# Patient Record
Sex: FEMALE | Race: WHITE | Employment: OTHER | URBAN - NONMETROPOLITAN AREA
[De-identification: names, ages, dates, MRNs, and addresses within clinical notes are randomized per-mention and may not be internally consistent; named-entity substitution may affect disease eponyms.]

---

## 2017-04-13 ENCOUNTER — TELEPHONE (OUTPATIENT)
Dept: NEUROSURGERY | Age: 57
End: 2017-04-13

## 2017-04-20 ENCOUNTER — OFFICE VISIT (OUTPATIENT)
Dept: URGENT CARE | Age: 57
End: 2017-04-20
Payer: COMMERCIAL

## 2017-04-20 VITALS
TEMPERATURE: 98.7 F | HEIGHT: 68 IN | RESPIRATION RATE: 18 BRPM | BODY MASS INDEX: 34.56 KG/M2 | SYSTOLIC BLOOD PRESSURE: 128 MMHG | OXYGEN SATURATION: 94 % | DIASTOLIC BLOOD PRESSURE: 72 MMHG | WEIGHT: 228 LBS | HEART RATE: 87 BPM

## 2017-04-20 DIAGNOSIS — J40 BRONCHITIS: Primary | ICD-10-CM

## 2017-04-20 PROCEDURE — 99213 OFFICE O/P EST LOW 20 MIN: CPT | Performed by: NURSE PRACTITIONER

## 2017-04-20 RX ORDER — METHYLPREDNISOLONE 4 MG/1
TABLET ORAL
Qty: 1 KIT | Refills: 0 | Status: SHIPPED | OUTPATIENT
Start: 2017-04-20 | End: 2017-04-26

## 2017-04-20 RX ORDER — AZITHROMYCIN 250 MG/1
TABLET, FILM COATED ORAL
Qty: 1 PACKET | Refills: 0 | Status: SHIPPED | OUTPATIENT
Start: 2017-04-20 | End: 2017-04-30

## 2017-04-20 RX ORDER — BENZONATATE 100 MG/1
100 CAPSULE ORAL 3 TIMES DAILY PRN
Qty: 21 CAPSULE | Refills: 0 | Status: SHIPPED | OUTPATIENT
Start: 2017-04-20 | End: 2017-04-27

## 2017-04-20 ASSESSMENT — ENCOUNTER SYMPTOMS
GASTROINTESTINAL NEGATIVE: 1
COUGH: 1
SINUS PRESSURE: 1
SORE THROAT: 0

## 2017-06-13 ENCOUNTER — OFFICE VISIT (OUTPATIENT)
Dept: NEUROSURGERY | Age: 57
End: 2017-06-13
Payer: COMMERCIAL

## 2017-06-13 VITALS
BODY MASS INDEX: 36.8 KG/M2 | SYSTOLIC BLOOD PRESSURE: 110 MMHG | DIASTOLIC BLOOD PRESSURE: 70 MMHG | WEIGHT: 229 LBS | HEIGHT: 66 IN | HEART RATE: 64 BPM | OXYGEN SATURATION: 95 %

## 2017-06-13 DIAGNOSIS — R41.3 MEMORY LOSS: Primary | ICD-10-CM

## 2017-06-13 DIAGNOSIS — R41.3 MEMORY LOSS: ICD-10-CM

## 2017-06-13 LAB
ALBUMIN SERPL-MCNC: 4.3 G/DL (ref 3.5–5.2)
ALP BLD-CCNC: 88 U/L (ref 35–104)
ALT SERPL-CCNC: 51 U/L (ref 5–33)
ANION GAP SERPL CALCULATED.3IONS-SCNC: 19 MMOL/L (ref 7–19)
AST SERPL-CCNC: 53 U/L (ref 5–32)
BASOPHILS ABSOLUTE: 0 K/UL (ref 0–0.2)
BASOPHILS RELATIVE PERCENT: 0.4 % (ref 0–1)
BILIRUB SERPL-MCNC: 0.5 MG/DL (ref 0.2–1.2)
BUN BLDV-MCNC: 15 MG/DL (ref 6–20)
CALCIUM SERPL-MCNC: 9.3 MG/DL (ref 8.6–10)
CHLORIDE BLD-SCNC: 101 MMOL/L (ref 98–111)
CO2: 23 MMOL/L (ref 22–29)
CREAT SERPL-MCNC: 0.7 MG/DL (ref 0.5–0.9)
EOSINOPHILS ABSOLUTE: 0.2 K/UL (ref 0–0.6)
EOSINOPHILS RELATIVE PERCENT: 3.6 % (ref 0–5)
GFR NON-AFRICAN AMERICAN: >60
GLUCOSE BLD-MCNC: 82 MG/DL (ref 74–109)
HCT VFR BLD CALC: 43.4 % (ref 37–47)
HEMOGLOBIN: 13.9 G/DL (ref 12–16)
LYMPHOCYTES ABSOLUTE: 2.3 K/UL (ref 1.1–4.5)
LYMPHOCYTES RELATIVE PERCENT: 34.5 % (ref 20–40)
MCH RBC QN AUTO: 29 PG (ref 27–31)
MCHC RBC AUTO-ENTMCNC: 32 G/DL (ref 33–37)
MCV RBC AUTO: 90.4 FL (ref 81–99)
MONOCYTES ABSOLUTE: 0.4 K/UL (ref 0–0.9)
MONOCYTES RELATIVE PERCENT: 6.1 % (ref 0–10)
NEUTROPHILS ABSOLUTE: 3.7 K/UL (ref 1.5–7.5)
NEUTROPHILS RELATIVE PERCENT: 55.1 % (ref 50–65)
PDW BLD-RTO: 12.9 % (ref 11.5–14.5)
PLATELET # BLD: 184 K/UL (ref 130–400)
PMV BLD AUTO: 10.2 FL (ref 9.4–12.3)
POTASSIUM SERPL-SCNC: 3.2 MMOL/L (ref 3.5–5)
RBC # BLD: 4.8 M/UL (ref 4.2–5.4)
RHEUMATOID FACTOR: <10 IU/ML
SODIUM BLD-SCNC: 143 MMOL/L (ref 136–145)
T4 FREE: 1.1 NG/ML (ref 0.9–1.7)
TOTAL PROTEIN: 7.6 G/DL (ref 6.6–8.7)
TSH SERPL DL<=0.05 MIU/L-ACNC: 1.72 UIU/ML (ref 0.27–4.2)
VITAMIN B-12: 610 PG/ML (ref 211–946)
WBC # BLD: 6.8 K/UL (ref 4.8–10.8)

## 2017-06-13 PROCEDURE — 99204 OFFICE O/P NEW MOD 45 MIN: CPT | Performed by: PSYCHIATRY & NEUROLOGY

## 2017-06-13 RX ORDER — DILTIAZEM HYDROCHLORIDE 360 MG/1
360 CAPSULE, EXTENDED RELEASE ORAL DAILY
COMMUNITY
End: 2018-06-11 | Stop reason: ALTCHOICE

## 2017-06-13 RX ORDER — ESCITALOPRAM OXALATE 10 MG/1
10 TABLET ORAL NIGHTLY
COMMUNITY

## 2017-06-13 RX ORDER — DONEPEZIL HYDROCHLORIDE 10 MG/1
10 TABLET, FILM COATED ORAL NIGHTLY
COMMUNITY
End: 2018-12-13 | Stop reason: SDUPTHER

## 2017-06-13 RX ORDER — MEMANTINE HYDROCHLORIDE 28 MG/1
28 CAPSULE, EXTENDED RELEASE ORAL DAILY
Qty: 30 CAPSULE | Refills: 5 | Status: SHIPPED | OUTPATIENT
Start: 2017-06-13 | End: 2017-06-16

## 2017-06-14 ENCOUNTER — TELEPHONE (OUTPATIENT)
Dept: NEUROLOGY | Age: 57
End: 2017-06-14

## 2017-06-15 LAB
HIV-1 AND HIV-2 ANTIBODIES: NEGATIVE
RPR: NORMAL
THYROID PEROXIDASE (TPO) ABS: 1.3 IU/ML (ref 0–9)

## 2017-06-16 ENCOUNTER — TELEPHONE (OUTPATIENT)
Dept: NEUROSURGERY | Age: 57
End: 2017-06-16

## 2017-06-16 LAB
ARSENIC BLOOD: <10 UG/L (ref 0–13)
LEAD LEVEL BLOOD: <2 UG/DL (ref 0–4.9)
MERCURY BLOOD: <3 UG/L (ref 0–10)
VITAMIN B1 WHOLE BLOOD: 139 NMOL/L (ref 70–180)

## 2017-06-16 RX ORDER — MEMANTINE HYDROCHLORIDE 10 MG/1
10 TABLET ORAL 2 TIMES DAILY
Qty: 60 TABLET | Refills: 5 | Status: SHIPPED | OUTPATIENT
Start: 2017-06-16 | End: 2017-09-19 | Stop reason: SINTOL

## 2017-06-16 RX ORDER — MEMANTINE HYDROCHLORIDE 5 MG-10 MG
KIT ORAL
Qty: 1 PACKAGE | Refills: 0 | Status: SHIPPED | OUTPATIENT
Start: 2017-06-16 | End: 2017-09-19 | Stop reason: SINTOL

## 2017-06-17 LAB — THYROID STIMULATING IMMUNOGLOBULIN: 92 %

## 2017-06-26 ENCOUNTER — TELEPHONE (OUTPATIENT)
Dept: NEUROLOGY | Age: 57
End: 2017-06-26

## 2017-07-10 ENCOUNTER — HOSPITAL ENCOUNTER (OUTPATIENT)
Dept: MRI IMAGING | Age: 57
Discharge: HOME | End: 2017-07-10
Payer: COMMERCIAL

## 2017-07-10 DIAGNOSIS — R41.3 MEMORY LOSS: ICD-10-CM

## 2017-07-10 PROCEDURE — 70553 MRI BRAIN STEM W/O & W/DYE: CPT

## 2017-07-10 PROCEDURE — 6360000004 HC RX CONTRAST MEDICATION: Performed by: PSYCHIATRY & NEUROLOGY

## 2017-07-10 PROCEDURE — A9579 GAD-BASE MR CONTRAST NOS,1ML: HCPCS | Performed by: PSYCHIATRY & NEUROLOGY

## 2017-07-10 RX ADMIN — GADOPENTETATE DIMEGLUMINE 20 ML: 469.01 INJECTION INTRAVENOUS at 13:38

## 2017-07-13 ENCOUNTER — TELEPHONE (OUTPATIENT)
Dept: NEUROLOGY | Age: 57
End: 2017-07-13

## 2017-07-27 ENCOUNTER — OFFICE VISIT (OUTPATIENT)
Dept: URGENT CARE | Age: 57
End: 2017-07-27
Payer: COMMERCIAL

## 2017-07-27 VITALS
HEIGHT: 66 IN | DIASTOLIC BLOOD PRESSURE: 65 MMHG | BODY MASS INDEX: 36.32 KG/M2 | RESPIRATION RATE: 20 BRPM | WEIGHT: 226 LBS | HEART RATE: 66 BPM | SYSTOLIC BLOOD PRESSURE: 122 MMHG | TEMPERATURE: 98.9 F | OXYGEN SATURATION: 96 %

## 2017-07-27 DIAGNOSIS — J01.00 ACUTE NON-RECURRENT MAXILLARY SINUSITIS: Primary | ICD-10-CM

## 2017-07-27 PROCEDURE — 99213 OFFICE O/P EST LOW 20 MIN: CPT | Performed by: NURSE PRACTITIONER

## 2017-07-27 RX ORDER — FLUTICASONE PROPIONATE 50 MCG
1 SPRAY, SUSPENSION (ML) NASAL DAILY
Qty: 1 BOTTLE | Refills: 0 | Status: SHIPPED | OUTPATIENT
Start: 2017-07-27 | End: 2018-08-14

## 2017-07-27 RX ORDER — AMOXICILLIN AND CLAVULANATE POTASSIUM 875; 125 MG/1; MG/1
1 TABLET, FILM COATED ORAL 2 TIMES DAILY
Qty: 20 TABLET | Refills: 0 | Status: SHIPPED | OUTPATIENT
Start: 2017-07-27 | End: 2017-08-06

## 2017-07-27 ASSESSMENT — ENCOUNTER SYMPTOMS
COUGH: 1
SINUS COMPLAINT: 1
EYE REDNESS: 0
SORE THROAT: 0
VOMITING: 0
SHORTNESS OF BREATH: 0
DIARRHEA: 0
SINUS PRESSURE: 1
EYE DISCHARGE: 0

## 2017-09-13 ENCOUNTER — HOSPITAL ENCOUNTER (OUTPATIENT)
Dept: NEUROLOGY | Age: 57
Discharge: HOME | End: 2017-09-13
Payer: COMMERCIAL

## 2017-09-13 PROCEDURE — 95816 EEG AWAKE AND DROWSY: CPT

## 2017-09-13 PROCEDURE — 95819 EEG AWAKE AND ASLEEP: CPT | Performed by: PSYCHIATRY & NEUROLOGY

## 2017-09-19 ENCOUNTER — OFFICE VISIT (OUTPATIENT)
Dept: NEUROSURGERY | Age: 57
End: 2017-09-19
Payer: COMMERCIAL

## 2017-09-19 VITALS
SYSTOLIC BLOOD PRESSURE: 123 MMHG | WEIGHT: 231 LBS | BODY MASS INDEX: 37.12 KG/M2 | HEIGHT: 66 IN | DIASTOLIC BLOOD PRESSURE: 75 MMHG | OXYGEN SATURATION: 96 % | HEART RATE: 76 BPM

## 2017-09-19 DIAGNOSIS — R41.3 MEMORY LOSS: Primary | ICD-10-CM

## 2017-09-19 PROCEDURE — 99214 OFFICE O/P EST MOD 30 MIN: CPT | Performed by: PSYCHIATRY & NEUROLOGY

## 2017-09-20 ENCOUNTER — TRANSCRIBE ORDERS (OUTPATIENT)
Dept: ADMINISTRATIVE | Facility: HOSPITAL | Age: 57
End: 2017-09-20

## 2017-09-20 DIAGNOSIS — Z12.31 ENCOUNTER FOR SCREENING MAMMOGRAM FOR MALIGNANT NEOPLASM OF BREAST: Primary | ICD-10-CM

## 2017-11-20 DIAGNOSIS — Z12.11 COLON CANCER SCREENING: Primary | ICD-10-CM

## 2017-11-20 LAB
CONTROL: NORMAL
HEMOCCULT STL QL: NEGATIVE

## 2017-11-21 ENCOUNTER — HOSPITAL ENCOUNTER (OUTPATIENT)
Dept: CT IMAGING | Age: 57
Discharge: HOME | End: 2017-11-21
Payer: COMMERCIAL

## 2017-11-21 DIAGNOSIS — R10.9 ABDOMINAL PAIN, UNSPECIFIED ABDOMINAL LOCATION: ICD-10-CM

## 2017-11-21 PROCEDURE — 74177 CT ABD & PELVIS W/CONTRAST: CPT

## 2017-11-21 PROCEDURE — 6360000004 HC RX CONTRAST MEDICATION: Performed by: NURSE PRACTITIONER

## 2017-11-21 RX ADMIN — IOVERSOL 90 ML: 636 INJECTION INTRA-ARTERIAL; INTRAVENOUS at 08:15

## 2017-11-22 ENCOUNTER — TRANSCRIBE ORDERS (OUTPATIENT)
Dept: ADMINISTRATIVE | Facility: HOSPITAL | Age: 57
End: 2017-11-22

## 2017-11-22 ENCOUNTER — APPOINTMENT (OUTPATIENT)
Dept: LAB | Facility: HOSPITAL | Age: 57
End: 2017-11-22

## 2017-11-22 DIAGNOSIS — R19.7 DIARRHEA, UNSPECIFIED TYPE: Primary | ICD-10-CM

## 2017-11-24 ENCOUNTER — TRANSCRIBE ORDERS (OUTPATIENT)
Dept: LAB | Facility: HOSPITAL | Age: 57
End: 2017-11-24

## 2017-11-24 ENCOUNTER — APPOINTMENT (OUTPATIENT)
Dept: LAB | Facility: HOSPITAL | Age: 57
End: 2017-11-24

## 2017-11-24 DIAGNOSIS — R19.7 DIARRHEA, UNSPECIFIED TYPE: Primary | ICD-10-CM

## 2017-11-24 LAB
ADV 40+41 DNA STL QL NAA+NON-PROBE: NOT DETECTED
ASTRO TYP 1-8 RNA STL QL NAA+NON-PROBE: NOT DETECTED
C CAYETANENSIS DNA STL QL NAA+NON-PROBE: NOT DETECTED
C DIFF TOX GENS STL QL NAA+PROBE: NOT DETECTED
CAMPY SP DNA.DIARRHEA STL QL NAA+PROBE: NOT DETECTED
CRYPTOSP STL CULT: NOT DETECTED
E COLI DNA SPEC QL NAA+PROBE: NOT DETECTED
E HISTOLYT AG STL-ACNC: NOT DETECTED
EAEC PAA PLAS AGGR+AATA ST NAA+NON-PRB: NOT DETECTED
EC STX1+STX2 GENES STL QL NAA+NON-PROBE: NOT DETECTED
EPEC EAE GENE STL QL NAA+NON-PROBE: NOT DETECTED
ETEC LTA+ST1A+ST1B TOX ST NAA+NON-PROBE: NOT DETECTED
G LAMBLIA DNA SPEC QL NAA+PROBE: NOT DETECTED
NOROVIRUS GI+II RNA STL QL NAA+NON-PROBE: NOT DETECTED
P SHIGELLOIDES DNA STL QL NAA+NON-PROBE: NOT DETECTED
RV RNA STL NAA+PROBE: NOT DETECTED
SALMONELLA DNA SPEC QL NAA+PROBE: NOT DETECTED
SAPO I+II+IV+V RNA STL QL NAA+NON-PROBE: NOT DETECTED
SHIGELLA SP+EIEC IPAH ST NAA+NON-PROBE: NOT DETECTED
V CHOLERAE DNA SPEC QL NAA+PROBE: NOT DETECTED
VIBRIO DNA SPEC NAA+PROBE: NOT DETECTED
YERSINIA STL CULT: NOT DETECTED

## 2017-11-24 PROCEDURE — 87507 IADNA-DNA/RNA PROBE TQ 12-25: CPT | Performed by: NURSE PRACTITIONER

## 2017-11-28 ENCOUNTER — OFFICE VISIT (OUTPATIENT)
Dept: GASTROENTEROLOGY | Facility: CLINIC | Age: 57
End: 2017-11-28

## 2017-11-28 VITALS
WEIGHT: 230 LBS | HEART RATE: 80 BPM | DIASTOLIC BLOOD PRESSURE: 64 MMHG | BODY MASS INDEX: 36.96 KG/M2 | SYSTOLIC BLOOD PRESSURE: 118 MMHG | HEIGHT: 66 IN | TEMPERATURE: 99 F | OXYGEN SATURATION: 97 %

## 2017-11-28 DIAGNOSIS — R19.7 DIARRHEA, UNSPECIFIED TYPE: Primary | ICD-10-CM

## 2017-11-28 DIAGNOSIS — R11.0 NAUSEA: ICD-10-CM

## 2017-11-28 DIAGNOSIS — R41.3 MEMORY DEFICIT: ICD-10-CM

## 2017-11-28 DIAGNOSIS — K52.9 COLITIS: ICD-10-CM

## 2017-11-28 PROCEDURE — 99204 OFFICE O/P NEW MOD 45 MIN: CPT | Performed by: NURSE PRACTITIONER

## 2017-11-28 RX ORDER — LATANOPROST 50 UG/ML
SOLUTION/ DROPS OPHTHALMIC
COMMUNITY
Start: 2017-10-13 | End: 2021-08-26

## 2017-11-28 RX ORDER — DILTIAZEM HYDROCHLORIDE 360 MG/1
360 CAPSULE, EXTENDED RELEASE ORAL DAILY
COMMUNITY
Start: 2017-09-20 | End: 2023-03-13

## 2017-11-28 RX ORDER — ESCITALOPRAM OXALATE 10 MG/1
10 TABLET ORAL DAILY
COMMUNITY
Start: 2017-11-15

## 2017-11-28 RX ORDER — DONEPEZIL HYDROCHLORIDE 10 MG/1
10 TABLET, FILM COATED ORAL NIGHTLY
COMMUNITY
Start: 2017-11-17

## 2017-11-28 RX ORDER — TRIAMTERENE AND HYDROCHLOROTHIAZIDE 37.5; 25 MG/1; MG/1
CAPSULE ORAL
COMMUNITY
Start: 2017-09-20 | End: 2021-08-26

## 2017-11-28 NOTE — PROGRESS NOTES
Chief Complaint:   Chief Complaint   Patient presents with   • Diarrhea     Patient is here today with complaints of diarrhea.         Patient ID: Geneva Knight is a 56 y.o. female     History of Present Illness:  This is a very pleasant 56-year-old female who was referred to our office by JR Hoyt for diarrhea.  The patient tells me that this started occurring gradually around the beginning to the middle of October.  She states at that time she was having 5-7 watery stools a day.  She was seen by her PCP who ordered a CT of the abdomen and pelvis that revealed distal colon changes that could represent acute colitis there was no obstruction.  Stool cultures were taken on 11/24/17 and were found to be negative for pathogens.  The patient states that she is still having the diarrhea but it has decreased to only occur every few days now.  The patient states that she has had some nausea with this.  She states she cannot find any relieving or exacerbating factors.    The patient denies any vomiting, epigastric pain, dysphagia, pyrosis or hematemesis.  She denies any fever or chills.  She denies any melena or hematochezia.  She denies any constipation.  She denies any abdominal pain.  She denies any unintentional weight loss or loss of appetite.     Last colonoscopy was 7/25/13 normal.         Past Medical History:   Diagnosis Date   • GERD (gastroesophageal reflux disease)    • HTN (hypertension)    • Hyperlipemia    • VIOLETA (obstructive sleep apnea)        Past Surgical History:   Procedure Laterality Date   • CHOLECYSTECTOMY     • COLONOSCOPY  07/25/2013         Current Outpatient Prescriptions:   •  diltiaZEM CD (CARDIZEM CD) 360 MG 24 hr capsule, , Disp: , Rfl:   •  donepezil (ARICEPT) 10 MG tablet, , Disp: , Rfl:   •  escitalopram (LEXAPRO) 10 MG tablet, , Disp: , Rfl:   •  latanoprost (XALATAN) 0.005 % ophthalmic solution, , Disp: , Rfl:   •  triamterene-hydrochlorothiazide (DYAZIDE) 37.5-25 MG per  "capsule, , Disp: , Rfl:   •  polyethylene glycol (GOLYTELY) 236 g solution, Take 4,000 mL by mouth 1 (One) Time for 1 dose. As directed by instructions provided at office, Disp: 4000 mL, Rfl: 0    No Known Allergies    Social History     Social History   • Marital status:      Spouse name: N/A   • Number of children: N/A   • Years of education: N/A     Occupational History   • Not on file.     Social History Main Topics   • Smoking status: Never Smoker   • Smokeless tobacco: Never Used   • Alcohol use No   • Drug use: Not on file   • Sexual activity: Not on file     Other Topics Concern   • Not on file     Social History Narrative       Family History   Problem Relation Age of Onset   • Breast cancer Mother    • Breast cancer Maternal Grandmother    • Colon cancer Neg Hx    • Colon polyps Neg Hx        Vitals:    11/28/17 0821   BP: 118/64   Pulse: 80   Temp: 99 °F (37.2 °C)   SpO2: 97%   Weight: 230 lb (104 kg)   Height: 66\" (167.6 cm)       Review of Systems:    General:    Present -feeling well   Skin:    Not Present-Rash   HEENT:     Not Present-Acute visual changes or Acute hearing changes   Neck :    Not Present- swollen glands   Genitourinary:      Not Present- burning, frequency, urgency hematuria, dysuria,   Cardiovascular:   Not Present-chest pain, palpitations, or pressure   Respiratory:   Not Present- shortness of breath or cough   Gastrointestinal:  Musculoskeletal:  Neurological:  Psychiatric:   Present as mentioned in the HP    Not Present. Recent gait disturbances.    Not Present-Seizures and weakness in extremities.    Not Present- Anxiety or Depression.       Physical Exam:    General Appearance:    Alert, cooperative, in no acute distress   Psych:    Mood appropriate    Eyes:          conjunctivae and sclerae normal, no   icterus, no pallor   ENMT:    Ears appear intact with no abnormalities noted oral mucosa moist   Neck:   No adenopathy, supple, trachea midline, no thyromegaly, no   " carotid bruit, no JVD    Cardiovascular:    Regular rhythm and normal rate, normal S1 and S2, no            murmur, no gallop, no rub, no click   Gastrointestinal:     Inspection normal.  Normal bowel sounds, no masses, no organomegaly, soft round non-tender, non-distended, no guarding, no rebound or tenderness. No hepatosplenomegaly.   Skin:   No bleeding, bruising or rash   Neurologic:   nonfocal       No results found for: WBC, HGB, HCT, PLT     No results found for: NA, K, CL, CO2, BUN, CREATININE, BILITOT, ALKPHOS, ALT, AST, GLUCOSE    No results found for: INR    Assessment and Plan:    Geneva was seen today for diarrhea.    Diagnoses and all orders for this visit:    Diarrhea, unspecified type  -     Case Request; Standing  -     Case Request Colonoscopy    Colitis  -     Case Request; Standing  -     Case Request    Nausea  -     Case Request; Standing  -     Case Request    Memory deficit    Other orders  -     Implement Anesthesia Orders Day of Procedure; Standing  -     Obtain Informed Consent; Standing  -     Verify bowel prep was successful; Standing  -     polyethylene glycol (GOLYTELY) 236 g solution; Take 4,000 mL by mouth 1 (One) Time for 1 dose. As directed by instructions provided at office      The risks, benefits, and alternatives of colonoscopy were reviewed with the patient today.  Risks including perforation of the colon possibly requiring surgery or colostomy.  Additional risks include risk of bleeding from biopsies or removal of colon tissue.  There is also the risk of a drug reaction or problems with anesthesia.  This will be discussed with the further by the anesthesia team on the day of the procedure.  Lastly there is a possibility of missing a colon polyp or cancer.  The benefits include the diagnosis and management of disease of the colon and rectum.  Alternatives to colonoscopy include barium enema, laboratory testing, radiographic evaluation, or no intervention.  The patient  verbalizes understanding and agrees.      Next follow-up appointment      EMR Dragon/Transcription disclaimer:  Much of this encounter note is an electronic transcription/translation of spoken language to printed text. The electronic translation of spoken language may permit erroneous, or at times, nonsensical words or phrases to be inadvertently transcribed; although I have reviewed the note for such errors, some may still exist.

## 2017-12-18 ENCOUNTER — HOSPITAL ENCOUNTER (OUTPATIENT)
Facility: HOSPITAL | Age: 57
Setting detail: HOSPITAL OUTPATIENT SURGERY
Discharge: HOME | End: 2017-12-18
Attending: INTERNAL MEDICINE | Admitting: INTERNAL MEDICINE

## 2017-12-18 ENCOUNTER — ANESTHESIA (OUTPATIENT)
Dept: GASTROENTEROLOGY | Facility: HOSPITAL | Age: 57
End: 2017-12-18

## 2017-12-18 ENCOUNTER — ANESTHESIA EVENT (OUTPATIENT)
Dept: GASTROENTEROLOGY | Facility: HOSPITAL | Age: 57
End: 2017-12-18

## 2017-12-18 VITALS
RESPIRATION RATE: 17 BRPM | SYSTOLIC BLOOD PRESSURE: 121 MMHG | HEIGHT: 66 IN | WEIGHT: 229 LBS | HEART RATE: 67 BPM | TEMPERATURE: 97.6 F | DIASTOLIC BLOOD PRESSURE: 70 MMHG | OXYGEN SATURATION: 94 % | BODY MASS INDEX: 36.8 KG/M2

## 2017-12-18 DIAGNOSIS — R19.7 DIARRHEA, UNSPECIFIED TYPE: ICD-10-CM

## 2017-12-18 DIAGNOSIS — K52.9 COLITIS: ICD-10-CM

## 2017-12-18 DIAGNOSIS — R11.0 NAUSEA: ICD-10-CM

## 2017-12-18 PROBLEM — K63.5 COLON POLYPS: Status: ACTIVE | Noted: 2017-12-18

## 2017-12-18 PROCEDURE — 88305 TISSUE EXAM BY PATHOLOGIST: CPT | Performed by: INTERNAL MEDICINE

## 2017-12-18 PROCEDURE — 45380 COLONOSCOPY AND BIOPSY: CPT | Performed by: INTERNAL MEDICINE

## 2017-12-18 PROCEDURE — 45385 COLONOSCOPY W/LESION REMOVAL: CPT | Performed by: INTERNAL MEDICINE

## 2017-12-18 PROCEDURE — 25010000002 PROPOFOL 10 MG/ML EMULSION: Performed by: NURSE ANESTHETIST, CERTIFIED REGISTERED

## 2017-12-18 RX ORDER — SODIUM CHLORIDE 9 MG/ML
500 INJECTION, SOLUTION INTRAVENOUS CONTINUOUS PRN
Status: DISCONTINUED | OUTPATIENT
Start: 2017-12-18 | End: 2017-12-18 | Stop reason: HOSPADM

## 2017-12-18 RX ORDER — SODIUM CHLORIDE 9 MG/ML
100 INJECTION, SOLUTION INTRAVENOUS CONTINUOUS
Status: CANCELLED | OUTPATIENT
Start: 2017-12-18

## 2017-12-18 RX ORDER — SODIUM CHLORIDE 0.9 % (FLUSH) 0.9 %
3 SYRINGE (ML) INJECTION AS NEEDED
Status: DISCONTINUED | OUTPATIENT
Start: 2017-12-18 | End: 2017-12-18 | Stop reason: HOSPADM

## 2017-12-18 RX ORDER — PROPOFOL 10 MG/ML
VIAL (ML) INTRAVENOUS AS NEEDED
Status: DISCONTINUED | OUTPATIENT
Start: 2017-12-18 | End: 2017-12-18 | Stop reason: SURG

## 2017-12-18 RX ORDER — SODIUM CHLORIDE 0.9 % (FLUSH) 0.9 %
1-10 SYRINGE (ML) INJECTION AS NEEDED
Status: CANCELLED | OUTPATIENT
Start: 2017-12-18

## 2017-12-18 RX ORDER — LIDOCAINE HYDROCHLORIDE 20 MG/ML
INJECTION, SOLUTION INFILTRATION; PERINEURAL AS NEEDED
Status: DISCONTINUED | OUTPATIENT
Start: 2017-12-18 | End: 2017-12-18 | Stop reason: SURG

## 2017-12-18 RX ADMIN — SODIUM CHLORIDE 500 ML: 9 INJECTION, SOLUTION INTRAVENOUS at 07:35

## 2017-12-18 RX ADMIN — PROPOFOL 100 MG: 10 INJECTION, EMULSION INTRAVENOUS at 09:17

## 2017-12-18 RX ADMIN — PROPOFOL 100 MG: 10 INJECTION, EMULSION INTRAVENOUS at 09:03

## 2017-12-18 RX ADMIN — LIDOCAINE HYDROCHLORIDE 0.5 ML: 10 INJECTION, SOLUTION EPIDURAL; INFILTRATION; INTRACAUDAL; PERINEURAL at 07:35

## 2017-12-18 RX ADMIN — PROPOFOL 50 MG: 10 INJECTION, EMULSION INTRAVENOUS at 09:10

## 2017-12-18 RX ADMIN — PROPOFOL 50 MG: 10 INJECTION, EMULSION INTRAVENOUS at 09:06

## 2017-12-18 RX ADMIN — LIDOCAINE HYDROCHLORIDE 50 MG: 20 INJECTION, SOLUTION INFILTRATION; PERINEURAL at 09:03

## 2017-12-18 RX ADMIN — PROPOFOL 50 MG: 10 INJECTION, EMULSION INTRAVENOUS at 09:23

## 2017-12-18 RX ADMIN — PROPOFOL 50 MG: 10 INJECTION, EMULSION INTRAVENOUS at 09:14

## 2017-12-18 RX ADMIN — PROPOFOL 50 MG: 10 INJECTION, EMULSION INTRAVENOUS at 09:29

## 2017-12-18 RX ADMIN — PROPOFOL 50 MG: 10 INJECTION, EMULSION INTRAVENOUS at 09:26

## 2017-12-18 RX ADMIN — PROPOFOL 100 MG: 10 INJECTION, EMULSION INTRAVENOUS at 09:20

## 2017-12-18 NOTE — PLAN OF CARE
Problem: GI Endoscopy (Adult)  Goal: Signs and Symptoms of Listed Potential Problems Will be Absent or Manageable (GI Endoscopy)  Outcome: Ongoing (interventions implemented as appropriate)    12/18/17 8835   GI Endoscopy   Problems Assessed (GI Endoscopy) all   Problems Present (GI Endoscopy) none

## 2017-12-18 NOTE — ANESTHESIA PREPROCEDURE EVALUATION
Anesthesia Evaluation     no history of anesthetic complications:  NPO Solid Status: > 8 hours  NPO Liquid Status: > 2 hours     Airway   Mallampati: II  TM distance: >3 FB  Neck ROM: full  no difficulty expected  Dental - normal exam     Pulmonary - normal exam    breath sounds clear to auscultation  (+) sleep apnea on CPAP,   (-) asthma, recent URI, not a smoker  Cardiovascular - normal exam  Exercise tolerance: good (4-7 METS)    Rhythm: regular  Rate: normal    (+) hypertension well controlled,   (-) pacemaker, past MI, angina, cardiac stents, CABG      Neuro/Psych  (-) seizures, TIA, CVA  GI/Hepatic/Renal/Endo    (+) obesity,    (-) GERD, liver disease, no renal disease, diabetes, hypothyroidism, hyperthyroidism    Musculoskeletal     Abdominal    Substance History      OB/GYN          Other                                        Anesthesia Plan    ASA 2     general   total IV anesthesia  intravenous induction   Anesthetic plan and risks discussed with patient.

## 2017-12-18 NOTE — ANESTHESIA POSTPROCEDURE EVALUATION
Patient: Geneva Knight    Procedure Summary     Date Anesthesia Start Anesthesia Stop Room / Location    12/18/17 0902 0933 [facility] ENDOSCOPY 5 / [facility]       Procedure Diagnosis Surgeon Provider    COLONOSCOPY WITH ANESTHESIA (N/A ) Colitis; Nausea; Diarrhea, unspecified type  (Colitis [K52.9]; Nausea [R11.0]; Diarrhea, unspecified type [R19.7]) MD JOEL Arrington CRNA          Anesthesia Type: general  Last vitals  BP   131/63 (12/18/17 0935)   Temp   97.6 °F (36.4 °C) (12/18/17 0725)   Pulse   77 (12/18/17 0935)   Resp   18 (12/18/17 0935)     SpO2   93 % (12/18/17 0935)     Post Anesthesia Care and Evaluation    Patient location during evaluation: PACU  Patient participation: complete - patient participated  Level of consciousness: awake and alert  Pain score: 0  Pain management: adequate  Airway patency: patent  Anesthetic complications: No anesthetic complications    Cardiovascular status: acceptable and stable  Respiratory status: acceptable and unassisted  Hydration status: acceptable

## 2017-12-18 NOTE — PLAN OF CARE
Problem: Patient Care Overview (Adult)  Goal: Plan of Care Review  Outcome: Outcome(s) achieved Date Met:  12/18/17 12/18/17 0937   Coping/Psychosocial Response Interventions   Plan Of Care Reviewed With patient;spouse   Patient Care Overview   Progress improving   Outcome Evaluation   Outcome Summary/Follow up Plan discharge criteria met

## 2017-12-18 NOTE — PLAN OF CARE
Problem: Patient Care Overview (Adult)  Goal: Adult Individualization and Mutuality  Outcome: Ongoing (interventions implemented as appropriate)    12/18/17 0723   Individualization   Patient Specific Preferences none   Patient Specific Goals none   Patient Specific Interventions none   Mutuality/Individual Preferences   What Anxieties, Fears or Concerns Do You Have About Your Health or Care? none   What Questions Do You Have About Your Health or Care? none   What Information Would Help Us Give You More Personalized Care? none

## 2017-12-18 NOTE — PLAN OF CARE
Problem: GI Endoscopy (Adult)  Goal: Signs and Symptoms of Listed Potential Problems Will be Absent or Manageable (GI Endoscopy)  Outcome: Outcome(s) achieved Date Met:  12/18/17 12/18/17 0913   GI Endoscopy   Problems Assessed (GI Endoscopy) all   Problems Present (GI Endoscopy) none

## 2017-12-19 LAB
CYTO UR: NORMAL
LAB AP CASE REPORT: NORMAL
LAB AP CLINICAL INFORMATION: NORMAL
Lab: NORMAL
PATH REPORT.FINAL DX SPEC: NORMAL
PATH REPORT.GROSS SPEC: NORMAL

## 2017-12-27 ENCOUNTER — HOSPITAL ENCOUNTER (OUTPATIENT)
Dept: MAMMOGRAPHY | Facility: HOSPITAL | Age: 57
Discharge: HOME | End: 2017-12-27
Admitting: NURSE PRACTITIONER

## 2017-12-27 DIAGNOSIS — Z12.31 ENCOUNTER FOR SCREENING MAMMOGRAM FOR MALIGNANT NEOPLASM OF BREAST: ICD-10-CM

## 2017-12-27 PROCEDURE — G0202 SCR MAMMO BI INCL CAD: HCPCS

## 2017-12-27 PROCEDURE — 77063 BREAST TOMOSYNTHESIS BI: CPT

## 2017-12-30 ENCOUNTER — OFFICE VISIT (OUTPATIENT)
Dept: URGENT CARE | Age: 57
End: 2017-12-30
Payer: COMMERCIAL

## 2017-12-30 VITALS
DIASTOLIC BLOOD PRESSURE: 70 MMHG | OXYGEN SATURATION: 98 % | HEART RATE: 78 BPM | SYSTOLIC BLOOD PRESSURE: 120 MMHG | WEIGHT: 233 LBS | RESPIRATION RATE: 18 BRPM | BODY MASS INDEX: 37.61 KG/M2 | TEMPERATURE: 98.2 F

## 2017-12-30 DIAGNOSIS — J01.90 ACUTE SINUSITIS, RECURRENCE NOT SPECIFIED, UNSPECIFIED LOCATION: Primary | ICD-10-CM

## 2017-12-30 PROCEDURE — G8417 CALC BMI ABV UP PARAM F/U: HCPCS | Performed by: SPECIALIST

## 2017-12-30 PROCEDURE — 3017F COLORECTAL CA SCREEN DOC REV: CPT | Performed by: SPECIALIST

## 2017-12-30 PROCEDURE — G8427 DOCREV CUR MEDS BY ELIG CLIN: HCPCS | Performed by: SPECIALIST

## 2017-12-30 PROCEDURE — 99213 OFFICE O/P EST LOW 20 MIN: CPT | Performed by: SPECIALIST

## 2017-12-30 PROCEDURE — G8484 FLU IMMUNIZE NO ADMIN: HCPCS | Performed by: SPECIALIST

## 2017-12-30 PROCEDURE — 3014F SCREEN MAMMO DOC REV: CPT | Performed by: SPECIALIST

## 2017-12-30 PROCEDURE — 1036F TOBACCO NON-USER: CPT | Performed by: SPECIALIST

## 2017-12-30 RX ORDER — AMOXICILLIN 875 MG/1
875 TABLET, COATED ORAL 2 TIMES DAILY
Qty: 20 TABLET | Refills: 0 | Status: SHIPPED | OUTPATIENT
Start: 2017-12-30 | End: 2018-01-09

## 2017-12-30 RX ORDER — PREDNISONE 10 MG/1
10 TABLET ORAL DAILY
Qty: 5 TABLET | Refills: 0 | Status: SHIPPED | OUTPATIENT
Start: 2017-12-30 | End: 2018-01-04

## 2017-12-30 ASSESSMENT — ENCOUNTER SYMPTOMS
SINUS PAIN: 1
SINUS PRESSURE: 1

## 2017-12-30 NOTE — PATIENT INSTRUCTIONS
Patient Education        Sinusitis: Care Instructions  Your Care Instructions    Sinusitis is an infection of the lining of the sinus cavities in your head. Sinusitis often follows a cold. It causes pain and pressure in your head and face. In most cases, sinusitis gets better on its own in 1 to 2 weeks. But some mild symptoms may last for several weeks. Sometimes antibiotics are needed. Follow-up care is a key part of your treatment and safety. Be sure to make and go to all appointments, and call your doctor if you are having problems. It's also a good idea to know your test results and keep a list of the medicines you take. How can you care for yourself at home? · Take an over-the-counter pain medicine, such as acetaminophen (Tylenol), ibuprofen (Advil, Motrin), or naproxen (Aleve). Read and follow all instructions on the label. · If the doctor prescribed antibiotics, take them as directed. Do not stop taking them just because you feel better. You need to take the full course of antibiotics. · Be careful when taking over-the-counter cold or flu medicines and Tylenol at the same time. Many of these medicines have acetaminophen, which is Tylenol. Read the labels to make sure that you are not taking more than the recommended dose. Too much acetaminophen (Tylenol) can be harmful. · Breathe warm, moist air from a steamy shower, a hot bath, or a sink filled with hot water. Avoid cold, dry air. Using a humidifier in your home may help. Follow the directions for cleaning the machine. · Use saline (saltwater) nasal washes to help keep your nasal passages open and wash out mucus and bacteria. You can buy saline nose drops at a grocery store or drugstore. Or you can make your own at home by adding 1 teaspoon of salt and 1 teaspoon of baking soda to 2 cups of distilled water. If you make your own, fill a bulb syringe with the solution, insert the tip into your nostril, and squeeze gently. [place] your nose.   · Put a hot, wet towel or a warm gel pack on your face 3 or 4 times a day for 5 to 10 minutes each time. · Try a decongestant nasal spray like oxymetazoline (Afrin). Do not use it for more than 3 days in a row. Using it for more than 3 days can make your congestion worse. When should you call for help? Call your doctor now or seek immediate medical care if:  ? · You have new or worse swelling or redness in your face or around your eyes. ? · You have a new or higher fever. ? Watch closely for changes in your health, and be sure to contact your doctor if:  ? · You have new or worse facial pain. ? · The mucus from your nose becomes thicker (like pus) or has new blood in it. ? · You are not getting better as expected. Where can you learn more? Go to https://Call[employer] TechnologiespepicRaftereb.Fubles. org and sign in to your [employer] account. Enter B555 in the [employer] box to learn more about \"Sinusitis: Care Instructions. \"     If you do not have an account, please click on the \"Sign Up Now\" link. Current as of: May 12, 2017  Content Version: 11.4  © 7377-4342 Healthwise, Incorporated. Care instructions adapted under license by [facility] ([facility]). If you have questions about a medical condition or this instruction, always ask your healthcare professional. [address]  any warranty or liability for your use of this information.

## 2017-12-30 NOTE — PROGRESS NOTES
[address] [facility] CARE  [address] [facility] [address]  Dept: [phone]  Loc: [phone]    Nils Barbour is a 65 y.o. female who presents today for her medical conditions/complaints as noted below. Nils Barbour is c/o of Head Congestion        HPI:     Sinusitis   This is a new problem. The current episode started in the past 7 days. The problem has been gradually worsening since onset. Associated symptoms include congestion and sinus pressure. Past treatments include nothing. Past Medical History:   Diagnosis Date    Anxiety     Depression     Diabetes ([address]     Hypertension     Memory difficulty     Sleep apnea     c-pap      Past Surgical History:   Procedure Laterality Date    GALLBLADDER SURGERY         Family History   Problem Relation Age of Onset    Cancer Mother     Heart Attack Father        Social History   Substance Use Topics    Smoking status: Never Smoker    Smokeless tobacco: Never Used    Alcohol use No      Current Outpatient Prescriptions   Medication Sig Dispense Refill    amoxicillin (AMOXIL) 875 MG tablet Take 1 tablet by mouth 2 times daily for 10 days 20 tablet 0    predniSONE (DELTASONE) 10 MG tablet Take 1 tablet by mouth daily for 5 days 5 tablet 0    fluticasone (FLONASE) 50 MCG/ACT nasal spray 1 spray by Nasal route daily 1 Bottle 0    diltiazem (TIAZAC) 360 MG extended release capsule Take 360 mg by mouth daily      escitalopram (LEXAPRO) 10 MG tablet Take 10 mg by mouth daily      donepezil (ARICEPT) 10 MG tablet Take 10 mg by mouth nightly      triamterene-hydrochlorothiazide (MAXZIDE-25) 37.5-25 MG per tablet Take 1 tablet by mouth daily       No current facility-administered medications for this visit.       No Known Allergies    Health Maintenance   Topic Date Due    Hepatitis C screen  1960    Diabetic foot exam  12/27/1971    Diabetic hemoglobin A1C test  12/27/1971    Diabetic retinal exam  12/27/1971    Lipid screen  12/27/1971    Diabetic microalbuminuria test  12/27/1979    DTaP/Tdap/Td vaccine (1 - Tdap) 12/27/1980    Pneumococcal med risk (1 of 1 - PPSV23) 12/27/1980    Cervical cancer screen  12/27/1982    Breast cancer screen  11/08/2015    Flu vaccine (1) 09/01/2017    Colon Cancer Screen FIT/FOBT  10/25/2018    HIV screen  Completed       Subjective:      Review of Systems   HENT: Positive for congestion, sinus pain and sinus pressure. Objective:     Physical Exam   Constitutional: She is oriented to person, place, and time. Vital signs are normal. She appears well-developed and well-nourished. HENT:   Head: Normocephalic and atraumatic. Right Ear: Tympanic membrane and external ear normal.   Left Ear: Tympanic membrane and external ear normal.   Nose: Nose normal.   Mouth/Throat: Oropharynx is clear and moist.   Neck: Normal range of motion. Cardiovascular: Normal rate, regular rhythm, normal heart sounds and intact distal pulses. Pulmonary/Chest: Effort normal and breath sounds normal.   Musculoskeletal: Normal range of motion. Lymphadenopathy:     She has cervical adenopathy. Neurological: She is alert and oriented to person, place, and time. Skin: Skin is warm and dry. Psychiatric: She has a normal mood and affect. Her behavior is normal. Judgment and thought content normal.   Nursing note and vitals reviewed. /70   Pulse 78   Temp 98.2 °F (36.8 °C) (Temporal)   Resp 18   Wt 233 lb (105.7 kg)   SpO2 98%   BMI 37.61 kg/m²     Assessment:      1. Acute sinusitis, recurrence not specified, unspecified location         Plan:    No orders of the defined types were placed in this encounter. No Follow-up on file. No orders of the defined types were placed in this encounter.     Orders Placed This Encounter   Medications    amoxicillin (AMOXIL) 875 MG tablet     Sig: Take 1 tablet by mouth 2 times daily for 10 days     Dispense: air. Using a humidifier in your home may help. Follow the directions for cleaning the machine. · Use saline (saltwater) nasal washes to help keep your nasal passages open and wash out mucus and bacteria. You can buy saline nose drops at a grocery store or drugstore. Or you can make your own at home by adding 1 teaspoon of salt and 1 teaspoon of baking soda to 2 cups of distilled water. If you make your own, fill a bulb syringe with the solution, insert the tip into your nostril, and squeeze gently. [employer] your nose. · Put a hot, wet towel or a warm gel pack on your face 3 or 4 times a day for 5 to 10 minutes each time. · Try a decongestant nasal spray like oxymetazoline (Afrin). Do not use it for more than 3 days in a row. Using it for more than 3 days can make your congestion worse. When should you call for help? Call your doctor now or seek immediate medical care if:  ? · You have new or worse swelling or redness in your face or around your eyes. ? · You have a new or higher fever. ? Watch closely for changes in your health, and be sure to contact your doctor if:  ? · You have new or worse facial pain. ? · The mucus from your nose becomes thicker (like pus) or has new blood in it. ? · You are not getting better as expected. Where can you learn more? Go to https://Cloudfinder.[employer]IME Analytics. org and sign in to your [employer] account. Enter Y896 in the [facility] box to learn more about \"Sinusitis: Care Instructions. \"     If you do not have an account, please click on the \"Sign Up Now\" link. Current as of: May 12, 2017  Content Version: 11.4  © 9317-5118 Healthwise, [facility]. Care instructions adapted under license by [facility] ([facility]). If you have questions about a medical condition or this instruction, always ask your healthcare professional. [address] any warranty or liability for your use of this information.              Electronically signed by Xander Chaney NP

## 2018-06-11 ENCOUNTER — OFFICE VISIT (OUTPATIENT)
Dept: NEUROSURGERY | Age: 58
End: 2018-06-11
Payer: COMMERCIAL

## 2018-06-11 VITALS
HEIGHT: 66 IN | BODY MASS INDEX: 37.64 KG/M2 | WEIGHT: 234.2 LBS | SYSTOLIC BLOOD PRESSURE: 113 MMHG | DIASTOLIC BLOOD PRESSURE: 68 MMHG | HEART RATE: 69 BPM

## 2018-06-11 DIAGNOSIS — R41.3 MEMORY DEFICIT: Primary | ICD-10-CM

## 2018-06-11 PROBLEM — K52.9 COLITIS: Status: ACTIVE | Noted: 2017-11-28

## 2018-06-11 PROBLEM — R19.7 DIARRHEA: Status: ACTIVE | Noted: 2017-11-28

## 2018-06-11 PROBLEM — R11.0 NAUSEA: Status: ACTIVE | Noted: 2017-11-28

## 2018-06-11 PROBLEM — K63.5 COLON POLYPS: Status: ACTIVE | Noted: 2017-12-18

## 2018-06-11 PROCEDURE — 99213 OFFICE O/P EST LOW 20 MIN: CPT | Performed by: NURSE PRACTITIONER

## 2018-06-11 RX ORDER — METOPROLOL SUCCINATE 100 MG/1
100 TABLET, EXTENDED RELEASE ORAL NIGHTLY
Refills: 1 | COMMUNITY
Start: 2018-06-04

## 2018-06-26 ENCOUNTER — TELEPHONE (OUTPATIENT)
Dept: NEUROSURGERY | Age: 58
End: 2018-06-26

## 2018-08-14 ENCOUNTER — HOSPITAL ENCOUNTER (EMERGENCY)
Age: 58
Discharge: HOME | End: 2018-08-14
Attending: EMERGENCY MEDICINE
Payer: COMMERCIAL

## 2018-08-14 ENCOUNTER — APPOINTMENT (OUTPATIENT)
Dept: CT IMAGING | Age: 58
End: 2018-08-14
Payer: COMMERCIAL

## 2018-08-14 VITALS
BODY MASS INDEX: 33.34 KG/M2 | HEIGHT: 68 IN | SYSTOLIC BLOOD PRESSURE: 129 MMHG | RESPIRATION RATE: 17 BRPM | TEMPERATURE: 99 F | WEIGHT: 220 LBS | HEART RATE: 82 BPM | OXYGEN SATURATION: 94 % | DIASTOLIC BLOOD PRESSURE: 77 MMHG

## 2018-08-14 DIAGNOSIS — R19.7 NAUSEA VOMITING AND DIARRHEA: ICD-10-CM

## 2018-08-14 DIAGNOSIS — R11.2 NAUSEA VOMITING AND DIARRHEA: ICD-10-CM

## 2018-08-14 DIAGNOSIS — R10.12 LEFT UPPER QUADRANT PAIN: Primary | ICD-10-CM

## 2018-08-14 LAB
ALBUMIN SERPL-MCNC: 4.9 G/DL (ref 3.5–5.2)
ALP BLD-CCNC: 89 U/L (ref 35–104)
ALT SERPL-CCNC: 66 U/L (ref 5–33)
ANION GAP SERPL CALCULATED.3IONS-SCNC: 20 MMOL/L (ref 7–19)
AST SERPL-CCNC: 99 U/L (ref 5–32)
BASOPHILS ABSOLUTE: 0 K/UL (ref 0–0.2)
BASOPHILS RELATIVE PERCENT: 0.2 % (ref 0–1)
BILIRUB SERPL-MCNC: 1.3 MG/DL (ref 0.2–1.2)
BUN BLDV-MCNC: 15 MG/DL (ref 6–20)
CALCIUM SERPL-MCNC: 10.5 MG/DL (ref 8.6–10)
CHLORIDE BLD-SCNC: 103 MMOL/L (ref 98–111)
CO2: 21 MMOL/L (ref 22–29)
CREAT SERPL-MCNC: 0.9 MG/DL (ref 0.5–0.9)
EOSINOPHILS ABSOLUTE: 0 K/UL (ref 0–0.6)
EOSINOPHILS RELATIVE PERCENT: 0.1 % (ref 0–5)
GFR NON-AFRICAN AMERICAN: >60
GLUCOSE BLD-MCNC: 201 MG/DL (ref 74–109)
HCT VFR BLD CALC: 48.7 % (ref 37–47)
HEMOGLOBIN: 16.3 G/DL (ref 12–16)
LIPASE: 62 U/L (ref 13–60)
LYMPHOCYTES ABSOLUTE: 0.6 K/UL (ref 1.1–4.5)
LYMPHOCYTES RELATIVE PERCENT: 3.7 % (ref 20–40)
MCH RBC QN AUTO: 29 PG (ref 27–31)
MCHC RBC AUTO-ENTMCNC: 33.5 G/DL (ref 33–37)
MCV RBC AUTO: 86.7 FL (ref 81–99)
MONOCYTES ABSOLUTE: 0.7 K/UL (ref 0–0.9)
MONOCYTES RELATIVE PERCENT: 4.5 % (ref 0–10)
NEUTROPHILS ABSOLUTE: 14.8 K/UL (ref 1.5–7.5)
NEUTROPHILS RELATIVE PERCENT: 91.1 % (ref 50–65)
PDW BLD-RTO: 13.1 % (ref 11.5–14.5)
PLATELET # BLD: 189 K/UL (ref 130–400)
PMV BLD AUTO: 10.4 FL (ref 9.4–12.3)
POTASSIUM SERPL-SCNC: 3.1 MMOL/L (ref 3.5–5)
RBC # BLD: 5.62 M/UL (ref 4.2–5.4)
SODIUM BLD-SCNC: 144 MMOL/L (ref 136–145)
TOTAL PROTEIN: 8.9 G/DL (ref 6.6–8.7)
WBC # BLD: 16.3 K/UL (ref 4.8–10.8)

## 2018-08-14 PROCEDURE — 99284 EMERGENCY DEPT VISIT MOD MDM: CPT

## 2018-08-14 PROCEDURE — 96374 THER/PROPH/DIAG INJ IV PUSH: CPT

## 2018-08-14 PROCEDURE — S0028 INJECTION, FAMOTIDINE, 20 MG: HCPCS | Performed by: EMERGENCY MEDICINE

## 2018-08-14 PROCEDURE — 6370000000 HC RX 637 (ALT 250 FOR IP): Performed by: EMERGENCY MEDICINE

## 2018-08-14 PROCEDURE — 74177 CT ABD & PELVIS W/CONTRAST: CPT

## 2018-08-14 PROCEDURE — 2580000003 HC RX 258: Performed by: EMERGENCY MEDICINE

## 2018-08-14 PROCEDURE — 6360000004 HC RX CONTRAST MEDICATION: Performed by: EMERGENCY MEDICINE

## 2018-08-14 PROCEDURE — 2500000003 HC RX 250 WO HCPCS: Performed by: EMERGENCY MEDICINE

## 2018-08-14 PROCEDURE — 36415 COLL VENOUS BLD VENIPUNCTURE: CPT

## 2018-08-14 PROCEDURE — 6360000002 HC RX W HCPCS: Performed by: EMERGENCY MEDICINE

## 2018-08-14 PROCEDURE — 96375 TX/PRO/DX INJ NEW DRUG ADDON: CPT

## 2018-08-14 PROCEDURE — 80053 COMPREHEN METABOLIC PANEL: CPT

## 2018-08-14 PROCEDURE — 85025 COMPLETE CBC W/AUTO DIFF WBC: CPT

## 2018-08-14 PROCEDURE — 99284 EMERGENCY DEPT VISIT MOD MDM: CPT | Performed by: EMERGENCY MEDICINE

## 2018-08-14 PROCEDURE — 83690 ASSAY OF LIPASE: CPT

## 2018-08-14 RX ORDER — MORPHINE SULFATE 15 MG/1
15 TABLET ORAL EVERY 6 HOURS PRN
Qty: 12 TABLET | Refills: 0 | Status: SHIPPED | OUTPATIENT
Start: 2018-08-14 | End: 2018-08-17

## 2018-08-14 RX ORDER — ONDANSETRON 4 MG/1
4 TABLET, ORALLY DISINTEGRATING ORAL EVERY 8 HOURS PRN
Qty: 15 TABLET | Refills: 0 | Status: SHIPPED | OUTPATIENT
Start: 2018-08-14 | End: 2019-02-26 | Stop reason: ALTCHOICE

## 2018-08-14 RX ORDER — ONDANSETRON 2 MG/ML
4 INJECTION INTRAMUSCULAR; INTRAVENOUS ONCE
Status: COMPLETED | OUTPATIENT
Start: 2018-08-14 | End: 2018-08-14

## 2018-08-14 RX ORDER — POTASSIUM CHLORIDE 750 MG/1
40 TABLET, EXTENDED RELEASE ORAL ONCE
Status: COMPLETED | OUTPATIENT
Start: 2018-08-14 | End: 2018-08-14

## 2018-08-14 RX ORDER — MORPHINE SULFATE 1 MG/ML
6 INJECTION, SOLUTION EPIDURAL; INTRATHECAL; INTRAVENOUS ONCE
Status: COMPLETED | OUTPATIENT
Start: 2018-08-14 | End: 2018-08-14

## 2018-08-14 RX ADMIN — Medication 6 MG: at 14:25

## 2018-08-14 RX ADMIN — IOPAMIDOL 90 ML: 755 INJECTION, SOLUTION INTRAVENOUS at 14:09

## 2018-08-14 RX ADMIN — POTASSIUM CHLORIDE 40 MEQ: 10 TABLET, EXTENDED RELEASE ORAL at 15:16

## 2018-08-14 RX ADMIN — ONDANSETRON HYDROCHLORIDE 4 MG: 2 INJECTION, SOLUTION INTRAMUSCULAR; INTRAVENOUS at 14:25

## 2018-08-14 RX ADMIN — DEXTROSE AND SODIUM CHLORIDE 1000 ML: 5; 900 INJECTION, SOLUTION INTRAVENOUS at 15:16

## 2018-08-14 RX ADMIN — FAMOTIDINE 20 MG: 10 INJECTION, SOLUTION INTRAVENOUS at 14:25

## 2018-08-14 ASSESSMENT — ENCOUNTER SYMPTOMS
SHORTNESS OF BREATH: 0
CHEST TIGHTNESS: 0
BACK PAIN: 0
COUGH: 0
ABDOMINAL PAIN: 1
VOMITING: 1
EYE PAIN: 0
SORE THROAT: 0
RHINORRHEA: 0
PHOTOPHOBIA: 0
NAUSEA: 1
DIARRHEA: 1

## 2018-08-14 ASSESSMENT — PAIN DESCRIPTION - LOCATION: LOCATION: ABDOMEN;BACK

## 2018-08-14 ASSESSMENT — PAIN SCALES - GENERAL
PAINLEVEL_OUTOF10: 9
PAINLEVEL_OUTOF10: 10

## 2018-08-14 ASSESSMENT — PAIN DESCRIPTION - PAIN TYPE: TYPE: ACUTE PAIN

## 2018-08-14 NOTE — Clinical Note
Patient is well-appearing, stable for discharge and follow-up without fail with his/her medical doctor. I had a detailed discussion with the patient and/or guardian regarding the historical points, exam findings, and any diagnostic results supporting  the discharge diagnosis. The patient was educated on care and need for follow-up. Strict return instructions including red flag signs and symptoms were discussed with the patient. Medications for discharge discussed, and adverse effects reviewed. Danica Hardy invited and answered. Patient shows understanding of the discharge information and agrees to follow-up.

## 2018-08-14 NOTE — ED PROVIDER NOTES
Mouth/Throat: Oropharynx is clear and moist.   Eyes: Pupils are equal, round, and reactive to light. EOM are normal. No scleral icterus. Neck: Normal range of motion. Neck supple. No tracheal deviation present. Cardiovascular: Normal rate, regular rhythm, normal heart sounds and intact distal pulses. Exam reveals no gallop and no friction rub. No murmur heard. Pulmonary/Chest: Effort normal and breath sounds normal. No respiratory distress. She has no wheezes. She has no rales. She exhibits no tenderness. Abdominal: Soft. She exhibits no distension and no mass. There is tenderness (Epigastric and left upper quadrant). There is no rebound and no guarding. Musculoskeletal: Normal range of motion. She exhibits no edema, tenderness or deformity. Neurological: She is alert and oriented to person, place, and time. No cranial nerve deficit. She exhibits normal muscle tone. Coordination normal.   Skin: Skin is warm and dry. No rash noted. Psychiatric: She has a normal mood and affect. Her behavior is normal. Judgment and thought content normal.   Nursing note and vitals reviewed. DIAGNOSTIC RESULTS     EKG: All EKG's are interpreted by the Emergency Department Physician who either signs or Co-signs this chart in the absence of a cardiologist.    RADIOLOGY:   Non-plain film images such as CT, Ultrasound and MRI are read by the radiologist. Plain radiographic images are visualized and preliminarily interpreted by the emergency physician with the below findings:    CT ABDOMEN PELVIS W IV CONTRAST Additional Contrast? None   Final Result   1. Fluid-filled appearance of multiple loops of small bowel,   nonspecific, may be seen in enteritis. 2.  Hepatosteatosis.    Signed by Dr Desiree Garcia on 8/14/2018 2:27 PM          LABS:  Labs Reviewed   CBC WITH AUTO DIFFERENTIAL - Abnormal; Notable for the following:        Result Value    WBC 16.3 (*)     RBC 5.62 (*)     Hemoglobin 16.3 (*)     Hematocrit 48.7 mouth every 6 hours as needed for Pain (For breakthrough pain) for up to 3 days. ., Disp-12 tablet, R-0Print                (Please note that portions of this note were completed with a voice recognition program.  Efforts were made to edit the dictations but occasionally words are mis-transcribed.)    Geneva Geiger MD (electronically signed)  Attending Emergency Physician          Geneva Geiger MD  08/14/18 5257

## 2018-11-13 ENCOUNTER — OFFICE VISIT (OUTPATIENT)
Dept: URGENT CARE | Age: 58
End: 2018-11-13
Payer: COMMERCIAL

## 2018-11-13 VITALS
HEIGHT: 66 IN | OXYGEN SATURATION: 95 % | TEMPERATURE: 98.7 F | WEIGHT: 237 LBS | BODY MASS INDEX: 38.09 KG/M2 | HEART RATE: 80 BPM | RESPIRATION RATE: 18 BRPM

## 2018-11-13 DIAGNOSIS — J01.10 ACUTE NON-RECURRENT FRONTAL SINUSITIS: Primary | ICD-10-CM

## 2018-11-13 PROCEDURE — 99213 OFFICE O/P EST LOW 20 MIN: CPT | Performed by: NURSE PRACTITIONER

## 2018-11-13 PROCEDURE — 96372 THER/PROPH/DIAG INJ SC/IM: CPT | Performed by: NURSE PRACTITIONER

## 2018-11-13 RX ORDER — AMOXICILLIN AND CLAVULANATE POTASSIUM 875; 125 MG/1; MG/1
1 TABLET, FILM COATED ORAL 2 TIMES DAILY
Qty: 20 TABLET | Refills: 0 | Status: SHIPPED | OUTPATIENT
Start: 2018-11-13 | End: 2018-11-23

## 2018-11-13 RX ORDER — BENZONATATE 100 MG/1
100 CAPSULE ORAL 3 TIMES DAILY PRN
Qty: 21 CAPSULE | Refills: 0 | Status: SHIPPED | OUTPATIENT
Start: 2018-11-13 | End: 2018-11-20

## 2018-11-13 RX ORDER — DEXAMETHASONE SODIUM PHOSPHATE 10 MG/ML
10 INJECTION INTRAMUSCULAR; INTRAVENOUS ONCE
Status: COMPLETED | OUTPATIENT
Start: 2018-11-13 | End: 2018-11-13

## 2018-11-13 RX ORDER — DEXTROMETHORPHAN HYDROBROMIDE AND PROMETHAZINE HYDROCHLORIDE 15; 6.25 MG/5ML; MG/5ML
5 SYRUP ORAL NIGHTLY PRN
Qty: 120 ML | Refills: 0 | Status: SHIPPED | OUTPATIENT
Start: 2018-11-13 | End: 2018-12-13 | Stop reason: ALTCHOICE

## 2018-11-13 RX ORDER — METHYLPREDNISOLONE 4 MG/1
TABLET ORAL
Qty: 1 KIT | Refills: 0 | Status: SHIPPED | OUTPATIENT
Start: 2018-11-13 | End: 2018-12-13 | Stop reason: ALTCHOICE

## 2018-11-13 RX ADMIN — DEXAMETHASONE SODIUM PHOSPHATE 10 MG: 10 INJECTION INTRAMUSCULAR; INTRAVENOUS at 12:01

## 2018-11-13 ASSESSMENT — ENCOUNTER SYMPTOMS
NAUSEA: 0
VOMITING: 0
DIARRHEA: 0
ABDOMINAL PAIN: 0
COUGH: 1
RHINORRHEA: 0
SORE THROAT: 0
SHORTNESS OF BREATH: 0

## 2018-11-21 ENCOUNTER — TRANSCRIBE ORDERS (OUTPATIENT)
Dept: ADMINISTRATIVE | Facility: HOSPITAL | Age: 58
End: 2018-11-21

## 2018-11-21 DIAGNOSIS — Z12.31 ENCOUNTER FOR SCREENING MAMMOGRAM FOR MALIGNANT NEOPLASM OF BREAST: Primary | ICD-10-CM

## 2018-12-13 ENCOUNTER — OFFICE VISIT (OUTPATIENT)
Dept: NEUROSURGERY | Age: 58
End: 2018-12-13
Payer: COMMERCIAL

## 2018-12-13 VITALS
HEIGHT: 66 IN | DIASTOLIC BLOOD PRESSURE: 69 MMHG | BODY MASS INDEX: 37.61 KG/M2 | SYSTOLIC BLOOD PRESSURE: 108 MMHG | WEIGHT: 234 LBS | HEART RATE: 64 BPM

## 2018-12-13 DIAGNOSIS — R41.3 MEMORY DEFICIT: Primary | ICD-10-CM

## 2018-12-13 PROCEDURE — 99213 OFFICE O/P EST LOW 20 MIN: CPT | Performed by: NURSE PRACTITIONER

## 2018-12-13 RX ORDER — DONEPEZIL HYDROCHLORIDE 10 MG/1
10 TABLET, FILM COATED ORAL NIGHTLY
Qty: 30 TABLET | Refills: 5 | Status: SHIPPED | OUTPATIENT
Start: 2018-12-13 | End: 2019-06-21 | Stop reason: SDUPTHER

## 2018-12-13 NOTE — PROGRESS NOTES
[id number] [facility] Neurology Office Note      Patient:   Francia Harper  MR#:    [id number]  Account Number:                         YOB: 1960  Date of Evaluation:  12/13/2018  Time of Note:                          9:48 AM  Primary/Referring Physician:  SWAPNA Brennan - CNP   Consulting Physician:  SWAPNA Carmichael     FOLLOW UP VISIT    Chief Complaint   Patient presents with    Follow-up     6 month    Memory Loss     Pt reports worsening memory, family states she has been worsening, also notes speech declines when tired.  Fall     Pt reports recent falls. HISTORY OF PRESENT ILLNESS    Francia Harper is a 65y.o. year old female here for follow up of memory loss. Some slight worsening since last being seen. Family has noticed more difficulty with speech- problems with word finding/has difficulty forming thoughts and sentences- tends to worsen when she is tired. Mostly short term memory loss, no problems remembering events from years ago. Some falls noted as well. No clear bladder incontinence, hallucinations, tremor. Denies depression or anxiety. No overt issues with ADLs- states she can get them done but just slower. Lives with . On Aricept, has previously been unable to tolerate Namenda and Namzeric. Has seen Dr. Garrison Coello, psychiatry, no records today but reportedly did some type of neuropsych testing. She has been seen by Deborah, possible MCI vs mild early onset dementia. No overt evidence of FTD dementia. Family history with aunt having Pick's disease, onset was in her 46s. No other complaints today.      Past Medical History:   Diagnosis Date    Anxiety     Depression     Diabetes ([facility] [address]     Hypertension     Memory difficulty     Sleep apnea     c-pap       Past Surgical History:   Procedure Laterality Date    GALLBLADDER SURGERY         Family History   Problem Relation Age of Onset    Cancer Mother     Heart Attack Father        Social History     Social

## 2018-12-28 ENCOUNTER — HOSPITAL ENCOUNTER (OUTPATIENT)
Dept: MAMMOGRAPHY | Facility: HOSPITAL | Age: 58
Discharge: HOME | End: 2018-12-28
Admitting: FAMILY MEDICINE

## 2018-12-28 DIAGNOSIS — Z12.31 ENCOUNTER FOR SCREENING MAMMOGRAM FOR MALIGNANT NEOPLASM OF BREAST: ICD-10-CM

## 2018-12-28 PROCEDURE — 77063 BREAST TOMOSYNTHESIS BI: CPT

## 2018-12-28 PROCEDURE — 77067 SCR MAMMO BI INCL CAD: CPT

## 2019-02-26 ENCOUNTER — HOSPITAL ENCOUNTER (OUTPATIENT)
Dept: GENERAL RADIOLOGY | Age: 59
Discharge: HOME | End: 2019-02-26
Payer: COMMERCIAL

## 2019-02-26 ENCOUNTER — OFFICE VISIT (OUTPATIENT)
Dept: URGENT CARE | Age: 59
End: 2019-02-26
Payer: COMMERCIAL

## 2019-02-26 VITALS
OXYGEN SATURATION: 95 % | BODY MASS INDEX: 35.61 KG/M2 | SYSTOLIC BLOOD PRESSURE: 143 MMHG | WEIGHT: 235 LBS | DIASTOLIC BLOOD PRESSURE: 78 MMHG | TEMPERATURE: 99.8 F | HEART RATE: 76 BPM | HEIGHT: 68 IN | RESPIRATION RATE: 18 BRPM

## 2019-02-26 DIAGNOSIS — R05.9 COUGH: ICD-10-CM

## 2019-02-26 DIAGNOSIS — R06.2 WHEEZING: ICD-10-CM

## 2019-02-26 DIAGNOSIS — J18.9 PNEUMONIA DUE TO ORGANISM: Primary | ICD-10-CM

## 2019-02-26 DIAGNOSIS — R09.81 HEAD CONGESTION: ICD-10-CM

## 2019-02-26 LAB
INFLUENZA A ANTIBODY: NORMAL
INFLUENZA B ANTIBODY: NORMAL
S PYO AG THROAT QL: NORMAL

## 2019-02-26 PROCEDURE — 96372 THER/PROPH/DIAG INJ SC/IM: CPT | Performed by: NURSE PRACTITIONER

## 2019-02-26 PROCEDURE — 71046 X-RAY EXAM CHEST 2 VIEWS: CPT

## 2019-02-26 PROCEDURE — 87880 STREP A ASSAY W/OPTIC: CPT | Performed by: NURSE PRACTITIONER

## 2019-02-26 PROCEDURE — 99213 OFFICE O/P EST LOW 20 MIN: CPT | Performed by: NURSE PRACTITIONER

## 2019-02-26 PROCEDURE — 87804 INFLUENZA ASSAY W/OPTIC: CPT | Performed by: NURSE PRACTITIONER

## 2019-02-26 RX ORDER — DEXAMETHASONE SODIUM PHOSPHATE 100 MG/10ML
10 INJECTION INTRAMUSCULAR; INTRAVENOUS ONCE
Status: COMPLETED | OUTPATIENT
Start: 2019-02-26 | End: 2019-02-26

## 2019-02-26 RX ORDER — AZITHROMYCIN 250 MG/1
250 TABLET, FILM COATED ORAL SEE ADMIN INSTRUCTIONS
Qty: 6 TABLET | Refills: 0 | Status: SHIPPED | OUTPATIENT
Start: 2019-02-26 | End: 2019-03-03

## 2019-02-26 RX ADMIN — DEXAMETHASONE SODIUM PHOSPHATE 10 MG: 100 INJECTION INTRAMUSCULAR; INTRAVENOUS at 12:14

## 2019-02-26 ASSESSMENT — ENCOUNTER SYMPTOMS
SORE THROAT: 1
SINUS PRESSURE: 1
EYES NEGATIVE: 1
CHEST TIGHTNESS: 0
TROUBLE SWALLOWING: 0
CHOKING: 0
WHEEZING: 0
COUGH: 1
GASTROINTESTINAL NEGATIVE: 1
RHINORRHEA: 0
VOICE CHANGE: 0
STRIDOR: 0
COLOR CHANGE: 0
SHORTNESS OF BREATH: 0

## 2019-03-13 ENCOUNTER — OFFICE VISIT (OUTPATIENT)
Dept: URGENT CARE | Age: 59
End: 2019-03-13
Payer: COMMERCIAL

## 2019-03-13 VITALS
SYSTOLIC BLOOD PRESSURE: 100 MMHG | HEART RATE: 78 BPM | HEIGHT: 68 IN | OXYGEN SATURATION: 96 % | RESPIRATION RATE: 18 BRPM | DIASTOLIC BLOOD PRESSURE: 66 MMHG | WEIGHT: 235 LBS | TEMPERATURE: 98.5 F | BODY MASS INDEX: 35.61 KG/M2

## 2019-03-13 DIAGNOSIS — B86 SCABIES: Primary | ICD-10-CM

## 2019-03-13 PROCEDURE — 99213 OFFICE O/P EST LOW 20 MIN: CPT | Performed by: NURSE PRACTITIONER

## 2019-03-13 RX ORDER — PERMETHRIN 50 MG/G
CREAM TOPICAL
Qty: 1 TUBE | Refills: 0 | Status: SHIPPED | OUTPATIENT
Start: 2019-03-13 | End: 2019-12-02 | Stop reason: ALTCHOICE

## 2019-03-13 RX ORDER — HYDROXYZINE HYDROCHLORIDE 25 MG/1
25 TABLET, FILM COATED ORAL EVERY 6 HOURS PRN
Qty: 30 TABLET | Refills: 0 | Status: SHIPPED | OUTPATIENT
Start: 2019-03-13 | End: 2019-03-23

## 2019-03-13 ASSESSMENT — ENCOUNTER SYMPTOMS: COUGH: 0

## 2019-06-21 ENCOUNTER — OFFICE VISIT (OUTPATIENT)
Dept: NEUROSURGERY | Age: 59
End: 2019-06-21
Payer: COMMERCIAL

## 2019-06-21 VITALS
DIASTOLIC BLOOD PRESSURE: 70 MMHG | OXYGEN SATURATION: 94 % | WEIGHT: 243 LBS | SYSTOLIC BLOOD PRESSURE: 114 MMHG | HEART RATE: 63 BPM | BODY MASS INDEX: 38.14 KG/M2 | HEIGHT: 67 IN

## 2019-06-21 DIAGNOSIS — R41.3 MEMORY DEFICIT: Primary | ICD-10-CM

## 2019-06-21 LAB
ALBUMIN SERPL-MCNC: 4.4 G/DL (ref 3.5–5.2)
ALP BLD-CCNC: 91 U/L (ref 35–104)
ALT SERPL-CCNC: 41 U/L (ref 5–33)
ANION GAP SERPL CALCULATED.3IONS-SCNC: 13 MMOL/L (ref 7–19)
AST SERPL-CCNC: 55 U/L (ref 5–32)
BASOPHILS ABSOLUTE: 0 K/UL (ref 0–0.2)
BASOPHILS RELATIVE PERCENT: 0.6 % (ref 0–1)
BILIRUB SERPL-MCNC: 1 MG/DL (ref 0.2–1.2)
BUN BLDV-MCNC: 15 MG/DL (ref 6–20)
CALCIUM SERPL-MCNC: 9.6 MG/DL (ref 8.6–10)
CHLORIDE BLD-SCNC: 102 MMOL/L (ref 98–111)
CHOLESTEROL, TOTAL: 171 MG/DL (ref 160–199)
CO2: 26 MMOL/L (ref 22–29)
CREAT SERPL-MCNC: 0.7 MG/DL (ref 0.5–0.9)
EOSINOPHILS ABSOLUTE: 0.2 K/UL (ref 0–0.6)
EOSINOPHILS RELATIVE PERCENT: 3.5 % (ref 0–5)
GFR NON-AFRICAN AMERICAN: >60
GLUCOSE BLD-MCNC: 144 MG/DL (ref 74–109)
HBA1C MFR BLD: 6.9 % (ref 4–6)
HCT VFR BLD CALC: 43.1 % (ref 37–47)
HDLC SERPL-MCNC: 60 MG/DL (ref 65–121)
HEMOGLOBIN: 14.2 G/DL (ref 12–16)
LDL CHOLESTEROL CALCULATED: 94 MG/DL
LYMPHOCYTES ABSOLUTE: 1.9 K/UL (ref 1.1–4.5)
LYMPHOCYTES RELATIVE PERCENT: 28.2 % (ref 20–40)
MCH RBC QN AUTO: 30.6 PG (ref 27–31)
MCHC RBC AUTO-ENTMCNC: 32.9 G/DL (ref 33–37)
MCV RBC AUTO: 92.9 FL (ref 81–99)
MONOCYTES ABSOLUTE: 0.5 K/UL (ref 0–0.9)
MONOCYTES RELATIVE PERCENT: 7.4 % (ref 0–10)
NEUTROPHILS ABSOLUTE: 4 K/UL (ref 1.5–7.5)
NEUTROPHILS RELATIVE PERCENT: 60.1 % (ref 50–65)
PDW BLD-RTO: 13.2 % (ref 11.5–14.5)
PLATELET # BLD: 136 K/UL (ref 130–400)
PMV BLD AUTO: 10.7 FL (ref 9.4–12.3)
POTASSIUM SERPL-SCNC: 4 MMOL/L (ref 3.5–5)
RBC # BLD: 4.64 M/UL (ref 4.2–5.4)
SODIUM BLD-SCNC: 141 MMOL/L (ref 136–145)
TOTAL PROTEIN: 7.4 G/DL (ref 6.6–8.7)
TRIGL SERPL-MCNC: 83 MG/DL (ref 0–149)
TSH SERPL DL<=0.05 MIU/L-ACNC: 1.75 UIU/ML (ref 0.27–4.2)
VITAMIN D 25-HYDROXY: 25.1 NG/ML
WBC # BLD: 6.6 K/UL (ref 4.8–10.8)

## 2019-06-21 PROCEDURE — 99213 OFFICE O/P EST LOW 20 MIN: CPT | Performed by: NURSE PRACTITIONER

## 2019-06-21 RX ORDER — DONEPEZIL HYDROCHLORIDE 10 MG/1
10 TABLET, FILM COATED ORAL NIGHTLY
Qty: 30 TABLET | Refills: 5 | Status: SHIPPED | OUTPATIENT
Start: 2019-06-21 | End: 2019-12-02 | Stop reason: SDUPTHER

## 2019-06-21 NOTE — PROGRESS NOTES
Marital status:      Spouse name: Not on file    Number of children: Not on file    Years of education: Not on file    Highest education level: Not on file   Occupational History    Not on file   Social Needs    Financial resource strain: Not on file    Food insecurity:     Worry: Not on file     Inability: Not on file    Transportation needs:     Medical: Not on file     Non-medical: Not on file   Tobacco Use    Smoking status: Never Smoker    Smokeless tobacco: Never Used   Substance and Sexual Activity    Alcohol use: No    Drug use: No    Sexual activity: Yes   Lifestyle    Physical activity:     Days per week: Not on file     Minutes per session: Not on file    Stress: Not on file   Relationships    Social connections:     Talks on phone: Not on file     Gets together: Not on file     Attends Taoist service: Not on file     Active member of club or organization: Not on file     Attends meetings of clubs or organizations: Not on file     Relationship status: Not on file    Intimate partner violence:     Fear of current or ex partner: Not on file     Emotionally abused: Not on file     Physically abused: Not on file     Forced sexual activity: Not on file   Other Topics Concern    Not on file   Social History Narrative    Not on file       Current Outpatient Medications   Medication Sig Dispense Refill    donepezil (ARICEPT) 10 MG tablet Take 1 tablet by mouth nightly 30 tablet 5    metoprolol succinate (TOPROL XL) 100 MG extended release tablet Take 1 tablet by mouth daily  1    escitalopram (LEXAPRO) 10 MG tablet Take 10 mg by mouth daily      triamterene-hydrochlorothiazide (MAXZIDE-25) 37.5-25 MG per tablet Take 1 tablet by mouth daily      permethrin (ELIMITE) 5 % cream Apply topically as directed 1 Tube 0     No current facility-administered medications for this visit.       ALLERGIES  No Known Allergies      REVIEW OF SYSTEMS    Constitutional: []Fever []Sweat []Chills [] Recent Injury [x] Denies all unless marked  HEENT:[]Headache  [] Head Injury/Hearing Loss  [] Sore Throat  [] Ear Ache/Dizziness  [x] Denies all unless marked  Spine:  [] Neck pain  [] Back pain  [] Sciaticia  [x] Denies all unless marked  Cardiovascular:[]Heart Disease []Chest Pain [] Palpitations  [x] Denies all unless marked  Pulmonary: []Shortness of Breath []Cough   [x] Denies all unless marke  Gastrointestinal: []Nausea  []Vomiting  []Abdominal Pain  []Constipation  []Diarrhea  []Dark Bloody Stools  [x] Denies all unless marked  Psychiatric/Behavioral:[] Depression [] Anxiety [x] Denies all unless marked  Genitourinary:   [] Frequency  [] Urgency  [] Incontinence [] Pain with Urination  [x] Denies all unless marked  Extremities: []Pain  []Swelling  [x] Denies all unless marked  Musculoskeletal: [] Muscle Pain  [] Joint Pain  [] Arthritis [] Muscle Cramps [] Muscle Twitches  [x] Denies all unless marked  Sleep: [] Insomnia [x] Snoring [] Restless Legs [x] Sleep Apnea  [] Daytime Sleepiness  [x] Denies all unless marked  Skin:[] Rash [] Skin Discoloration [x] Denies all unless marked   Neurological: [x]Visual Disturbance/Memory Loss [] Loss of Balance [x] Slurred Speech/Weakness [] Seizures  [] Vertigo/Dizziness [x] Denies all unless marked     The MA has completed the ROS with the patient. I have reviewed it in its' entirety with the patient and agree with the documentation. PHYSICAL EXAM  /70   Pulse 63   Ht 5' 7\" (1.702 m)   Wt 243 lb (110.2 kg)   SpO2 94%   BMI 38.06 kg/m²     Constitutional - No acute distress    HEENT- Conjunctiva normal.  No scars, masses, or lesions over external nose or ears  Cardiac- regular rate and rhythm   Respiratory- normal effort, no use of accessory muscles  Musculoskeletal - No significant wasting of muscles noted, no bony deformities  Extremities - No clubbing, cyanosis or edema  Skin - Warm, dry, and intact.   No rash, erythema, or pallor    NEUROLOGICAL EXAM    Mental status   [x]Awake, alert, oriented   [x]Affect attention and concentration appear appropriate  []Recent and remote memory appears unremarkable  []Speech normal without dysarthria or aphasia, comprehension and repetition intact. COMMENTS:14/30 SLUMS, unchanged, speech abn intermittently    Cranial Nerves [x]No VF deficit to confrontation,  no papilledema on fundoscopic exam.  [x]PERRLA, EOMI, no nystagmus, conjugate eye movements, no ptosis  [x]Face symmetric  [x]Facial sensation intact  [x]Tongue midline no atrophy or fasciculations present  [x]Palate midline, hearing to finger rub normal bilaterally  [x]Shoulder shrug and SCM testing normal bilaterally  COMMENTS:   Motor   [x]5/5 strength x 4 extremities  [x]Normal bulk and tone  [x]No tremor present  [x]No rigidity or bradykinesia noted  COMMENTS:   Sensory  [x]Sensation intact to light touch, pin prick, vibration, and proprioception BLE  [x]Sensation intact to light touch, pin prick, vibration, and proprioception BUE  COMMENTS:   Coordination [x]FTN normal bilaterally   [x]HTS normal bilaterally  [x]OSCAR normal bilaterally.    COMMENTS:   Reflexes  [x]Symmetric and non-pathological  [x]Toes down going bilaterally  [x]No clonus present  COMMENTS:   Gait                  []Normal steady gait    []Ataxic    []Spastic     []Magnetic     []Shuffling  COMMENTS:  Not overtly shuffling, decreased arm swing over the right       LABS RECORD AND IMAGING REVIEW (As below and per HPI)      Lab Results   Component Value Date    WBC 16.3 (H) 08/14/2018    HGB 16.3 (H) 08/14/2018    HCT 48.7 (H) 08/14/2018    MCV 86.7 08/14/2018     08/14/2018     Lab Results   Component Value Date     08/14/2018    K 3.1 (L) 08/14/2018     08/14/2018    CO2 21 (L) 08/14/2018    BUN 15 08/14/2018    CREATININE 0.9 08/14/2018    GLUCOSE 201 (H) 08/14/2018    CALCIUM 10.5 (H) 08/14/2018    PROT 8.9 (H) 08/14/2018    LABALBU 4.9 08/14/2018    BILITOT 1.3 (H) 08/14/2018    ALKPHOS 89 08/14/2018    AST 99 (H) 08/14/2018    ALT 66 (H) 08/14/2018    LABGLOM >60 08/14/2018       Labs, MRI, records from Dr. Wade Noland and [place] reviewed    MRI, EEG, and labs reviewed and negative. ASSESSMENT:    Tosha Murdock is a 63y.o. year old female here for follow up of memory loss. Some progression noted since last visit, now requiring more cues to complete ADLs. No overt personality or mood changes noted. Suspect underlying dementia, possible early onset Alzheimer's. FTD felt less likely. Family history with Pick's disease noted. On Aricept and tolerating. Discussed further referral to [facility] in [place], patient will discuss with . Diagnosis Orders   1. Memory deficit          PLAN:  1. Continue Aricept, previously unable to tolerate Namenda (GI side effects)   2. Recommend 30 minutes daily exercise, daily mental stimulation, and healthy diet with fish, fruits, vegetables, fiber and water   3. Safety concerns discussed- increase supervision, monitor medications, no driving   4. Re-try for records from Dr. Ras Beebe (neuropsych testing)   5. Patient will discuss referral to [facility] with family (note placed in AVS paperwork for family), will place referral if they wish to follow through on this   6. Follow up in 6 months, sooner with any worsening     SWAPNA Monae    Note:  A total of >50% (>8 minutes) of 15 minutes was spent discussing the pathophysiology and treatment and/or coordination of care of the above diagnoses.

## 2019-06-21 NOTE — PROGRESS NOTES
Review of Systems:  Denies all others  Constitution- Fatigue  Skin- None  Hearing/ Nose/ Throat- None  Eyes- None  Cardiovascular- None  Respiratory- None  Gastrointestinal- None  Genitourinary- None  Musculoskeletal- Falls  Endocrine/ Hematology/ Allergy- None  Neurological- Speech changes  Psychiatric- Memory loss

## 2019-09-25 ENCOUNTER — TELEPHONE (OUTPATIENT)
Dept: NEUROSURGERY | Age: 59
End: 2019-09-25

## 2019-09-25 NOTE — TELEPHONE ENCOUNTER
Called patient about changing appointment with Johnnie Rodriguez, left message on patient voice mail about the appointment.

## 2019-12-02 ENCOUNTER — OFFICE VISIT (OUTPATIENT)
Dept: NEUROSURGERY | Age: 59
End: 2019-12-02
Payer: COMMERCIAL

## 2019-12-02 VITALS
HEIGHT: 68 IN | DIASTOLIC BLOOD PRESSURE: 63 MMHG | HEART RATE: 74 BPM | BODY MASS INDEX: 37.56 KG/M2 | OXYGEN SATURATION: 91 % | WEIGHT: 247.8 LBS | SYSTOLIC BLOOD PRESSURE: 110 MMHG

## 2019-12-02 DIAGNOSIS — R41.3 MEMORY DEFICIT: Primary | ICD-10-CM

## 2019-12-02 PROCEDURE — 99213 OFFICE O/P EST LOW 20 MIN: CPT | Performed by: NURSE PRACTITIONER

## 2019-12-02 RX ORDER — DONEPEZIL HYDROCHLORIDE 10 MG/1
10 TABLET, FILM COATED ORAL NIGHTLY
Qty: 30 TABLET | Refills: 11 | Status: SHIPPED | OUTPATIENT
Start: 2019-12-02 | End: 2020-08-07 | Stop reason: SDUPTHER

## 2020-06-03 ENCOUNTER — OFFICE VISIT (OUTPATIENT)
Dept: NEUROSURGERY | Age: 60
End: 2020-06-03
Payer: COMMERCIAL

## 2020-06-03 VITALS
HEIGHT: 66 IN | OXYGEN SATURATION: 93 % | BODY MASS INDEX: 40.18 KG/M2 | SYSTOLIC BLOOD PRESSURE: 119 MMHG | DIASTOLIC BLOOD PRESSURE: 71 MMHG | HEART RATE: 82 BPM | WEIGHT: 250 LBS

## 2020-06-03 DIAGNOSIS — R41.3 MEMORY DEFICIT: ICD-10-CM

## 2020-06-03 LAB
BASOPHILS ABSOLUTE: 0 K/UL (ref 0–0.2)
BASOPHILS RELATIVE PERCENT: 0.6 % (ref 0–1)
EOSINOPHILS ABSOLUTE: 0.2 K/UL (ref 0–0.6)
EOSINOPHILS RELATIVE PERCENT: 4.7 % (ref 0–5)
HCT VFR BLD CALC: 38.6 % (ref 37–47)
HEMOGLOBIN: 13.2 G/DL (ref 12–16)
IMMATURE GRANULOCYTES #: 0 K/UL
LYMPHOCYTES ABSOLUTE: 1.7 K/UL (ref 1.1–4.5)
LYMPHOCYTES RELATIVE PERCENT: 34.2 % (ref 20–40)
MCH RBC QN AUTO: 30.8 PG (ref 27–31)
MCHC RBC AUTO-ENTMCNC: 34.2 G/DL (ref 33–37)
MCV RBC AUTO: 90.2 FL (ref 81–99)
MONOCYTES ABSOLUTE: 0.4 K/UL (ref 0–0.9)
MONOCYTES RELATIVE PERCENT: 7.9 % (ref 0–10)
NEUTROPHILS ABSOLUTE: 2.6 K/UL (ref 1.5–7.5)
NEUTROPHILS RELATIVE PERCENT: 52.4 % (ref 50–65)
PDW BLD-RTO: 13.4 % (ref 11.5–14.5)
PLATELET # BLD: 101 K/UL (ref 130–400)
PMV BLD AUTO: 11.1 FL (ref 9.4–12.3)
RBC # BLD: 4.28 M/UL (ref 4.2–5.4)
TSH REFLEX FT4: 1.59 UIU/ML (ref 0.35–5.5)
VITAMIN B-12: 852 PG/ML (ref 211–946)
WBC # BLD: 4.9 K/UL (ref 4.8–10.8)

## 2020-06-03 PROCEDURE — 99213 OFFICE O/P EST LOW 20 MIN: CPT | Performed by: NURSE PRACTITIONER

## 2020-06-03 NOTE — PROGRESS NOTES
[facility] Neurology Office Note      Patient:   Brynn Aguero  MR#:    [id number]  Account Number:                         YOB: 1960  Date of Evaluation:  6/3/2020  Time of Note:                          10:37 AM  Primary/Referring Physician:  SWAPNA Handy - CNP   Consulting Physician:  SWAPNA Licea     FOLLOW UP VISIT    Chief Complaint   Patient presents with    6 Month Follow-Up    Memory Loss       HISTORY OF PRESENT ILLNESS    Brynn Aguero is a 63y.o. year old female here for follow up of memory loss. She is alone at today's appointment given COVID 19 restrictions, her  is waiting in the car. She lives at home with her . States that she is doing about the same. Feels like she is having a little more confusion possibly, states \"I feel like I'm turning in circles sometimes\". Primarily short term memory affected. Notes more difficulty with speech and word finding difficulty. Needing reminders to complete ADLs, having difficulty with cooking/following recipes. Denies gait changes or bladder incontinence. No hallucinations. Denies tremor. Needing some reminders to take medications at time. No longer driving. Denies depression/anxiety. She has noted more fatigue recently. Getting a full nights sleep but will find herself napping throughout the day. On Aricept, no side effects. Unable to tolerate Namenda and Namzeric in the past. She has had neuropsych testing completed, no records today. No overt evidence of FTD. Family history with aunt having Pick's disease, onset was in her 46s. No other complaints today.      Past Medical History:   Diagnosis Date    Anxiety     Depression     Diabetes ([address]     Hypertension     Memory difficulty     Sleep apnea     c-pap       Past Surgical History:   Procedure Laterality Date    GALLBLADDER SURGERY         Family History   Problem Relation Age of Onset    Cancer Mother     Heart Attack Father        Social History Socioeconomic History    Marital status:      Spouse name: Not on file    Number of children: Not on file    Years of education: Not on file    Highest education level: Not on file   Occupational History    Not on file   Social Needs    Financial resource strain: Not on file    Food insecurity     Worry: Not on file     Inability: Not on file    Transportation needs     Medical: Not on file     Non-medical: Not on file   Tobacco Use    Smoking status: Never Smoker    Smokeless tobacco: Never Used   Substance and Sexual Activity    Alcohol use: No    Drug use: No    Sexual activity: Yes   Lifestyle    Physical activity     Days per week: Not on file     Minutes per session: Not on file    Stress: Not on file   Relationships    Social connections     Talks on phone: Not on file     Gets together: Not on file     Attends Anabaptism service: Not on file     Active member of club or organization: Not on file     Attends meetings of clubs or organizations: Not on file     Relationship status: Not on file    Intimate partner violence     Fear of current or ex partner: Not on file     Emotionally abused: Not on file     Physically abused: Not on file     Forced sexual activity: Not on file   Other Topics Concern    Not on file   Social History Narrative    Not on file       Current Outpatient Medications   Medication Sig Dispense Refill    donepezil (ARICEPT) 10 MG tablet Take 1 tablet by mouth nightly 30 tablet 11    metoprolol succinate (TOPROL XL) 100 MG extended release tablet Take 1 tablet by mouth daily  1    escitalopram (LEXAPRO) 10 MG tablet Take 10 mg by mouth daily      triamterene-hydrochlorothiazide (MAXZIDE-25) 37.5-25 MG per tablet Take 1 tablet by mouth daily       No current facility-administered medications for this visit.       ALLERGIES  Allergies   Allergen Reactions    Azithromycin Rash    Namenda  [Memantine Hcl] Nausea And Vomiting and Nausea Only       REVIEW OF

## 2020-06-08 ENCOUNTER — TELEPHONE (OUTPATIENT)
Dept: NEUROSURGERY | Age: 60
End: 2020-06-08

## 2020-06-08 LAB — VITAMIN B1 WHOLE BLOOD: 175 NMOL/L (ref 70–180)

## 2020-06-08 NOTE — TELEPHONE ENCOUNTER
----- Message from SWAPNA Resendiz sent at 6/3/2020  4:29 PM CDT -----  Platelets are mildly low- have her PCP follow this.  Otherwise labs are normal.

## 2020-06-09 ENCOUNTER — TELEPHONE (OUTPATIENT)
Dept: NEUROSURGERY | Age: 60
End: 2020-06-09

## 2020-06-10 ENCOUNTER — TELEPHONE (OUTPATIENT)
Dept: NEUROSURGERY | Age: 60
End: 2020-06-10

## 2020-07-01 LAB
ALBUMIN SERPL-MCNC: 4.1 G/DL (ref 3.5–5.2)
ALP BLD-CCNC: 135 U/L (ref 35–104)
ALT SERPL-CCNC: 38 U/L (ref 5–33)
ANION GAP SERPL CALCULATED.3IONS-SCNC: 13 MMOL/L (ref 7–19)
AST SERPL-CCNC: 52 U/L (ref 5–32)
BASOPHILS ABSOLUTE: 0 K/UL (ref 0–0.2)
BASOPHILS RELATIVE PERCENT: 0.6 % (ref 0–1)
BILIRUB SERPL-MCNC: 0.8 MG/DL (ref 0.2–1.2)
BUN BLDV-MCNC: 14 MG/DL (ref 6–20)
CALCIUM SERPL-MCNC: 9 MG/DL (ref 8.6–10)
CHLORIDE BLD-SCNC: 99 MMOL/L (ref 98–111)
CHOLESTEROL, TOTAL: 151 MG/DL (ref 160–199)
CO2: 27 MMOL/L (ref 22–29)
CREAT SERPL-MCNC: 0.5 MG/DL (ref 0.5–0.9)
EOSINOPHILS ABSOLUTE: 0.2 K/UL (ref 0–0.6)
EOSINOPHILS RELATIVE PERCENT: 3.3 % (ref 0–5)
GFR NON-AFRICAN AMERICAN: >60
GLUCOSE BLD-MCNC: 206 MG/DL (ref 74–109)
HBA1C MFR BLD: 9.4 % (ref 4–6)
HCT VFR BLD CALC: 40.5 % (ref 37–47)
HDLC SERPL-MCNC: 55 MG/DL (ref 65–121)
HEMOGLOBIN: 13.5 G/DL (ref 12–16)
IMMATURE GRANULOCYTES #: 0 K/UL
LDL CHOLESTEROL CALCULATED: 77 MG/DL
LYMPHOCYTES ABSOLUTE: 1.9 K/UL (ref 1.1–4.5)
LYMPHOCYTES RELATIVE PERCENT: 36.9 % (ref 20–40)
MCH RBC QN AUTO: 30.5 PG (ref 27–31)
MCHC RBC AUTO-ENTMCNC: 33.3 G/DL (ref 33–37)
MCV RBC AUTO: 91.4 FL (ref 81–99)
MONOCYTES ABSOLUTE: 0.4 K/UL (ref 0–0.9)
MONOCYTES RELATIVE PERCENT: 8.1 % (ref 0–10)
NEUTROPHILS ABSOLUTE: 2.6 K/UL (ref 1.5–7.5)
NEUTROPHILS RELATIVE PERCENT: 50.7 % (ref 50–65)
PDW BLD-RTO: 13.2 % (ref 11.5–14.5)
PLATELET # BLD: 107 K/UL (ref 130–400)
PMV BLD AUTO: 11.3 FL (ref 9.4–12.3)
POTASSIUM SERPL-SCNC: 4 MMOL/L (ref 3.5–5)
RBC # BLD: 4.43 M/UL (ref 4.2–5.4)
SODIUM BLD-SCNC: 139 MMOL/L (ref 136–145)
T4 FREE: 0.86 NG/DL (ref 0.93–1.7)
TOTAL PROTEIN: 6.8 G/DL (ref 6.6–8.7)
TRIGL SERPL-MCNC: 97 MG/DL (ref 0–149)
TSH SERPL DL<=0.05 MIU/L-ACNC: 1.98 UIU/ML (ref 0.27–4.2)
VITAMIN D 25-HYDROXY: 33.5 NG/ML
WBC # BLD: 5.1 K/UL (ref 4.8–10.8)

## 2020-07-24 ENCOUNTER — HOSPITAL ENCOUNTER (OUTPATIENT)
Dept: ULTRASOUND IMAGING | Age: 60
Discharge: HOME | End: 2020-07-24
Payer: MEDICARE

## 2020-07-24 PROCEDURE — 76705 ECHO EXAM OF ABDOMEN: CPT

## 2020-07-27 NOTE — PROGRESS NOTES
OP HEMATOLOGY/ONCOLOGY CONSULTATION      Pt Name: Vandaan Clement  YOB: 1960  MRN: [id number]  Referring provider: Julien Brower PA-C   PCP: SWAPNA Inman - CNP      Date of evaluation: 7/28/2020   History Obtained From:  patient, electronic medical record    CHIEF COMPLAINT:    Chief Complaint   Patient presents with    New Patient     Thrombocytopenia     HISTORY OF PRESENT ILLNESS:    Vandana Clement is a 63 y.o.  female referred by SWAPNA Mayer for evaluation of thrombocytopenia and splenomegaly. Serology 6/30/2020  B12 - 852  CMP -alk phos 135, AST 52, ALT 38    · Review prior imaging studies  CT abdomen pelvis with contrast at [facility] [place] [place] [facility] 8/14/2018 compared to 11/21/2017 revealed hepato-steatosis but spleen read as-no splenomegaly    · Current imaging study  Ultrasound liver 7/24/2020 at [facility] was compared to CT of the abdomen 8/14/2018 which revealed moderate diffuse fatty infiltration of the liver without focal lesion. Doppler sonography suggested a normal hepato-pedal portal venous circulation. Splenomegaly measuring 14.4 x 9.5 x 14.6 cm    She is newly diagnosed diabetic and currently on metformin. She does have essential hypertension that is controlled with Toprol- mg daily, Maxide 25 daily. She does have issues with memory- recent memory, remote memory is okay. She is on Aricept. She has anxiety that is controlled with Lexapro.     Past Medical History:   Diagnosis Date    Anxiety     Depression     Diabetes ([address]     Hypertension     Memory difficulty     Sleep apnea     c-pap       Past Surgical History:   Procedure Laterality Date    GALLBLADDER SURGERY           Current Outpatient Medications:     metFORMIN (GLUCOPHAGE) 500 MG tablet, Take 500 mg by mouth daily (with breakfast), Disp: , Rfl:     donepezil (ARICEPT) 10 MG tablet, Take 1 tablet by mouth nightly, Disp: 30 tablet, Rfl: 11    metoprolol succinate (TOPROL XL) 100 MG extended release 238 lb 6.4 oz (108.1 kg)   SpO2 94%   BMI 38.48 kg/m²     PHYSICAL EXAM:  Physical Exam  Constitutional:       Appearance: She is well-developed. HENT:      Head: Normocephalic and atraumatic. Eyes:      General: No scleral icterus. Conjunctiva/sclera: Conjunctivae normal.   Neck:      Musculoskeletal: Normal range of motion and neck supple. Trachea: No tracheal deviation. Cardiovascular:      Rate and Rhythm: Normal rate and regular rhythm. Heart sounds: Normal heart sounds. No murmur. Pulmonary:      Effort: Pulmonary effort is normal. No respiratory distress. Breath sounds: Normal breath sounds. Abdominal:      General: Bowel sounds are normal. There is no distension. Palpations: Abdomen is soft. There is hepatomegaly (Minimal tenderness). There is no fluid wave or splenomegaly (Not obviously palpable). Tenderness: There is no abdominal tenderness. Musculoskeletal:         General: No tenderness. Comments: Full ROM in all 4 extremities   Skin:     General: Skin is warm and dry. Findings: No rash. Neurological:      Mental Status: She is alert and oriented to person, place, and time. Coordination: Coordination normal.   Psychiatric:         Behavior: Behavior normal.         Thought Content: Thought content normal.         Cognition and Memory: She exhibits impaired recent memory. Narrative    Examination. US LIVER 7/24/2020 8:19 AM    History: Abdominal liver enzymes. The ultrasound examination of the liver is performed. There is no    previous study for comparison. The correlation is made with the    previous CT scan of the abdomen dated 8/14/2018. Moderate diffuse increased density of the liver parenchyma seen. No    focal abnormality or a lesion. The Doppler sonography suggested a    normal hepatopedal portal venous circulation. The gallbladder is surgically absent. The common bile duct measures 8    mm in diameter.     The pancreas is normal.    The right kidney measures 12.6 x 5.1 x 4.9 cm. No evidence of mass or    hydronephrosis. There is normal corticomedullary differentiation. Renal cortex measures 1.3 cm in thickness. The spleen is moderately enlarged and measures 14.4 x 9.5 x 14.6 cm.         Impression    The fatty infiltration of the liver. A moderate splenomegaly. A prior cholecystectomy. Signed by Dr Fiorella Knight on 7/24/2020 10:16 AM      Lab Results   Component Value Date    WBC 5.38 07/28/2020    HGB 14.6 07/28/2020    HCT 42.8 07/28/2020    MCV 94.1 07/28/2020     (L) 07/28/2020         VISIT DIAGNOSES  1. Thrombocytopenia (HCC)    2. Splenomegaly    3. Hepatic steatosis              Serology 6/30/2020  B12 - 852  CMP -alk phos 135, AST 52, ALT 38    · Review prior imaging studies  CT abdomen pelvis with contrast at [facility] 8/14/2018 compared to 11/21/2017 revealed hepato-steatosis but spleen read as-no splenomegaly    · Current imaging study  Ultrasound liver 7/24/2020 at [facility] was compared to CT of the abdomen 8/14/2018 which revealed moderate diffuse fatty infiltration of the liver without focal lesion. Doppler sonography suggested a normal hepato-pedal portal venous circulation. Splenomegaly measuring 14.4 x 9.5 x 14.6 cm      CBC today in the office reveals a normal WBC of 5.38 with normal percent differential.  Hgb is 14.6 with MCV 94.1. PLT is 116,000. 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 I discussed the fact that her platelet count is adequate at 116,000. She denies any significant bruising or bleeding. She denies any night sweats, weight loss, extreme fatigue, pruritus. Baseline serology will be checked however she will most likely be monitored conservatively. 4.  Colon cancer screening. Most recent colonoscopy was 12/18/2017 by Dr. Boris Willson.   3 polyps removed from the rectum, (1 tubular adenoma and 2 hyperplastic polyps), congested rectal mucosa with biopsy benign. 5.  Breast cancer screening. Bilateral screening mammogram 12/28/2018 BI-RADS 1    6. Cervical cancer screening. She reports that she has Pap smears every other year and they have been negative. Thank you Shey Lambert for referring Mrs. Dave Rollins for evaluation of her thrombocytopenia and splenomegaly. Orders Placed This Encounter   Procedures    Comprehensive Metabolic Panel    Lactate Dehydrogenase    Folate    Beta 2 Microglobulin, Serum    Electrophoresis Protein, Serum with Reflex to Immunofixation    Immunoglobulins, Quantitative    [place]/Lambda Free Lt Chains, Serum Quant    Platelet antibodies, direct    Protime/INR & PTT    Fibrinogen    Copper, Serum    Zinc    Camilla Barr Virus (EBV) Antibody Panel I        Return in about 3 months (around 10/28/2020) for With Jordon Avilez.       Hoang Krause PA-C  10:45 AM  7/28/2020

## 2020-07-28 ENCOUNTER — HOSPITAL ENCOUNTER (OUTPATIENT)
Dept: INFUSION THERAPY | Age: 60
Discharge: HOME | End: 2020-07-28
Payer: MEDICARE

## 2020-07-28 ENCOUNTER — OFFICE VISIT (OUTPATIENT)
Dept: HEMATOLOGY | Age: 60
End: 2020-07-28
Payer: MEDICARE

## 2020-07-28 VITALS
WEIGHT: 238.4 LBS | HEIGHT: 66 IN | HEART RATE: 65 BPM | BODY MASS INDEX: 38.31 KG/M2 | SYSTOLIC BLOOD PRESSURE: 130 MMHG | DIASTOLIC BLOOD PRESSURE: 78 MMHG | OXYGEN SATURATION: 94 % | TEMPERATURE: 97.7 F

## 2020-07-28 DIAGNOSIS — K76.0 HEPATIC STEATOSIS: ICD-10-CM

## 2020-07-28 DIAGNOSIS — R16.1 SPLENOMEGALY: ICD-10-CM

## 2020-07-28 DIAGNOSIS — D69.6 THROMBOCYTOPENIA (HCC): ICD-10-CM

## 2020-07-28 LAB
ALBUMIN SERPL-MCNC: 4.2 G/DL (ref 3.5–5.2)
ALP BLD-CCNC: 130 U/L (ref 35–104)
ALT SERPL-CCNC: 54 U/L (ref 9–52)
ANION GAP SERPL CALCULATED.3IONS-SCNC: 7 MMOL/L (ref 7–19)
AST SERPL-CCNC: 79 U/L (ref 14–36)
BASOPHILS ABSOLUTE: 0.03 K/UL (ref 0.01–0.08)
BASOPHILS RELATIVE PERCENT: 0.6 % (ref 0.1–1.2)
BILIRUB SERPL-MCNC: 1.7 MG/DL (ref 0.2–1.3)
BUN BLDV-MCNC: 18 MG/DL (ref 7–17)
CALCIUM SERPL-MCNC: 9.7 MG/DL (ref 8.4–10.2)
CHLORIDE BLD-SCNC: 105 MMOL/L (ref 98–111)
CO2: 28 MMOL/L (ref 22–29)
CREAT SERPL-MCNC: 0.6 MG/DL (ref 0.5–1)
EOSINOPHILS ABSOLUTE: 0.21 K/UL (ref 0.04–0.54)
EOSINOPHILS RELATIVE PERCENT: 3.9 % (ref 0.7–7)
FOLATE: 18.2 NG/ML (ref 2.7–20)
GFR NON-AFRICAN AMERICAN: >60
GLOBULIN: 3.4 G/DL
GLUCOSE BLD-MCNC: 155 MG/DL (ref 74–106)
HCT VFR BLD CALC: 42.8 % (ref 34.1–44.9)
HEMOGLOBIN: 14.6 G/DL (ref 11.2–15.7)
LACTATE DEHYDROGENASE: 510 U/L (ref 313–618)
LYMPHOCYTES ABSOLUTE: 2.04 K/UL (ref 1.18–3.74)
LYMPHOCYTES RELATIVE PERCENT: 37.9 % (ref 19.3–53.1)
MCH RBC QN AUTO: 32.1 PG (ref 25.6–32.2)
MCHC RBC AUTO-ENTMCNC: 34.1 G/DL (ref 32.3–35.5)
MCV RBC AUTO: 94.1 FL (ref 79.4–94.8)
MONOCYTES ABSOLUTE: 0.43 K/UL (ref 0.24–0.82)
MONOCYTES RELATIVE PERCENT: 8 % (ref 4.7–12.5)
NEUTROPHILS ABSOLUTE: 2.67 K/UL (ref 1.56–6.13)
NEUTROPHILS RELATIVE PERCENT: 49.6 % (ref 34–71.1)
PDW BLD-RTO: 12.9 % (ref 11.7–14.4)
PLATELET # BLD: 116 K/UL (ref 182–369)
PMV BLD AUTO: 10.9 FL (ref 7.4–10.4)
POTASSIUM SERPL-SCNC: 4.2 MMOL/L (ref 3.5–5.1)
RBC # BLD: 4.55 M/UL (ref 3.93–5.22)
SODIUM BLD-SCNC: 140 MMOL/L (ref 137–145)
TOTAL PROTEIN: 7.7 G/DL (ref 6.3–8.2)
WBC # BLD: 5.38 K/UL (ref 3.98–10.04)

## 2020-07-28 PROCEDURE — 83615 LACTATE (LD) (LDH) ENZYME: CPT

## 2020-07-28 PROCEDURE — 99211 OFF/OP EST MAY X REQ PHY/QHP: CPT

## 2020-07-28 PROCEDURE — 80053 COMPREHEN METABOLIC PANEL: CPT

## 2020-07-28 PROCEDURE — 99203 OFFICE O/P NEW LOW 30 MIN: CPT | Performed by: PHYSICIAN ASSISTANT

## 2020-07-28 PROCEDURE — 82746 ASSAY OF FOLIC ACID SERUM: CPT

## 2020-07-28 PROCEDURE — 85025 COMPLETE CBC W/AUTO DIFF WBC: CPT

## 2020-07-28 ASSESSMENT — ENCOUNTER SYMPTOMS
ABDOMINAL PAIN: 0
CONSTIPATION: 0
COUGH: 0
NAUSEA: 0
EYE DISCHARGE: 0
EYE ITCHING: 0
BACK PAIN: 0
SHORTNESS OF BREATH: 0
SORE THROAT: 0
TROUBLE SWALLOWING: 0
ABDOMINAL DISTENTION: 1
VOMITING: 0
DIARRHEA: 0
BLOOD IN STOOL: 0
VOICE CHANGE: 0
COLOR CHANGE: 0
WHEEZING: 0
PHOTOPHOBIA: 0

## 2020-08-10 RX ORDER — DONEPEZIL HYDROCHLORIDE 10 MG/1
10 TABLET, FILM COATED ORAL NIGHTLY
Qty: 30 TABLET | Refills: 11 | Status: SHIPPED | OUTPATIENT
Start: 2020-08-10 | End: 2021-06-09 | Stop reason: SDUPTHER

## 2020-08-10 NOTE — TELEPHONE ENCOUNTER
Killian Olga has requested a refill on her medication.       Last office visit : 6/3/2020   Next office visit : 12/3/2020         Requested Prescriptions     Pending Prescriptions Disp Refills    donepezil (ARICEPT) 10 MG tablet 30 tablet 11     Sig: Take 1 tablet by mouth nightly

## 2020-10-26 NOTE — PROGRESS NOTES
differential.  Hgb is 14.6 with MCV 94.1. PLT is 116,000.     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 I discussed the fact that her platelet count is adequate at 116,000. She denies any significant bruising or bleeding. She denies any night sweats, weight loss, extreme fatigue, pruritus.     Baseline serology will be checked however she will most likely be monitored conservatively.     Serology 7/28/2020  Fibrinogen - 261  PT/INR 12.2/1.2  PTT - 31  Folate - 18.2  Copper - 91  Zinc - 75  Antiplatelet ab - Negative  SPEP - No M spike -with immunofixation negative  QI - IgG 1232, IgA 422 (), IgM 78  Free serum light chains -   B2 M - 2.1    EBV titers  EBV Ab VCA, IgM  <36  EBV Ab VCA, IgG 136  EBV nuclear antigen Ab, IgG < 18    Consistent with prior infection      Past Medical History:   Diagnosis Date    Anxiety     Depression     Diabetes ([facility] [address]     Hypertension     Memory difficulty     Sleep apnea     c-pap        Past Surgical History:   Procedure Laterality Date    GALLBLADDER SURGERY             Current Outpatient Medications:     donepezil (ARICEPT) 10 MG tablet, Take 1 tablet by mouth nightly, Disp: 30 tablet, Rfl: 11    metFORMIN (GLUCOPHAGE) 500 MG tablet, Take 1,000 mg by mouth daily (with breakfast) , Disp: , Rfl:     metoprolol succinate (TOPROL XL) 100 MG extended release tablet, Take 1 tablet by mouth daily, Disp: , Rfl: 1    escitalopram (LEXAPRO) 10 MG tablet, Take 10 mg by mouth daily, Disp: , Rfl:     triamterene-hydrochlorothiazide (MAXZIDE-25) 37.5-25 MG per tablet, Take 1 tablet by mouth daily, Disp: , Rfl:      Allergies   Allergen Reactions    Azithromycin Rash    Namenda  [Memantine Hcl] Nausea And Vomiting and Nausea Only       Social History     Tobacco Use    Smoking status: Never Smoker    Smokeless tobacco: Never Used   Substance Use Topics    Alcohol use: No    Drug use: No       Family History   Problem Relation Age of Onset    Cancer Mother     Heart Attack Father        Subjective   REVIEW OF SYSTEMS:   Review of Systems   Constitutional: Negative for chills, diaphoresis, fatigue, fever and unexpected weight change. HENT: Negative for mouth sores, nosebleeds, sore throat, trouble swallowing and voice change. Eyes: Negative for photophobia, discharge and itching. Respiratory: Negative for cough, shortness of breath and wheezing. Cardiovascular: Negative for chest pain, palpitations and leg swelling. Gastrointestinal: Positive for abdominal distention. Negative for abdominal pain, blood in stool, constipation, diarrhea, nausea and vomiting. Endocrine: Negative for cold intolerance, heat intolerance, polydipsia and polyuria. Genitourinary: Negative for difficulty urinating, dysuria, hematuria and urgency. Musculoskeletal: Negative for arthralgias, back pain, joint swelling and myalgias. Skin: Negative for color change and rash. Neurological: Negative for dizziness, tremors, seizures, syncope and light-headedness. Hematological: Negative for adenopathy. Does not bruise/bleed easily. Psychiatric/Behavioral: Negative for behavioral problems and suicidal ideas. The patient is not nervous/anxious. All other systems reviewed and are negative. Objective   /78   Pulse 71   Temp 97.1 °F (36.2 °C)   Ht 5' 6\" (1.676 m)   Wt 232 lb (105.2 kg)   SpO2 97%   BMI 37.45 kg/m²     PHYSICAL EXAM:  Physical Exam  Constitutional:       Appearance: She is well-developed. HENT:      Head: Normocephalic and atraumatic. Eyes:      General: No scleral icterus. Conjunctiva/sclera: Conjunctivae normal.   Neck:      Musculoskeletal: Normal range of motion and neck supple. Trachea: No tracheal deviation. Cardiovascular:      Rate and Rhythm: Normal rate and regular rhythm. Heart sounds: Normal heart sounds. No murmur.    Pulmonary:      Effort: Pulmonary effort is normal. No respiratory distress. Breath sounds: Normal breath sounds. Abdominal:      General: Bowel sounds are normal. There is no distension. Palpations: Abdomen is soft. There is hepatomegaly (Minimal tenderness). There is no fluid wave or splenomegaly (Not obviously palpable). Tenderness: There is no abdominal tenderness. Musculoskeletal:         General: No tenderness. Comments: Full ROM in all 4 extremities   Skin:     General: Skin is warm and dry. Findings: No rash. Neurological:      Mental Status: She is alert and oriented to person, place, and time. Coordination: Coordination normal.   Psychiatric:         Behavior: Behavior normal.         Thought Content: Thought content normal.         Cognition and Memory: She exhibits impaired recent memory. Lab Results   Component Value Date    WBC 5.43 10/27/2020    HGB 13.9 10/27/2020    HCT 41.8 10/27/2020    MCV 96.3 (H) 10/27/2020     (L) 10/27/2020       VISIT DIAGNOSES  1. Thrombocytopenia (HCC)    2. Splenomegaly    3. Hepatic steatosis    4. Abnormal LFTs    5. Other specified anemias         ASSESSMENT/PLAN:      Serology 7/28/2020  Fibrinogen - 261  PT/INR 12.2/1.2  PTT - 31  Folate - 18.2  Copper - 91  Zinc - 75  Antiplatelet ab - Negative  SPEP - No M spike -with immunofixation negative  QI - IgG 1232, IgA 422 (), IgM 78  Free serum light chains -   B2 M - 2.1    LDH - 510 (313-618) - WNL  CMP - total bilirubin is 1.7, alk phos 130, AST 79, ALT 54    EBV titers  EBV Ab VCA, IgM  <36  EBV Ab VCA, IgG 136  EBV nuclear antigen Ab, IgG < 18    Consistent with prior infection    CBC today reveals that her PLT is 125,000 which is actually improved slightly. She continues to have mild thrombocytopenia. She does not have any obvious splenomegaly on examination today. She has chronic mild midepigastric discomfort. PLAN  HIV  Hepatitis ABC  Repeat protein serology above     5. Colon cancer screening.   Most recent colonoscopy was 12/18/2017 by Dr. Drew Epley. 3 polyps removed from the rectum, (1 tubular adenoma and 2 hyperplastic polyps), congested rectal mucosa with biopsy benign.     6.  Breast cancer screening. Bilateral screening mammogram 5/28/2020 BI-RADS 2 - Donaldo Stoner.     7.  Cervical cancer screening. She reports that she has Pap smears every other year and they have been negative. Orders Placed This Encounter   Procedures    Hepatitis Panel, Acute    HIV Rapid 1&2    Comprehensive Metabolic Panel    Beta 2 Microglobulin, Serum    Electrophoresis Protein, Serum with Reflex to Immunofixation    [place]/Lambda Free Lt Chains, Serum Quant        Return in about 4 months (around 2/27/2021) for With Krystyna Choi.      Sergio Rose PA-C  9:21 AM  10/27/2020

## 2020-10-27 ENCOUNTER — OFFICE VISIT (OUTPATIENT)
Dept: HEMATOLOGY | Age: 60
End: 2020-10-27
Payer: MEDICARE

## 2020-10-27 ENCOUNTER — HOSPITAL ENCOUNTER (OUTPATIENT)
Dept: INFUSION THERAPY | Age: 60
Discharge: HOME | End: 2020-10-27
Payer: MEDICARE

## 2020-10-27 VITALS
BODY MASS INDEX: 37.28 KG/M2 | WEIGHT: 232 LBS | HEART RATE: 71 BPM | OXYGEN SATURATION: 97 % | SYSTOLIC BLOOD PRESSURE: 122 MMHG | TEMPERATURE: 97.1 F | DIASTOLIC BLOOD PRESSURE: 78 MMHG | HEIGHT: 66 IN

## 2020-10-27 DIAGNOSIS — D69.6 THROMBOCYTOPENIA (HCC): ICD-10-CM

## 2020-10-27 LAB
ALBUMIN SERPL-MCNC: 4.5 G/DL (ref 3.5–5.2)
ALP BLD-CCNC: 102 U/L (ref 35–104)
ALT SERPL-CCNC: 39 U/L (ref 9–52)
ANION GAP SERPL CALCULATED.3IONS-SCNC: 8 MMOL/L (ref 7–19)
AST SERPL-CCNC: 56 U/L (ref 14–36)
BASOPHILS ABSOLUTE: 0.03 K/UL (ref 0.01–0.08)
BASOPHILS RELATIVE PERCENT: 0.6 % (ref 0.1–1.2)
BILIRUB SERPL-MCNC: 1.5 MG/DL (ref 0.2–1.3)
BUN BLDV-MCNC: 16 MG/DL (ref 7–17)
CALCIUM SERPL-MCNC: 9.5 MG/DL (ref 8.4–10.2)
CHLORIDE BLD-SCNC: 106 MMOL/L (ref 98–111)
CO2: 30 MMOL/L (ref 22–29)
CREAT SERPL-MCNC: 0.7 MG/DL (ref 0.5–1)
EOSINOPHILS ABSOLUTE: 0.19 K/UL (ref 0.04–0.54)
EOSINOPHILS RELATIVE PERCENT: 3.5 % (ref 0.7–7)
GFR NON-AFRICAN AMERICAN: >60
GLUCOSE BLD-MCNC: 140 MG/DL (ref 74–106)
HCT VFR BLD CALC: 41.8 % (ref 34.1–44.9)
HEMOGLOBIN: 13.9 G/DL (ref 11.2–15.7)
LYMPHOCYTES ABSOLUTE: 2.03 K/UL (ref 1.18–3.74)
LYMPHOCYTES RELATIVE PERCENT: 37.4 % (ref 19.3–53.1)
MCH RBC QN AUTO: 32 PG (ref 25.6–32.2)
MCHC RBC AUTO-ENTMCNC: 33.3 G/DL (ref 32.3–35.5)
MCV RBC AUTO: 96.3 FL (ref 79.4–94.8)
MONOCYTES ABSOLUTE: 0.38 K/UL (ref 0.24–0.82)
MONOCYTES RELATIVE PERCENT: 7 % (ref 4.7–12.5)
NEUTROPHILS ABSOLUTE: 2.8 K/UL (ref 1.56–6.13)
NEUTROPHILS RELATIVE PERCENT: 51.5 % (ref 34–71.1)
PDW BLD-RTO: 12.8 % (ref 11.7–14.4)
PLATELET # BLD: 125 K/UL (ref 182–369)
PMV BLD AUTO: 11.1 FL (ref 7.4–10.4)
POTASSIUM SERPL-SCNC: 3.9 MMOL/L (ref 3.5–5.1)
RBC # BLD: 4.34 M/UL (ref 3.93–5.22)
SODIUM BLD-SCNC: 144 MMOL/L (ref 137–145)
TOTAL PROTEIN: 7.6 G/DL (ref 6.3–8.2)
WBC # BLD: 5.43 K/UL (ref 3.98–10.04)

## 2020-10-27 PROCEDURE — 99211 OFF/OP EST MAY X REQ PHY/QHP: CPT

## 2020-10-27 PROCEDURE — 80053 COMPREHEN METABOLIC PANEL: CPT

## 2020-10-27 PROCEDURE — 85025 COMPLETE CBC W/AUTO DIFF WBC: CPT

## 2020-10-27 PROCEDURE — 99213 OFFICE O/P EST LOW 20 MIN: CPT | Performed by: PHYSICIAN ASSISTANT

## 2020-10-27 ASSESSMENT — ENCOUNTER SYMPTOMS
WHEEZING: 0
PHOTOPHOBIA: 0
VOICE CHANGE: 0
ABDOMINAL PAIN: 0
VOMITING: 0
TROUBLE SWALLOWING: 0
DIARRHEA: 0
SORE THROAT: 0
EYE ITCHING: 0
COUGH: 0
ABDOMINAL DISTENTION: 1
NAUSEA: 0
BACK PAIN: 0
COLOR CHANGE: 0
CONSTIPATION: 0
BLOOD IN STOOL: 0
EYE DISCHARGE: 0
SHORTNESS OF BREATH: 0

## 2020-12-03 ENCOUNTER — OFFICE VISIT (OUTPATIENT)
Dept: NEUROSURGERY | Age: 60
End: 2020-12-03
Payer: MEDICARE

## 2020-12-03 VITALS
TEMPERATURE: 97 F | HEIGHT: 66 IN | DIASTOLIC BLOOD PRESSURE: 73 MMHG | BODY MASS INDEX: 36.96 KG/M2 | WEIGHT: 230 LBS | HEART RATE: 69 BPM | SYSTOLIC BLOOD PRESSURE: 113 MMHG | OXYGEN SATURATION: 95 %

## 2020-12-03 PROCEDURE — 99213 OFFICE O/P EST LOW 20 MIN: CPT | Performed by: NURSE PRACTITIONER

## 2020-12-03 RX ORDER — SITAGLIPTIN AND METFORMIN HYDROCHLORIDE 100; 1000 MG/1; MG/1
100-1000 TABLET, FILM COATED, EXTENDED RELEASE ORAL NIGHTLY
COMMUNITY
End: 2022-07-12

## 2020-12-03 NOTE — PROGRESS NOTES
[facility] Neurology Office Note      Patient:   Toribio Jenkins  MR#:    [id number]  Account Number:                         YOB: 1960  Date of Evaluation:  12/3/2020  Time of Note:                          9:35 AM  Primary/Referring Physician:  SWAPNA De Leon - CNP   Consulting Physician:  SWAPNA Barker     FOLLOW UP VISIT    Chief Complaint   Patient presents with    6 Month Follow-Up    Memory Loss     pt states things are about the same       HISTORY OF PRESENT ILLNESS    Toribio Jenkins is a 63y.o. year old female here for follow up of memory loss. She is alone at today's appointment. Doing about the same from last visit. Primarily short term memory loss noted. She has quite a bit of word finding difficulty, speech difficulty. She states that she feels like her thoughts turn in circles at times. Needing reminders to complete ADLs, having difficulty with cooking/following recipes. Denies gait changes or bladder incontinence. No hallucinations. Denies tremor. Needing some reminders to take medications at time,  is helping her with this. No longer driving. Denies depression/anxiety. On Aricept 10mg nightly. Tried and failed Namenda and Namzeric in the past. She has had neuropsych testing completed, no records. No overt evidence of FTD. Family history with aunt having Pick's disease, onset was in her 46s. Following with hematology now for thrombocytopenia, felt to be related to splenomegaly. No other complaints.      Past Medical History:   Diagnosis Date    Anxiety     Depression     Diabetes ([address]     Hypertension     Memory difficulty     Sleep apnea     c-pap       Past Surgical History:   Procedure Laterality Date    GALLBLADDER SURGERY         Family History   Problem Relation Age of Onset    Cancer Mother     Heart Attack Father        Social History     Socioeconomic History    Marital status:      Spouse name: Not on file    Number of children: Not on file    Years of education: Not on file    Highest education level: Not on file   Occupational History    Not on file   Social Needs    Financial resource strain: Not on file    Food insecurity     Worry: Not on file     Inability: Not on file    Transportation needs     Medical: Not on file     Non-medical: Not on file   Tobacco Use    Smoking status: Never Smoker    Smokeless tobacco: Never Used   Substance and Sexual Activity    Alcohol use: No    Drug use: No    Sexual activity: Yes   Lifestyle    Physical activity     Days per week: Not on file     Minutes per session: Not on file    Stress: Not on file   Relationships    Social connections     Talks on phone: Not on file     Gets together: Not on file     Attends Zoroastrian service: Not on file     Active member of club or organization: Not on file     Attends meetings of clubs or organizations: Not on file     Relationship status: Not on file    Intimate partner violence     Fear of current or ex partner: Not on file     Emotionally abused: Not on file     Physically abused: Not on file     Forced sexual activity: Not on file   Other Topics Concern    Not on file   Social History Narrative    Not on file       Current Outpatient Medications   Medication Sig Dispense Refill    SITagliptin-metFORMIN HCl ER (JANUMET XR) 100-1000 MG TB24 Take 100-1,000 mg by mouth daily      donepezil (ARICEPT) 10 MG tablet Take 1 tablet by mouth nightly 30 tablet 11    metoprolol succinate (TOPROL XL) 100 MG extended release tablet Take 1 tablet by mouth daily  1    escitalopram (LEXAPRO) 10 MG tablet Take 10 mg by mouth daily      triamterene-hydrochlorothiazide (MAXZIDE-25) 37.5-25 MG per tablet Take 1 tablet by mouth daily       No current facility-administered medications for this visit.       ALLERGIES  Allergies   Allergen Reactions    Azithromycin Rash    Namenda  [Memantine Hcl] Nausea And Vomiting and Nausea Only     REVIEW OF SYSTEMS  Constitutional: []? Fever []? Sweats []? Chills []? Recent Injury [x]? Denies all unless marked  HEENT:[]? Headache  []? Head Injury []? Hearing Loss  []? Sore Throat  []? Ear Ache [x]? Denies all unless marked  Spine:  []? Neck pain  []? Back pain  []? Sciaticia  [x]? Denies all unless marked  Cardiovascular:[]? Heart Disease []? Palpitations []? Chest Pain   [x]? Denies all unless marked  Pulmonary: []? Shortness of Breath []? Cough   [x]? Denies all unless marked  Psychiatric/Behavioral:[]? Depression []? Anxiety [x]? Denies all unless marked  Gastrointestinal: []? Nausea  []? Vomiting  []? Abdominal Pain  []? Constipation  []? Diarrhea  [x]? Denies all unless marked  Genitourinary:   []? Frequency  []? Urgency  []? Dysuria []? Incontinence  [x]? Denies all unless marked  Extremities: []? Pain  []? Swelling  [x]? Denies all unless marked  Musculoskeletal: []? Myalgias  []? Joint Pain  []? Arthritis []? Muscle Cramps []? Muscle Twitches  [x]? Denies all unless marked  Sleep: []? Insomnia[]? Snoring []? Restless Legs  []? Sleep Apnea  []? Daytime Sleepiness  [x]? Denies all unless marked  Skin:[]? Rash []? Color Change [x]? Denies all unless marked   Neurological:[]? Visual Disturbance []? Memory Loss []? Loss of Balance []? Slurred Speech []? Weakness []? Seizures  []? Dizziness [x]? Denies all unless marked    The MA has completed the ROS with the patient. I have reviewed it in its' entirety with the patient and agree with the documentation.      PHYSICAL EXAM  /73   Pulse 69   Temp 97 °F (36.1 °C)   Ht 5' 6\" (1.676 m)   Wt 230 lb (104.3 kg)   SpO2 95%   BMI 37.12 kg/m²     Constitutional - No acute distress    HEENT- Conjunctiva normal.  No scars, masses, or lesions over external nose or ears  Cardiac- regular rate and rhythm   Respiratory- normal effort, no use of accessory muscles  Musculoskeletal - No significant wasting of muscles noted, no bony deformities  Extremities - No clubbing, cyanosis or edema  Skin - LABALBU 4.5 10/27/2020    BILITOT 1.5 (H) 10/27/2020    ALKPHOS 102 10/27/2020    AST 56 (H) 10/27/2020    ALT 39 10/27/2020    LABGLOM >60 10/27/2020    AGRATIO 1.3 10/27/2020    GLOB 3.2 10/27/2020     Labs, MRI, records from Dr. Harvinder Herrera and [place] reviewed    MRI, EEG, and labs reviewed and negative. ASSESSMENT:    Salina Wade is a 63y.o. year old female here for follow up of memory loss. No clear progression since last visit. No overt mood or personality changes. Suspect underlying dementia- possible early onset Alzheimer's, FTD felt less likely. Family history of Pick's disease. On Aricept, unable to tolerate Namenda or Namzeric in the past. Discussed referral to [facility] again today but patient defers. Will discuss further with family as well. Diagnosis Orders   1. Memory deficit          PLAN:  1. Continue Aricept 10mg nightly. 2. Supplement thiamine   3. Safety concerns discussed, increase supervision, monitor medications, no driving   4. Discussed referral to [facility]   5. Follow up in 6 months, sooner with any worsening     Vicenta Bernstein DNP, APRN     Note:  A total of >50% (>8 minutes) of 15 minutes was spent discussing the pathophysiology and treatment and/or coordination of care of the above diagnoses.

## 2020-12-03 NOTE — PATIENT INSTRUCTIONS
Would like a family member to accompany her to next appointment    Please let me know if you would like a referral to [facility]

## 2020-12-31 LAB
ALBUMIN SERPL-MCNC: 4.1 G/DL (ref 3.5–5.2)
ALP BLD-CCNC: 127 U/L (ref 35–104)
ALT SERPL-CCNC: 29 U/L (ref 5–33)
ANION GAP SERPL CALCULATED.3IONS-SCNC: 9 MMOL/L (ref 7–19)
AST SERPL-CCNC: 38 U/L (ref 5–32)
BILIRUB SERPL-MCNC: 0.9 MG/DL (ref 0.2–1.2)
BUN BLDV-MCNC: 17 MG/DL (ref 6–20)
CALCIUM SERPL-MCNC: 9.4 MG/DL (ref 8.6–10)
CHLORIDE BLD-SCNC: 105 MMOL/L (ref 98–111)
CHOLESTEROL, TOTAL: 169 MG/DL (ref 160–199)
CO2: 28 MMOL/L (ref 22–29)
CREAT SERPL-MCNC: 0.6 MG/DL (ref 0.5–0.9)
GFR AFRICAN AMERICAN: >59
GFR NON-AFRICAN AMERICAN: >60
GLUCOSE BLD-MCNC: 160 MG/DL (ref 74–109)
HBA1C MFR BLD: 6.6 % (ref 4–6)
HDLC SERPL-MCNC: 68 MG/DL (ref 65–121)
LDL CHOLESTEROL CALCULATED: 82 MG/DL
POTASSIUM SERPL-SCNC: 3.9 MMOL/L (ref 3.5–5)
SODIUM BLD-SCNC: 142 MMOL/L (ref 136–145)
TOTAL PROTEIN: 6.9 G/DL (ref 6.6–8.7)
TRIGL SERPL-MCNC: 96 MG/DL (ref 0–149)

## 2021-02-22 NOTE — PROGRESS NOTES
Progress Note      Pt Name: Frida Murguia  YOB: 1960  MRN: [id number]    Date of evaluation: 2/23/2021  History Obtained From:  patient, electronic medical record    CHIEF COMPLAINT:    Chief Complaint   Patient presents with    Follow-up     Thrombocytopenia ([address]     Current active problems  Thrombocytopenia  Splenomegaly  Hepatic steatosis  Prior EBV      HISTORY OF PRESENT ILLNESS:    Frida Murguia is a 62 y.o.  female seen initially on 7/28/2020 referred for evaluation of mild thrombocytopenia and splenomegaly. 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 She does have splenomegaly and hepatic steatosis. She denies any new left upper quadrant pain. She was also noted to have a mild elevated IgA as well as elevated K/L ratio. She denies any renal issues. She denies any hospitalizations her last visit. She has not had any Covid 19 issues. She is diabetic and currently on Januvia med with blood sugars running around 160. She does not know her A1c. Blood pressure has been stable on her Toprol-XL and Maxide. She does have memory issuescognitive problems and is followed by Dr. Yina Li. She is on Aricept she has been on this medication for about 5 years. She has not really had any significant worsening of her recent memory. She is on Lexapro with a stable mood. She reports she does take multivitamins at times.     HEMATOLOGY HISTORY: Thrombocytopenia, splenomegaly, hepatic steatosis, gammopathy  Jasmin Hussein was seen in initial hematology consultation on 7/28/2020 referred by SWAPNA Pretty for evaluation of thrombocytopenia and splenomegaly.             Serology 6/30/2020  B12 - 852  CMP -alk phos 135, AST 52, ALT 38     · Review prior imaging studies  CT abdomen pelvis with contrast at [facility] 8/14/2018 compared to 11/21/2017 revealed hepato-steatosis but spleen read asno splenomegaly     · Current imaging study  Ultrasound liver 7/24/2020 at [facility] was compared to CT of the abdomen 8/14/2018 which revealed moderate diffuse fatty infiltration of the liver without focal lesion. Doppler sonography suggested a normal hepato-pedal portal venous circulation. Splenomegaly measuring 14.4 x 9.5 x 14.6 cm     Her initial CBC in the office on 7/28/2020 revealed a normal WBC of 5.38 with normal percent differential.  Hgb is 14.6 with MCV 94.1. PLT is 116,000.     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 I discussed the fact that her platelet count is adequate at 116,000. She denies any significant bruising or bleeding. She denies any night sweats, weight loss, extreme fatigue, pruritus.     Baseline serology will be checked however she will most likely be monitored conservatively.     Serology 7/28/2020  Fibrinogen - 261  PT/INR 12.2/1.2  PTT - 31  Folate - 18.2  Copper - 91  Zinc - 75  Antiplatelet ab - Negative  SPEP - No M spike -with immunofixation negative  QI - IgG 1232, IgA 422 (), IgM 78  Free serum light chains -   B2 M - 2.1    EBV titers  EBV Ab VCA, IgM  <36  EBV Ab VCA, IgG 136  EBV nuclear antigen Ab, IgG < 18    Consistent with prior infection    Serology 10/27/2020  SPEP - no M spike with immunofixation negative  QI - IgG 1227, IgA 390, IgM 69  Free serum light chains -kappa 31.3, K/L ratio 2.27  B2M - 2.2 - WNL  CMP = crt 0.6 with GFR > 60, AST 56, total bili 1.5    HIV 1/2 - nonreactive  Hepatitis A, B, C - negative        Past Medical History:   Diagnosis Date    Anxiety     Depression     [facility] [address] [address]     Hypertension     Memory difficulty     Sleep apnea     c-pap        Past Surgical History:   Procedure Laterality Date    GALLBLADDER SURGERY             Current Outpatient Medications:     SITagliptin-metFORMIN HCl ER (JANUMET XR) 100-1000 MG TB24, Take 100-1,000 mg by mouth daily, Disp: , Rfl:     donepezil (ARICEPT) 10 MG tablet, Take 1 tablet by mouth nightly, Disp: 30 tablet, Rfl: 11    metoprolol succinate (TOPROL XL) 100 MG extended release tablet, Take 1 tablet by mouth daily, Disp: , Rfl: 1    escitalopram (LEXAPRO) 10 MG tablet, Take 10 mg by mouth daily, Disp: , Rfl:     triamterene-hydrochlorothiazide (MAXZIDE-25) 37.5-25 MG per tablet, Take 1 tablet by mouth daily, Disp: , Rfl:      Allergies   Allergen Reactions    Azithromycin Rash    Namenda  [Memantine Hcl] Nausea And Vomiting and Nausea Only       Social History     Tobacco Use    Smoking status: Never Smoker    Smokeless tobacco: Never Used   Substance Use Topics    Alcohol use: No    Drug use: No       Family History   Problem Relation Age of Onset    Cancer Mother     Heart Attack Father        Subjective   REVIEW OF SYSTEMS:   Review of Systems   Constitutional: Negative for chills, diaphoresis, fatigue, fever and unexpected weight change. HENT: Negative for mouth sores, nosebleeds, sore throat, trouble swallowing and voice change. Eyes: Negative for photophobia, discharge and itching. Respiratory: Negative for cough, shortness of breath and wheezing. Cardiovascular: Negative for chest pain, palpitations and leg swelling. Gastrointestinal: Positive for abdominal distention. Negative for abdominal pain, blood in stool, constipation, diarrhea, nausea and vomiting. Endocrine: Negative for cold intolerance, heat intolerance, polydipsia and polyuria. Genitourinary: Negative for difficulty urinating, dysuria, hematuria and urgency. Musculoskeletal: Negative for arthralgias, back pain, joint swelling and myalgias. Skin: Negative for color change and rash. Neurological: Negative for dizziness, tremors, seizures, syncope and light-headedness. Hematological: Negative for adenopathy. Does not bruise/bleed easily. Psychiatric/Behavioral: Negative for behavioral problems and suicidal ideas. The patient is not nervous/anxious. All other systems reviewed and are negative.       Objective   /68   Pulse 72   Temp 96.9 °F (36.1 °C)   Ht 5' 6\" (1.676 m)   Wt 240 lb (108.9 kg)   SpO2 93%   BMI 38.74 kg/m²     PHYSICAL EXAM:  Physical Exam  Constitutional:       Appearance: She is well-developed. HENT:      Head: Normocephalic and atraumatic. Eyes:      General: No scleral icterus. Conjunctiva/sclera: Conjunctivae normal.   Neck:      Musculoskeletal: Normal range of motion and neck supple. Trachea: No tracheal deviation. Cardiovascular:      Rate and Rhythm: Normal rate and regular rhythm. Heart sounds: Normal heart sounds. No murmur. Pulmonary:      Effort: Pulmonary effort is normal. No respiratory distress. Breath sounds: Normal breath sounds. Abdominal:      General: Bowel sounds are normal. There is no distension. Palpations: Abdomen is soft. There is hepatomegaly (Minimal tenderness). There is no fluid wave or splenomegaly (Not obviously palpable). Tenderness: There is no abdominal tenderness. Musculoskeletal:         General: No tenderness. Comments: Full ROM in all 4 extremities   Lymphadenopathy:      Cervical: No cervical adenopathy. Upper Body:      Right upper body: No supraclavicular or axillary adenopathy. Left upper body: No supraclavicular or axillary adenopathy. Lower Body: No right inguinal adenopathy. No left inguinal adenopathy. Skin:     General: Skin is warm and dry. Findings: No rash. Neurological:      Mental Status: She is alert and oriented to person, place, and time. Coordination: Coordination normal.   Psychiatric:         Behavior: Behavior normal.         Thought Content: Thought content normal.         Cognition and Memory: She exhibits impaired recent memory.      CBC reviewed by me  Lab Results   Component Value Date    WBC 5.74 02/23/2021    HGB 13.7 02/23/2021    HCT 39.5 02/23/2021    MCV 90.8 02/23/2021     (L) 02/23/2021     Lab Results   Component Value Date    NEUTROABS 2.92 02/23/2021       VISIT procedure performed in the office versus in [address] [place] [address] [place] [facility] She would prefer to have the procedure performed here. We will also need to get a 24 urine for immunoelectrophoresis and immunofixation. Imaging studies will also need to be considered. All questions were answered to her satisfaction.         · Colon cancer screening. Most recent colonoscopy was 12/18/2017 by Dr. Noman Quiles. 3 polyps removed from the rectum, (1 tubular adenoma and 2 hyperplastic polyps), congested rectal mucosa with biopsy benign.     · Breast cancer screening. Bilateral screening mammogram 5/28/2020 BI-RADS 2 - Yeison Davidson.     · Cervical cancer screening. She reports that she has Pap smears every other year and they have been negative. Orders Placed This Encounter   Procedures    Electrophoresis Protein, Serum with Reflex to Immunofixation    [place]/Lambda Free Lt Chains, Serum Quant    Beta 2 Microglobulin, Serum    Lactate Dehydrogenase    Comprehensive Metabolic Panel    Vitamin B12    Folate        Return in about 4 months (around 6/23/2021) for With [facility].      Tran Whitman PA-C  9:32 AM  2/23/2021

## 2021-02-23 ENCOUNTER — HOSPITAL ENCOUNTER (OUTPATIENT)
Dept: INFUSION THERAPY | Age: 61
Discharge: HOME | End: 2021-02-23
Payer: MEDICARE

## 2021-02-23 ENCOUNTER — OFFICE VISIT (OUTPATIENT)
Dept: HEMATOLOGY | Age: 61
End: 2021-02-23
Payer: MEDICARE

## 2021-02-23 VITALS
HEART RATE: 72 BPM | OXYGEN SATURATION: 93 % | SYSTOLIC BLOOD PRESSURE: 130 MMHG | DIASTOLIC BLOOD PRESSURE: 68 MMHG | HEIGHT: 66 IN | WEIGHT: 240 LBS | BODY MASS INDEX: 38.57 KG/M2 | TEMPERATURE: 96.9 F

## 2021-02-23 DIAGNOSIS — D69.6 THROMBOCYTOPENIA (HCC): Primary | ICD-10-CM

## 2021-02-23 DIAGNOSIS — D47.2 GAMMOPATHY: ICD-10-CM

## 2021-02-23 DIAGNOSIS — R79.89 ABNORMAL LFTS: ICD-10-CM

## 2021-02-23 DIAGNOSIS — R16.1 SPLENOMEGALY: ICD-10-CM

## 2021-02-23 DIAGNOSIS — D69.6 THROMBOCYTOPENIA (HCC): ICD-10-CM

## 2021-02-23 DIAGNOSIS — K76.0 HEPATIC STEATOSIS: ICD-10-CM

## 2021-02-23 LAB
ALBUMIN SERPL-MCNC: 4.3 G/DL (ref 3.5–5.2)
ALP BLD-CCNC: 155 U/L (ref 35–104)
ALT SERPL-CCNC: 34 U/L (ref 9–52)
ANION GAP SERPL CALCULATED.3IONS-SCNC: 10 MMOL/L (ref 7–19)
AST SERPL-CCNC: 52 U/L (ref 14–36)
BASOPHILS ABSOLUTE: 0.03 K/UL (ref 0.01–0.08)
BASOPHILS RELATIVE PERCENT: 0.5 % (ref 0.1–1.2)
BILIRUB SERPL-MCNC: 0.8 MG/DL (ref 0.2–1.3)
BUN BLDV-MCNC: 19 MG/DL (ref 7–17)
CALCIUM SERPL-MCNC: 9.7 MG/DL (ref 8.4–10.2)
CHLORIDE BLD-SCNC: 106 MMOL/L (ref 98–111)
CO2: 27 MMOL/L (ref 22–29)
CREAT SERPL-MCNC: 0.7 MG/DL (ref 0.5–1)
EOSINOPHILS ABSOLUTE: 0.23 K/UL (ref 0.04–0.54)
EOSINOPHILS RELATIVE PERCENT: 4 % (ref 0.7–7)
FOLATE: 11.9 NG/ML (ref 2.7–20)
GFR NON-AFRICAN AMERICAN: >60
GLOBULIN: 2.9 G/DL
GLUCOSE BLD-MCNC: 138 MG/DL (ref 74–106)
HCT VFR BLD CALC: 39.5 % (ref 34.1–44.9)
HEMOGLOBIN: 13.7 G/DL (ref 11.2–15.7)
LACTATE DEHYDROGENASE: 492 U/L (ref 313–618)
LYMPHOCYTES ABSOLUTE: 2.15 K/UL (ref 1.18–3.74)
LYMPHOCYTES RELATIVE PERCENT: 37.5 % (ref 19.3–53.1)
MCH RBC QN AUTO: 31.5 PG (ref 25.6–32.2)
MCHC RBC AUTO-ENTMCNC: 34.7 G/DL (ref 32.3–35.5)
MCV RBC AUTO: 90.8 FL (ref 79.4–94.8)
MONOCYTES ABSOLUTE: 0.41 K/UL (ref 0.24–0.82)
MONOCYTES RELATIVE PERCENT: 7.1 % (ref 4.7–12.5)
NEUTROPHILS ABSOLUTE: 2.92 K/UL (ref 1.56–6.13)
NEUTROPHILS RELATIVE PERCENT: 50.9 % (ref 34–71.1)
PDW BLD-RTO: 12.4 % (ref 11.7–14.4)
PLATELET # BLD: 103 K/UL (ref 182–369)
PMV BLD AUTO: 10 FL (ref 7.4–10.4)
POTASSIUM SERPL-SCNC: 4.2 MMOL/L (ref 3.5–5.1)
RBC # BLD: 4.35 M/UL (ref 3.93–5.22)
SODIUM BLD-SCNC: 143 MMOL/L (ref 137–145)
TOTAL PROTEIN: 7.1 G/DL (ref 6.3–8.2)
VITAMIN B-12: 766 PG/ML (ref 239–931)
WBC # BLD: 5.74 K/UL (ref 3.98–10.04)

## 2021-02-23 PROCEDURE — 82746 ASSAY OF FOLIC ACID SERUM: CPT

## 2021-02-23 PROCEDURE — 99211 OFF/OP EST MAY X REQ PHY/QHP: CPT

## 2021-02-23 PROCEDURE — 83615 LACTATE (LD) (LDH) ENZYME: CPT

## 2021-02-23 PROCEDURE — 85025 COMPLETE CBC W/AUTO DIFF WBC: CPT

## 2021-02-23 PROCEDURE — 99214 OFFICE O/P EST MOD 30 MIN: CPT | Performed by: PHYSICIAN ASSISTANT

## 2021-02-23 PROCEDURE — 82607 VITAMIN B-12: CPT

## 2021-02-23 PROCEDURE — 80053 COMPREHEN METABOLIC PANEL: CPT

## 2021-02-23 ASSESSMENT — ENCOUNTER SYMPTOMS
EYE ITCHING: 0
COUGH: 0
SORE THROAT: 0
COLOR CHANGE: 0
VOMITING: 0
SHORTNESS OF BREATH: 0
PHOTOPHOBIA: 0
ABDOMINAL PAIN: 0
TROUBLE SWALLOWING: 0
WHEEZING: 0
EYE DISCHARGE: 0
BLOOD IN STOOL: 0
BACK PAIN: 0
VOICE CHANGE: 0
CONSTIPATION: 0
DIARRHEA: 0
ABDOMINAL DISTENTION: 1
NAUSEA: 0

## 2021-04-20 ENCOUNTER — HOSPITAL ENCOUNTER (INPATIENT)
Age: 61
LOS: 3 days | Discharge: HOME | DRG: 493 | End: 2021-04-23
Attending: INTERNAL MEDICINE | Admitting: INTERNAL MEDICINE
Payer: MEDICARE

## 2021-04-20 ENCOUNTER — APPOINTMENT (OUTPATIENT)
Dept: GENERAL RADIOLOGY | Age: 61
DRG: 493 | End: 2021-04-20
Payer: MEDICARE

## 2021-04-20 DIAGNOSIS — S92.311A CLOSED DISPLACED FRACTURE OF FIRST METATARSAL BONE OF RIGHT FOOT, INITIAL ENCOUNTER: ICD-10-CM
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
## 2021-04-20 DIAGNOSIS — S82.51XA CLOSED DISPLACED FRACTURE OF MEDIAL MALLEOLUS OF RIGHT TIBIA, INITIAL ENCOUNTER: Primary | ICD-10-CM

## 2021-04-20 DIAGNOSIS — S82.891A CLOSED RIGHT ANKLE FRACTURE, INITIAL ENCOUNTER: ICD-10-CM

## 2021-04-20 DIAGNOSIS — S82.442A CLOSED DISPLACED SPIRAL FRACTURE OF SHAFT OF LEFT FIBULA, INITIAL ENCOUNTER: ICD-10-CM

## 2021-04-20 DIAGNOSIS — S92.321A CLOSED DISPLACED FRACTURE OF SECOND METATARSAL BONE OF RIGHT FOOT, INITIAL ENCOUNTER: ICD-10-CM

## 2021-04-20 PROBLEM — S82.842A FRACTURE, ANKLE CLOSED, BIMALLEOLAR, LEFT, INITIAL ENCOUNTER: Status: ACTIVE | Noted: 2021-04-20

## 2021-04-20 LAB
ALBUMIN SERPL-MCNC: 3.9 G/DL (ref 3.5–5.2)
ALP BLD-CCNC: 129 U/L (ref 35–104)
ALT SERPL-CCNC: 29 U/L (ref 5–33)
ANION GAP SERPL CALCULATED.3IONS-SCNC: 10 MMOL/L (ref 7–19)
AST SERPL-CCNC: 52 U/L (ref 5–32)
BASOPHILS ABSOLUTE: 0 K/UL (ref 0–0.2)
BASOPHILS RELATIVE PERCENT: 0.3 % (ref 0–1)
BILIRUB SERPL-MCNC: 0.7 MG/DL (ref 0.2–1.2)
BUN BLDV-MCNC: 14 MG/DL (ref 6–20)
CALCIUM SERPL-MCNC: 9.2 MG/DL (ref 8.6–10)
CHLORIDE BLD-SCNC: 103 MMOL/L (ref 98–111)
CO2: 26 MMOL/L (ref 22–29)
CREAT SERPL-MCNC: 0.6 MG/DL (ref 0.5–0.9)
EOSINOPHILS ABSOLUTE: 0.2 K/UL (ref 0–0.6)
EOSINOPHILS RELATIVE PERCENT: 2.3 % (ref 0–5)
GFR AFRICAN AMERICAN: >59
GFR NON-AFRICAN AMERICAN: >60
GLUCOSE BLD-MCNC: 154 MG/DL (ref 74–109)
HCT VFR BLD CALC: 40 % (ref 37–47)
HEMOGLOBIN: 13.4 G/DL (ref 12–16)
IMMATURE GRANULOCYTES #: 0 K/UL
LYMPHOCYTES ABSOLUTE: 1.6 K/UL (ref 1.1–4.5)
LYMPHOCYTES RELATIVE PERCENT: 17.2 % (ref 20–40)
MCH RBC QN AUTO: 31.2 PG (ref 27–31)
MCHC RBC AUTO-ENTMCNC: 33.5 G/DL (ref 33–37)
MCV RBC AUTO: 93.2 FL (ref 81–99)
MONOCYTES ABSOLUTE: 0.7 K/UL (ref 0–0.9)
MONOCYTES RELATIVE PERCENT: 6.9 % (ref 0–10)
NEUTROPHILS ABSOLUTE: 6.9 K/UL (ref 1.5–7.5)
NEUTROPHILS RELATIVE PERCENT: 73.1 % (ref 50–65)
PDW BLD-RTO: 13.2 % (ref 11.5–14.5)
PLATELET # BLD: 127 K/UL (ref 130–400)
PMV BLD AUTO: 11.2 FL (ref 9.4–12.3)
POTASSIUM REFLEX MAGNESIUM: 3.6 MMOL/L (ref 3.5–5)
RBC # BLD: 4.29 M/UL (ref 4.2–5.4)
SARS-COV-2, NAAT: NOT DETECTED
SODIUM BLD-SCNC: 139 MMOL/L (ref 136–145)
TOTAL PROTEIN: 6.9 G/DL (ref 6.6–8.7)
WBC # BLD: 9.4 K/UL (ref 4.8–10.8)

## 2021-04-20 PROCEDURE — 85025 COMPLETE CBC W/AUTO DIFF WBC: CPT

## 2021-04-20 PROCEDURE — 29515 APPLICATION SHORT LEG SPLINT: CPT

## 2021-04-20 PROCEDURE — 99284 EMERGENCY DEPT VISIT MOD MDM: CPT

## 2021-04-20 PROCEDURE — 80053 COMPREHEN METABOLIC PANEL: CPT

## 2021-04-20 PROCEDURE — 1210000000 HC MED SURG R&B

## 2021-04-20 PROCEDURE — 73610 X-RAY EXAM OF ANKLE: CPT

## 2021-04-20 PROCEDURE — 87635 SARS-COV-2 COVID-19 AMP PRB: CPT

## 2021-04-20 PROCEDURE — 71045 X-RAY EXAM CHEST 1 VIEW: CPT

## 2021-04-20 PROCEDURE — 96372 THER/PROPH/DIAG INJ SC/IM: CPT

## 2021-04-20 PROCEDURE — 96376 TX/PRO/DX INJ SAME DRUG ADON: CPT

## 2021-04-20 PROCEDURE — 73630 X-RAY EXAM OF FOOT: CPT

## 2021-04-20 PROCEDURE — 36415 COLL VENOUS BLD VENIPUNCTURE: CPT

## 2021-04-20 PROCEDURE — 96374 THER/PROPH/DIAG INJ IV PUSH: CPT

## 2021-04-20 PROCEDURE — 6360000002 HC RX W HCPCS: Performed by: NURSE PRACTITIONER

## 2021-04-20 PROCEDURE — 93005 ELECTROCARDIOGRAM TRACING: CPT | Performed by: NURSE PRACTITIONER

## 2021-04-20 RX ORDER — METOPROLOL SUCCINATE 50 MG/1
100 TABLET, EXTENDED RELEASE ORAL NIGHTLY
Status: DISCONTINUED | OUTPATIENT
Start: 2021-04-21 | End: 2021-04-23 | Stop reason: HOSPADM

## 2021-04-20 RX ORDER — ESCITALOPRAM OXALATE 10 MG/1
10 TABLET ORAL NIGHTLY
Status: DISCONTINUED | OUTPATIENT
Start: 2021-04-21 | End: 2021-04-23 | Stop reason: HOSPADM

## 2021-04-20 RX ORDER — ONDANSETRON 2 MG/ML
4 INJECTION INTRAMUSCULAR; INTRAVENOUS ONCE
Status: COMPLETED | OUTPATIENT
Start: 2021-04-20 | End: 2021-04-20

## 2021-04-20 RX ORDER — HYDROMORPHONE HYDROCHLORIDE 1 MG/ML
1 INJECTION, SOLUTION INTRAMUSCULAR; INTRAVENOUS; SUBCUTANEOUS ONCE
Status: COMPLETED | OUTPATIENT
Start: 2021-04-20 | End: 2021-04-20

## 2021-04-20 RX ORDER — DONEPEZIL HYDROCHLORIDE 5 MG/1
10 TABLET, FILM COATED ORAL NIGHTLY
Status: DISCONTINUED | OUTPATIENT
Start: 2021-04-21 | End: 2021-04-23 | Stop reason: HOSPADM

## 2021-04-20 RX ORDER — HYDROMORPHONE HYDROCHLORIDE 1 MG/ML
0.5 INJECTION, SOLUTION INTRAMUSCULAR; INTRAVENOUS; SUBCUTANEOUS ONCE
Status: COMPLETED | OUTPATIENT
Start: 2021-04-20 | End: 2021-04-20

## 2021-04-20 RX ADMIN — HYDROMORPHONE HYDROCHLORIDE 1 MG: 1 INJECTION, SOLUTION INTRAMUSCULAR; INTRAVENOUS; SUBCUTANEOUS at 19:15

## 2021-04-20 RX ADMIN — HYDROMORPHONE HYDROCHLORIDE 0.5 MG: 1 INJECTION, SOLUTION INTRAMUSCULAR; INTRAVENOUS; SUBCUTANEOUS at 23:43

## 2021-04-20 RX ADMIN — HYDROMORPHONE HYDROCHLORIDE 0.5 MG: 1 INJECTION, SOLUTION INTRAMUSCULAR; INTRAVENOUS; SUBCUTANEOUS at 22:20

## 2021-04-20 RX ADMIN — HYDROMORPHONE HYDROCHLORIDE 0.5 MG: 1 INJECTION, SOLUTION INTRAMUSCULAR; INTRAVENOUS; SUBCUTANEOUS at 21:41

## 2021-04-20 RX ADMIN — ONDANSETRON HYDROCHLORIDE 4 MG: 2 SOLUTION INTRAMUSCULAR; INTRAVENOUS at 19:14

## 2021-04-20 ASSESSMENT — PAIN DESCRIPTION - FREQUENCY: FREQUENCY: CONTINUOUS

## 2021-04-20 ASSESSMENT — PAIN SCALES - GENERAL
PAINLEVEL_OUTOF10: 8
PAINLEVEL_OUTOF10: 8
PAINLEVEL_OUTOF10: 10

## 2021-04-20 ASSESSMENT — ENCOUNTER SYMPTOMS
SHORTNESS OF BREATH: 0
BACK PAIN: 0

## 2021-04-20 ASSESSMENT — PAIN DESCRIPTION - DESCRIPTORS: DESCRIPTORS: ACHING

## 2021-04-21 ENCOUNTER — ANESTHESIA (OUTPATIENT)
Dept: OPERATING ROOM | Age: 61
DRG: 493 | End: 2021-04-21
Payer: MEDICARE

## 2021-04-21 ENCOUNTER — ANESTHESIA EVENT (OUTPATIENT)
Dept: OPERATING ROOM | Age: 61
DRG: 493 | End: 2021-04-21
Payer: MEDICARE

## 2021-04-21 ENCOUNTER — APPOINTMENT (OUTPATIENT)
Dept: GENERAL RADIOLOGY | Age: 61
DRG: 493 | End: 2021-04-21
Payer: MEDICARE

## 2021-04-21 VITALS — SYSTOLIC BLOOD PRESSURE: 94 MMHG | OXYGEN SATURATION: 91 % | DIASTOLIC BLOOD PRESSURE: 49 MMHG | TEMPERATURE: 97 F

## 2021-04-21 PROBLEM — R26.81 GAIT INSTABILITY: Status: ACTIVE | Noted: 2021-04-21

## 2021-04-21 PROBLEM — S82.891A CLOSED RIGHT ANKLE FRACTURE, INITIAL ENCOUNTER: Status: ACTIVE | Noted: 2021-04-21

## 2021-04-21 PROBLEM — E66.9 OBESITY: Status: ACTIVE | Noted: 2021-04-21

## 2021-04-21 LAB
ALBUMIN SERPL-MCNC: 3.7 G/DL (ref 3.5–5.2)
ALP BLD-CCNC: 99 U/L (ref 35–104)
ALT SERPL-CCNC: 28 U/L (ref 5–33)
ANION GAP SERPL CALCULATED.3IONS-SCNC: 9 MMOL/L (ref 7–19)
AST SERPL-CCNC: 50 U/L (ref 5–32)
BILIRUB SERPL-MCNC: 0.8 MG/DL (ref 0.2–1.2)
BUN BLDV-MCNC: 15 MG/DL (ref 6–20)
CALCIUM SERPL-MCNC: 8.7 MG/DL (ref 8.6–10)
CHLORIDE BLD-SCNC: 106 MMOL/L (ref 98–111)
CO2: 27 MMOL/L (ref 22–29)
CREAT SERPL-MCNC: 0.6 MG/DL (ref 0.5–0.9)
EKG P AXIS: 49 DEGREES
EKG P-R INTERVAL: 188 MS
EKG Q-T INTERVAL: 424 MS
EKG QRS DURATION: 90 MS
EKG QTC CALCULATION (BAZETT): 443 MS
EKG T AXIS: 37 DEGREES
GFR AFRICAN AMERICAN: >59
GFR NON-AFRICAN AMERICAN: >60
GLUCOSE BLD-MCNC: 121 MG/DL (ref 70–99)
GLUCOSE BLD-MCNC: 135 MG/DL (ref 74–109)
GLUCOSE BLD-MCNC: 140 MG/DL (ref 70–99)
GLUCOSE BLD-MCNC: 168 MG/DL (ref 70–99)
HCT VFR BLD CALC: 38.1 % (ref 37–47)
HEMOGLOBIN: 12.3 G/DL (ref 12–16)
MCH RBC QN AUTO: 30.4 PG (ref 27–31)
MCHC RBC AUTO-ENTMCNC: 32.3 G/DL (ref 33–37)
MCV RBC AUTO: 94.3 FL (ref 81–99)
PDW BLD-RTO: 13.2 % (ref 11.5–14.5)
PERFORMED ON: ABNORMAL
PLATELET # BLD: 122 K/UL (ref 130–400)
PMV BLD AUTO: 11.1 FL (ref 9.4–12.3)
POTASSIUM REFLEX MAGNESIUM: 3.8 MMOL/L (ref 3.5–5)
RBC # BLD: 4.04 M/UL (ref 4.2–5.4)
SODIUM BLD-SCNC: 142 MMOL/L (ref 136–145)
TOTAL PROTEIN: 6 G/DL (ref 6.6–8.7)
WBC # BLD: 8.2 K/UL (ref 4.8–10.8)

## 2021-04-21 PROCEDURE — 2500000003 HC RX 250 WO HCPCS: Performed by: INTERNAL MEDICINE

## 2021-04-21 PROCEDURE — 73610 X-RAY EXAM OF ANKLE: CPT

## 2021-04-21 PROCEDURE — 2580000003 HC RX 258: Performed by: INTERNAL MEDICINE

## 2021-04-21 PROCEDURE — 2700000000 HC OXYGEN THERAPY PER DAY

## 2021-04-21 PROCEDURE — 6370000000 HC RX 637 (ALT 250 FOR IP): Performed by: ORTHOPAEDIC SURGERY

## 2021-04-21 PROCEDURE — 6360000002 HC RX W HCPCS: Performed by: ORTHOPAEDIC SURGERY

## 2021-04-21 PROCEDURE — 7100000001 HC PACU RECOVERY - ADDTL 15 MIN: Performed by: ORTHOPAEDIC SURGERY

## 2021-04-21 PROCEDURE — 3209999900 FLUORO FOR SURGICAL PROCEDURES

## 2021-04-21 PROCEDURE — 80053 COMPREHEN METABOLIC PANEL: CPT

## 2021-04-21 PROCEDURE — 2500000003 HC RX 250 WO HCPCS: Performed by: NURSE ANESTHETIST, CERTIFIED REGISTERED

## 2021-04-21 PROCEDURE — 2709999900 HC NON-CHARGEABLE SUPPLY: Performed by: ORTHOPAEDIC SURGERY

## 2021-04-21 PROCEDURE — 2500000003 HC RX 250 WO HCPCS: Performed by: ORTHOPAEDIC SURGERY

## 2021-04-21 PROCEDURE — 0QSJ04Z REPOSITION RIGHT FIBULA WITH INTERNAL FIXATION DEVICE, OPEN APPROACH: ICD-10-PCS | Performed by: ORTHOPAEDIC SURGERY

## 2021-04-21 PROCEDURE — 3600000004 HC SURGERY LEVEL 4 BASE: Performed by: ORTHOPAEDIC SURGERY

## 2021-04-21 PROCEDURE — 0QSG04Z REPOSITION RIGHT TIBIA WITH INTERNAL FIXATION DEVICE, OPEN APPROACH: ICD-10-PCS | Performed by: ORTHOPAEDIC SURGERY

## 2021-04-21 PROCEDURE — C1769 GUIDE WIRE: HCPCS | Performed by: ORTHOPAEDIC SURGERY

## 2021-04-21 PROCEDURE — 7100000000 HC PACU RECOVERY - FIRST 15 MIN: Performed by: ORTHOPAEDIC SURGERY

## 2021-04-21 PROCEDURE — 6360000002 HC RX W HCPCS: Performed by: INTERNAL MEDICINE

## 2021-04-21 PROCEDURE — 1210000000 HC MED SURG R&B

## 2021-04-21 PROCEDURE — 6370000000 HC RX 637 (ALT 250 FOR IP): Performed by: NURSE ANESTHETIST, CERTIFIED REGISTERED

## 2021-04-21 PROCEDURE — 3700000001 HC ADD 15 MINUTES (ANESTHESIA): Performed by: ORTHOPAEDIC SURGERY

## 2021-04-21 PROCEDURE — 2580000003 HC RX 258: Performed by: ORTHOPAEDIC SURGERY

## 2021-04-21 PROCEDURE — 85027 COMPLETE CBC AUTOMATED: CPT

## 2021-04-21 PROCEDURE — 3700000000 HC ANESTHESIA ATTENDED CARE: Performed by: ORTHOPAEDIC SURGERY

## 2021-04-21 PROCEDURE — 82947 ASSAY GLUCOSE BLOOD QUANT: CPT

## 2021-04-21 PROCEDURE — 6370000000 HC RX 637 (ALT 250 FOR IP): Performed by: INTERNAL MEDICINE

## 2021-04-21 PROCEDURE — C1713 ANCHOR/SCREW BN/BN,TIS/BN: HCPCS | Performed by: ORTHOPAEDIC SURGERY

## 2021-04-21 PROCEDURE — 2580000003 HC RX 258: Performed by: ANESTHESIOLOGY

## 2021-04-21 PROCEDURE — 36415 COLL VENOUS BLD VENIPUNCTURE: CPT

## 2021-04-21 PROCEDURE — 6360000002 HC RX W HCPCS: Performed by: NURSE ANESTHETIST, CERTIFIED REGISTERED

## 2021-04-21 PROCEDURE — 3600000014 HC SURGERY LEVEL 4 ADDTL 15MIN: Performed by: ORTHOPAEDIC SURGERY

## 2021-04-21 PROCEDURE — 2720000010 HC SURG SUPPLY STERILE: Performed by: ORTHOPAEDIC SURGERY

## 2021-04-21 DEVICE — SCREW BNE L14MM DIA3.5MM CORT S STL ST NONCANNULATED LOK: Type: IMPLANTABLE DEVICE | Site: ANKLE | Status: FUNCTIONAL

## 2021-04-21 DEVICE — ISOLA SPINE SYSTEM ROD BENDERS (TUBULAR) SET
Type: IMPLANTABLE DEVICE | Site: ANKLE | Status: FUNCTIONAL
Brand: ISOLA

## 2021-04-21 DEVICE — SCREW BNE L20MM DIA2.7MM CORT S STL ST FULL THRD FOR SM: Type: IMPLANTABLE DEVICE | Site: ANKLE | Status: FUNCTIONAL

## 2021-04-21 DEVICE — SCREW BNE L50MM DIA4MM S STL CANN SHT 1/3 THRD SM HEX SOCK: Type: IMPLANTABLE DEVICE | Site: ANKLE | Status: FUNCTIONAL

## 2021-04-21 DEVICE — PLATE BNE L93MM 8 H S STL 1/3 TBLR LOK COMPR W/ CLLR FOR: Type: IMPLANTABLE DEVICE | Site: ANKLE | Status: FUNCTIONAL

## 2021-04-21 DEVICE — SCREW CORTES 3.5MM SELF-TAPPING 18MM: Type: IMPLANTABLE DEVICE | Site: ANKLE | Status: FUNCTIONAL

## 2021-04-21 RX ORDER — MORPHINE SULFATE 4 MG/ML
4 INJECTION, SOLUTION INTRAMUSCULAR; INTRAVENOUS
Status: DISCONTINUED | OUTPATIENT
Start: 2021-04-21 | End: 2021-04-23 | Stop reason: HOSPADM

## 2021-04-21 RX ORDER — SODIUM CHLORIDE, SODIUM LACTATE, POTASSIUM CHLORIDE, CALCIUM CHLORIDE 600; 310; 30; 20 MG/100ML; MG/100ML; MG/100ML; MG/100ML
INJECTION, SOLUTION INTRAVENOUS CONTINUOUS
Status: DISCONTINUED | OUTPATIENT
Start: 2021-04-21 | End: 2021-04-21

## 2021-04-21 RX ORDER — ALBUTEROL SULFATE 90 UG/1
AEROSOL, METERED RESPIRATORY (INHALATION) PRN
Status: DISCONTINUED | OUTPATIENT
Start: 2021-04-21 | End: 2021-04-21 | Stop reason: SDUPTHER

## 2021-04-21 RX ORDER — DEXTROSE MONOHYDRATE 25 G/50ML
12.5 INJECTION, SOLUTION INTRAVENOUS PRN
Status: DISCONTINUED | OUTPATIENT
Start: 2021-04-21 | End: 2021-04-23 | Stop reason: HOSPADM

## 2021-04-21 RX ORDER — ENALAPRILAT 2.5 MG/2ML
1.25 INJECTION INTRAVENOUS
Status: DISCONTINUED | OUTPATIENT
Start: 2021-04-21 | End: 2021-04-21 | Stop reason: HOSPADM

## 2021-04-21 RX ORDER — PROMETHAZINE HYDROCHLORIDE 25 MG/ML
6.25 INJECTION, SOLUTION INTRAMUSCULAR; INTRAVENOUS
Status: DISCONTINUED | OUTPATIENT
Start: 2021-04-21 | End: 2021-04-21 | Stop reason: HOSPADM

## 2021-04-21 RX ORDER — ACETAMINOPHEN 325 MG/1
650 TABLET ORAL EVERY 6 HOURS PRN
Status: DISCONTINUED | OUTPATIENT
Start: 2021-04-21 | End: 2021-04-22

## 2021-04-21 RX ORDER — HYDROMORPHONE HYDROCHLORIDE 1 MG/ML
1 INJECTION, SOLUTION INTRAMUSCULAR; INTRAVENOUS; SUBCUTANEOUS
Status: DISCONTINUED | OUTPATIENT
Start: 2021-04-21 | End: 2021-04-22

## 2021-04-21 RX ORDER — SODIUM CHLORIDE 0.9 % (FLUSH) 0.9 %
5-40 SYRINGE (ML) INJECTION EVERY 12 HOURS SCHEDULED
Status: DISCONTINUED | OUTPATIENT
Start: 2021-04-21 | End: 2021-04-21

## 2021-04-21 RX ORDER — PROPOFOL 10 MG/ML
INJECTION, EMULSION INTRAVENOUS PRN
Status: DISCONTINUED | OUTPATIENT
Start: 2021-04-21 | End: 2021-04-21 | Stop reason: SDUPTHER

## 2021-04-21 RX ORDER — ACETAMINOPHEN 650 MG/1
650 SUPPOSITORY RECTAL EVERY 6 HOURS PRN
Status: DISCONTINUED | OUTPATIENT
Start: 2021-04-21 | End: 2021-04-22

## 2021-04-21 RX ORDER — SODIUM CHLORIDE 0.9 % (FLUSH) 0.9 %
5-40 SYRINGE (ML) INJECTION PRN
Status: DISCONTINUED | OUTPATIENT
Start: 2021-04-21 | End: 2021-04-23 | Stop reason: HOSPADM

## 2021-04-21 RX ORDER — MORPHINE SULFATE 4 MG/ML
2 INJECTION, SOLUTION INTRAMUSCULAR; INTRAVENOUS EVERY 5 MIN PRN
Status: DISCONTINUED | OUTPATIENT
Start: 2021-04-21 | End: 2021-04-21 | Stop reason: HOSPADM

## 2021-04-21 RX ORDER — SODIUM CHLORIDE 0.9 % (FLUSH) 0.9 %
10 SYRINGE (ML) INJECTION EVERY 12 HOURS SCHEDULED
Status: DISCONTINUED | OUTPATIENT
Start: 2021-04-21 | End: 2021-04-23 | Stop reason: HOSPADM

## 2021-04-21 RX ORDER — ROPIVACAINE HYDROCHLORIDE 5 MG/ML
INJECTION, SOLUTION EPIDURAL; INFILTRATION; PERINEURAL
Status: COMPLETED
Start: 2021-04-21 | End: 2021-04-21

## 2021-04-21 RX ORDER — DIPHENHYDRAMINE HYDROCHLORIDE 50 MG/ML
12.5 INJECTION INTRAMUSCULAR; INTRAVENOUS
Status: DISCONTINUED | OUTPATIENT
Start: 2021-04-21 | End: 2021-04-21 | Stop reason: HOSPADM

## 2021-04-21 RX ORDER — SODIUM CHLORIDE 9 MG/ML
25 INJECTION, SOLUTION INTRAVENOUS PRN
Status: DISCONTINUED | OUTPATIENT
Start: 2021-04-21 | End: 2021-04-23 | Stop reason: HOSPADM

## 2021-04-21 RX ORDER — ACETAMINOPHEN 325 MG/1
650 TABLET ORAL EVERY 6 HOURS
Status: DISCONTINUED | OUTPATIENT
Start: 2021-04-21 | End: 2021-04-23 | Stop reason: HOSPADM

## 2021-04-21 RX ORDER — MEPERIDINE HYDROCHLORIDE 50 MG/ML
12.5 INJECTION INTRAMUSCULAR; INTRAVENOUS; SUBCUTANEOUS EVERY 5 MIN PRN
Status: DISCONTINUED | OUTPATIENT
Start: 2021-04-21 | End: 2021-04-21 | Stop reason: HOSPADM

## 2021-04-21 RX ORDER — MAGNESIUM SULFATE IN WATER 40 MG/ML
2000 INJECTION, SOLUTION INTRAVENOUS PRN
Status: DISCONTINUED | OUTPATIENT
Start: 2021-04-21 | End: 2021-04-23 | Stop reason: HOSPADM

## 2021-04-21 RX ORDER — LABETALOL HYDROCHLORIDE 5 MG/ML
5 INJECTION, SOLUTION INTRAVENOUS EVERY 10 MIN PRN
Status: DISCONTINUED | OUTPATIENT
Start: 2021-04-21 | End: 2021-04-21 | Stop reason: HOSPADM

## 2021-04-21 RX ORDER — ONDANSETRON 2 MG/ML
INJECTION INTRAMUSCULAR; INTRAVENOUS PRN
Status: DISCONTINUED | OUTPATIENT
Start: 2021-04-21 | End: 2021-04-21 | Stop reason: SDUPTHER

## 2021-04-21 RX ORDER — HYDRALAZINE HYDROCHLORIDE 20 MG/ML
5 INJECTION INTRAMUSCULAR; INTRAVENOUS EVERY 10 MIN PRN
Status: DISCONTINUED | OUTPATIENT
Start: 2021-04-21 | End: 2021-04-21 | Stop reason: HOSPADM

## 2021-04-21 RX ORDER — POTASSIUM CHLORIDE 20 MEQ/1
40 TABLET, EXTENDED RELEASE ORAL PRN
Status: DISCONTINUED | OUTPATIENT
Start: 2021-04-21 | End: 2021-04-23 | Stop reason: HOSPADM

## 2021-04-21 RX ORDER — POLYETHYLENE GLYCOL 3350 17 G/17G
17 POWDER, FOR SOLUTION ORAL DAILY PRN
Status: DISCONTINUED | OUTPATIENT
Start: 2021-04-21 | End: 2021-04-23 | Stop reason: HOSPADM

## 2021-04-21 RX ORDER — FENTANYL CITRATE 50 UG/ML
INJECTION, SOLUTION INTRAMUSCULAR; INTRAVENOUS PRN
Status: DISCONTINUED | OUTPATIENT
Start: 2021-04-21 | End: 2021-04-21 | Stop reason: SDUPTHER

## 2021-04-21 RX ORDER — ACETAMINOPHEN 500 MG
1000 TABLET ORAL ONCE
Status: DISCONTINUED | OUTPATIENT
Start: 2021-04-21 | End: 2021-04-21 | Stop reason: HOSPADM

## 2021-04-21 RX ORDER — MORPHINE SULFATE 4 MG/ML
4 INJECTION, SOLUTION INTRAMUSCULAR; INTRAVENOUS EVERY 5 MIN PRN
Status: DISCONTINUED | OUTPATIENT
Start: 2021-04-21 | End: 2021-04-21 | Stop reason: HOSPADM

## 2021-04-21 RX ORDER — LIDOCAINE HYDROCHLORIDE 10 MG/ML
INJECTION, SOLUTION INFILTRATION; PERINEURAL PRN
Status: DISCONTINUED | OUTPATIENT
Start: 2021-04-21 | End: 2021-04-21 | Stop reason: SDUPTHER

## 2021-04-21 RX ORDER — IBUPROFEN 400 MG/1
400 TABLET ORAL
Status: DISCONTINUED | OUTPATIENT
Start: 2021-04-21 | End: 2021-04-23 | Stop reason: HOSPADM

## 2021-04-21 RX ORDER — HYDROMORPHONE HYDROCHLORIDE 1 MG/ML
0.5 INJECTION, SOLUTION INTRAMUSCULAR; INTRAVENOUS; SUBCUTANEOUS EVERY 5 MIN PRN
Status: DISCONTINUED | OUTPATIENT
Start: 2021-04-21 | End: 2021-04-21 | Stop reason: HOSPADM

## 2021-04-21 RX ORDER — HYDROMORPHONE HYDROCHLORIDE 1 MG/ML
0.25 INJECTION, SOLUTION INTRAMUSCULAR; INTRAVENOUS; SUBCUTANEOUS EVERY 5 MIN PRN
Status: DISCONTINUED | OUTPATIENT
Start: 2021-04-21 | End: 2021-04-21 | Stop reason: HOSPADM

## 2021-04-21 RX ORDER — OXYCODONE HYDROCHLORIDE 5 MG/1
5 TABLET ORAL EVERY 4 HOURS PRN
Status: DISCONTINUED | OUTPATIENT
Start: 2021-04-21 | End: 2021-04-23 | Stop reason: HOSPADM

## 2021-04-21 RX ORDER — DEXAMETHASONE SODIUM PHOSPHATE 10 MG/ML
INJECTION, SOLUTION INTRAMUSCULAR; INTRAVENOUS PRN
Status: DISCONTINUED | OUTPATIENT
Start: 2021-04-21 | End: 2021-04-21 | Stop reason: SDUPTHER

## 2021-04-21 RX ORDER — SODIUM CHLORIDE 0.9 % (FLUSH) 0.9 %
10 SYRINGE (ML) INJECTION PRN
Status: DISCONTINUED | OUTPATIENT
Start: 2021-04-21 | End: 2021-04-23 | Stop reason: HOSPADM

## 2021-04-21 RX ORDER — MIDAZOLAM HYDROCHLORIDE 1 MG/ML
2 INJECTION INTRAMUSCULAR; INTRAVENOUS ONCE
Status: DISCONTINUED | OUTPATIENT
Start: 2021-04-21 | End: 2021-04-21 | Stop reason: HOSPADM

## 2021-04-21 RX ORDER — OXYCODONE HYDROCHLORIDE 5 MG/1
10 TABLET ORAL EVERY 4 HOURS PRN
Status: DISCONTINUED | OUTPATIENT
Start: 2021-04-21 | End: 2021-04-23 | Stop reason: HOSPADM

## 2021-04-21 RX ORDER — METOCLOPRAMIDE HYDROCHLORIDE 5 MG/ML
10 INJECTION INTRAMUSCULAR; INTRAVENOUS
Status: DISCONTINUED | OUTPATIENT
Start: 2021-04-21 | End: 2021-04-21 | Stop reason: HOSPADM

## 2021-04-21 RX ORDER — SENNA AND DOCUSATE SODIUM 50; 8.6 MG/1; MG/1
1 TABLET, FILM COATED ORAL 2 TIMES DAILY
Status: DISCONTINUED | OUTPATIENT
Start: 2021-04-21 | End: 2021-04-23 | Stop reason: HOSPADM

## 2021-04-21 RX ORDER — BISMUTH TRIBROMOPH/PETROLATUM 5"X9"
BANDAGE TOPICAL PRN
Status: DISCONTINUED | OUTPATIENT
Start: 2021-04-21 | End: 2021-04-21 | Stop reason: ALTCHOICE

## 2021-04-21 RX ORDER — CELECOXIB 200 MG/1
200 CAPSULE ORAL ONCE
Status: DISCONTINUED | OUTPATIENT
Start: 2021-04-21 | End: 2021-04-21 | Stop reason: HOSPADM

## 2021-04-21 RX ORDER — POTASSIUM CHLORIDE 7.45 MG/ML
10 INJECTION INTRAVENOUS PRN
Status: DISCONTINUED | OUTPATIENT
Start: 2021-04-21 | End: 2021-04-23 | Stop reason: HOSPADM

## 2021-04-21 RX ORDER — ONDANSETRON 2 MG/ML
4 INJECTION INTRAMUSCULAR; INTRAVENOUS EVERY 6 HOURS PRN
Status: DISCONTINUED | OUTPATIENT
Start: 2021-04-21 | End: 2021-04-23 | Stop reason: HOSPADM

## 2021-04-21 RX ORDER — NICOTINE POLACRILEX 4 MG
15 LOZENGE BUCCAL PRN
Status: DISCONTINUED | OUTPATIENT
Start: 2021-04-21 | End: 2021-04-23 | Stop reason: HOSPADM

## 2021-04-21 RX ORDER — ROPIVACAINE HYDROCHLORIDE 2 MG/ML
INJECTION, SOLUTION EPIDURAL; INFILTRATION; PERINEURAL PRN
Status: DISCONTINUED | OUTPATIENT
Start: 2021-04-21 | End: 2021-04-21 | Stop reason: ALTCHOICE

## 2021-04-21 RX ORDER — DEXTROSE MONOHYDRATE 50 MG/ML
100 INJECTION, SOLUTION INTRAVENOUS PRN
Status: DISCONTINUED | OUTPATIENT
Start: 2021-04-21 | End: 2021-04-23 | Stop reason: HOSPADM

## 2021-04-21 RX ORDER — MORPHINE SULFATE 4 MG/ML
2 INJECTION, SOLUTION INTRAMUSCULAR; INTRAVENOUS
Status: DISCONTINUED | OUTPATIENT
Start: 2021-04-21 | End: 2021-04-23 | Stop reason: HOSPADM

## 2021-04-21 RX ADMIN — ASPIRIN 325 MG: 325 TABLET, COATED ORAL at 21:29

## 2021-04-21 RX ADMIN — FAMOTIDINE 20 MG: 10 INJECTION, SOLUTION INTRAVENOUS at 08:48

## 2021-04-21 RX ADMIN — LIDOCAINE HYDROCHLORIDE 5 ML: 10 INJECTION, SOLUTION INFILTRATION; PERINEURAL at 16:30

## 2021-04-21 RX ADMIN — ESCITALOPRAM OXALATE 10 MG: 10 TABLET ORAL at 00:19

## 2021-04-21 RX ADMIN — HYDROMORPHONE HYDROCHLORIDE 1 MG: 1 INJECTION, SOLUTION INTRAMUSCULAR; INTRAVENOUS; SUBCUTANEOUS at 06:30

## 2021-04-21 RX ADMIN — HYDROMORPHONE HYDROCHLORIDE 1 MG: 1 INJECTION, SOLUTION INTRAMUSCULAR; INTRAVENOUS; SUBCUTANEOUS at 13:59

## 2021-04-21 RX ADMIN — PROPOFOL 170 MG: 10 INJECTION, EMULSION INTRAVENOUS at 16:31

## 2021-04-21 RX ADMIN — SODIUM CHLORIDE, PRESERVATIVE FREE 10 ML: 5 INJECTION INTRAVENOUS at 19:49

## 2021-04-21 RX ADMIN — METOPROLOL SUCCINATE 100 MG: 50 TABLET, EXTENDED RELEASE ORAL at 21:28

## 2021-04-21 RX ADMIN — ACETAMINOPHEN 650 MG: 325 TABLET ORAL at 21:29

## 2021-04-21 RX ADMIN — FAMOTIDINE 20 MG: 10 INJECTION, SOLUTION INTRAVENOUS at 21:28

## 2021-04-21 RX ADMIN — DONEPEZIL HYDROCHLORIDE 10 MG: 5 TABLET, FILM COATED ORAL at 21:29

## 2021-04-21 RX ADMIN — FENTANYL CITRATE 50 MCG: 50 INJECTION, SOLUTION INTRAMUSCULAR; INTRAVENOUS at 16:30

## 2021-04-21 RX ADMIN — Medication 2000 MG: at 16:36

## 2021-04-21 RX ADMIN — SODIUM CHLORIDE, PRESERVATIVE FREE 10 ML: 5 INJECTION INTRAVENOUS at 06:30

## 2021-04-21 RX ADMIN — SODIUM CHLORIDE, SODIUM LACTATE, POTASSIUM CHLORIDE, AND CALCIUM CHLORIDE: 600; 310; 30; 20 INJECTION, SOLUTION INTRAVENOUS at 08:49

## 2021-04-21 RX ADMIN — DOCUSATE SODIUM 50 MG AND SENNOSIDES 8.6 MG 1 TABLET: 8.6; 5 TABLET, FILM COATED ORAL at 21:29

## 2021-04-21 RX ADMIN — ALBUTEROL SULFATE 4 PUFF: 90 AEROSOL, METERED RESPIRATORY (INHALATION) at 16:45

## 2021-04-21 RX ADMIN — IBUPROFEN 400 MG: 400 TABLET ORAL at 21:41

## 2021-04-21 RX ADMIN — DEXAMETHASONE SODIUM PHOSPHATE 8 MG: 10 INJECTION, SOLUTION INTRAMUSCULAR; INTRAVENOUS at 16:35

## 2021-04-21 RX ADMIN — METOPROLOL SUCCINATE 100 MG: 50 TABLET, EXTENDED RELEASE ORAL at 00:19

## 2021-04-21 RX ADMIN — ONDANSETRON HYDROCHLORIDE 4 MG: 2 INJECTION, SOLUTION INTRAMUSCULAR; INTRAVENOUS at 16:41

## 2021-04-21 RX ADMIN — ROPIVACAINE HYDROCHLORIDE: 5 INJECTION EPIDURAL; INFILTRATION; PERINEURAL at 15:30

## 2021-04-21 RX ADMIN — MORPHINE SULFATE 4 MG: 4 INJECTION, SOLUTION INTRAMUSCULAR; INTRAVENOUS at 19:49

## 2021-04-21 RX ADMIN — ESCITALOPRAM OXALATE 10 MG: 10 TABLET ORAL at 21:29

## 2021-04-21 RX ADMIN — DONEPEZIL HYDROCHLORIDE 10 MG: 5 TABLET, FILM COATED ORAL at 00:19

## 2021-04-21 RX ADMIN — SODIUM CHLORIDE, SODIUM LACTATE, POTASSIUM CHLORIDE, AND CALCIUM CHLORIDE: 600; 310; 30; 20 INJECTION, SOLUTION INTRAVENOUS at 15:17

## 2021-04-21 ASSESSMENT — PAIN SCALES - GENERAL
PAINLEVEL_OUTOF10: 10
PAINLEVEL_OUTOF10: 4
PAINLEVEL_OUTOF10: 10
PAINLEVEL_OUTOF10: 6
PAINLEVEL_OUTOF10: 10
PAINLEVEL_OUTOF10: 8

## 2021-04-21 ASSESSMENT — ENCOUNTER SYMPTOMS
ALLERGIC/IMMUNOLOGIC NEGATIVE: 1
RESPIRATORY NEGATIVE: 1
EYES NEGATIVE: 1
GASTROINTESTINAL NEGATIVE: 1

## 2021-04-21 ASSESSMENT — PAIN DESCRIPTION - LOCATION: LOCATION: LEG

## 2021-04-21 ASSESSMENT — LIFESTYLE VARIABLES: SMOKING_STATUS: 0

## 2021-04-21 ASSESSMENT — PAIN DESCRIPTION - PAIN TYPE
TYPE: ACUTE PAIN
TYPE: ACUTE PAIN

## 2021-04-21 ASSESSMENT — PAIN DESCRIPTION - DESCRIPTORS: DESCRIPTORS: ACHING

## 2021-04-21 NOTE — ED NOTES
Patient states that she has had a cough for about one week now.  She states that it is interfering with her sleep.  She would like to know whether a prescription for benzonatate (TESSALON PERLES) 100 MG capsule could be called into her pharmacy ([facility] on [address]).  Please advise.   Pt placed on 2L NC, 02 sat dropping to 83% on room air after medications. Pt states she does wear a CPAP at home.       Latonya Duque RN  04/20/21 [id number]

## 2021-04-21 NOTE — ED PROVIDER NOTES
[facility]  eMERGENCY dEPARTMENT eNCOUnter      Pt Name: Phu Hendrix  MRN: [id number]  [place] 1960  Date of evaluation: 4/20/2021  Provider: Kailee Madsen, [address]       Chief Complaint   Patient presents with    Ankle Pain     pt stood up from couch, lost balance, and twisted right ankle. HISTORY OF PRESENT ILLNESS   (Location/Symptom, Timing/Onset,Context/Setting, Quality, Duration, Modifying Factors, Severity)  Note limiting factors. Felipe Ellison a 62 y.o. female who presents to the emergency department for evaluation of ankle pain. Pt tells me that she fell while walking in her house today injuring her right ankle and right foot. She tells me that she fell due to being \"half a sleep\". She denies head injury as well as any neck or back pain. She has had no chest or abdominal pain. She describes twisting her right ankle during fall. HPI    Nursing Notes were reviewed. REVIEW OF SYSTEMS    (2-9 systems for level 4, 10 or more for level 5)     Review of Systems   Constitutional: Negative for fever. Respiratory: Negative for shortness of breath. Cardiovascular: Negative for chest pain. Musculoskeletal: Positive for arthralgias (right ankle/right foot). Negative for back pain and neck pain. All other systems reviewed and are negative. A complete review of systems was performed and is negative except as noted above in the HPI.        PAST MEDICAL HISTORY     Past Medical History:   Diagnosis Date    Anxiety     Depression     Diabetes ([address]     Hypertension     Memory difficulty     Sleep apnea     c-pap         SURGICAL HISTORY       Past Surgical History:   Procedure Laterality Date    GALLBLADDER SURGERY           CURRENT MEDICATIONS       Previous Medications    DONEPEZIL (ARICEPT) 10 MG TABLET    Take 1 tablet by mouth nightly    ESCITALOPRAM (LEXAPRO) 10 MG TABLET    Take 10 mg by mouth nightly     METOPROLOL SUCCINATE (TOPROL XL) 100 MG EXTENDED RELEASE TABLET    Take 1 tablet by mouth nightly     SITAGLIPTIN-METFORMIN HCL ER (JANUMET XR) 100-1000 MG TB24    Take 100-1,000 mg by mouth nightly     TRIAMTERENE-HYDROCHLOROTHIAZIDE (MAXZIDE-25) 37.5-25 MG PER TABLET    Take 1 tablet by mouth nightly        ALLERGIES     Azithromycin and Namenda  [memantine hcl]    FAMILY HISTORY       Family History   Problem Relation Age of Onset    Cancer Mother     Heart Attack Father           SOCIAL HISTORY       Social History     Socioeconomic History    Marital status:      Spouse name: Not on file    Number of children: Not on file    Years of education: Not on file    Highest education level: Not on file   Occupational History    Not on file   Social Needs    Financial resource strain: Not on file    Food insecurity     Worry: Not on file     Inability: Not on file    Transportation needs     Medical: Not on file     Non-medical: Not on file   Tobacco Use    Smoking status: Never Smoker    Smokeless tobacco: Never Used   Substance and Sexual Activity    Alcohol use: No    Drug use: No    Sexual activity: Yes   Lifestyle    Physical activity     Days per week: Not on file     Minutes per session: Not on file    Stress: Not on file   Relationships    Social connections     Talks on phone: Not on file     Gets together: Not on file     Attends Sabianism service: Not on file     Active member of club or organization: Not on file     Attends meetings of clubs or organizations: Not on file     Relationship status: Not on file    Intimate partner violence     Fear of current or ex partner: Not on file     Emotionally abused: Not on file     Physically abused: Not on file     Forced sexual activity: Not on file   Other Topics Concern    Not on file   Social History Narrative    Not on file       SCREENINGS    [place]ain[place] Coma Scale  Eye Opening: Spontaneous  Best Verbal Response: Oriented  Best Motor Response: Obeys commands  [place] Coma Scale Score: 15        PHYSICAL EXAM    (up to 7 for level 4, 8 or more for level 5)     ED Triage Vitals [04/20/21 1905]   BP Temp Temp Source Pulse Resp SpO2 Height Weight   139/75 97.6 °F (36.4 °C) Oral 78 17 91 % 5' 8\" (1.727 m) 240 lb (108.9 kg)       Physical Exam  Vitals signs reviewed. HENT:      Head: Normocephalic. Right Ear: External ear normal.      Left Ear: External ear normal.   Eyes:      Conjunctiva/sclera: Conjunctivae normal.      Pupils: Pupils are equal, round, and reactive to light. Neck:      Musculoskeletal: Normal range of motion. Comments: No direct ttp cervical spine  Cardiovascular:      Rate and Rhythm: Normal rate and regular rhythm. Heart sounds: Normal heart sounds. Pulmonary:      Effort: Pulmonary effort is normal.      Breath sounds: Normal breath sounds. Abdominal:      General: Bowel sounds are normal.      Palpations: Abdomen is soft. Musculoskeletal: Normal range of motion. Comments: No direct ttp thoracic or lumbar spine  Right lateral foot with severe ttp  Diffuse ttp right ankle  CMS intact right foot  Compartments of right lower extremity are soft  Normal flexion/extension of right knee/right hip   Skin:     General: Skin is warm and dry. Neurological:      Mental Status: She is alert and oriented to person, place, and time. DIAGNOSTIC RESULTS     EKG: All EKG's are interpreted by the Emergency Department Physician who either signs or Co-signs this chart in the absence of acardiologist.    There is a regular rate and rhythm. SR hr 71b/min Normal NM interval and normal P waves. Normal QRS interval. Normal QT interval. No obvious ST elevation or ST depression.        RADIOLOGY:   Non-plain film images such as CT, Ultrasound andMRI are read by the radiologist. Plain radiographic images are visualized and preliminarily interpreted by the emergency physician with the below findings:        Interpretation per the Radiologist below, if available at the time of this note:    XR CHEST PORTABLE   Final Result   No evidence of acute cardiopulmonary process. Signed by Dr Rosie Granados on 4/20/2021 9:27 PM      XR ANKLE RIGHT (MIN 3 VIEWS)   Final Result   1. Comminuted fracture of the medial malleolus with disruption of the   ankle mortise. Nondisplaced fracture through the posterior malleolus   of the tibia. 2. Comminuted spiral fracture through the distal fibular diaphysis. 3. Acute fractures through the base of the first and second   metatarsals of the foot. No definite additional fractures identified   within the foot. Signed by Dr Rosie Granados on 4/20/2021 7:56 PM      XR FOOT RIGHT (MIN 3 VIEWS)   Final Result   1. Comminuted fracture of the medial malleolus with disruption of the   ankle mortise. Nondisplaced fracture through the posterior malleolus   of the tibia. 2. Comminuted spiral fracture through the distal fibular diaphysis. 3. Acute fractures through the base of the first and second   metatarsals of the foot. No definite additional fractures identified   within the foot. Signed by Dr Rosie Granados on 4/20/2021 7:56 PM            ED BEDSIDE ULTRASOUND:   Performed by ED Physician - none    LABS:  Labs Reviewed   CBC WITH AUTO DIFFERENTIAL - Abnormal; Notable for the following components:       Result Value    MCH 31.2 (*)     Platelets 934 (*)     Neutrophils % 73.1 (*)     Lymphocytes % 17.2 (*)     All other components within normal limits   COMPREHENSIVE METABOLIC PANEL W/ REFLEX TO MG FOR LOW K - Abnormal; Notable for the following components:    Glucose 154 (*)     Alkaline Phosphatase 129 (*)     AST 52 (*)     All other components within normal limits   COVID-19, RAPID   URINE RT REFLEX TO CULTURE   PROTIME-INR   APTT       All other labs were within normal range or not returned as of this dictation.     RE-ASSESSMENT     Discussed with Dr. Edilson Hebert who will consult asked to admit to hospitalist, splint, npo after midnight  Discussed with Dr. Jose L Guevara who will admit patient to hospitalist service. EMERGENCY DEPARTMENT COURSE and DIFFERENTIALDIAGNOSIS/MDM:   Vitals:    Vitals:    04/20/21 1905 04/20/21 2305 04/20/21 2330   BP: 139/75 (!) 137/95 114/63   Pulse: 78  72   Resp: 17  17   Temp: 97.6 °F (36.4 °C)     TempSrc: Oral     SpO2: 91% (!) 83% 94%   Weight: 240 lb (108.9 kg)     Height: 5' 8\" (1.727 m)         MDM      CONSULTS:  IP CONSULT TO ORTHOPEDIC SURGERY    PROCEDURES:  Unless otherwise notedbelow, none     Splint Application    Date/Time: 4/20/2021 11:44 PM  Performed by: SWAPNA Worley  Authorized by: SWAPNA Worley     Consent:     Consent obtained:  Verbal    Consent given by:  Patient    Risks discussed:  Pain    Alternatives discussed:  No treatment  Pre-procedure details:     Sensation:  Normal  Procedure details:     Laterality:  Right    Location:  Ankle    Ankle:  R ankle    Splint type: Ankle stirrup and short leg    Supplies:  Cotton padding, Ortho-Glass and elastic bandage  Post-procedure details:     Pain:  Unchanged    Sensation:  Normal    Patient tolerance of procedure: Tolerated with difficulty        FINAL IMPRESSION     1. Closed displaced fracture of medial malleolus of right tibia, initial encounter    2. Closed displaced spiral fracture of shaft of left fibula, initial encounter    3. Closed displaced fracture of first metatarsal bone of right foot, initial encounter    4. Closed displaced fracture of second metatarsal bone of right foot, initial encounter    5. Fall, initial encounter          DISPOSITION/PLAN   DISPOSITION Admitted 04/20/2021 11:01:49 PM      PATIENT REFERRED TO:  No follow-up provider specified.     DISCHARGE MEDICATIONS:       Current Discharge Medication List          (Pleasenote that portions of this note were completed with a voice recognition program.  Efforts were made to edit the dictations but occasionally words are mis-transcribed.)              Connie Atkinson Dalton Diss, APRN  04/20/21 0107

## 2021-04-21 NOTE — H&P
HISTORY AND PHYSICAL             Date: 4/21/2021        Patient Name: Naz Brown     YOB: 1960      Age:  62 y.o. Chief Complaint     Chief Complaint   Patient presents with    Ankle Pain     pt stood up from couch, lost balance, and twisted right ankle. Lost balance and fell. No syncope   Ankle fracture admit and consult ortho for surgical evaluation    History Obtained From   Patient     History of Present Illness   63 yo female with history of hypertension and type II DM. She has also been dx with memory loss and loss of ability to speak and communicate slowly over these years. She has been diagnosed with early onset dementia. She is  On aricept and not much improvement she has noted in her memory. She was standing up from the sofa and lost her balance and fell on the right foot. She was not able to stand up and had a great deal of pain. Thus she came into er for evaluation and treatment. She was noted to have sustained right ankle comminuted fracture. She is being admitted for definitive treatment of this. Past Medical History     Past Medical History:   Diagnosis Date    Anxiety     Depression     [address] [address]     Hypertension     Memory difficulty     Obesity 4/21/2021    Sleep apnea     c-pap        Past Surgical History     Past Surgical History:   Procedure Laterality Date    GALLBLADDER SURGERY          Medications Prior to Admission     Prior to Admission medications    Medication Sig Start Date End Date Taking?  Authorizing Provider   SITagliptin-metFORMIN HCl ER (JANUMET XR) 100-1000 MG TB24 Take 100-1,000 mg by mouth nightly    Yes Historical Provider, MD   donepezil (ARICEPT) 10 MG tablet Take 1 tablet by mouth nightly 8/10/20  Yes SWAPNA Olvera   metoprolol succinate (TOPROL XL) 100 MG extended release tablet Take 1 tablet by mouth nightly  6/4/18  Yes Historical Provider, MD   escitalopram (LEXAPRO) 10 MG tablet Take 10 mg by mouth nightly    Yes Historical Provider, MD   triamterene-hydrochlorothiazide (MAXZIDE-25) 37.5-25 MG per tablet Take 1 tablet by mouth nightly    Yes Historical Provider, MD        Allergies   Azithromycin and Namenda  [memantine hcl]    Social History     Social History     Tobacco History     Smoking Status  Never Smoker    Smokeless Tobacco Use  Never Used          Alcohol History     Alcohol Use Status  No          Drug Use     Drug Use Status  No          Sexual Activity     Sexually Active  Yes                Family History     Family History   Problem Relation Age of Onset    Cancer Mother     Heart Attack Father        Review of Systems   Review of Systems   Constitutional: Positive for activity change. HENT: Negative. Eyes: Negative. Respiratory: Negative. Cardiovascular: Positive for palpitations. Gastrointestinal: Negative. Endocrine: Negative. Genitourinary: Negative. Musculoskeletal: Positive for arthralgias, gait problem and joint swelling. Allergic/Immunologic: Negative. Hematological: Negative. Psychiatric/Behavioral: Negative. All other systems reviewed and are negative. Physical Exam   /67   Pulse 69   Temp 99.1 °F (37.3 °C) (Temporal)   Resp 18   Ht 5' 6\" (1.676 m)   Wt 240 lb (108.9 kg)   SpO2 98%   BMI 38.74 kg/m²     Physical Exam  Vitals signs and nursing note reviewed. Constitutional:       Appearance: Normal appearance. HENT:      Head: Normocephalic and atraumatic. Nose: Nose normal.   Eyes:      Conjunctiva/sclera: Conjunctivae normal.      Pupils: Pupils are equal, round, and reactive to light. Neck:      Musculoskeletal: Normal range of motion. Cardiovascular:      Rate and Rhythm: Regular rhythm. Tachycardia present. Pulmonary:      Effort: Pulmonary effort is normal.      Breath sounds: Normal breath sounds. Abdominal:      General: Abdomen is flat. Bowel sounds are normal.      Palpations: Abdomen is soft. acute nondisplaced fracture through the base of the first and second metatarsals. No widening of the Lisfranc joint is identified on these nonweightbearing images. There is somewhat diffuse soft tissue swelling. 1. Comminuted fracture of the medial malleolus with disruption of the ankle mortise. Nondisplaced fracture through the posterior malleolus of the tibia. 2. Comminuted spiral fracture through the distal fibular diaphysis. 3. Acute fractures through the base of the first and second metatarsals of the foot. No definite additional fractures identified within the foot. Signed by Dr Omar Ohara on 4/20/2021 7:56 PM    Xr Chest Portable    Result Date: 4/20/2021  EXAMINATION: XR CHEST PORTABLE 4/20/2021 9:26 PM HISTORY: Fall, history of hypertension COMPARISON: 2/26/2019 FINDINGS: Heart and mediastinal contours appear normal. Lungs are clear. There is no appreciable pneumothorax or pleural effusion. The pulmonary vasculature appears grossly normal.    No evidence of acute cardiopulmonary process. Signed by Dr Omar Ohara on 4/20/2021 9:27 PM      Assessment      Hospital Problems           Last Modified POA    * (Principal) Closed right ankle fracture, initial encounter 4/21/2021 Yes    Hypertension 4/21/2021 Yes    Sleep apnea 4/21/2021 Yes    Overview Signed 10/28/2015  7:06 AM by SWAPNA Caruso         Diabetes ([facility] [address] 4/21/2021 Yes    Memory deficit 4/21/2021 Yes    Obesity 4/21/2021 Yes    Gait instability 4/21/2021 Yes                Plan   1. Patient is being admitted after sustaining fall and right ankle comminuted fracture noted. She will need surgical intervention   Will consult ortho in am   Pain control   Supportive care. 2.  Hypertension resume home meds     3. Type II DM   Accuchecks. Insulin sliding scale. And hold off on metformin for now. 4.  Memory loss and dementia  On therapy    5. Will need pt and ot     6.  Keep npo for now after midnight for surgery possibly in am.     7.  Hold lovenox for surgery in am. And start postoperatively. 8.  Gi prophylaxis.        Consultations Ordered:  IP CONSULT TO ORTHOPEDIC SURGERY    Electronically signed by Homa Hensley MD on 4/20/21 at 11:01 PM CDT

## 2021-04-21 NOTE — ANESTHESIA PRE PROCEDURE
Department of Anesthesiology  Preprocedure Note       Name:  Alin Garcia   Age:  62 y.o.  :  1960                                          MRN:  [id number]         Date:  2021      Surgeon: Rock Tate):  Carin Goncalves MD    Procedure: Procedure(s):  ANKLE OPEN REDUCTION INTERNAL FIXATION WITH POSSIBLE EXTERNAL FIXATOR    Medications prior to admission:   Prior to Admission medications    Medication Sig Start Date End Date Taking?  Authorizing Provider   SITagliptin-metFORMIN HCl ER (JANUMET XR) 100-1000 MG TB24 Take 100-1,000 mg by mouth nightly    Yes Historical Provider, MD   donepezil (ARICEPT) 10 MG tablet Take 1 tablet by mouth nightly 8/10/20  Yes SWAPNA Domingo   metoprolol succinate (TOPROL XL) 100 MG extended release tablet Take 1 tablet by mouth nightly  18  Yes Historical Provider, MD   escitalopram (LEXAPRO) 10 MG tablet Take 10 mg by mouth nightly    Yes Historical Provider, MD   triamterene-hydrochlorothiazide (MAXZIDE-25) 37.5-25 MG per tablet Take 1 tablet by mouth nightly    Yes Historical Provider, MD       Current medications:    Current Facility-Administered Medications   Medication Dose Route Frequency Provider Last Rate Last Admin    [MAR Hold] sodium chloride flush 0.9 % injection 5-40 mL  5-40 mL Intravenous 2 times per day Gilson Mondragon MD        [facility] Hold] sodium chloride flush 0.9 % injection 5-40 mL  5-40 mL Intravenous PRN Gilson Mondragon MD   10 mL at 21 0630    [MAR Hold] 0.9 % sodium chloride infusion  25 mL Intravenous PRN Gilson Mondragon MD        [facility] Hold] polyethylene glycol (GLYCOLAX) packet 17 g  17 g Oral Daily PRN Gilson Mondragon MD        [facility] Hold] acetaminophen (TYLENOL) tablet 650 mg  650 mg Oral Q6H PRN MD Cabrera Ortega [facility] Hold] acetaminophen (TYLENOL) suppository 650 mg  650 mg Rectal Q6H PRN Gilson Mondragon MD        [facility] Hold] potassium chloride (KLOR-CON M) extended release tablet 40 mEq  40 mEq Oral PRN Tamra Butts MD        Or   Delisa Castro [facility] Hold] potassium bicarb-citric acid (EFFER-K) effervescent tablet 40 mEq  40 mEq Oral PRN Tamra Butts MD        Or   Delisa Castro [facility] Hold] potassium chloride 10 mEq/100 mL IVPB (Peripheral Line)  10 mEq Intravenous PRN MD Delisa Gracia [facility] Hold] magnesium sulfate 2000 mg in 50 mL IVPB premix  2,000 mg Intravenous PRN Tamra Butts MD        [facility] Hold] ondansetron (ZOFRAN) injection 4 mg  4 mg Intravenous Q6H PRN Tamra Butts MD        [facility] Hold] famotidine (PEPCID) injection 20 mg  20 mg Intravenous BID Tamra Butts MD   20 mg at 04/21/21 0848    [MAR Hold] HYDROmorphone HCl PF (DILAUDID) injection 1 mg  1 mg Intravenous Q3H PRN Tamra Butts MD   1 mg at 04/21/21 1359    [MAR Hold] insulin lispro (HUMALOG) injection vial 0-12 Units  0-12 Units Subcutaneous TID WC Tamra Butts MD        [facility] Hold] insulin lispro (HUMALOG) injection vial 0-6 Units  0-6 Units Subcutaneous Nightly Tamra Butts MD        [facility] Hold] glucose (GLUTOSE) 40 % oral gel 15 g  15 g Oral PRN Tamra Butts MD        [facility] Hold] dextrose 50 % IV solution  12.5 g Intravenous PRN Tamra Butts MD        [facility] Hold] glucagon (rDNA) injection 1 mg  1 mg Intramuscular PRN Tamra Butts MD        [facility] Hold] dextrose 5 % solution  100 mL/hr Intravenous PRN Tamra Butts MD        [facility] Hold] acetaminophen (TYLENOL) tablet 1,000 mg  1,000 mg Oral Once Troy Miller MD        [facility] Hold] celecoxib (CELEBREX) capsule 200 mg  200 mg Oral Once Troy Miller MD        [facility] Hold] lactated ringers infusion   Intravenous Continuous Troy Miller  mL/hr at 04/21/21 0849 New Bag at 04/21/21 0849    [MAR Hold] ceFAZolin (ANCEF) 2000 mg in 0.9% sodium chloride 50 mL IVPB  2,000 mg Intravenous On Call to [address] Amara Santana MD        [facility] Hold] donepezil (ARICEPT) tablet 10 mg  10 mg Oral Nightly Taz Martínez MD   10 mg at 04/21/21 0019    [MAR Hold] escitalopram (LEXAPRO) tablet 10 mg  10 mg Oral Nightly Taz Martínez MD   10 mg at 04/21/21 0019    [MAR Hold] metoprolol succinate (TOPROL XL) extended release tablet 100 mg  100 mg Oral Nightly Taz Martínez MD   100 mg at 04/21/21 0019       Allergies: Allergies   Allergen Reactions    Azithromycin Rash    Namenda  [Memantine Hcl] Nausea And Vomiting and Nausea Only       Problem List:    Patient Active Problem List   Diagnosis Code    Hypertension I10    Sleep apnea G47.30    Spider veins I78.1    Venous reflux I87.2    Diabetes ([facility] [address] E11.9    Colitis K52.9    Colon polyps K63.5    Diarrhea R19.7    Memory deficit R41.3    Nausea R11.0    Obesity E66.9    Gait instability R26.81    Closed right ankle fracture, initial encounter S82.891A       Past Medical History:        Diagnosis Date    Anxiety     Depression     Diabetes ([facility] [address]     Hypertension     Memory difficulty     Obesity 4/21/2021    Sleep apnea     c-pap       Past Surgical History:        Procedure Laterality Date    GALLBLADDER SURGERY         Social History:    Social History     Tobacco Use    Smoking status: Never Smoker    Smokeless tobacco: Never Used   Substance Use Topics    Alcohol use:  No                                Counseling given: Not Answered      Vital Signs (Current):   Vitals:    04/21/21 0640 04/21/21 0949 04/21/21 1039 04/21/21 1457   BP: 105/61  125/70 116/70   Pulse: 71 71 70 72   Resp: 20  20 20   Temp: 97.1 °F (36.2 °C)  97.5 °F (36.4 °C) 97.2 °F (36.2 °C)   TempSrc: Temporal  Temporal Temporal   SpO2: 93%  91% 94%   Weight:       Height:                                                  BP Readings from Last 3 Encounters:   04/21/21 116/70   02/23/21 130/68   12/03/20 113/73       NPO Status: BMI:   Wt Readings from Last 3 Encounters:   04/20/21 240 lb (108.9 kg)   02/23/21 240 lb (108.9 kg)   12/03/20 230 lb (104.3 kg)     Body mass index is 38.74 kg/m².     CBC:   Lab Results   Component Value Date    WBC 8.2 04/21/2021    RBC 4.04 04/21/2021    HGB 12.3 04/21/2021    HCT 38.1 04/21/2021    MCV 94.3 04/21/2021    RDW 13.2 04/21/2021     04/21/2021       CMP:   Lab Results   Component Value Date     04/21/2021    K 3.8 04/21/2021     04/21/2021    CO2 27 04/21/2021    BUN 15 04/21/2021    CREATININE 0.6 04/21/2021    GFRAA >59 04/21/2021    AGRATIO 1.4 02/23/2021    LABGLOM >60 04/21/2021    GLUCOSE 135 04/21/2021    PROT 6.0 04/21/2021    PROT 7.2 03/07/2013    CALCIUM 8.7 04/21/2021    BILITOT 0.8 04/21/2021    ALKPHOS 99 04/21/2021    AST 50 04/21/2021    ALT 28 04/21/2021       POC Tests:   Recent Labs     04/21/21  1131   POCGLU 121*       Coags: No results found for: PROTIME, INR, APTT    HCG (If Applicable): No results found for: PREGTESTUR, PREGSERUM, HCG, HCGQUANT     ABGs: No results found for: PHART, PO2ART, AHZ2PVF, ATV4XVH, BEART, E0QVWXOQ     Type & Screen (If Applicable):  No results found for: LABABO, LABRH    Drug/Infectious Status (If Applicable):  No results found for: HIV, HEPCAB    COVID-19 Screening (If Applicable):   Lab Results   Component Value Date    COVID19 Not Detected 04/20/2021           Anesthesia Evaluation  Patient summary reviewed and Nursing notes reviewed no history of anesthetic complications:   Airway: Mallampati: II  TM distance: >3 FB   Neck ROM: full  Mouth opening: < 3 FB Dental: normal exam         Pulmonary:normal exam    (+) sleep apnea: on CPAP,      (-) not a current smoker                           Cardiovascular:    (+) hypertension: moderate,       ECG reviewed  Rhythm: regular             Beta Blocker:  Not on Beta Blocker      ROS comment: 71 BPM  Sinus rhythmPoor R wave progression - probable normal variantComparison Summary: No serial   comparison madeSummary: Borderline ECG     Neuro/Psych:   (+) psychiatric history: stable with treatmentdepression/anxiety             GI/Hepatic/Renal:   (+) morbid obesity     (-) GERD       Endo/Other: Negative Endo/Other ROS   (+) DiabetesType II DM, well controlled, , .          Pt had no PAT visit       Abdominal:   (+) obese,     Abdomen: soft. Vascular: negative vascular ROS. Anesthesia Plan      general     ASA 3       Induction: intravenous. MIPS: Postoperative opioids intended and Prophylactic antiemetics administered. Anesthetic plan and risks discussed with patient.                     Precious Goss, APRN - CRNA   4/21/2021

## 2021-04-21 NOTE — PROGRESS NOTES
Brandon Nazario arrived to room # 533. Presented with: Fx Right tib/fib  Mental Status: Patient is oriented, alert, coherent, logical, thought processes intact and able to concentrate and follow conversation. Vitals:    04/21/21 0000   BP: 127/67   Pulse: 69   Resp: 18   Temp: 99.1 °F (37.3 °C)   SpO2: 98%     Patient safety contract and falls prevention contract reviewed with patient Yes. Oriented Patient to room. Call light within reach. Yes.   Needs, issues or concerns expressed at this time: no.      Electronically signed by Lemuel Cooper RN on 4/21/2021 at 2:15 AM

## 2021-04-21 NOTE — H&P
Orthopaedic Inpatient Consultation    NAME:  Misbah La   : 1960  MRN: [id number]    2021  7:03 PM        CHIEF COMPLAINT:  right trimalleolar ankle fracture dislocation      HISTORY OF PRESENT ILLNESS:   The patient is a 62 y.o. female iddm nonsmoker who presents with the above complaint after a  Ground level fall yesterday afternoon. Slipped taking food off stovetop. Was in a hurry. Severe pain unable to bear weight. No prior ankle problems. No other injuries.  esa at bedside. Past Medical History:        Diagnosis Date    Anxiety     Depression     Diabetes ([facility] [address]     Hypertension     Memory difficulty     Obesity 2021    Sleep apnea     c-pap       Past Surgical History:        Procedure Laterality Date    GALLBLADDER SURGERY         Current Medications:   Prior to Admission medications    Medication Sig Start Date End Date Taking?  Authorizing Provider   SITagliptin-metFORMIN HCl ER (JANUMET XR) 100-1000 MG TB24 Take 100-1,000 mg by mouth nightly    Yes Historical Provider, MD   donepezil (ARICEPT) 10 MG tablet Take 1 tablet by mouth nightly 8/10/20  Yes SWAPNA Alvarez   metoprolol succinate (TOPROL XL) 100 MG extended release tablet Take 1 tablet by mouth nightly  18  Yes Historical Provider, MD   escitalopram (LEXAPRO) 10 MG tablet Take 10 mg by mouth nightly    Yes Historical Provider, MD   triamterene-hydrochlorothiazide (MAXZIDE-25) 37.5-25 MG per tablet Take 1 tablet by mouth nightly    Yes Historical Provider, MD       Allergies:  Azithromycin and Namenda  [memantine hcl]    Social History:   Social History     Socioeconomic History    Marital status:      Spouse name: Not on file    Number of children: Not on file    Years of education: Not on file    Highest education level: Not on file   Occupational History    Not on file   Social Needs    Financial resource strain: Not on file    Food insecurity     Worry: Not on file Inability: Not on file    Transportation needs     Medical: Not on file     Non-medical: Not on file   Tobacco Use    Smoking status: Never Smoker    Smokeless tobacco: Never Used   Substance and Sexual Activity    Alcohol use: No    Drug use: No    Sexual activity: Yes   Lifestyle    Physical activity     Days per week: Not on file     Minutes per session: Not on file    Stress: Not on file   Relationships    Social connections     Talks on phone: Not on file     Gets together: Not on file     Attends Orthodoxy service: Not on file     Active member of club or organization: Not on file     Attends meetings of clubs or organizations: Not on file     Relationship status: Not on file    Intimate partner violence     Fear of current or ex partner: Not on file     Emotionally abused: Not on file     Physically abused: Not on file     Forced sexual activity: Not on file   Other Topics Concern    Not on file   Social History Narrative    Not on file       Family History:   Family History   Problem Relation Age of Onset    Cancer Mother     Heart Attack Father        REVIEW OF SYSTEMS:  14 point review of systems has been reviewed from the patient's emergency room visit, reviewed with the patient on today's date with no new changes. PHYSICAL EXAM:      Physical Examination:  Vitals:   Vitals:    04/20/21 2343 04/21/21 0000 04/21/21 0352 04/21/21 0640   BP:  127/67 (!) 106/57 105/61   Pulse:  69 71 71   Resp:  18 20 20   Temp:  99.1 °F (37.3 °C) 98.2 °F (36.8 °C) 97.1 °F (36.2 °C)   TempSrc:  Temporal Temporal Temporal   SpO2:  98% 90% 93%   Weight: 240 lb (108.9 kg)      Height: 5' 6\" (1.676 m)        General:  Appears stated age, no distress. Orientation:  Alert and oriented to time, place, and person. Mood and Affect:  Cooperative and pleasant. Gait:  Resting comfortably in bed. Cardiovascular:  Symmetric 1-2 plus pulses in upper and lower extremities.   Lymph:  No cervical or inguinal lymphadenopathy 04/21/2021     BMP:    Lab Results   Component Value Date     04/21/2021    K 3.8 04/21/2021     04/21/2021    CO2 27 04/21/2021    BUN 15 04/21/2021    CREATININE 0.6 04/21/2021    CALCIUM 8.7 04/21/2021    GFRAA >59 04/21/2021    LABGLOM >60 04/21/2021    GLUCOSE 135 04/21/2021         Radiology: Xr Ankle Right (min 3 Views)    Result Date: 4/20/2021  EXAMINATION: XR ANKLE RIGHT (MIN 3 VIEWS), XR FOOT RIGHT (MIN 3 VIEWS) 4/20/2021 7:51 PM HISTORY: Pain after fall COMPARISON: None FINDINGS: There is a comminuted fracture through the medial malleolus with intra-articular extension at the tibiotalar joint. There is associated widening of the medial tibiotalar joint to 9 mm. There is a spiral fracture through the distal fibular diaphysis. A fracture is also noted through the posterior malleolus of the tibia. There is an associated joint effusion. Base of the fifth metatarsal appears intact. There is an acute nondisplaced fracture through the base of the first and second metatarsals. No widening of the Lisfranc joint is identified on these nonweightbearing images. There is somewhat diffuse soft tissue swelling. 1. Comminuted fracture of the medial malleolus with disruption of the ankle mortise. Nondisplaced fracture through the posterior malleolus of the tibia. 2. Comminuted spiral fracture through the distal fibular diaphysis. 3. Acute fractures through the base of the first and second metatarsals of the foot. No definite additional fractures identified within the foot. Signed by Dr Omar Ohara on 4/20/2021 7:56 PM    Xr Foot Right (min 3 Views)    Result Date: 4/20/2021  EXAMINATION: XR ANKLE RIGHT (MIN 3 VIEWS), XR FOOT RIGHT (MIN 3 VIEWS) 4/20/2021 7:51 PM HISTORY: Pain after fall COMPARISON: None FINDINGS: There is a comminuted fracture through the medial malleolus with intra-articular extension at the tibiotalar joint. There is associated widening of the medial tibiotalar joint to 9 mm.  There is a spiral fracture through the distal fibular diaphysis. A fracture is also noted through the posterior malleolus of the tibia. There is an associated joint effusion. Base of the fifth metatarsal appears intact. There is an acute nondisplaced fracture through the base of the first and second metatarsals. No widening of the Lisfranc joint is identified on these nonweightbearing images. There is somewhat diffuse soft tissue swelling. 1. Comminuted fracture of the medial malleolus with disruption of the ankle mortise. Nondisplaced fracture through the posterior malleolus of the tibia. 2. Comminuted spiral fracture through the distal fibular diaphysis. 3. Acute fractures through the base of the first and second metatarsals of the foot. No definite additional fractures identified within the foot. Signed by Dr Lalo Mercedes on 4/20/2021 7:56 PM    Xr Chest Portable    Result Date: 4/20/2021  EXAMINATION: XR CHEST PORTABLE 4/20/2021 9:26 PM HISTORY: Fall, history of hypertension COMPARISON: 2/26/2019 FINDINGS: Heart and mediastinal contours appear normal. Lungs are clear. There is no appreciable pneumothorax or pleural effusion. The pulmonary vasculature appears grossly normal.    No evidence of acute cardiopulmonary process. Signed by Dr Lalo Mercedes on 4/20/2021 9:27 PM      Assessment:   Displaced trimall right ankle fracture, closed, nvi. Plan:  ORIF vs ex fix right ankle fracture later today. I explained to the patient/family the patient's diagnosis and operative procedure in detail. They said they understood basically what was wrong and how I planned to fix it. They understand the expected recovery and the risks which include excessive bleeding, infection, reaction to anesthesia, nerve injury, stiffness, fracture, deformity and dislocation. They then signed an operative consent form. Provider:  Pradeep Cervantes  Date: 4/21/2021

## 2021-04-21 NOTE — OP NOTE
ANKLE FRACTURE OPEN REDUCTION INTERNAL FIXATION   OPERATIVE NOTE    NAME OF SURGEON / : Claudell Saba, MD  PATIENT:   Alin Garcia  Date: 4/21/2021        Time: 6:00 PM   Referring Physician: ________________________    PREOP DIAGNOSIS:  right displaced trimalleolar ankle fracture   POSTOP DIAGNOSIS:  Same     PROCEDURE:    Right   Ankle fracture open reduction and internal fixation    IMPLANTS:   Implant Name Type Inv. Item Serial No.  Lot No. LRB No. Used Action   SCREW BNE L20MM DIA2.7MM SUKH S STL ST FULL THRD FOR SM  SCREW BNE L20MM DIA2.7MM SUKH S STL ST FULL THRD FOR SM  [employer]  Right 1 Implanted   SCREW BNE L14MM DIA3.5MM SUKH S STL ST NONCANNULATED QUE  SCREW BNE L14MM DIA3.5MM SUKH S STL ST NONCANNULATED QUE  DEPU[employer]  Right 4 Implanted   3.5MM CORTEX SCREW SELF-TAPPING 18MM  3.5MM CORTEX SCREW SELF-TAPPING 18MM  DEPU[employer]  Right 2 Implanted   SCREW BNE L20MM DIA4MM CANC S STL ST RANJAN NONLOCKING FULL  SCREW BNE L20MM DIA4MM CANC S STL ST RANJAN NONLOCKING FULL  DEPU[employer]  Right 1 Implanted   SCREW BNE L50MM DIA4MM S STL RANJAN SHT 1/3 THRD SM HEX SOCK  SCREW BNE L50MM DIA4MM S STL RANJAN SHT 1/3 THRD SM HEX SOCK  DEPU[employer]  Right 2 Implanted   PLATE BNE [id number] 8 H S STL 1/3 TBLR QUE COMPR W/ CLLR FOR  PLATE BNE M11CP 8 H S STL 1/3 TBLR QUE COMPR W/ CLLR FOR  DEPU[employer]  Right 1 Implanted       FINDINGS: None  ASSISTANT:  See brief op note    ANESTHESIA:  General  EBL:  200 mL  FLUIDS: See anesthesia record  BLOOD PRODUCTS:  None  COMPLICATIONS:  None  SPECIMEN:  None        INDICATIONS:  Patient presents for the above procedure having failed conservative treatment. Patient consents to the procedure above understanding the risks of bleeding, infection, anesthesia, nerve injury, stiffness, and blood clots. Procedure in Detail:    The patient was brought into the operating room and general anesthesia given.   Patient was given and iv antibiotic. A bump was placed under the buttock and a tourniquet applied around the proximal thigh. The toes were scrubbed with betadine and the extremity was prepped with chlorhexidine and alcohol and draped sterilely. The extremity was exsanguinated with an esmarch and the tourniquet was inflated to 300 mm Hg. A longitudinal incision was made over the distal fibula through skin. Scissor dissection was taken down to bone. Retractors were used to protect soft tissue and the sural nerve. Subperiosteal dissection was done at the fracture site. The fracture was curetted and rinsed and then reduced and held with a clamp. A cortical lag screw was placed perpendicular to the fracture using standard AO technique. A lateral buttress plate was prebent and placed over the distal fibula and fixed with cancellous screws distally and cortical screws proximally. The syndesmosis was checked by pulling on the distal fibula with a towel clip. The syndesmosis was intact. C-arm was used to verify the fracture was well reduced and the hardware was in good position. Attention was then turned to the medial malleolus. A longitudinal incision was made over th medial malleolus and careful dissection was taken down to bone. A displaced fracture sight was curetted and irrigated. The fractue was reduced and held with a clamp. Two parallel k wires were placed from the tip of the medial malleolus and advanced proximally into the central distal metaphysis. 50 mm 4.0 cannulated screws were placed over the wires and the wires removed. C-arm was used to verify the fracture was well reduced and the hardware was in good position. The posterior malleolus fragment was nondisplaced. The tourniquet was released. The wound was checked for bleeding and pulse lavaged with antibiotic irrigation. The fascia was closed with 0 vicryl running suture.   The subcutaneous layer was closed  with 2-0 vicryl and the skin closed with staples. A sterile dressing was applied and an orthoglass splint was placed. The patient was awakened, extubated and transferred to recovery in stable condition. Plan:  No weightbearing and immobilization for 6 weeks.       Electronically signed by Niels Gill MD on 4/21/2021 at 6:00 PM

## 2021-04-21 NOTE — ANESTHESIA POSTPROCEDURE EVALUATION
Department of Anesthesiology  Postprocedure Note    Patient: Debi Wagner  MRN: [id number]  YOB: 1960  Date of evaluation: 4/21/2021  Time:  6:07 PM     Procedure Summary     Date: 04/21/21 Room / Location: [address]    Anesthesia Start: 1627 Anesthesia Stop: 7419    Procedure: ANKLE OPEN REDUCTION INTERNAL FIXATION WITH POSSIBLE EXTERNAL FIXATOR (Right Ankle) Diagnosis: (fx ankle)    Surgeons: Georgie Hunt MD Responsible Provider: SWAPNA Dale CRNA    Anesthesia Type: general ASA Status: 3          Anesthesia Type: general    Case Phase I: Case Score: 5    Case Phase II:      Last vitals: Reviewed and per EMR flowsheets.        Anesthesia Post Evaluation    Patient location during evaluation: PACU  Patient participation: complete - patient participated  Level of consciousness: sleepy but conscious  Pain score: 0  Airway patency: patent  Nausea & Vomiting: no nausea and no vomiting  Complications: no  Cardiovascular status: hemodynamically stable  Respiratory status: acceptable, spontaneous ventilation and nasal cannula  Hydration status: euvolemic

## 2021-04-21 NOTE — PROGRESS NOTES
Progress Note  Date:2021       Room:0533/533-01  Patient Name:Judi Yi     YOB: 1960     Age:60 y.o. Subjective    Subjective   Patient seen and examined this a.m. for member present at the bedside, patient very fatigued groggy status post pain dosing. Plans for OR this afternoon for repair of trimalleolar right ankle fracture. Patient denies headache, change in vision, chest pain, shortness of breath, nausea vomiting, fevers or chills. Review of Systems   ROS: 14 point review of systems is negative except as specifically addressed above. Objective         Vitals Last 24 Hours:  TEMPERATURE:  Temp  Av.9 °F (36.6 °C)  Min: 97.1 °F (36.2 °C)  Max: 99.1 °F (37.3 °C)  RESPIRATIONS RANGE: Resp  Av.7  Min: 17  Max: 20  PULSE OXIMETRY RANGE: SpO2  Av.4 %  Min: 83 %  Max: 98 %  PULSE RANGE: Pulse  Av.7  Min: 69  Max: 78  BLOOD PRESSURE RANGE: Systolic (23YMM), IYH:900 , Min:105 , ZZV:728   ; Diastolic (24AMI), GQA:55, Min:57, Max:95    I/O (24Hr): Intake/Output Summary (Last 24 hours) at 2021 1317  Last data filed at 2021 0830  Gross per 24 hour   Intake 0 ml   Output --   Net 0 ml     Physical Exam  Vitals signs and nursing note reviewed. Constitutional:       Appearance: She is obese. She is not toxic-appearing. Comments: Noticeable discomfort due to pain of right ankle fracture   HENT:      Head: Normocephalic and atraumatic. Nose: Nose normal.   Eyes:      Conjunctiva/sclera: Conjunctivae normal.   Cardiovascular:      Rate and Rhythm: Normal rate and regular rhythm. Pulmonary:      Effort: Pulmonary effort is normal. No respiratory distress. Abdominal:      Tenderness: There is no abdominal tenderness. There is no guarding or rebound. Musculoskeletal:         General: Tenderness present. Comments: Right ankle currently bandaged, elevated   Skin:     General: Skin is warm.    Neurological:      Mental Status: She is alert. Mental status is at baseline. Comments: Appears groggy status post pain modality         Labs/Imaging/Diagnostics    Labs:  CBC:  Recent Labs     04/20/21 2142 04/21/21 0259   WBC 9.4 8.2   RBC 4.29 4.04*   HGB 13.4 12.3   HCT 40.0 38.1   MCV 93.2 94.3   RDW 13.2 13.2   * 122*     CHEMISTRIES:  Recent Labs     04/20/21 2142 04/21/21 0259    142   K 3.6 3.8    106   CO2 26 27   BUN 14 15   CREATININE 0.6 0.6   GLUCOSE 154* 135*     PT/INR:No results for input(s): PROTIME, INR in the last 72 hours. APTT:No results for input(s): APTT in the last 72 hours. LIVER PROFILE:  Recent Labs     04/20/21 2142 04/21/21 0259   AST 52* 50*   ALT 29 28   BILITOT 0.7 0.8   ALKPHOS 129* 99       Imaging Last 24 Hours:  Xr Ankle Right (min 3 Views)    Result Date: 4/20/2021  EXAMINATION: XR ANKLE RIGHT (MIN 3 VIEWS), XR FOOT RIGHT (MIN 3 VIEWS) 4/20/2021 7:51 PM HISTORY: Pain after fall COMPARISON: None FINDINGS: There is a comminuted fracture through the medial malleolus with intra-articular extension at the tibiotalar joint. There is associated widening of the medial tibiotalar joint to 9 mm. There is a spiral fracture through the distal fibular diaphysis. A fracture is also noted through the posterior malleolus of the tibia. There is an associated joint effusion. Base of the fifth metatarsal appears intact. There is an acute nondisplaced fracture through the base of the first and second metatarsals. No widening of the Lisfranc joint is identified on these nonweightbearing images. There is somewhat diffuse soft tissue swelling. 1. Comminuted fracture of the medial malleolus with disruption of the ankle mortise. Nondisplaced fracture through the posterior malleolus of the tibia. 2. Comminuted spiral fracture through the distal fibular diaphysis. 3. Acute fractures through the base of the first and second metatarsals of the foot. No definite additional fractures identified within the foot. Overview Signed 10/28/2015  7:06 AM by SWAPNA Carusopap         Diabetes ([facility] [address] 4/21/2021 Yes    Memory deficit 4/21/2021 Yes    Obesity 4/21/2021 Yes    Gait instability 4/21/2021 Yes              Closed right trimalleolar ankle fracture  -Status post mechanical fall, orthopedic surgery has been consulted plans for or intervention this afternoon  -Continue with pain modalities, with scrutinize patient's respiratory status in the postoperative period  -X-ray modalities reviewed        Essential hypertension-chronic condition, continue antihypertensive regimen, routine vitals    Type 2 diabetes-chronic condition, hold oral antihyperglycemic's, continue insulin modalities, Accu-Cheks q. ACH S, hypoglycemic protocol, C carb diet    Obstructive sleep apnea-continue with CPAP nightly      Memory deficit-continue with Aricept    Depression/anxiety-chronic condition, continue with Lexapro        EMR Dragon/Transcription disclaimer:   Much of this encounter note is an electronic transcription/translation of spoken language to printed text.  The electronic translation of spoken language may permit erroneous, or at times, nonsensical words or phrases to be inadvertently transcribed; although attempts have made to review the note for such errors, some may still exist.    Electronically signed by   Danielito Hernandez   Internal Medicine Hospitalist  On 4/21/2021  At 1:18 PM

## 2021-04-21 NOTE — PROGRESS NOTES
4 Eyes Skin Assessment    Ingrid Ebbing is being assessed upon: Admission    I agree that I, Ashley Butts, along with Yaritza Deleon RN (either 2 RN's or 1 LPN and 1 RN) have performed a thorough Head to Toe Skin Assessment on the patient. ALL assessment sites listed below have been assessed. Areas assessed by both nurses:     [x]   Head, Face, and Ears   [x]   Shoulders, Back, and Chest  [x]   Arms, Elbows, and Hands   [x]   Coccyx, Sacrum, and Ischium  [x]   Legs, Feet, and Heels    Does the Patient have Skin Breakdown?  No    Macario Prevention initiated: Yes  Wound Care Orders initiated: NA    [facility] nurse consulted for Pressure Injury (Stage 3,4, Unstageable, DTI, NWPT, and Complex wounds) and New or Established Ostomies: NA        Primary Nurse eSignature: Donna Uriostegui RN on 4/21/2021 at 2:16 AM      Co-Signer eSignature: Electronically signed by Dean Fair RN on 4/21/21 at 2:18 AM CDT

## 2021-04-22 LAB
ANION GAP SERPL CALCULATED.3IONS-SCNC: 9 MMOL/L (ref 7–19)
BUN BLDV-MCNC: 19 MG/DL (ref 6–20)
CALCIUM SERPL-MCNC: 8.1 MG/DL (ref 8.6–10)
CHLORIDE BLD-SCNC: 104 MMOL/L (ref 98–111)
CO2: 26 MMOL/L (ref 22–29)
CREAT SERPL-MCNC: 0.6 MG/DL (ref 0.5–0.9)
GFR AFRICAN AMERICAN: >59
GFR NON-AFRICAN AMERICAN: >60
GLUCOSE BLD-MCNC: 141 MG/DL (ref 70–99)
GLUCOSE BLD-MCNC: 155 MG/DL (ref 70–99)
GLUCOSE BLD-MCNC: 178 MG/DL (ref 70–99)
GLUCOSE BLD-MCNC: 178 MG/DL (ref 74–109)
GLUCOSE BLD-MCNC: 191 MG/DL (ref 70–99)
HCT VFR BLD CALC: 35.6 % (ref 37–47)
HEMOGLOBIN: 11.4 G/DL (ref 12–16)
PERFORMED ON: ABNORMAL
POTASSIUM REFLEX MAGNESIUM: 3.8 MMOL/L (ref 3.5–5)
SODIUM BLD-SCNC: 139 MMOL/L (ref 136–145)

## 2021-04-22 PROCEDURE — 2700000000 HC OXYGEN THERAPY PER DAY

## 2021-04-22 PROCEDURE — 36415 COLL VENOUS BLD VENIPUNCTURE: CPT

## 2021-04-22 PROCEDURE — 97161 PT EVAL LOW COMPLEX 20 MIN: CPT

## 2021-04-22 PROCEDURE — 97116 GAIT TRAINING THERAPY: CPT

## 2021-04-22 PROCEDURE — 80048 BASIC METABOLIC PNL TOTAL CA: CPT

## 2021-04-22 PROCEDURE — 97165 OT EVAL LOW COMPLEX 30 MIN: CPT

## 2021-04-22 PROCEDURE — 85018 HEMOGLOBIN: CPT

## 2021-04-22 PROCEDURE — 97535 SELF CARE MNGMENT TRAINING: CPT

## 2021-04-22 PROCEDURE — 97530 THERAPEUTIC ACTIVITIES: CPT

## 2021-04-22 PROCEDURE — 82947 ASSAY GLUCOSE BLOOD QUANT: CPT

## 2021-04-22 PROCEDURE — 6370000000 HC RX 637 (ALT 250 FOR IP): Performed by: ORTHOPAEDIC SURGERY

## 2021-04-22 PROCEDURE — 6360000002 HC RX W HCPCS: Performed by: ORTHOPAEDIC SURGERY

## 2021-04-22 PROCEDURE — 2580000003 HC RX 258: Performed by: ORTHOPAEDIC SURGERY

## 2021-04-22 PROCEDURE — 85014 HEMATOCRIT: CPT

## 2021-04-22 PROCEDURE — 1210000000 HC MED SURG R&B

## 2021-04-22 PROCEDURE — 2500000003 HC RX 250 WO HCPCS: Performed by: ORTHOPAEDIC SURGERY

## 2021-04-22 RX ADMIN — Medication 2000 MG: at 08:30

## 2021-04-22 RX ADMIN — IBUPROFEN 400 MG: 400 TABLET ORAL at 19:01

## 2021-04-22 RX ADMIN — ACETAMINOPHEN 650 MG: 325 TABLET ORAL at 14:02

## 2021-04-22 RX ADMIN — ACETAMINOPHEN 650 MG: 325 TABLET ORAL at 01:54

## 2021-04-22 RX ADMIN — OXYCODONE 10 MG: 5 TABLET ORAL at 08:30

## 2021-04-22 RX ADMIN — METOPROLOL SUCCINATE 100 MG: 50 TABLET, EXTENDED RELEASE ORAL at 19:46

## 2021-04-22 RX ADMIN — ASPIRIN 325 MG: 325 TABLET, COATED ORAL at 19:46

## 2021-04-22 RX ADMIN — ASPIRIN 325 MG: 325 TABLET, COATED ORAL at 08:30

## 2021-04-22 RX ADMIN — OXYCODONE 10 MG: 5 TABLET ORAL at 19:47

## 2021-04-22 RX ADMIN — FAMOTIDINE 20 MG: 10 INJECTION, SOLUTION INTRAVENOUS at 19:47

## 2021-04-22 RX ADMIN — SODIUM CHLORIDE, PRESERVATIVE FREE 10 ML: 5 INJECTION INTRAVENOUS at 19:47

## 2021-04-22 RX ADMIN — SODIUM CHLORIDE, PRESERVATIVE FREE 10 ML: 5 INJECTION INTRAVENOUS at 08:37

## 2021-04-22 RX ADMIN — DOCUSATE SODIUM 50 MG AND SENNOSIDES 8.6 MG 1 TABLET: 8.6; 5 TABLET, FILM COATED ORAL at 08:30

## 2021-04-22 RX ADMIN — ACETAMINOPHEN 650 MG: 325 TABLET ORAL at 08:30

## 2021-04-22 RX ADMIN — Medication 2000 MG: at 01:57

## 2021-04-22 RX ADMIN — DOCUSATE SODIUM 50 MG AND SENNOSIDES 8.6 MG 1 TABLET: 8.6; 5 TABLET, FILM COATED ORAL at 19:46

## 2021-04-22 RX ADMIN — IBUPROFEN 400 MG: 400 TABLET ORAL at 13:07

## 2021-04-22 RX ADMIN — DONEPEZIL HYDROCHLORIDE 10 MG: 5 TABLET, FILM COATED ORAL at 19:46

## 2021-04-22 RX ADMIN — IBUPROFEN 400 MG: 400 TABLET ORAL at 08:30

## 2021-04-22 RX ADMIN — ACETAMINOPHEN 650 MG: 325 TABLET ORAL at 19:47

## 2021-04-22 RX ADMIN — ESCITALOPRAM OXALATE 10 MG: 10 TABLET ORAL at 19:46

## 2021-04-22 RX ADMIN — FAMOTIDINE 20 MG: 10 INJECTION, SOLUTION INTRAVENOUS at 08:30

## 2021-04-22 ASSESSMENT — PAIN SCALES - GENERAL
PAINLEVEL_OUTOF10: 7
PAINLEVEL_OUTOF10: 3
PAINLEVEL_OUTOF10: 6

## 2021-04-22 ASSESSMENT — PAIN DESCRIPTION - DESCRIPTORS: DESCRIPTORS: ACHING

## 2021-04-22 ASSESSMENT — PAIN DESCRIPTION - LOCATION: LOCATION: ANKLE

## 2021-04-22 ASSESSMENT — PAIN DESCRIPTION - ORIENTATION
ORIENTATION: RIGHT
ORIENTATION: RIGHT

## 2021-04-22 NOTE — PROGRESS NOTES
Physical Therapy    Facility/Department: [facility] SURG SERVICES  Initial Assessment    NAME: Phu Hendrix  : 1960  MRN: [id number]    Date of Service: 2021    Discharge Recommendations:  Continue to assess pending progress, Patient would benefit from continued therapy after discharge   PT Equipment Recommendations  Other: ASSESSING NEEDS. WILL LIKELY NEED A W/C AND OR A RW AND A BSC    Assessment   Body structures, Functions, Activity limitations: Decreased functional mobility ; Decreased endurance;Decreased balance;Decreased strength;Decreased safe awareness  Assessment: pt WOULD BENEFIT FROM SKILLED PT IN THIS SETTING TO ADDRESS HER MOBILITY DEFICITS RELATED TO FALL AND R ANKLE FX. Pt DOES NOT APPEAR SAFE TO D/C HOME AT THIS TIME AND BE IND WITH MOBILITY. APPEARS TO BE A GOOD CANDIDATE FOR SHORT TERM REHAB TO WORK ON SAFETY WITH MOBILITY AND ADLS WHILE FINDING BEST METHOD FOR ENTERING AND EXITING HOME. Prognosis: Good  Decision Making: Low Complexity  PT Education: General Safety; Functional Mobility Training;Transfer Training;Precautions;Weight-bearing Education; Family Education  REQUIRES PT FOLLOW UP: Yes  Activity Tolerance  Activity Tolerance: Patient Tolerated treatment well       Patient Diagnosis(es): The primary encounter diagnosis was Closed displaced fracture of medial malleolus of right tibia, initial encounter. Diagnoses of Closed displaced spiral fracture of shaft of left fibula, initial encounter, Closed displaced fracture of first metatarsal bone of right foot, initial encounter, Closed displaced fracture of second metatarsal bone of right foot, initial encounter, and Fall, initial encounter were also pertinent to this visit. has a past medical history of Anxiety, Depression, Diabetes ([address] Hypertension, Memory difficulty, Obesity, and Sleep apnea.    has a past surgical history that includes Gallbladder surgery and Ankle fracture surgery (Right, 4/21/2021). Restrictions  Restrictions/Precautions  Restrictions/Precautions: Fall Risk, Weight Bearing  Lower Extremity Weight Bearing Restrictions  Right Lower Extremity Weight Bearing: Non Weight Bearing  Vision/Hearing  Vision: Within Functional Limits  Hearing: Within functional limits     Subjective  General  Patient assessed for rehabilitation services?: Yes  Diagnosis: FX R ANKLE  Follows Commands: Within Functional Limits  General Comment  Comments: CAST NOTED ON R ANKLE. Subjective  Subjective: pt REPORTS SHE HAS MILD DEMENTIA AND WILL REQUIRE REPEAT OF INFORMATION AT TIMES. Pt ALSO REPORTS SHE HAD MINOR BALANCE PROBLEMS PRIOR TO FALL AND R ANKLE FX AND IS CONCERNED ABOUT RETURNING HOME AND BEING HOME ALONE DURING THE DAY IN LIGHT OF HER NWB STATUS.   Pain Screening  Patient Currently in Pain: Denies  Vital Signs  Patient Currently in Pain: Denies       Orientation  Orientation  Overall Orientation Status: Within Functional Limits(pt DOES REPORT MILD MEMORY LOSS BUT ABLE TO FOLLOW DIRECTIONS WELL)  Social/Functional History  Social/Functional History  Lives With: Spouse  Type of Home: House  Home Layout: One level  Home Access: Stairs to enter without rails  Entrance Stairs - Number of Steps: 2  Bathroom Shower/Tub: Tub/Shower unit  Bathroom Toilet: Handicap height  Bathroom Accessibility: Wheelchair accessible  ADL Assistance: Independent  Ambulation Assistance: Independent  Transfer Assistance: Independent  Cognition        Objective          AROM RLE (degrees)  RLE AROM: WFL  AROM LLE (degrees)  LLE AROM : WFL  Strength RLE  Comment: ANTIGRAVITY  Strength LLE  Strength LLE: WFL        Bed mobility  Supine to Sit: Stand by assistance  Transfers  Sit to Stand: Minimal Assistance  Stand to sit: Minimal Assistance  Bed to Chair: Minimal assistance  Comment: pt ABLE TO STAND WITH RW AND PERFORM A TURN AND BACK TO RECLINER WITH MIN A 1. pt ABLE TO MAINTAIN NWB ON R LE BUT HAS DIFFICULTY WITH TRYING TO BACK TO RECLINER AND REQUIRED MIN A FOR SAFETY AND BALANCE.   Ambulation  Ambulation?: No(DID NOT APPEAR READY TO ACTUALLY AMB A DISTANCE AT TIME OF EVAL)     Balance  Comments: pt EXPERIENCES MILD UNSTEADINESS WITH STANDING AND STAND PIVOT/STEP TF'S        Plan   Plan  Times per week: 5-7  Plan weeks: 2  Current Treatment Recommendations: Strengthening, Functional Mobility Training, Transfer Training, Gait Training, Stair training, Pain Management, Positioning, Patient/Caregiver Education & Training, Safety Education & Training  Plan Comment: NWB TANG PIRES  Safety Devices  Type of devices: Call light within reach, Left in chair, Chair alarm in place, Gait belt      Goals  Short term goals  Time Frame for Short term goals: 2 WKS  Short term goal 1: SIT<>STAND, SBA  Short term goal 2: STAND STEP/PIVOT TF'S WITH RW AND CGA  Short term goal 3: AMB 25 FT WITH RW, CGA  Patient Goals   Patient goals : WOULD LIKE TO D/C TO IN Pt REHAB       Therapy Time   Individual Concurrent Group Co-treatment   Time In           Time Out           Minutes                   Rony Briscoe PT    Electronically signed by Rony Briscoe PT on 4/22/2021 at 10:57 AM

## 2021-04-22 NOTE — CARE COORDINATION
The [address] at [facility]  Notification of Admission Decision      [] Patient has been accepted for admit to [facility] on :       Please write discharge orders and summary prior to discharge. [x] Patient acceptance to Rehab pending the following : Insurance approval    [] Eval in progress       [] Patient determined to be ineligible for services at [facility] because : We recommend you consider        Thank you for your referral, we appreciate you. If you have any questions, please feel   free to contact me at [phone].     Electronically Signed by Mikayla Marsh, Admissions Coordinator 4/22/2021 11:58 AM

## 2021-04-22 NOTE — PROGRESS NOTES
Occupational Therapy   Occupational Therapy Initial Assessment  Date: 2021   Patient Name: Evan Matta  MRN: [id number]     : 1960    Date of Service: 2021    Discharge Recommendations:  Patient would benefit from continued therapy after discharge(Pt refuses SNF; may benefit from 8th floor rehab.)  OT Equipment Recommendations  Equipment Needed: (May benefit from w/c for long-distance mobility purposes.)    Assessment   Assessment: OT evaluation completed and tx initiated. Pt's performance indicates need for further skilled therapy services. Pt will likely make significant improvements in independence/safety in ADLs with continued tx. Currently, pt is unsafe to D/C home without full 24/7 assist. Also, non-optional stairs to enter house is an environmental barrier to D/C home while NWB on RLE. OT Education: IADL Safety;Plan of Care;Home Exercise Program;Precautions; Family Education; ADL Adaptive Strategies; Equipment;Transfer Training  Patient Education: Pt and sister educated. Pt may need repetition. Barriers to Learning: Pt's early-onset dementia. REQUIRES OT FOLLOW UP: Yes  Activity Tolerance  Activity Tolerance: Patient Tolerated treatment well  Activity Tolerance: Pt demos ability to particpate with therapy for extended periods. Safety Devices  Safety Devices in place: Yes  Type of devices: Call light within reach; Chair alarm in place; Left in chair         Patient Diagnosis(es): The primary encounter diagnosis was Closed displaced fracture of medial malleolus of right tibia, initial encounter. Diagnoses of Closed displaced spiral fracture of shaft of left fibula, initial encounter, Closed displaced fracture of first metatarsal bone of right foot, initial encounter, Closed displaced fracture of second metatarsal bone of right foot, initial encounter, and Fall, initial encounter were also pertinent to this visit.    has a past medical history of Anxiety, Depression, Diabetes ([facility] [address] Hypertension, Memory difficulty, Obesity, and Sleep apnea. has a past surgical history that includes Gallbladder surgery and Ankle fracture surgery (Right, 4/21/2021). Restrictions  Restrictions/Precautions  Restrictions/Precautions: Fall Risk, Weight Bearing  Lower Extremity Weight Bearing Restrictions  Right Lower Extremity Weight Bearing: Non Weight Bearing    Subjective   General  Chart Reviewed: Yes  Patient assessed for rehabilitation services?: Yes  Additional Pertinent Hx: Anxiety; depression; DM; HTN; early onset dementia  Family / Caregiver Present: Yes(Sister)  Diagnosis: (R) trimalleolar ankle fx; (R) 1st & 2nd metatarsal bone fx's; ORIF (R) ankle. Patient Currently in Pain: Denies    Social/Functional History  Social/Functional History  Lives With: Spouse  Type of Home: House  Home Layout: One level  Home Access: Stairs to enter without rails  Entrance Stairs - Number of Steps: 2  Bathroom Shower/Tub: Tub/Shower unit  Bathroom Toilet: Handicap height  Bathroom Accessibility: Wheelchair accessible  ADL Assistance: Independent  Ambulation Assistance: Independent  Transfer Assistance: Independent     Objective   Vision: Within Functional Limits  Vision Exceptions: Wears glasses at all times  Hearing: Within functional limits    Orientation  Overall Orientation Status: Within Functional Limits     Standing Balance  Activity: Pt able to intermittently complete unilateral reaching/bending for LBD simulation but appeared unsteady. Toilet Transfers  Toilet - Technique: Stand step  Equipment Used: Extra wide bedside commode  Toilet Transfer: Minimal assistance  Toilet Transfers Comments: with r/w  ADL  Feeding: Independent  Grooming: Independent  UE Bathing: Independent  LE Bathing: Minimal assistance  UE Dressing: Independent  LE Dressing: Minimal assistance  Toileting: Minimal assistance  Additional Comments: NWB for RLE is largest limitation for ADLs with standing components.         Bed mobility  Supine to Sit: Stand by assistance  Transfers  Stand Step Transfers: Minimal assistance  Transfer Comments: with r/w     Cognition  Following Commands: Follows one step commands consistently  Attention Span: Attends with cues to redirect  Memory: Decreased short term memory  Safety Judgement: Decreased awareness of need for assistance  Problem Solving: Assistance required to generate solutions  Insights: Decreased awareness of deficits  Initiation: Requires cues for some        Sensation  Overall Sensation Status: WFL(For BUE)      LUE AROM (degrees)  LUE AROM : WFL  RUE AROM (degrees)  RUE AROM : WFL                 Tx Initiated   OT provided education over AE/DME options, positioning for pain/edema, recommended/discouraged therapeutic activities, and weight-bearing precautions post-fx. Physical assist provided during standing/sitting balance challenges, transfers, and bed mobility (total time: 15 min). Plan   Plan  Times per week: 3-5  Current Treatment Recommendations: Strengthening, Wheelchair Mobility Training, Pain Management, Positioning, Safety Education & Training, Balance Training, Patient/Caregiver Education & Training, Self-Care / ADL, Cognitive/Perceptual Training, Functional Mobility Training, Equipment Evaluation, Education, & procurement, Home Management Training, Endurance Training    G-Code     OutComes Score                                                AM-PAC Score           Goals  Short term goals  Time Frame for Short term goals: 1-2 weeks  Short term goal 1: Complete transfers with supervision and PRN DME. Short term goal 2: Complete LBD/bathing and toileting skills with supervision and PRN AE/DME. Short term goal 3: Pt/family verbalize/demo AE/DME use, recommended therapeutic activities, fall prevention, home mangement techniques, and compliance with weight-bearing preacutions. Short term goal 4: Perform light ambulatory ADLs with supervision and PRN DME for 15 min.   Long

## 2021-04-22 NOTE — CARE COORDINATION
Spoke with patient regarding MD orders for [facility] services. Patient agreeable and has chosen [facility]. Referral Faxed. [address] [place] [address] [phone]. [phone]. Please notify [address] [place] [address] when patient discharges and fax DC Summary,  DC med list and any new [facility] orders. The Patient was provided with a choice of provider and agrees   with the discharge plan. [x] Yes [] No    Freedom of choice list was provided with basic dialogue that supports the patient's individualized plan of care/goals, treatment preferences and shares the quality data associated with the providers.  [x] Yes [] No  Electronically signed by Herminio Pavon on 4/22/2021 at 9:06 AM

## 2021-04-22 NOTE — PROGRESS NOTES
Physical Therapy  Name: Kamilla Eaton  MRN:  [id number]  Date of service:  4/22/2021 04/22/21 7656   Restrictions/Precautions   Restrictions/Precautions Fall Risk;Weight Bearing   Lower Extremity Weight Bearing Restrictions   Right Lower Extremity Weight Bearing Non Weight Bearing   General   Chart Reviewed Yes   Subjective   Subjective Pt ready for back to bed, agrees to walk in room with therapy. Pain Screening   Patient Currently in Pain Denies   Oxygen Therapy   O2 Device Nasal cannula   O2 Flow Rate (L/min) 2 L/min   Bed Mobility   Sit to Supine Contact guard assistance   Transfers   Sit to Stand Minimal Assistance   Stand to sit Contact guard assistance   Ambulation   Ambulation? Yes   WB Status NWB RLE   Ambulation 1   Surface level tile   Device Rolling Walker   Assistance Contact guard assistance   Gait Deviations Slow Kalie;Decreased step length;Decreased step height   Distance 20'   Comments Pt able to amb in room with rwx, good consideration to WB precautions   Short term goals   Time Frame for Short term goals 2 WKS   Short term goal 1 SIT<>STAND, SBA   Short term goal 2 STAND STEP/PIVOT TF'S WITH RW AND CGA   Short term goal 3 AMB 25 FT WITH RW, CGA   Conditions Requiring Skilled Therapeutic Intervention   Body structures, Functions, Activity limitations Decreased functional mobility ; Decreased endurance;Decreased balance;Decreased strength;Decreased safe awareness   Assessment Pt cont to require assist for STS, able to amb in room with rwx and good consideration to NWB RLE. Pt returned to supine and positioned for comfort with all needs in reach.    Activity Tolerance   Activity Tolerance Patient Tolerated treatment well   Safety Devices   Type of devices Bed alarm in place;Call light within reach;Gait belt;Left in bed         Electronically signed by Betty Lundberg PTA on 4/22/2021 at 3:08 PM

## 2021-04-22 NOTE — PROGRESS NOTES
Subjective:     Post-Operative Day: 1 No complaints   Objective:     Patient Vitals for the past 24 hrs:   BP Temp Temp src Pulse Resp SpO2 Weight   04/22/21 0233 (!) 99/59 97.1 °F (36.2 °C) Temporal 61 17 93 % --   04/21/21 2301 115/65 96.4 °F (35.8 °C) Temporal 66 16 93 % --   04/21/21 2101 -- -- -- -- -- -- 250 lb 8 oz (113.6 kg)   04/21/21 2049 121/69 97.6 °F (36.4 °C) Temporal 73 16 92 % --   04/21/21 1945 120/67 98 °F (36.7 °C) Temporal 71 14 92 % --   04/21/21 1916 131/74 97 °F (36.1 °C) Temporal 79 14 91 % --   04/21/21 1911 -- -- -- 77 -- -- --   04/21/21 1900 125/70 97.6 °F (36.4 °C) -- 76 12 91 % --   04/21/21 1845 131/72 98 °F (36.7 °C) Temporal 79 10 94 % --   04/21/21 1840 137/71 -- -- 73 14 93 % --   04/21/21 1835 (!) 146/97 -- -- 78 16 90 % --   04/21/21 1830 138/84 -- -- 72 15 92 % --   04/21/21 1825 129/75 -- -- 69 12 93 % --   04/21/21 1820 (!) 155/80 -- -- 74 17 94 % --   04/21/21 1815 (!) 163/86 -- -- 82 21 90 % --   04/21/21 1810 (!) 140/74 -- -- 67 12 92 % --   04/21/21 1807 -- 98.9 °F (37.2 °C) -- -- -- -- --   04/21/21 1805 -- -- -- 69 13 (!) 89 % --   04/21/21 1804 (!) 175/98 98.1 °F (36.7 °C) Temporal 71 14 (!) 85 % --   04/21/21 1457 116/70 97.2 °F (36.2 °C) Temporal 72 20 94 % --   04/21/21 1039 125/70 97.5 °F (36.4 °C) Temporal 70 20 91 % --   04/21/21 0949 -- -- -- 71 -- -- --       General: alert, appears stated age and cooperative   Wound: Dressing clean,dry and intact and mod swelling    Neurovascular: Exam normal             Data Review:  Recent Labs     04/21/21  0259 04/22/21  0419   HGB 12.3 11.4*     Recent Labs     04/22/21  0419      K 3.8   CREATININE 0.6   GLUCOSE 178*     Recent Labs     04/21/21  0718 04/21/21  1131 04/21/21 2056   POCGLU 140* 121* 168*     XR ANKLE RIGHT (MIN 3 VIEWS)   Final Result      XR CHEST PORTABLE   Final Result   No evidence of acute cardiopulmonary process.    Signed by Dr Stefany Higgins on 4/20/2021 9:27 PM      XR ANKLE RIGHT (MIN 3 VIEWS)   Final Result   1. Comminuted fracture of the medial malleolus with disruption of the   ankle mortise. Nondisplaced fracture through the posterior malleolus   of the tibia. 2. Comminuted spiral fracture through the distal fibular diaphysis. 3. Acute fractures through the base of the first and second   metatarsals of the foot. No definite additional fractures identified   within the foot. Signed by Dr Rosie Granados on 4/20/2021 7:56 PM      XR FOOT RIGHT (MIN 3 VIEWS)   Final Result   1. Comminuted fracture of the medial malleolus with disruption of the   ankle mortise. Nondisplaced fracture through the posterior malleolus   of the tibia. 2. Comminuted spiral fracture through the distal fibular diaphysis. 3. Acute fractures through the base of the first and second   metatarsals of the foot. No definite additional fractures identified   within the foot. Signed by Dr Rosie Granados on 4/20/2021 7:56 PM      FLUORO FOR SURGICAL PROCEDURES    (Results Pending)         Assessment:     Status Post right ankle fracture ORIF. Doing well postoperatively.  . Acute postoperative anemia    Plan:     Pain control  PT/OT  DVT prophylaxis  Ice and elevate  Discharge Home this week

## 2021-04-22 NOTE — PROGRESS NOTES
Attempted to call waiting room and spouse's telephone with no answer. Patient brought to room. Rn at bedside.

## 2021-04-22 NOTE — PROGRESS NOTES
Progress Note  Date:2021       Room:0533/533-01  Patient Name:Judi Yi     YOB: 1960     Age:60 y.o. Subjective    Subjective   Patient seen examined postop day #1 status post orthopedic interval change in right ankle fracture. Patient more alert and conversational this a.m., pain rated 6 out of 10. Patient denies headache, change in vision, chest pain, shortness of breath, nausea vomiting, fevers or chills. Cumulative hospital course: Patient was admitted 320, 71-year-old obese  female with a past medical history of hypertension type 2 diabetes, memory loss, presented to the emergency room due to a loss of balance in which she suffered right ankle fracture. Patient presented to the OR  fixation provided by Dr. Anjana Vela. Ongoing pain control modalities, PT/OT. Review of Systems   ROS: 14 point review of systems is negative except as specifically addressed above. Objective         Vitals Last 24 Hours:  TEMPERATURE:  Temp  Av.4 °F (36.3 °C)  Min: 96.4 °F (35.8 °C)  Max: 98.9 °F (37.2 °C)  RESPIRATIONS RANGE: Resp  Av.7  Min: 0  Max: 21  PULSE OXIMETRY RANGE: SpO2  Av.5 %  Min: 85 %  Max: 96 %  PULSE RANGE: Pulse  Av.8  Min: 61  Max: 82  BLOOD PRESSURE RANGE: Systolic (12HAJ), YTE:736 , Min:85 , DZY:626   ; Diastolic (98GUP), VKY:98, Min:45, Max:98    I/O (24Hr): Intake/Output Summary (Last 24 hours) at 2021 1036  Last data filed at 2021 0944  Gross per 24 hour   Intake 2183 ml   Output 1175 ml   Net 1008 ml     Physical Exam  Vitals signs and nursing note reviewed. Constitutional:       Appearance: She is obese. She is not toxic-appearing. Comments: Noticeable discomfort due to pain of right ankle fracture   HENT:      Head: Normocephalic and atraumatic. Nose: Nose normal.   Eyes:      Conjunctiva/sclera: Conjunctivae normal.   Cardiovascular:      Rate and Rhythm: Normal rate and regular rhythm. Pulmonary:      Effort: Pulmonary effort is normal. No respiratory distress. Abdominal:      Tenderness: There is no abdominal tenderness. There is no guarding or rebound. Musculoskeletal:         General: Tenderness present. Comments: Right ankle currently bandaged, elevated   Skin:     General: Skin is warm. Neurological:      Mental Status: She is alert. Mental status is at baseline. Comments: Appears groggy status post pain modality         Labs/Imaging/Diagnostics    Labs:  CBC:  Recent Labs     04/20/21 2142 04/21/21 0259 04/22/21 0419   WBC 9.4 8.2  --    RBC 4.29 4.04*  --    HGB 13.4 12.3 11.4*   HCT 40.0 38.1 35.6*   MCV 93.2 94.3  --    RDW 13.2 13.2  --    * 122*  --      CHEMISTRIES:  Recent Labs     04/20/21 2142 04/21/21 0259 04/22/21 0419    142 139   K 3.6 3.8 3.8    106 104   CO2 26 27 26   BUN 14 15 19   CREATININE 0.6 0.6 0.6   GLUCOSE 154* 135* 178*     PT/INR:No results for input(s): PROTIME, INR in the last 72 hours. APTT:No results for input(s): APTT in the last 72 hours. LIVER PROFILE:  Recent Labs     04/20/21 2142 04/21/21 0259   AST 52* 50*   ALT 29 28   BILITOT 0.7 0.8   ALKPHOS 129* 99       Imaging Last 24 Hours:  Xr Ankle Right (min 3 Views)    Result Date: 4/20/2021  EXAMINATION: XR ANKLE RIGHT (MIN 3 VIEWS), XR FOOT RIGHT (MIN 3 VIEWS) 4/20/2021 7:51 PM HISTORY: Pain after fall COMPARISON: None FINDINGS: There is a comminuted fracture through the medial malleolus with intra-articular extension at the tibiotalar joint. There is associated widening of the medial tibiotalar joint to 9 mm. There is a spiral fracture through the distal fibular diaphysis. A fracture is also noted through the posterior malleolus of the tibia. There is an associated joint effusion. Base of the fifth metatarsal appears intact. There is an acute nondisplaced fracture through the base of the first and second metatarsals.  No widening of the Lisfranc joint is identified on these nonweightbearing images. There is somewhat diffuse soft tissue swelling. 1. Comminuted fracture of the medial malleolus with disruption of the ankle mortise. Nondisplaced fracture through the posterior malleolus of the tibia. 2. Comminuted spiral fracture through the distal fibular diaphysis. 3. Acute fractures through the base of the first and second metatarsals of the foot. No definite additional fractures identified within the foot. Signed by Dr Mami Obando on 4/20/2021 7:56 PM    Xr Foot Right (min 3 Views)    Result Date: 4/20/2021  EXAMINATION: XR ANKLE RIGHT (MIN 3 VIEWS), XR FOOT RIGHT (MIN 3 VIEWS) 4/20/2021 7:51 PM HISTORY: Pain after fall COMPARISON: None FINDINGS: There is a comminuted fracture through the medial malleolus with intra-articular extension at the tibiotalar joint. There is associated widening of the medial tibiotalar joint to 9 mm. There is a spiral fracture through the distal fibular diaphysis. A fracture is also noted through the posterior malleolus of the tibia. There is an associated joint effusion. Base of the fifth metatarsal appears intact. There is an acute nondisplaced fracture through the base of the first and second metatarsals. No widening of the Lisfranc joint is identified on these nonweightbearing images. There is somewhat diffuse soft tissue swelling. 1. Comminuted fracture of the medial malleolus with disruption of the ankle mortise. Nondisplaced fracture through the posterior malleolus of the tibia. 2. Comminuted spiral fracture through the distal fibular diaphysis. 3. Acute fractures through the base of the first and second metatarsals of the foot. No definite additional fractures identified within the foot.  Signed by Dr Mami Obando on 4/20/2021 7:56 PM    Xr Chest Portable    Result Date: 4/20/2021  EXAMINATION: XR CHEST PORTABLE 4/20/2021 9:26 PM HISTORY: Fall, history of hypertension COMPARISON: 2/26/2019 FINDINGS: Heart and mediastinal contours appear normal. Lungs are clear. There is no appreciable pneumothorax or pleural effusion. The pulmonary vasculature appears grossly normal.    No evidence of acute cardiopulmonary process. Signed by Dr Jaden Flood on 4/20/2021 9:27 PM    Assessment//Plan           Hospital Problems           Last Modified POA    * (Principal) Closed right ankle fracture, initial encounter 4/21/2021 Yes    Hypertension 4/21/2021 Yes    Sleep apnea 4/21/2021 Yes    Overview Signed 10/28/2015  7:06 AM by [facility], [facility]     c-pap         Diabetes ([address] 4/21/2021 Yes    Memory deficit 4/21/2021 Yes    Obesity 4/21/2021 Yes    Gait instability 4/21/2021 Yes              Closed right trimalleolar ankle fracture  -Postop day #1  -Postoperative H/H stable  -Patient encouraged to continue with incentive spirometry  -PT/OT  -Home health order has been placed and arranged  -Continue to scrutinize patient's respiratory status  -X-ray modalities on admission and postoperatively reviewed      Essential hypertension-chronic condition, continue antihypertensive regimen, routine vitals    Type 2 diabetes-chronic condition, hold oral antihyperglycemic's, continue insulin modalities, Accu-Cheks q. ACH S, hypoglycemic protocol, C carb diet    Obstructive sleep apnea-continue with CPAP nightly      Memory deficit-continue with Aricept    Depression/anxiety-chronic condition, continue with Lexapro        EMR Dragon/Transcription disclaimer:   Much of this encounter note is an electronic transcription/translation of spoken language to printed text.  The electronic translation of spoken language may permit erroneous, or at times, nonsensical words or phrases to be inadvertently transcribed; although attempts have made to review the note for such errors, some may still exist.    Electronically signed by   Lanny Phalen   Internal Medicine Hospitalist  On 4/22/2021  At 10:36 AM

## 2021-04-23 VITALS
BODY MASS INDEX: 40.26 KG/M2 | HEIGHT: 66 IN | SYSTOLIC BLOOD PRESSURE: 110 MMHG | HEART RATE: 74 BPM | TEMPERATURE: 98.9 F | OXYGEN SATURATION: 93 % | RESPIRATION RATE: 18 BRPM | WEIGHT: 250.5 LBS | DIASTOLIC BLOOD PRESSURE: 60 MMHG

## 2021-04-23 LAB
ANION GAP SERPL CALCULATED.3IONS-SCNC: 11 MMOL/L (ref 7–19)
BUN BLDV-MCNC: 17 MG/DL (ref 6–20)
CALCIUM SERPL-MCNC: 8.4 MG/DL (ref 8.6–10)
CHLORIDE BLD-SCNC: 103 MMOL/L (ref 98–111)
CO2: 26 MMOL/L (ref 22–29)
CREAT SERPL-MCNC: 0.6 MG/DL (ref 0.5–0.9)
GFR AFRICAN AMERICAN: >59
GFR NON-AFRICAN AMERICAN: >60
GLUCOSE BLD-MCNC: 127 MG/DL (ref 70–99)
GLUCOSE BLD-MCNC: 138 MG/DL (ref 74–109)
GLUCOSE BLD-MCNC: 144 MG/DL (ref 70–99)
HCT VFR BLD CALC: 37 % (ref 37–47)
HEMOGLOBIN: 11.9 G/DL (ref 12–16)
MCH RBC QN AUTO: 30.3 PG (ref 27–31)
MCHC RBC AUTO-ENTMCNC: 32.2 G/DL (ref 33–37)
MCV RBC AUTO: 94.1 FL (ref 81–99)
PDW BLD-RTO: 13 % (ref 11.5–14.5)
PERFORMED ON: ABNORMAL
PERFORMED ON: ABNORMAL
PLATELET # BLD: 108 K/UL (ref 130–400)
PMV BLD AUTO: 11.5 FL (ref 9.4–12.3)
POTASSIUM REFLEX MAGNESIUM: 4.1 MMOL/L (ref 3.5–5)
RBC # BLD: 3.93 M/UL (ref 4.2–5.4)
SODIUM BLD-SCNC: 140 MMOL/L (ref 136–145)
WBC # BLD: 7.7 K/UL (ref 4.8–10.8)

## 2021-04-23 PROCEDURE — 80048 BASIC METABOLIC PNL TOTAL CA: CPT

## 2021-04-23 PROCEDURE — 85027 COMPLETE CBC AUTOMATED: CPT

## 2021-04-23 PROCEDURE — 97530 THERAPEUTIC ACTIVITIES: CPT

## 2021-04-23 PROCEDURE — 2580000003 HC RX 258: Performed by: ORTHOPAEDIC SURGERY

## 2021-04-23 PROCEDURE — 82947 ASSAY GLUCOSE BLOOD QUANT: CPT

## 2021-04-23 PROCEDURE — 2500000003 HC RX 250 WO HCPCS: Performed by: ORTHOPAEDIC SURGERY

## 2021-04-23 PROCEDURE — 36415 COLL VENOUS BLD VENIPUNCTURE: CPT

## 2021-04-23 PROCEDURE — 97165 OT EVAL LOW COMPLEX 30 MIN: CPT

## 2021-04-23 PROCEDURE — 6370000000 HC RX 637 (ALT 250 FOR IP): Performed by: ORTHOPAEDIC SURGERY

## 2021-04-23 PROCEDURE — 97116 GAIT TRAINING THERAPY: CPT

## 2021-04-23 PROCEDURE — 97535 SELF CARE MNGMENT TRAINING: CPT

## 2021-04-23 RX ORDER — OXYCODONE HYDROCHLORIDE 5 MG/1
5 TABLET ORAL EVERY 6 HOURS PRN
Qty: 12 TABLET | Refills: 0 | Status: SHIPPED | OUTPATIENT
Start: 2021-04-23 | End: 2021-04-26

## 2021-04-23 RX ORDER — IBUPROFEN 400 MG/1
400 TABLET ORAL
Qty: 30 TABLET | Refills: 0 | Status: SHIPPED | OUTPATIENT
Start: 2021-04-23 | End: 2021-06-09

## 2021-04-23 RX ORDER — DOCUSATE SODIUM 100 MG/1
100 CAPSULE, LIQUID FILLED ORAL DAILY
Qty: 7 CAPSULE | Refills: 0 | Status: SHIPPED | OUTPATIENT
Start: 2021-04-23 | End: 2021-04-30

## 2021-04-23 RX ADMIN — ACETAMINOPHEN 650 MG: 325 TABLET ORAL at 15:38

## 2021-04-23 RX ADMIN — FAMOTIDINE 20 MG: 10 INJECTION, SOLUTION INTRAVENOUS at 09:10

## 2021-04-23 RX ADMIN — OXYCODONE 10 MG: 5 TABLET ORAL at 02:54

## 2021-04-23 RX ADMIN — SODIUM CHLORIDE, PRESERVATIVE FREE 10 ML: 5 INJECTION INTRAVENOUS at 09:12

## 2021-04-23 RX ADMIN — IBUPROFEN 400 MG: 400 TABLET ORAL at 15:39

## 2021-04-23 RX ADMIN — ASPIRIN 325 MG: 325 TABLET, COATED ORAL at 09:09

## 2021-04-23 RX ADMIN — ACETAMINOPHEN 650 MG: 325 TABLET ORAL at 02:54

## 2021-04-23 RX ADMIN — IBUPROFEN 400 MG: 400 TABLET ORAL at 09:10

## 2021-04-23 RX ADMIN — DOCUSATE SODIUM 50 MG AND SENNOSIDES 8.6 MG 1 TABLET: 8.6; 5 TABLET, FILM COATED ORAL at 09:10

## 2021-04-23 RX ADMIN — ACETAMINOPHEN 650 MG: 325 TABLET ORAL at 09:09

## 2021-04-23 ASSESSMENT — PAIN SCALES - GENERAL
PAINLEVEL_OUTOF10: 3
PAINLEVEL_OUTOF10: 7
PAINLEVEL_OUTOF10: 0

## 2021-04-23 ASSESSMENT — PAIN - FUNCTIONAL ASSESSMENT: PAIN_FUNCTIONAL_ASSESSMENT: PREVENTS OR INTERFERES SOME ACTIVE ACTIVITIES AND ADLS

## 2021-04-23 NOTE — CARE COORDINATION
The [address] at [facility]  Notification of Admission Decision      [] Patient has been accepted for admit to [facility] on :       Please write discharge orders and summary prior to discharge. [] Patient acceptance to Rehab pending the following :    [] Eval in progress       [x] Patient determined to be ineligible for services at [facility] because : Spoke with patient this a.m., she has now decided that she doesn't want to come to Rehab and wants to go home with [facility]. States she had told the staff down here. Told her I wasn't notified but would call and cancel the request for IPR approval.  Notified Darlin Najjar, SW of above. We recommend you consider: as above       Thank you for your referral, we appreciate you. If you have any questions, please feel   free to contact me at [phone].     Electronically Signed by Caleb Carranza, Admissions Coordinator 4/23/2021 9:37 AM

## 2021-04-23 NOTE — PROGRESS NOTES
Physical Therapy  Brandon Nazario  [id number]     04/23/21 0939   Subjective   Subjective Agrees to work with therapy. Bed Mobility   Supine to Sit Contact guard assistance;Stand by assistance   Sit to Supine Contact guard assistance;Stand by assistance   Transfers   Sit to Stand Contact guard assistance   Stand to sit Contact guard assistance   Ambulation   Ambulation? Yes   WB Status NWB RLE   Ambulation 1   Surface level tile   Device Rolling [address] 53 guard assistance   Distance 15' x2   Other Activities   Comment Patient did well with therapy. Assisted patient to/from the [facility], patient able to perform self care in BR. Patient requested to return to bed after treatment, positioned for comfort with all needs in reach. Short term goals   Time Frame for Short term goals 2 WKS   Short term goal 1 SIT<>STAND, SBA   Short term goal 2 STAND STEP/PIVOT TF'S WITH RW AND CGA   Short term goal 3 AMB 25 FT WITH RW, CGA   Activity Tolerance   Activity Tolerance Patient Tolerated treatment well   Safety Devices   Type of devices Bed alarm in place;Call light within reach; Left in bed   Electronically signed by Martínez William PTA on 4/23/2021 at 9:45 AM

## 2021-04-23 NOTE — DISCHARGE SUMMARY
Discharge Summary  Date:4/23/2021        Patient Name:Judi Franco     YOB: 1960     Age:60 y.o. Admit Date:4/20/2021   Admission Condition:poor   Discharged Condition:fair  Discharge Date: 04/23/21     Discharge Diagnoses   Principal Problem:    Closed right ankle fracture, initial encounter  Active Problems:    Hypertension    Sleep apnea    Diabetes ([address]    Memory deficit    Obesity    Gait instability  Resolved Problems:    * No resolved hospital problems. [facility] Stay   Narrative of Hospital Course: Patient was admitted 320, 62-year-old obese  female with a past medical history of hypertension type 2 diabetes, memory loss, presented to the emergency room due to a loss of balance in which she suffered right ankle fracture. Patient presented to the OR 4/21 fixation provided by Dr. Vivien Holly. Patient worked with PT/OT in the postoperative period, improved orientation as well as stabilization. DME modalities have been provided. Patient has follow-up with orthopedic surgery in 2 weeks times for repeat x-rays of the right foot as well as to take the ankle out of cast.  Patient has been arranged with home health modalities prior to discharge. Encourage patient to be mindful of surroundings to prevent falls at home. Education modalities have been attached after visit summary. Patient to continue with pain control modalities. Consultants:   IP CONSULT TO ORTHOPEDIC SURGERY  IP CONSULT TO HOME CARE NEEDS  IP CONSULT TO REHAB/TCU ADMISSION COORDINATOR    Time Spent on Discharge:  45  minutes were spent in patient examination, evaluation, counseling as well as medication reconciliation, prescriptions for required medications, discharge plan and follow up.       Surgeries/Procedures Performed:  Procedure(s):  ANKLE OPEN REDUCTION INTERNAL FIXATION WITH POSSIBLE EXTERNAL FIXATOR     Treatments:   analgesia: acetaminophen, Dilaudid, Morphine and oxycodone, cardiac meds: Toprol-XL, anticoagulation: ASA, insulin: Humalog and Lantus, respiratory therapy: O2, therapies: PT and OT and electrolyte monitoring stabilization    Significant Diagnostic Studies:   Recent Labs:  CBC:   Lab Results   Component Value Date    WBC 7.7 04/23/2021    RBC 3.93 04/23/2021    HGB 11.9 04/23/2021    HCT 37.0 04/23/2021    MCV 94.1 04/23/2021    MCH 30.3 04/23/2021    MCHC 32.2 04/23/2021    RDW 13.0 04/23/2021     04/23/2021     BMP:    Lab Results   Component Value Date    GLUCOSE 138 04/23/2021     04/23/2021    K 4.1 04/23/2021     04/23/2021    CO2 26 04/23/2021    ANIONGAP 11 04/23/2021    BUN 17 04/23/2021    CREATININE 0.6 04/23/2021    CALCIUM 8.4 04/23/2021    LABGLOM >60 04/23/2021    GFRAA >59 04/23/2021     HFP:    Lab Results   Component Value Date    PROT 6.0 04/21/2021    PROT 7.2 03/07/2013     CMP:    Lab Results   Component Value Date    GLUCOSE 138 04/23/2021     04/23/2021    K 4.1 04/23/2021     04/23/2021    CO2 26 04/23/2021    BUN 17 04/23/2021    CREATININE 0.6 04/23/2021    ANIONGAP 11 04/23/2021    ALKPHOS 99 04/21/2021    ALT 28 04/21/2021    AST 50 04/21/2021    BILITOT 0.8 04/21/2021    LABALBU 3.7 04/21/2021    LABGLOM >60 04/23/2021    GFRAA >59 04/23/2021    PROT 6.0 04/21/2021    PROT 7.2 03/07/2013    CALCIUM 8.4 04/23/2021     PT/INR:  No results found for: PTINR, PROTIME, INR  PTT: No results found for: APTT  FLP:    Lab Results   Component Value Date    CHOL 169 12/31/2020    TRIG 96 12/31/2020    HDL 68 12/31/2020     U/A:  No results found for: COLORU, TURBIDITY, SPECGRAV, HGBUR, PHUR, PROTEINU, GLUCOSEU, KETUA, BILIRUBINUR, UROBILINOGEN, NITRU, LEUKOCYTESUR  TSH:    Lab Results   Component Value Date    TSH 1.980 07/01/2020       Radiology Last 7 Days:  Xr Ankle Right (min 3 Views)    Result Date: 4/20/2021  1. Comminuted fracture of the medial malleolus with disruption of the ankle mortise.  Nondisplaced fracture through the posterior malleolus of the tibia. 2. Comminuted spiral fracture through the distal fibular diaphysis. 3. Acute fractures through the base of the first and second metatarsals of the foot. No definite additional fractures identified within the foot. Signed by Dr Omar Ohara on 4/20/2021 7:56 PM    Xr Foot Right (min 3 Views)    Result Date: 4/20/2021  1. Comminuted fracture of the medial malleolus with disruption of the ankle mortise. Nondisplaced fracture through the posterior malleolus of the tibia. 2. Comminuted spiral fracture through the distal fibular diaphysis. 3. Acute fractures through the base of the first and second metatarsals of the foot. No definite additional fractures identified within the foot. Signed by Dr Omar Ohara on 4/20/2021 7:56 PM    Xr Chest Portable    Result Date: 4/20/2021  No evidence of acute cardiopulmonary process. Signed by Dr Omar Ohara on 4/20/2021 9:27 PM    Physical Exam  Vitals signs and nursing note reviewed. Constitutional:       Appearance: She is obese. She is not toxic-appearing. Comments: Patient clinically improving, less discomfort in right ankle   HENT:      Head: Normocephalic and atraumatic. Nose: Nose normal.   Eyes:      Conjunctiva/sclera: Conjunctivae normal.   Cardiovascular:      Rate and Rhythm: Normal rate and regular rhythm. Pulmonary:      Effort: Pulmonary effort is normal. No respiratory distress. Abdominal:      Tenderness: There is no abdominal tenderness. There is no guarding or rebound. Musculoskeletal:         General: Tenderness present. Comments: Right ankle currently bandaged, elevated   Skin:     General: Skin is warm. Neurological:      Mental Status: She is alert. Mental status is at baseline.        Awake alert and oriented x3      Discharge Plan   Disposition: Home - with home health services     Provider Follow-Up:   Vick Sacks, MD  [address] [place] [address]  [phone]    Go on 5/3/2021  @ 3:10 pm for follow up on right ankle     Patient Instructions   Diet: diabetic diet    Activity: activity as tolerated      Discharge Medications         Medication List      START taking these medications    aspirin 325 MG EC tablet  Take 1 tablet by mouth 2 times daily     docusate sodium 100 MG capsule  Commonly known as: COLACE  Take 1 capsule by mouth daily for 7 days     ibuprofen 400 MG tablet  Commonly known as: ADVIL;MOTRIN  Take 1 tablet by mouth 3 times daily (with meals) for 10 days     oxyCODONE 5 MG immediate release tablet  Commonly known as: ROXICODONE  Take 1 tablet by mouth every 6 hours as needed for Pain for up to 3 days. CONTINUE taking these medications    donepezil 10 MG tablet  Commonly known as: ARICEPT  Take 1 tablet by mouth nightly     escitalopram 10 MG tablet  Commonly known as: LEXAPRO     Janumet -1000 MG Tb24  Generic drug: SITagliptin-metFORMIN HCl ER     metoprolol succinate 100 MG extended release tablet  Commonly known as: TOPROL XL     triamterene-hydroCHLOROthiazide 37.5-25 MG per tablet  Commonly known as: MAXZIDE-25           Where to Get Your Medications      These medications [id number] [address] [facility]  [address] [facility] [address]    Hours: 24-hours Phone: [phone]   · aspirin 325 MG EC tablet  · docusate sodium 100 MG capsule  · ibuprofen 400 MG tablet  · oxyCODONE 5 MG immediate release tablet         EMR Dragon/Transcription disclaimer:   Much of this encounter note is an electronic transcription/translation of spoken language to printed text.  The electronic translation of spoken language may permit erroneous, or at times, nonsensical words or phrases to be inadvertently transcribed; although attempts have made to review the note for such errors, some may still exist.    Electronically signed by   Vilma Anguiano   Internal Medicine Hospitalist  On 4/23/2021  At 11:46 AM

## 2021-04-23 NOTE — CARE COORDINATION
Maira Howell, RN  P# [phone]    Patient would like dme items to be delivered to room #533. Patient is scheduled to discharge home today. Please call if you have any questions. Patient Information    Patient Name   Edgar Hand ([id number]) Sex   Female    1960   Room Bed   0533 [id number]   Patient Ethnicity & Race    Ethnic Group Patient Race   Non-/Non [address] [place] [address] Status   No [002]   Patient Demographics    Address   [address] [place], [place] [id number] Phone   [phone] (Home) *Preferred*   [phone] [facility]) E-mail Address   [email or web address]   PCP and [facility], [address] [place] [address]   Emergency Contact(s)    Name Relation Home Work Mobile   Harrison Yi Spouse 21    Navdeep Yi Child Margret Vega Child [phone]     Documents on File     Status Date Received Description   Documents for the Patient   HIPAA Notice of Privacy  ()     Photo ID Received () 18 EXP 19   Insurance Card Received () 10/27/15 Erica Gutierrez   Physician Office Consent for Treatment Not Received     ACP-Advance Directive Not Received     ACP-Power of  Not Received     Financial Responsibility Form Signed () 10/27/15    Financial Assistance Program Applications Not Received     MyChart Adult Proxy Access Not Received     MyChart Child Proxy Access Not Received     (OLD) [facility] ([facility]) Physician Consent and NPP Signed () 10/27/15    ACO Consent for the Release of  Confidential Alcohol or Drug Treatment Information Not Received     ACO Declining to [address],[address] [place] [address] Not Received     Outside Record   New Patient Referral from Dr. Iona Brown; 10/26/2015 @ 11   Other Results   Pres[facility] for [employer] [facility] () [facility]   Card[facility] Phase 3 Payment Not Received Recurring Hospital Consent/HIPAA Scanned Not Received     Miscellaneous   16 Record Request   Lab Result Scan   12/16/15 2017 Kvng Gibbons   Lab Result Scan   09/30/15 Internal Medicine Labs   Progress Notes   12/22/15 Neuropsych Test- Lafleonaa Persons   Progress Notes   12/10/15 Sunil Goodwin   Outside Record   Neuro Records   Referral Form   Referral from Dr. Gilbert Gonsalves for Headache and Memory Deficit   (OLD) [facility] ([facility]) Physician Consent and NPP Signed () 17    Medication Contract Signed () 17 Neuro   Form   ROS/Neuro/JEC   Form   ROS/Neuro/JEC   Miscellaneous   care teams/med list   Questionnaire   MRI/CT Scan Questionnaire/[facility]   Radiology Report   CT Head report from Benjamin   Outside Record   encounter notes from Kong Gomes   Outside Record   encounter notes from Kong Gomes   Outside Record   eval notes from [address] sent to neuro   Outside Record   records from [facility] neurology   Neurological Record   [facility] Exam/Neuro   Form   ROS/Neuro/JEC   [facility] Physician Communication Release NPP Signed 18    [facility] ([facility]) Physician Consent and NPP Signed () 18    HIM ALVINA Authorization Received () 17 Medical Records Request to Self   HIM ALVINA Authorization Received () 18 DOS 1/3/17-18,records invoice faxed 18 cg   Medication Contract Signed 18 [facility]   Insurance Card Received () 18 BCBS    Form Received 18 RENETTA/Michelle neuro/Dr.Jim Alvaro Katz/   Questionnaire Received 18 MRI/CT Scan Questionnaire/[facility]   HIM ALVINA Authorization Received 18 Electronically signed by Roxanna Lee on 7/3/2018 at 1:14 PM/ FAXED LETTER FOR TREATMENT DATES/ Electronically signed by Arleene Babinski on 2018 at 10:06 AM   HIM ALVINA Authorization Received 18 Electronically signed by Roxanna Lee on 2018 at 8:18 AM/ [place] 18 / FAXED [address] FORM/ Electronically signed by Jeny Nguyen on 2018 at 1:22 PM   HIM ALVINA Authorization Received 18    Outside Record Received 18 office notes yen [place] office   Irma Scott Received 18 Medical Records release   Advance Directive Assessment Received 18    HIM ALVINA Authorization Received 18    Release of Information Received 10/24/18 10/19/18 ALVINA TO [employer]   HIM ALVINA Authorization Received 10/25/18    Insurance Card Received () 18 BCBS   Release of Information Received 18 ALVINA FROM BHATTI  [facility] OFFICE/ DO   Form Received 18 ROS/NEURO/GABRIELLE   Text Reminder Consent Patient Refused () 19    HIM ALVINA Authorization Received 18 sent to Thompson Pearson 10/25 &    Miscellaneous Received 19 Mental Medical Source Statement   Miscellaneous Received 19 document for Delaney Smiling   Miscellaneous Received 19 Faxed documents to Law office    HIM ALVINA Authorization Received 04/10/19    Outside Record Received 19 2015 [employer] records   [facility]) Physician Consent and NPP Signed () 19    Form Received 19 ros/neuro/eg   [address] Physician Communication Release NPP Signed 19    Medication Contract Signed 19 [facility]   Insurance Card Received () 19 bcbs   Medication Administration Record Received 19 dora   Medication Administration Record Received 20 DORA/NEURO 2020   Referral Form Received 20 referral   Lab Result Scan Received 20 labs   Progress Notes Received 20 office notes by anu morris   Photo ID Received 20   Photo ID Received 20    Radiology Report Received 20,ANT,3D SCREEN MAMMO    Radiology Report Received 20,BAPT,LEESA SCREEN DIG BEHZAD GIL W CAD    Progress Notes Received Admission    Complaint      Hospital Account    Name Acct ID Class Status Primary Coverage   [place] Yi ESSENTIAL/PLUS          Guarantor Account (for Hospital Account [de-identified])    Name Relation to Pt Service Area Active?  Acct Type   Marley Fleming Self [address] Yes Personal/Family   Address Phone     [address] [place], [place] [address] [phone](J)            Coverage Information (for Hospital Account [de-identified])    F/O Payor/Plan Precert #   BCBS MEDICARE/ANTHEM MEDIBLUE ESSENTIAL/PLUS    Subscriber Subscriber #   Marley Fleming [id number]   Address Phone   [address]   [place], [address]           Medical Problems  Comment     Last edited by  on  at    [facility] Problem List  Date Reviewed: 4/21/2021     ICD-10-CM Priority Class Noted POA    Closed right ankle fracture, initial encounter S82.891A   4/21/2021 Yes   Hypertension I10   Unknown Yes   Sleep apnea G47.30   Unknown Yes   Overview Signed 10/28/2015  7:06 AM by SWAPNA Santana   c-pap   Diabetes ([address] E11.9   Unknown Yes   Memory deficit R41.3   11/28/2017 Yes   Obesity E66.9   4/21/2021 Yes   Gait instability R26.81   4/21/2021 Yes      Non-Hospital Problem List  Date Reviewed: 4/21/2021     ICD-10-CM Priority Class Noted   Spider veins I78.1   10/28/2015   Venous reflux I87.2   10/28/2015   Colitis K52.9   11/28/2017   Colon polyps K63.5   12/18/2017   Diarrhea R19.7   11/28/2017   Nausea R11.0   11/28/2017        Electronically signed by Kris Pate RN on 4/23/2021 at 8:38 AM

## 2021-04-23 NOTE — PROGRESS NOTES
Subjective:     Post-Operative Day: 2 No complaints   Objective:     Patient Vitals for the past 24 hrs:   BP Temp Temp src Pulse Resp SpO2   04/23/21 0246 (!) 118/56 97.8 °F (36.6 °C) Temporal 84 18 93 %   04/22/21 1947 118/61 97.5 °F (36.4 °C) Temporal 87 17 92 %   04/22/21 1503 (!) 103/57 98.5 °F (36.9 °C) -- 68 18 94 %   04/22/21 1049 135/67 98.8 °F (37.1 °C) -- 78 18 92 %   04/22/21 0949 (!) 108/55 97.5 °F (36.4 °C) -- 68 14 96 %       General: alert, appears stated age and cooperative   Wound: Dressing clean,dry and intact and mod swelling    Neurovascular: Exam normal             Data Review:  Recent Labs     04/22/21  0419 04/23/21  0258   HGB 11.4* 11.9*     Recent Labs     04/23/21  0258      K 4.1   CREATININE 0.6   GLUCOSE 138*     Recent Labs     04/21/21  2056 04/22/21  0729 04/22/21  1115 04/22/21  1728 04/22/21  2131   POCGLU 168* 178* 191* 141* 155*     Reviewed xrays and she has nondisplaced fxs base of 1 2 metatarsals nondisplaced      Assessment:     Status Post right ankle fracture ORIF.  r 1 and 2 met base frxs. Stable nondisplaced. Doing well postoperatively.  . Acute postoperative anemia    Plan:   Fu in 2 weeks for xrays of right foot and ankle out of cast.   Pain control  PT/OT  DVT prophylaxis  Ice and elevate  Discharge Home this week

## 2021-04-23 NOTE — PROGRESS NOTES
Occupational Therapy   Occupational Therapy Initial Assessment  Date: 2021   Patient Name: Nicole Bhatt  MRN: [id number]     : 1960    Date of Service: 2021    Discharge Recommendations:  Patient would benefit from continued therapy after discharge(would benefit from [facility] [place])       Assessment   Assessment: Patient has opted for discharge home. Completed ADL training with emphasis on AE/DME, safety, gait belt use, sit to stand techniques. Family not present. Report given to nursing  REQUIRES OT FOLLOW UP: Yes  Activity Tolerance  Activity Tolerance: Patient Tolerated treatment well  Safety Devices  Safety Devices in place: Yes  Type of devices: Bed alarm in place;Call light within reach           Patient Diagnosis(es): The primary encounter diagnosis was Closed displaced fracture of medial malleolus of right tibia, initial encounter. Diagnoses of Closed displaced spiral fracture of shaft of left fibula, initial encounter, Closed displaced fracture of first metatarsal bone of right foot, initial encounter, Closed displaced fracture of second metatarsal bone of right foot, initial encounter, Fall, initial encounter, and Closed right ankle fracture, initial encounter were also pertinent to this visit. has a past medical history of Anxiety, Depression, Diabetes ([facility] Hypertension, Memory difficulty, Obesity, and Sleep apnea. has a past surgical history that includes Gallbladder surgery and Ankle fracture surgery (Right, 2021).            Restrictions  Restrictions/Precautions  Restrictions/Precautions: Fall Risk, Weight Bearing  Lower Extremity Weight Bearing Restrictions  Right Lower Extremity Weight Bearing: Non Weight Bearing    Subjective   General  Chart Reviewed: Yes  Patient assessed for rehabilitation services?: Yes  Additional Pertinent Hx: Anxiety; depression; DM; HTN; early onset dementia  Family / Caregiver Present: No  Diagnosis: (R) trimalleolar ankle fx; (R) 1st & 2nd metatarsal bone fx's; ORIF (R) ankle. Patient Currently in Pain: Yes  Pain Assessment  Pain Assessment: 0-10  Pain Location: Ankle  Pain Orientation: Right  Functional Pain Assessment: Prevents or interferes some active activities and ADLs  Non-Pharmaceutical Pain Intervention(s): Ambulation/Increased Activity; Elevation;Repositioned  Response to Pain Intervention: Patient Satisfied  Vital Signs  Patient Currently in Pain: Yes  Social/Functional History  Social/Functional History  Lives With: Spouse  Type of Home: House  Home Layout: One level  Home Access: Stairs to enter without rails  Entrance Stairs - Number of Steps: 2  Bathroom Shower/Tub: Tub/Shower unit  Bathroom Toilet: Handicap height  Bathroom Accessibility: Wheelchair accessible  ADL Assistance: Independent  Ambulation Assistance: Independent  Transfer Assistance: Independent       Objective              Balance  Sitting Balance: Independent  Standing Balance: Contact guard assistance(to min A for one handed tasks)  Toilet Transfers  Toilet - Technique: Stand step  Toilet Transfer: Contact guard assistance;Minimal assistance  Toilet Transfers Comments: to [facility]  ADL  Feeding: Independent  Grooming: Independent;Setup  UE Bathing: Independent;Setup  LE Bathing: Minimal assistance(spongebath. Discussed AE/DME, techniques. Patient has had a shower chair delivered. Discussed she may wish to return this and get a transfer tub bench. Also recommended working through initial bathing strategies with Home Health.)  UE Dressing: Independent  LE Dressing: Contact guard assistance;Minimal assistance(for standing aspects.  Independent with set up for seated aspects)  Toileting: Contact guard assistance;Minimal assistance        Bed mobility  Supine to Sit: Supervision  Sit to Supine: Supervision  Transfers  Stand Step Transfers: Contact guard assistance;Minimal assistance  Transfer Comments: with r/w     Cognition  Cognition Comment: Awake, alert, repeating back/demo back instructions, does require min prompts at time                                        Plan   Plan  Times per week: 3-5  Current Treatment Recommendations: Strengthening, Wheelchair Mobility Training, Pain Management, Positioning, Safety Education & Training, Balance Training, Patient/Caregiver Education & Training, Self-Care / ADL, Cognitive/Perceptual Training, Functional Mobility Training, Equipment Evaluation, Education, & procurement, Home Management Training, Endurance Training    G-Code     OutComes Score                                                  AM-PAC Score             Goals  Short term goals  Time Frame for Short term goals: 1-2 weeks  Short term goal 1: Complete transfers with supervision and PRN DME. Short term goal 2: Complete LBD/bathing and toileting skills with supervision and PRN AE/DME. Short term goal 3: Pt/family verbalize/demo AE/DME use, recommended therapeutic activities, fall prevention, home mangement techniques, and compliance with weight-bearing preacutions. Short term goal 4: Perform light ambulatory ADLs with supervision and PRN DME for 15 min. Long term goals  Long term goal 1: Upgrade as tolerated.        Therapy Time   Individual Concurrent Group Co-treatment   Time In           Time Out           Minutes  Mayda Hogue Electronically signed by Tim Pizarro OT on 4/23/2021 at 2:14 PM

## 2021-04-29 NOTE — CONSULTS
Elvira Archer MD   Physician   Orthopedic Surgery   H&P   Signed   Date of Service:  2021  7:50 AM               Signed        Expand AllCollapse All      Show:Clear all  [x]Manual[x]Template[]Copied    Added by:  [x]Ye Ontiveros MD    [][place] for details     Orthopaedic Inpatient Consultation     NAME:  Tika Bear   : 1960    MRN: [id number]     2021  7:03 PM           CHIEF COMPLAINT:  right trimalleolar ankle fracture dislocation        HISTORY OF PRESENT ILLNESS:   The patient is a 62 y.o. female iddm nonsmoker who presents with the above complaint after a  Ground level fall yesterday afternoon. Slipped taking food off stovetop. Was in a hurry. Severe pain unable to bear weight. No prior ankle problems. No other injuries.  esa at bedside. Past Medical History:    Past Medical History             Diagnosis Date    Anxiety      Depression      Diabetes ([facility] [address]      Hypertension      Memory difficulty      Obesity 2021    Sleep apnea       c-pap            Past Surgical History:    Past Surgical History             Procedure Laterality Date    GALLBLADDER SURGERY                Current Medications:   Home Medications           Prior to Admission medications    Medication Sig Start Date End Date Taking?  Authorizing Provider   SITagliptin-metFORMIN HCl ER (JANUMET XR) 100-1000 MG TB24 Take 100-1,000 mg by mouth nightly      Yes Historical Provider, MD   donepezil (ARICEPT) 10 MG tablet Take 1 tablet by mouth nightly 8/10/20   Yes SWAPNA Napier   metoprolol succinate (TOPROL XL) 100 MG extended release tablet Take 1 tablet by mouth nightly  18   Yes Historical Provider, MD   escitalopram (LEXAPRO) 10 MG tablet Take 10 mg by mouth nightly      Yes Historical Provider, MD   triamterene-hydrochlorothiazide (MAXZIDE-25) 37.5-25 MG per tablet Take 1 tablet by mouth nightly      Yes Historical Provider, MD            Allergies: 71   Resp:   18 20 20   Temp:   99.1 °F (37.3 °C) 98.2 °F (36.8 °C) 97.1 °F (36.2 °C)   TempSrc:   Temporal Temporal Temporal   SpO2:   98% 90% 93%   Weight: 240 lb (108.9 kg)         Height: 5' 6\" (1.676 m)               General:  Appears stated age, no distress. Orientation:  Alert and oriented to time, place, and person. Mood and Affect:  Cooperative and pleasant. Gait:  Resting comfortably in bed. Cardiovascular:  Symmetric 1-2 plus pulses in upper and lower extremities. Lymph:  No cervical or inguinal lymphadenopathy noted. Sensation:  Grossly intact to light touch. DTR:  Normal, no pathologic reflexes. Coordination/balance:  Normal     Musculoskeletal:  Right upper extremity exam:  There is no tenderness to palpation about the shoulder, elbow, wrist or hand. Range of motion normal  .  5/5 strength, normal sensation, good radial pulse and skin is normal.       Left upper extremity exam:  There is no tenderness to palpation about the shoulder, elbow, wrist or hand. Range of motion normal .   5/5 strength, normal sensation, good radial pulse and skin is normal.      Right lower extremity exam:  There is tenderness to palpation about the ankle but not the hip, knee,or foot. Range of motion-none at ankle. 5/5 strength, normal sensation, good dorsalis pedis pulse  and skin is normal. Mod/severe swelling at ankle with bruising. Skin ok.      Left lower extremity exam:  There is no tenderness to palpation about the hip, knee, ankle or foot.  Range of motion normal .   5/5 strength normal sensation, good dorsalis pedis pulse and skin is normal.       DATA:    CBC with Differential:          Lab Results   Component Value Date     WBC 8.2 04/21/2021     RBC 4.04 04/21/2021     HGB 12.3 04/21/2021     HCT 38.1 04/21/2021      04/21/2021     MCV 94.3 04/21/2021     MCH 30.4 04/21/2021     MCHC 32.3 04/21/2021     RDW 13.2 04/21/2021     LYMPHOPCT 17.2 04/20/2021     MONOPCT 6.9 04/20/2021     BASOPCT 0.3 04/20/2021     MONOSABS 0.70 04/20/2021     LYMPHSABS 1.6 04/20/2021     EOSABS 0.20 04/20/2021     BASOSABS 0.00 04/20/2021      CMP:          Lab Results   Component Value Date      04/21/2021     K 3.8 04/21/2021      04/21/2021     CO2 27 04/21/2021     BUN 15 04/21/2021     CREATININE 0.6 04/21/2021     GFRAA >59 04/21/2021     AGRATIO 1.4 02/23/2021     LABGLOM >60 04/21/2021     GLUCOSE 135 04/21/2021     PROT 6.0 04/21/2021     PROT 7.2 03/07/2013     CALCIUM 8.7 04/21/2021     BILITOT 0.8 04/21/2021     ALKPHOS 99 04/21/2021     AST 50 04/21/2021     ALT 28 04/21/2021      BMP:          Lab Results   Component Value Date      04/21/2021     K 3.8 04/21/2021      04/21/2021     CO2 27 04/21/2021     BUN 15 04/21/2021     CREATININE 0.6 04/21/2021     CALCIUM 8.7 04/21/2021     GFRAA >59 04/21/2021     LABGLOM >60 04/21/2021     GLUCOSE 135 04/21/2021            Radiology: Xr Ankle Right (min 3 Views)     Result Date: 4/20/2021  EXAMINATION: XR ANKLE RIGHT (MIN 3 VIEWS), XR FOOT RIGHT (MIN 3 VIEWS) 4/20/2021 7:51 PM HISTORY: Pain after fall COMPARISON: None FINDINGS: There is a comminuted fracture through the medial malleolus with intra-articular extension at the tibiotalar joint. There is associated widening of the medial tibiotalar joint to 9 mm. There is a spiral fracture through the distal fibular diaphysis. A fracture is also noted through the posterior malleolus of the tibia. There is an associated joint effusion. Base of the fifth metatarsal appears intact. There is an acute nondisplaced fracture through the base of the first and second metatarsals. No widening of the Lisfranc joint is identified on these nonweightbearing images. There is somewhat diffuse soft tissue swelling.     1. Comminuted fracture of the medial malleolus with disruption of the ankle mortise. Nondisplaced fracture through the posterior malleolus of the tibia.  2. Comminuted spiral fracture through the distal fibular diaphysis. 3. Acute fractures through the base of the first and second metatarsals of the foot. No definite additional fractures identified within the foot. Signed by Dr Eneida Luz on 4/20/2021 7:56 PM     Xr Foot Right (min 3 Views)     Result Date: 4/20/2021  EXAMINATION: XR ANKLE RIGHT (MIN 3 VIEWS), XR FOOT RIGHT (MIN 3 VIEWS) 4/20/2021 7:51 PM HISTORY: Pain after fall COMPARISON: None FINDINGS: There is a comminuted fracture through the medial malleolus with intra-articular extension at the tibiotalar joint. There is associated widening of the medial tibiotalar joint to 9 mm. There is a spiral fracture through the distal fibular diaphysis. A fracture is also noted through the posterior malleolus of the tibia. There is an associated joint effusion. Base of the fifth metatarsal appears intact. There is an acute nondisplaced fracture through the base of the first and second metatarsals. No widening of the Lisfranc joint is identified on these nonweightbearing images. There is somewhat diffuse soft tissue swelling.     1. Comminuted fracture of the medial malleolus with disruption of the ankle mortise. Nondisplaced fracture through the posterior malleolus of the tibia. 2. Comminuted spiral fracture through the distal fibular diaphysis. 3. Acute fractures through the base of the first and second metatarsals of the foot. No definite additional fractures identified within the foot. Signed by Dr Eneida Luz on 4/20/2021 7:56 PM     Xr Chest Portable     Result Date: 4/20/2021  EXAMINATION: XR CHEST PORTABLE 4/20/2021 9:26 PM HISTORY: Fall, history of hypertension COMPARISON: 2/26/2019 FINDINGS: Heart and mediastinal contours appear normal. Lungs are clear. There is no appreciable pneumothorax or pleural effusion. The pulmonary vasculature appears grossly normal.     No evidence of acute cardiopulmonary process.  Signed by Dr Eneida Luz on 4/20/2021 9:27 PM        Assessment:   Displaced trimall right ankle fracture, closed, nvi.      Plan:  ORIF vs ex fix right ankle fracture later today. I explained to the patient/family the patient's diagnosis and operative procedure in detail. They said they understood basically what was wrong and how I planned to fix it. They understand the expected recovery and the risks which include excessive bleeding, infection, reaction to anesthesia, nerve injury, stiffness, fracture, deformity and dislocation. They then signed an operative consent form.           Provider:  Gracie Orr  Date: 4/21/2021                     Routing History

## 2021-06-09 ENCOUNTER — OFFICE VISIT (OUTPATIENT)
Dept: NEUROSURGERY | Age: 61
End: 2021-06-09
Payer: MEDICARE

## 2021-06-09 ENCOUNTER — TRANSCRIBE ORDERS (OUTPATIENT)
Dept: ADMINISTRATIVE | Facility: HOSPITAL | Age: 61
End: 2021-06-09

## 2021-06-09 VITALS
HEART RATE: 83 BPM | HEIGHT: 66 IN | BODY MASS INDEX: 40.18 KG/M2 | SYSTOLIC BLOOD PRESSURE: 103 MMHG | DIASTOLIC BLOOD PRESSURE: 63 MMHG | WEIGHT: 250 LBS

## 2021-06-09 DIAGNOSIS — Z12.31 ENCOUNTER FOR SCREENING MAMMOGRAM FOR MALIGNANT NEOPLASM OF BREAST: Primary | ICD-10-CM

## 2021-06-09 DIAGNOSIS — R47.89 WORD FINDING DIFFICULTY: ICD-10-CM

## 2021-06-09 DIAGNOSIS — R41.3 MEMORY LOSS: Primary | ICD-10-CM

## 2021-06-09 PROCEDURE — 99213 OFFICE O/P EST LOW 20 MIN: CPT | Performed by: NURSE PRACTITIONER

## 2021-06-09 RX ORDER — DONEPEZIL HYDROCHLORIDE 10 MG/1
10 TABLET, FILM COATED ORAL NIGHTLY
Qty: 30 TABLET | Refills: 11 | Status: SHIPPED | OUTPATIENT
Start: 2021-06-09 | End: 2022-04-22 | Stop reason: SDUPTHER

## 2021-06-09 NOTE — PROGRESS NOTES
[facility] Neurology Office Note      Patient:   Jes Peacock  MR#:    [id number]  Account Number:                         YOB: 1960  Date of Evaluation:  6/9/2021  Time of Note:                          3:42 PM  Primary/Referring Physician:  SWAPNA Posey - CNP   Consulting Physician:  Mariella Joseph DNP, APRN     FOLLOW UP VISIT    Chief Complaint   Patient presents with    Follow-up    Memory Loss       HISTORY OF PRESENT ILLNESS    Jes Peacock is a 62y.o. year old female here for follow up of memory loss. She is with her sister today. Doing about the same, no clear progression. She broke her foot recently, following with [facility]. Primarily short term memory loss noted. She has quite a bit of word finding difficulty, speech difficulty. She states that she feels like her thoughts turn in circles at times. Needing reminders to complete ADLs, having difficulty with cooking/following recipes. Denies gait changes or bladder incontinence. No hallucinations. Denies tremor. Needing some reminders to take medications at time,  is helping her with this. No longer driving. Denies depression/anxiety. On Aricept 10mg nightly. Tried and failed Namenda and Namzeric in the past. She has had neuropsych testing completed, no records. No overt evidence of FTD today. Family history with aunt having Pick's disease, onset was in her 46s. Following with hematology now for thrombocytopenia, will possible undergo bone marrow biopsy pending upcoming labs.      Past Medical History:   Diagnosis Date    Anxiety     Depression     Diabetes ([address]     Hypertension     Memory difficulty     Obesity 4/21/2021    Sleep apnea     c-pap       Past Surgical History:   Procedure Laterality Date    ANKLE FRACTURE SURGERY Right 4/21/2021    ANKLE OPEN REDUCTION INTERNAL FIXATION WITH POSSIBLE EXTERNAL FIXATOR performed by Mima Hunt MD at [facility]         Family History Problem Relation Age of Onset    Cancer Mother     Heart Attack Father        Social History     Socioeconomic History    Marital status:      Spouse name: Not on file    Number of children: Not on file    Years of education: Not on file    Highest education level: Not on file   Occupational History    Not on file   Tobacco Use    Smoking status: Never Smoker    Smokeless tobacco: Never Used   Substance and Sexual Activity    Alcohol use: No    Drug use: No    Sexual activity: Yes   Other Topics Concern    Not on file   Social History Narrative    Not on file     Social Determinants of Health     Financial Resource Strain:     Difficulty of Paying Living Expenses:    Food Insecurity:     Worried About Running Out of Food in the Last Year:     [address] in the Last Year:    Transportation Needs:     Lack of Transportation (Medical):      Lack of Transportation (Non-Medical):    Physical Activity:     Days of Exercise per Week:     Minutes of Exercise per Session:    Stress:     Feeling of Stress :    Social Connections:     Frequency of Communication with Friends and Family:     Frequency of Social Gatherings with Friends and Family:     Attends [employer] Services:     Active Member of Clubs or Organizations:     Attends Club or Organization Meetings:     Marital Status:    Intimate Partner Violence:     Fear of Current or Ex-Partner:     Emotionally Abused:     Physically Abused:     Sexually Abused:        Current Outpatient Medications   Medication Sig Dispense Refill    donepezil (ARICEPT) 10 MG tablet Take 1 tablet by mouth nightly 30 tablet 11    SITagliptin-metFORMIN HCl ER (JANUMET XR) 100-1000 MG TB24 Take 100-1,000 mg by mouth nightly       metoprolol succinate (TOPROL XL) 100 MG extended release tablet Take 1 tablet by mouth nightly   1    escitalopram (LEXAPRO) 10 MG tablet Take 10 mg by mouth nightly       triamterene-hydrochlorothiazide (MAXZIDE-25) 37.5-25 MG per tablet Take 1 tablet by mouth nightly        No current facility-administered medications for this visit. ALLERGIES  Allergies   Allergen Reactions    Azithromycin Rash    Namenda  [Memantine Hcl] Nausea And Vomiting and Nausea Only     REVIEW OF SYSTEMS  Constitutional: []? Fever []? Sweat []? Chills []? Recent Injury [x]? Denies all unless marked  HEENT:[]? Headache  []? Head Injury/Hearing Loss  []? Sore Throat  []? Ear Ache/Dizziness  [x]? Denies all unless marked  Spine:  []? Neck pain  []? Back pain  []? Sciaticia  [x]? Denies all unless marked  Cardiovascular:[]? Heart Disease []? Chest Pain []? Palpitations  [x]? Denies all unless marked  Pulmonary: []? Shortness of Breath []? Cough   [x]? Denies all unless marke  Gastrointestinal: []? Nausea  []? Vomiting  []? Abdominal Pain  []? Constipation  []? Diarrhea  []? Dark Bloody Stools  [x]? Denies all unless marked  Psychiatric/Behavioral:[]? Depression []? Anxiety [x]? Denies all unless marked  Genitourinary:   []? Frequency  []? Urgency  []? Incontinence []? Pain with Urination  [x]? Denies all unless marked  Extremities: []? Pain  [x]? Swelling  [x]? Denies all unless marked  Musculoskeletal: []? Muscle Pain  []? Joint Pain  []? Arthritis []? Muscle Cramps []? Muscle Twitches  [x]? Denies all unless marked  Sleep: []? Insomnia []? Snoring [x]? Restless Legs [x]? Sleep Apnea  []? Daytime Sleepiness  [x]? Denies all unless marked  Skin:[]? Rash []? Skin Discoloration [x]? Denies all unless marked   Neurological: [x]? Visual Disturbance/Memory Loss []? Loss of Balance []? Slurred Speech/Weakness []? Seizures  []? Vertigo/Dizziness [x]? Denies all unless marked    The MA has completed the ROS with the patient. I have reviewed it in its' entirety with the patient and agree with the documentation.      PHYSICAL EXAM  /63   Pulse 83   Ht 5' 6\" (1.676 m)   Wt 250 lb (113.4 kg)   BMI 40.35 kg/m²     Constitutional  No acute distress    HEENT- Conjunctiva HCT 37.0 04/23/2021    MCV 94.1 04/23/2021     (L) 04/23/2021     Lab Results   Component Value Date     04/23/2021    K 4.1 04/23/2021     04/23/2021    CO2 26 04/23/2021    BUN 17 04/23/2021    CREATININE 0.6 04/23/2021    GLUCOSE 138 (H) 04/23/2021    CALCIUM 8.4 (L) 04/23/2021    PROT 6.0 (L) 04/21/2021    LABALBU 3.7 04/21/2021    BILITOT 0.8 04/21/2021    ALKPHOS 99 04/21/2021    AST 50 (H) 04/21/2021    ALT 28 04/21/2021    LABGLOM >60 04/23/2021    GFRAA >59 04/23/2021    AGRATIO 1.4 02/23/2021    GLOB 2.9 02/23/2021     Prior records reviewed. ASSESSMENT:    Debi Wagner is a 62y.o. year old female here for follow up of memory loss. Doing about the same from last visit. No overt mood or personality changes noted. Suspect underlying dementia, possible early onset Alzheimer's, FTD variant felt less likely. On Aricept, unable to tolerate Namenda or Namzeric in the past. Discussed recent approval of new AD infusion, question if she would be a candidate. Will get additional opinion from [address] [place] [address]. Diagnosis Orders   1. Memory loss  External Referral To Neurology   2. Word finding difficulty  External Referral To Neurology        PLAN:  1. Continue Aricept 10mg nightly   2. Referral to [address] [place] [address]   3. Safety concerns discussed, increase supervision, monitor medications, no driving   4.  Follow up in 6 months, sooner with any worsening     Chele Cisneros DNP, APRN

## 2021-06-09 NOTE — PROGRESS NOTES
REVIEW OF SYSTEMS    Constitutional: []Fever []Sweat []Chills [] Recent Injury [x] Denies all unless marked  HEENT:[]Headache  [] Head Injury/Hearing Loss  [] Sore Throat  [] Ear Ache/Dizziness  [x] Denies all unless marked  Spine:  [] Neck pain  [] Back pain  [] Sciaticia  [x] Denies all unless marked  Cardiovascular:[]Heart Disease []Chest Pain [] Palpitations  [x] Denies all unless marked  Pulmonary: []Shortness of Breath []Cough   [x] Denies all unless marke  Gastrointestinal: []Nausea  []Vomiting  []Abdominal Pain  []Constipation  []Diarrhea  []Dark Bloody Stools  [x] Denies all unless marked  Psychiatric/Behavioral:[] Depression [] Anxiety [x] Denies all unless marked  Genitourinary:   [] Frequency  [] Urgency  [] Incontinence [] Pain with Urination  [x] Denies all unless marked  Extremities: []Pain  [x]Swelling  [x] Denies all unless marked  Musculoskeletal: [] Muscle Pain  [] Joint Pain  [] Arthritis [] Muscle Cramps [] Muscle Twitches  [x] Denies all unless marked  Sleep: [] Insomnia [] Snoring [x] Restless Legs [x] Sleep Apnea  [] Daytime Sleepiness  [x] Denies all unless marked  Skin:[] Rash [] Skin Discoloration [x] Denies all unless marked   Neurological: [x]Visual Disturbance/Memory Loss [] Loss of Balance [] Slurred Speech/Weakness [] Seizures  [] Vertigo/Dizziness [x] Denies all unless marked

## 2021-06-10 ENCOUNTER — TELEPHONE (OUTPATIENT)
Dept: NEUROLOGY | Age: 61
End: 2021-06-10

## 2021-06-10 NOTE — TELEPHONE ENCOUNTER
Called and spoke with patients , he was also aware of the patient declining referral to [facility]

## 2021-06-18 ENCOUNTER — APPOINTMENT (OUTPATIENT)
Dept: MAMMOGRAPHY | Facility: HOSPITAL | Age: 61
End: 2021-06-18

## 2021-06-21 NOTE — PROGRESS NOTES
Progress Note      Pt Name: Naz Brown  YOB: 1960  MRN: [id number]    Date of evaluation: 6/22/2021  History Obtained From:  patient, electronic medical record    CHIEF COMPLAINT:    Chief Complaint   Patient presents with    Follow-up     Thrombocytopenia ([address]     Current active problems  Thrombocytopenia  Splenomegaly  Hepatic steatosis  Prior EBV      HISTORY OF PRESENT ILLNESS:    Naz Brown is a 62 y.o.  female seen initially on 7/28/2020 referred for evaluation of mild thrombocytopenia. She does have mild splenomegaly and hepatic steatosis. She denies any new left upper quadrant pain. She denies any significant bruising. She did have ORIF of the right ankle by Dr. Sp Marroquin broke her ankle in her kitchen. She is in a wheelchair today. She has been taking more Motrin because of recovery from her fractured ankle. She also has a mild elevated IgA as well as elevated K/L ratio. Most recent repeat levels revealed that her IgA level actually normalized. The K/L ratio improved. She is diabetic with blood sugars more stable but again does not know her A1c. Blood pressure stable on her Toprol-XL, Maxide. She continues taking Aricept due to memory issues and is followed by Dr. Renata Eric. Mood is stable on her Lexapro.       HEMATOLOGY HISTORY: Thrombocytopenia, splenomegaly, hepatic steatosis, gammopathy  Elissa Noble was seen in initial hematology consultation on 7/28/2020 referred by SWAPNA Perez for evaluation of thrombocytopenia and splenomegaly.             Serology 6/30/2020  B12 - 852  CMP -alk phos 135, AST 52, ALT 38     · Review prior imaging studies  CT abdomen pelvis with contrast at [address] 8/14/2018 compared to 11/21/2017 revealed hepato-steatosis but spleen read as-no splenomegaly     · Current imaging study  Ultrasound liver 7/24/2020 at [facility] was compared to CT of the abdomen 8/14/2018 which revealed moderate diffuse fatty infiltration of the liver without focal lesion. Doppler sonography suggested a normal hepato-pedal portal venous circulation. Splenomegaly measuring 14.4 x 9.5 x 14.6 cm     Her initial CBC in the office on 7/28/2020 revealed a normal WBC of 5.38 with normal percent differential.  Hgb is 14.6 with MCV 94.1. PLT is 116,000.     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 I discussed the fact that her platelet count is adequate at 116,000. She denies any significant bruising or bleeding. She denies any night sweats, weight loss, extreme fatigue, pruritus.     Baseline serology will be checked however she will most likely be monitored conservatively.     Serology 7/28/2020  Fibrinogen - 261  PT/INR 12.2/1.2  PTT - 31  Folate - 18.2  Copper - 91  Zinc - 75  Antiplatelet ab - Negative  SPEP - No M spike -with immunofixation negative  QI - IgG 1232, IgA 422 (), IgM 78  Free serum light chains -   B2 M - 2.1    EBV titers  EBV Ab VCA, IgM  <36  EBV Ab VCA, IgG 136  EBV nuclear antigen Ab, IgG < 18    Consistent with prior infection    Serology 10/27/2020  SPEP - no M spike with immunofixation negative  QI - IgG 1227, IgA 390, IgM 69  Free serum light chains -kappa 31.3, K/L ratio 2.27  B2M - 2.2 - WNL  CMP = crt 0.6 with GFR > 60, AST 56, total bili 1.5    HIV 1/2 - nonreactive  Hepatitis A, B, C - negative        Past Medical History:   Diagnosis Date    Anxiety     Depression     [address] [address]     Hypertension     Memory difficulty     Obesity 4/21/2021    Sleep apnea     c-pap        Past Surgical History:   Procedure Laterality Date    ANKLE FRACTURE SURGERY Right 4/21/2021    ANKLE OPEN REDUCTION INTERNAL FIXATION WITH POSSIBLE EXTERNAL FIXATOR performed by Georgie Hunt MD at [facility]             Current Outpatient Medications:     ibuprofen (ADVIL;MOTRIN) 200 MG tablet, Take 200 mg by mouth every 6 hours as needed for Pain, Disp: , Rfl:     donepezil (ARICEPT) 10 MG tablet, Take 1 tablet by mouth nightly, Disp: 30 tablet, Rfl: 11    SITagliptin-metFORMIN HCl ER (JANUMET XR) 100-1000 MG TB24, Take 100-1,000 mg by mouth nightly , Disp: , Rfl:     metoprolol succinate (TOPROL XL) 100 MG extended release tablet, Take 1 tablet by mouth nightly , Disp: , Rfl: 1    escitalopram (LEXAPRO) 10 MG tablet, Take 10 mg by mouth nightly , Disp: , Rfl:     triamterene-hydrochlorothiazide (MAXZIDE-25) 37.5-25 MG per tablet, Take 1 tablet by mouth nightly , Disp: , Rfl:      Allergies   Allergen Reactions    Azithromycin Rash    Namenda  [Memantine Hcl] Nausea And Vomiting and Nausea Only       Social History     Tobacco Use    Smoking status: Never Smoker    Smokeless tobacco: Never Used   Substance Use Topics    Alcohol use: No    Drug use: No       Family History   Problem Relation Age of Onset    Cancer Mother     Heart Attack Father        Subjective   REVIEW OF SYSTEMS:   Review of Systems   Constitutional: Negative for chills, diaphoresis, fatigue, fever and unexpected weight change. HENT: Negative for mouth sores, nosebleeds, sore throat, trouble swallowing and voice change. Eyes: Negative for photophobia, discharge and itching. Respiratory: Negative for cough, shortness of breath and wheezing. Cardiovascular: Negative for chest pain, palpitations and leg swelling. Gastrointestinal: Negative for abdominal distention, abdominal pain, blood in stool, constipation, diarrhea, nausea and vomiting. Endocrine: Negative for cold intolerance, heat intolerance, polydipsia and polyuria. Genitourinary: Negative for difficulty urinating, dysuria, hematuria and urgency. Musculoskeletal: Positive for arthralgias (Right ankle). Negative for back pain, joint swelling and myalgias. Skin: Negative for color change and rash. Neurological: Negative for dizziness, tremors, seizures, syncope and light-headedness. Hematological: Negative for adenopathy.  Does not bruise/bleed easily. Psychiatric/Behavioral: Positive for decreased concentration. Negative for behavioral problems and suicidal ideas. The patient is not nervous/anxious. All other systems reviewed and are negative. Objective   BP (!) 126/58   Pulse 71   Ht 5' 6\" (1.676 m)   Wt 250 lb (113.4 kg)   SpO2 93%   BMI 40.35 kg/m²     PHYSICAL EXAM:  Physical Exam  Constitutional:       Appearance: She is well-developed. HENT:      Head: Normocephalic and atraumatic. Eyes:      General: No scleral icterus. Conjunctiva/sclera: Conjunctivae normal.   Neck:      Trachea: No tracheal deviation. Cardiovascular:      Rate and Rhythm: Normal rate and regular rhythm. Heart sounds: Normal heart sounds. No murmur heard. Pulmonary:      Effort: Pulmonary effort is normal. No respiratory distress. Breath sounds: Normal breath sounds. Abdominal:      General: Bowel sounds are normal. There is no distension. Palpations: Abdomen is soft. There is hepatomegaly (Minimal tenderness). There is no fluid wave or splenomegaly (Not obviously palpable). Tenderness: There is no abdominal tenderness. Musculoskeletal:         General: Deformity (Postop right ankle-wounds healing nicely) present. No tenderness. Cervical back: Normal range of motion and neck supple. Lymphadenopathy:      Cervical: No cervical adenopathy. Upper Body:      Right upper body: No supraclavicular or axillary adenopathy. Left upper body: No supraclavicular or axillary adenopathy. Lower Body: No right inguinal adenopathy. No left inguinal adenopathy. Skin:     General: Skin is warm and dry. Findings: No rash. Neurological:      Mental Status: She is alert and oriented to person, place, and time. Coordination: Coordination normal.   Psychiatric:         Behavior: Behavior normal.         Thought Content:  Thought content normal.         Cognition and Memory: She exhibits impaired recent memory. CBC reviewed by me  Lab Results   Component Value Date    WBC 4.48 06/22/2021    HGB 12.6 06/22/2021    HCT 39.2 06/22/2021    MCV 94.0 06/22/2021    PLT 92 (L) 06/22/2021     Lab Results   Component Value Date    NEUTROABS 2.48 06/22/2021       VISIT DIAGNOSES  1. Thrombocytopenia (HCC)    2. Splenomegaly    3. Hepatic steatosis    4. Gammopathy    5. Abnormal LFTs        ASSESSMENT/PLAN:    1. Thrombocytopenia. Chronic issue-stable overall    She has previously had mild splenomegaly on imaging studies. Spleen is not overtly palpable on examination. She has hepatic steatosis. Prior serology has been unrevealing for cause of her thrombocytopenia otherwise. Platelet count has been running above 100,000. PLT today is 92,000 which overall is stable. PLAN  JAK2 with reflex has been requested because of splenomegaly    2. Gammopathy. Improved    Serology 7/28/2020  SPEP - No M spike -with immunofixation negative  QI - IgG 1232, IgA 422 (), IgM 78  Free serum light chains -   B2 M - 2.1    LDH - 510 (313-618) - WNL  CMP - total bilirubin is 1.7, alk phos 130, AST 79, ALT 54      Serology 10/27/2020  SPEP - no M spike with immunofixation negative  QI - IgG 1227, IgA 390, IgM 69  Free serum light chains -kappa 31.3, K/L ratio 2.27  B2M - 2.2 - WNL  CMP = crt 0.6 with GFR > 60, AST 56, total bili 1.5    Serology 3/23/2021  SPEP - no M spike with immunofixation negative  QI - IgG 1227, IgA 342, IgM 69 - her IgA normalized  Free serum light chains -kappa 26.5, K/L ratio 2.04 -improved  B2M - 2.2 - WNL  CMP = crt 0.7 with GFR > 60, Ca 9.7, alk phos 155, AST 52, total bilirubin normal - known fatty liver    She does not have any B symptoms-she denies any extreme fatigue, she denies night sweats, pruritus, weight loss. She does not have any peripheral lymphadenopathy on examination. Her spleen is not overtly enlarged. She does not have any right upper quadrant tenderness as well.     She has a slightly elevated IgA which actually showed improvement on follow-up 10/27/2020. Her SPEP itself does not show any M spike. She does have a slightly elevated kappa light chain with elevated kappa light chain ratio. I discussed this with her at her office visit of 2/23/2021. I discussed implications and potential need to proceed with bone marrow aspiration biopsy. Serology from 3/23/2021 revealed that her IgA actually normalized. K/L ratio had improved. PLAN  Peripheral blood flow cytometry  Repeat protein serology         · Colon cancer screening. Most recent colonoscopy was 12/18/2017 by Dr. Shari Luther. 3 polyps removed from the rectum, (1 tubular adenoma and 2 hyperplastic polyps), congested rectal mucosa with biopsy benign.     · Breast cancer screening. Bilateral screening mammogram 5/28/2020 BI-RADS 2 - Dl Dae. Imaging this year has been delayed due to her ankle fracture.     · Cervical cancer screening. She reports that she has Pap smears every other year and they have been negative. Immunization        Orders Placed This Encounter   Procedures    Comprehensive Metabolic Panel    Electrophoresis Protein, Serum with Reflex to Immunofixation    [place]/Lambda Free Lt Chains, Serum Quant    Lactate Dehydrogenase    Miscellaneous Sendout 1    Miscellaneous Sendout 1        Return in about 4 months (around 10/22/2021) for With True Carballo.      Chris Reid PA-C  9:52 AM  6/22/2021

## 2021-06-22 ENCOUNTER — OFFICE VISIT (OUTPATIENT)
Dept: HEMATOLOGY | Age: 61
End: 2021-06-22
Payer: MEDICARE

## 2021-06-22 ENCOUNTER — HOSPITAL ENCOUNTER (OUTPATIENT)
Dept: INFUSION THERAPY | Age: 61
Discharge: HOME | End: 2021-06-22
Payer: MEDICARE

## 2021-06-22 VITALS
OXYGEN SATURATION: 93 % | HEART RATE: 71 BPM | WEIGHT: 250 LBS | SYSTOLIC BLOOD PRESSURE: 126 MMHG | HEIGHT: 66 IN | DIASTOLIC BLOOD PRESSURE: 58 MMHG | BODY MASS INDEX: 40.18 KG/M2

## 2021-06-22 DIAGNOSIS — R79.89 ABNORMAL LFTS: ICD-10-CM

## 2021-06-22 DIAGNOSIS — K76.0 HEPATIC STEATOSIS: ICD-10-CM

## 2021-06-22 DIAGNOSIS — D47.2 GAMMOPATHY: ICD-10-CM

## 2021-06-22 DIAGNOSIS — D69.6 THROMBOCYTOPENIA (HCC): Primary | ICD-10-CM

## 2021-06-22 DIAGNOSIS — R16.1 SPLENOMEGALY: ICD-10-CM

## 2021-06-22 DIAGNOSIS — D69.6 THROMBOCYTOPENIA (HCC): ICD-10-CM

## 2021-06-22 LAB
ALBUMIN SERPL-MCNC: 3.9 G/DL (ref 3.5–5.2)
ALP BLD-CCNC: 115 U/L (ref 35–104)
ALT SERPL-CCNC: 29 U/L (ref 9–52)
ANION GAP SERPL CALCULATED.3IONS-SCNC: 10 MMOL/L (ref 7–19)
AST SERPL-CCNC: 52 U/L (ref 14–36)
BASOPHILS ABSOLUTE: 0.01 K/UL (ref 0.01–0.08)
BASOPHILS RELATIVE PERCENT: 0.2 % (ref 0.1–1.2)
BILIRUB SERPL-MCNC: 1.4 MG/DL (ref 0.2–1.3)
BUN BLDV-MCNC: 22 MG/DL (ref 7–17)
CALCIUM SERPL-MCNC: 9.7 MG/DL (ref 8.4–10.2)
CHLORIDE BLD-SCNC: 105 MMOL/L (ref 98–111)
CO2: 27 MMOL/L (ref 22–29)
CREAT SERPL-MCNC: 0.7 MG/DL (ref 0.5–1)
EOSINOPHILS ABSOLUTE: 0.21 K/UL (ref 0.04–0.54)
EOSINOPHILS RELATIVE PERCENT: 4.7 % (ref 0.7–7)
GFR NON-AFRICAN AMERICAN: >60
GLOBULIN: 2.8 G/DL
GLUCOSE BLD-MCNC: 133 MG/DL (ref 74–106)
HCT VFR BLD CALC: 39.2 % (ref 34.1–44.9)
HEMOGLOBIN: 12.6 G/DL (ref 11.2–15.7)
LACTATE DEHYDROGENASE: 460 U/L (ref 313–618)
LYMPHOCYTES ABSOLUTE: 1.38 K/UL (ref 1.18–3.74)
LYMPHOCYTES RELATIVE PERCENT: 30.8 % (ref 19.3–53.1)
MCH RBC QN AUTO: 30.2 PG (ref 25.6–32.2)
MCHC RBC AUTO-ENTMCNC: 32.1 G/DL (ref 32.3–35.5)
MCV RBC AUTO: 94 FL (ref 79.4–94.8)
MONOCYTES ABSOLUTE: 0.4 K/UL (ref 0.24–0.82)
MONOCYTES RELATIVE PERCENT: 8.9 % (ref 4.7–12.5)
NEUTROPHILS ABSOLUTE: 2.48 K/UL (ref 1.56–6.13)
NEUTROPHILS RELATIVE PERCENT: 55.4 % (ref 34–71.1)
PDW BLD-RTO: 12.9 % (ref 11.7–14.4)
PLATELET # BLD: 92 K/UL (ref 182–369)
PMV BLD AUTO: 11 FL (ref 7.4–10.4)
POTASSIUM SERPL-SCNC: 3.8 MMOL/L (ref 3.5–5.1)
RBC # BLD: 4.17 M/UL (ref 3.93–5.22)
SODIUM BLD-SCNC: 142 MMOL/L (ref 137–145)
TOTAL PROTEIN: 6.6 G/DL (ref 6.3–8.2)
WBC # BLD: 4.48 K/UL (ref 3.98–10.04)

## 2021-06-22 PROCEDURE — 80053 COMPREHEN METABOLIC PANEL: CPT

## 2021-06-22 PROCEDURE — 99213 OFFICE O/P EST LOW 20 MIN: CPT | Performed by: PHYSICIAN ASSISTANT

## 2021-06-22 PROCEDURE — 85025 COMPLETE CBC W/AUTO DIFF WBC: CPT

## 2021-06-22 PROCEDURE — 99212 OFFICE O/P EST SF 10 MIN: CPT

## 2021-06-22 PROCEDURE — 83615 LACTATE (LD) (LDH) ENZYME: CPT

## 2021-06-22 RX ORDER — IBUPROFEN 200 MG
200 TABLET ORAL EVERY 6 HOURS PRN
COMMUNITY
End: 2021-10-21

## 2021-06-22 ASSESSMENT — ENCOUNTER SYMPTOMS
DIARRHEA: 0
EYE ITCHING: 0
COUGH: 0
EYE DISCHARGE: 0
SHORTNESS OF BREATH: 0
BACK PAIN: 0
PHOTOPHOBIA: 0
NAUSEA: 0
SORE THROAT: 0
VOMITING: 0
VOICE CHANGE: 0
TROUBLE SWALLOWING: 0
ABDOMINAL PAIN: 0
WHEEZING: 0
ABDOMINAL DISTENTION: 0
COLOR CHANGE: 0
BLOOD IN STOOL: 0
CONSTIPATION: 0

## 2021-06-23 ENCOUNTER — TRANSCRIBE ORDERS (OUTPATIENT)
Dept: ADMINISTRATIVE | Facility: HOSPITAL | Age: 61
End: 2021-06-23

## 2021-06-23 ENCOUNTER — HOSPITAL ENCOUNTER (OUTPATIENT)
Dept: ULTRASOUND IMAGING | Facility: HOSPITAL | Age: 61
Discharge: HOME | End: 2021-06-23
Admitting: ORTHOPAEDIC SURGERY

## 2021-06-23 DIAGNOSIS — M79.671 PAIN IN RIGHT FOOT: Primary | ICD-10-CM

## 2021-06-23 DIAGNOSIS — S82.851A DISPLACED TRIMALLEOLAR FRACTURE OF RIGHT LOWER LEG, INITIAL ENCOUNTER FOR CLOSED FRACTURE: ICD-10-CM

## 2021-06-23 DIAGNOSIS — S82.851D DISPLACED TRIMALLEOLAR FRACTURE OF RIGHT LOWER LEG, CLOSED, WITH ROUTINE HEALING, SUBSEQUENT ENCOUNTER: ICD-10-CM

## 2021-06-23 LAB
BASOPHILS ABSOLUTE: 0 K/UL (ref 0–0.2)
BASOPHILS RELATIVE PERCENT: 0.5 % (ref 0–1)
CREAT SERPL-MCNC: 0.6 MG/DL (ref 0.5–0.9)
D DIMER: 0.51 UG/ML FEU (ref 0–0.48)
EOSINOPHILS ABSOLUTE: 0.2 K/UL (ref 0–0.6)
EOSINOPHILS RELATIVE PERCENT: 4.2 % (ref 0–5)
GFR AFRICAN AMERICAN: >59
GFR NON-AFRICAN AMERICAN: >60
HCT VFR BLD CALC: 36.3 % (ref 37–47)
HEMOGLOBIN: 12.3 G/DL (ref 12–16)
IMMATURE GRANULOCYTES #: 0 K/UL
LYMPHOCYTES ABSOLUTE: 1.6 K/UL (ref 1.1–4.5)
LYMPHOCYTES RELATIVE PERCENT: 37 % (ref 20–40)
MCH RBC QN AUTO: 30.6 PG (ref 27–31)
MCHC RBC AUTO-ENTMCNC: 33.9 G/DL (ref 33–37)
MCV RBC AUTO: 90.3 FL (ref 81–99)
MONOCYTES ABSOLUTE: 0.3 K/UL (ref 0–0.9)
MONOCYTES RELATIVE PERCENT: 7.8 % (ref 0–10)
NEUTROPHILS ABSOLUTE: 2.1 K/UL (ref 1.5–7.5)
NEUTROPHILS RELATIVE PERCENT: 50.3 % (ref 50–65)
PDW BLD-RTO: 12.9 % (ref 11.5–14.5)
PLATELET # BLD: 108 K/UL (ref 130–400)
PMV BLD AUTO: 11 FL (ref 9.4–12.3)
RBC # BLD: 4.02 M/UL (ref 4.2–5.4)
SEDIMENTATION RATE, ERYTHROCYTE: 10 MM/HR (ref 0–25)
WBC # BLD: 4.2 K/UL (ref 4.8–10.8)

## 2021-06-23 PROCEDURE — 93971 EXTREMITY STUDY: CPT

## 2021-07-01 LAB — C-REACTIVE PROTEIN: <0.3 MG/DL (ref 0–0.5)

## 2021-07-06 ENCOUNTER — APPOINTMENT (OUTPATIENT)
Dept: MAMMOGRAPHY | Facility: HOSPITAL | Age: 61
End: 2021-07-06

## 2021-07-08 ENCOUNTER — HOSPITAL ENCOUNTER (OUTPATIENT)
Dept: MAMMOGRAPHY | Facility: HOSPITAL | Age: 61
Discharge: HOME | End: 2021-07-08
Admitting: FAMILY MEDICINE

## 2021-07-08 DIAGNOSIS — Z12.31 ENCOUNTER FOR SCREENING MAMMOGRAM FOR MALIGNANT NEOPLASM OF BREAST: ICD-10-CM

## 2021-07-08 PROCEDURE — 77067 SCR MAMMO BI INCL CAD: CPT

## 2021-07-08 PROCEDURE — 77063 BREAST TOMOSYNTHESIS BI: CPT

## 2021-07-12 ENCOUNTER — TRANSCRIBE ORDERS (OUTPATIENT)
Dept: ADMINISTRATIVE | Facility: HOSPITAL | Age: 61
End: 2021-07-12

## 2021-07-12 DIAGNOSIS — R92.8 ABNORMAL MAMMOGRAM: Primary | ICD-10-CM

## 2021-07-12 LAB
ALBUMIN SERPL-MCNC: 4.2 G/DL (ref 3.5–5.2)
ALP BLD-CCNC: 140 U/L (ref 35–104)
ALT SERPL-CCNC: 24 U/L (ref 5–33)
ANION GAP SERPL CALCULATED.3IONS-SCNC: 9 MMOL/L (ref 7–19)
AST SERPL-CCNC: 39 U/L (ref 5–32)
BILIRUB SERPL-MCNC: 0.5 MG/DL (ref 0.2–1.2)
BUN BLDV-MCNC: 18 MG/DL (ref 6–20)
CALCIUM SERPL-MCNC: 9.9 MG/DL (ref 8.6–10)
CHLORIDE BLD-SCNC: 106 MMOL/L (ref 98–111)
CO2: 28 MMOL/L (ref 22–29)
CREAT SERPL-MCNC: 0.7 MG/DL (ref 0.5–0.9)
GFR AFRICAN AMERICAN: >59
GFR NON-AFRICAN AMERICAN: >60
GLUCOSE BLD-MCNC: 132 MG/DL (ref 74–109)
HBA1C MFR BLD: 5.8 % (ref 4–6)
POTASSIUM SERPL-SCNC: 4 MMOL/L (ref 3.5–5)
SODIUM BLD-SCNC: 143 MMOL/L (ref 136–145)
TOTAL PROTEIN: 6.8 G/DL (ref 6.6–8.7)
VITAMIN D 25-HYDROXY: 32.8 NG/ML

## 2021-07-19 ENCOUNTER — HOSPITAL ENCOUNTER (OUTPATIENT)
Dept: MAMMOGRAPHY | Facility: HOSPITAL | Age: 61
Discharge: HOME | End: 2021-07-19

## 2021-07-19 ENCOUNTER — HOSPITAL ENCOUNTER (OUTPATIENT)
Dept: ULTRASOUND IMAGING | Facility: HOSPITAL | Age: 61
Discharge: HOME | End: 2021-07-19

## 2021-07-19 PROCEDURE — G0279 TOMOSYNTHESIS, MAMMO: HCPCS

## 2021-07-19 PROCEDURE — 76642 ULTRASOUND BREAST LIMITED: CPT

## 2021-07-19 PROCEDURE — 77065 DX MAMMO INCL CAD UNI: CPT

## 2021-07-20 ENCOUNTER — TRANSCRIBE ORDERS (OUTPATIENT)
Dept: ADMINISTRATIVE | Facility: HOSPITAL | Age: 61
End: 2021-07-20

## 2021-07-20 DIAGNOSIS — R92.8 ABNORMAL MAMMOGRAM: Primary | ICD-10-CM

## 2021-08-05 ENCOUNTER — TRANSCRIBE ORDERS (OUTPATIENT)
Dept: ADMINISTRATIVE | Facility: HOSPITAL | Age: 61
End: 2021-08-05

## 2021-08-05 ENCOUNTER — HOSPITAL ENCOUNTER (OUTPATIENT)
Dept: MRI IMAGING | Facility: HOSPITAL | Age: 61
Discharge: HOME | End: 2021-08-05
Admitting: NURSE PRACTITIONER

## 2021-08-05 DIAGNOSIS — R92.8 ABNORMAL MAMMOGRAM: ICD-10-CM

## 2021-08-05 DIAGNOSIS — R92.8 ABNORMAL MAMMOGRAM: Primary | ICD-10-CM

## 2021-08-05 LAB — CREAT BLDA-MCNC: 0.7 MG/DL (ref 0.6–1.3)

## 2021-08-05 PROCEDURE — 82565 ASSAY OF CREATININE: CPT

## 2021-08-05 PROCEDURE — C8908 MRI W/O FOL W/CONT, BREAST,: HCPCS

## 2021-08-05 PROCEDURE — 0 GADOBENATE DIMEGLUMINE 529 MG/ML SOLUTION: Performed by: NURSE PRACTITIONER

## 2021-08-05 PROCEDURE — A9577 INJ MULTIHANCE: HCPCS | Performed by: NURSE PRACTITIONER

## 2021-08-05 PROCEDURE — C8937 CAD BREAST MRI: HCPCS

## 2021-08-05 RX ADMIN — GADOBENATE DIMEGLUMINE 20 ML: 529 INJECTION, SOLUTION INTRAVENOUS at 09:24

## 2021-08-10 ENCOUNTER — HOSPITAL ENCOUNTER (OUTPATIENT)
Dept: MAMMOGRAPHY | Facility: HOSPITAL | Age: 61
Discharge: HOME | End: 2021-08-10

## 2021-08-10 ENCOUNTER — HOSPITAL ENCOUNTER (OUTPATIENT)
Dept: ULTRASOUND IMAGING | Facility: HOSPITAL | Age: 61
Discharge: HOME | End: 2021-08-10

## 2021-08-10 PROCEDURE — 88305 TISSUE EXAM BY PATHOLOGIST: CPT | Performed by: NURSE PRACTITIONER

## 2021-08-10 PROCEDURE — A4648 IMPLANTABLE TISSUE MARKER: HCPCS

## 2021-08-10 RX ORDER — LIDOCAINE HYDROCHLORIDE 10 MG/ML
10 INJECTION, SOLUTION INFILTRATION; PERINEURAL ONCE
Status: DISCONTINUED | OUTPATIENT
Start: 2021-08-10 | End: 2023-03-13

## 2021-08-10 RX ORDER — LIDOCAINE HYDROCHLORIDE AND EPINEPHRINE 10; 10 MG/ML; UG/ML
10 INJECTION, SOLUTION INFILTRATION; PERINEURAL ONCE
Status: DISCONTINUED | OUTPATIENT
Start: 2021-08-10 | End: 2023-03-13

## 2021-08-13 LAB
CYTO UR: NORMAL
LAB AP CASE REPORT: NORMAL
LAB AP CLINICAL INFORMATION: NORMAL
LAB AP DIAGNOSIS COMMENT: NORMAL
LAB AP INTRADEPARTMENTAL CONSULT: NORMAL
PATH REPORT.FINAL DX SPEC: NORMAL
PATH REPORT.GROSS SPEC: NORMAL

## 2021-08-26 ENCOUNTER — TRANSCRIBE ORDERS (OUTPATIENT)
Dept: ADMINISTRATIVE | Facility: HOSPITAL | Age: 61
End: 2021-08-26

## 2021-08-26 ENCOUNTER — PRE-ADMISSION TESTING (OUTPATIENT)
Dept: PREADMISSION TESTING | Facility: HOSPITAL | Age: 61
End: 2021-08-26

## 2021-08-26 VITALS
HEIGHT: 67 IN | OXYGEN SATURATION: 95 % | RESPIRATION RATE: 20 BRPM | SYSTOLIC BLOOD PRESSURE: 128 MMHG | BODY MASS INDEX: 38.1 KG/M2 | DIASTOLIC BLOOD PRESSURE: 57 MMHG | HEART RATE: 69 BPM | WEIGHT: 242.73 LBS

## 2021-08-26 DIAGNOSIS — N63.10 BREAST MASS, RIGHT: Primary | ICD-10-CM

## 2021-08-26 DIAGNOSIS — Z01.818 PREOP TESTING: ICD-10-CM

## 2021-08-26 LAB
ALBUMIN SERPL-MCNC: 4.2 G/DL (ref 3.5–5.2)
ALBUMIN/GLOB SERPL: 1.4 G/DL
ALP SERPL-CCNC: 139 U/L (ref 39–117)
ALT SERPL W P-5'-P-CCNC: 26 U/L (ref 1–33)
ANION GAP SERPL CALCULATED.3IONS-SCNC: 9 MMOL/L (ref 5–15)
AST SERPL-CCNC: 46 U/L (ref 1–32)
BASOPHILS # BLD AUTO: 0.01 10*3/MM3 (ref 0–0.2)
BASOPHILS NFR BLD AUTO: 0.2 % (ref 0–1.5)
BILIRUB SERPL-MCNC: 0.6 MG/DL (ref 0–1.2)
BUN SERPL-MCNC: 15 MG/DL (ref 6–20)
BUN/CREAT SERPL: 23.4 (ref 7–25)
CALCIUM SPEC-SCNC: 9.5 MG/DL (ref 8.6–10.5)
CHLORIDE SERPL-SCNC: 106 MMOL/L (ref 98–107)
CO2 SERPL-SCNC: 27 MMOL/L (ref 22–29)
CREAT SERPL-MCNC: 0.64 MG/DL (ref 0.57–1)
DEPRECATED RDW RBC AUTO: 42.3 FL (ref 37–54)
EOSINOPHIL # BLD AUTO: 0.17 10*3/MM3 (ref 0–0.4)
EOSINOPHIL NFR BLD AUTO: 3.6 % (ref 0.3–6.2)
ERYTHROCYTE [DISTWIDTH] IN BLOOD BY AUTOMATED COUNT: 13.1 % (ref 12.3–15.4)
GFR SERPL CREATININE-BSD FRML MDRD: 95 ML/MIN/1.73
GLOBULIN UR ELPH-MCNC: 2.9 GM/DL
GLUCOSE SERPL-MCNC: 137 MG/DL (ref 65–99)
HCT VFR BLD AUTO: 38.1 % (ref 34–46.6)
HGB BLD-MCNC: 12.7 G/DL (ref 12–15.9)
LYMPHOCYTES # BLD AUTO: 1.79 10*3/MM3 (ref 0.7–3.1)
LYMPHOCYTES NFR BLD AUTO: 38.1 % (ref 19.6–45.3)
MCH RBC QN AUTO: 29.4 PG (ref 26.6–33)
MCHC RBC AUTO-ENTMCNC: 33.3 G/DL (ref 31.5–35.7)
MCV RBC AUTO: 88.2 FL (ref 79–97)
MONOCYTES # BLD AUTO: 0.39 10*3/MM3 (ref 0.1–0.9)
MONOCYTES NFR BLD AUTO: 8.3 % (ref 5–12)
NEUTROPHILS NFR BLD AUTO: 2.33 10*3/MM3 (ref 1.7–7)
NEUTROPHILS NFR BLD AUTO: 49.6 % (ref 42.7–76)
PLATELET # BLD AUTO: 112 10*3/MM3 (ref 140–450)
PMV BLD AUTO: 11.8 FL (ref 6–12)
POTASSIUM SERPL-SCNC: 3.9 MMOL/L (ref 3.5–5.2)
PROT SERPL-MCNC: 7.1 G/DL (ref 6–8.5)
RBC # BLD AUTO: 4.32 10*6/MM3 (ref 3.77–5.28)
SODIUM SERPL-SCNC: 142 MMOL/L (ref 136–145)
WBC # BLD AUTO: 4.7 10*3/MM3 (ref 3.4–10.8)

## 2021-08-26 PROCEDURE — 93010 ELECTROCARDIOGRAM REPORT: CPT | Performed by: INTERNAL MEDICINE

## 2021-08-26 PROCEDURE — 80053 COMPREHEN METABOLIC PANEL: CPT

## 2021-08-26 PROCEDURE — 93005 ELECTROCARDIOGRAM TRACING: CPT

## 2021-08-26 PROCEDURE — 36415 COLL VENOUS BLD VENIPUNCTURE: CPT

## 2021-08-26 PROCEDURE — 85025 COMPLETE CBC W/AUTO DIFF WBC: CPT

## 2021-08-26 RX ORDER — METOPROLOL SUCCINATE 100 MG/1
100 TABLET, EXTENDED RELEASE ORAL NIGHTLY
COMMUNITY

## 2021-08-26 RX ORDER — KETOCONAZOLE 20 MG/G
1 CREAM TOPICAL DAILY
COMMUNITY
End: 2023-03-13

## 2021-08-26 RX ORDER — SITAGLIPTIN AND METFORMIN HYDROCHLORIDE 1000; 100 MG/1; MG/1
1 TABLET, FILM COATED, EXTENDED RELEASE ORAL DAILY
COMMUNITY
Start: 2021-07-15 | End: 2023-03-13

## 2021-08-26 NOTE — DISCHARGE INSTRUCTIONS
DAY OF SURGERY INSTRUCTIONS        YOUR SURGEON: Dr. Martine Roy    PROCEDURE: Excisional right breast biopsy with needle localization - ultrasound guided    DATE OF SURGERY: Monday August 30, 2021    ARRIVAL TIME: AS DIRECTED BY OFFICE    YOU MAY TAKE THE FOLLOWING MEDICATION(S) THE MORNING OF SURGERY WITH A SIP OF WATER: ***      ALL OTHER HOME MEDICATION CHECK WITH YOUR PHYSICIAN      DO NOT TAKE ANY ERECTILE DYSFUNCTION MEDICATIONS (EX: CIALIS, VIAGRA) 24 HOURS PRIOR TO SURGERY                      MANAGING PAIN AFTER SURGERY    We know you are probably wondering what your pain will be like after surgery.  Following surgery it is unrealistic to expect you will not have pain.   Pain is how our bodies let us know that something is wrong or cautions us to be careful.  That said, our goal is to make your pain tolerable.    Methods we may use to treat your pain include (oral or IV medications, PCAs, epidurals, nerve blocks, etc.)   While some procedures require IV pain medications for a short time after surgery, transitioning to pain medications by mouth allows for better management of pain.   Your nurse will encourage you to take oral pain medications whenever possible.  IV medications work almost immediately, but only last a short while.  Taking medications by mouth allows for a more constant level of medication in your blood stream for a longer period of time.      Once your pain is out of control it is harder to get back under control.  It is important you are aware when your next dose of pain medication is due.  If you are admitted, your nurse may write the time of your next dose on the white board in your room to help you remember.      We are interested in your pain and encourage you to inform us about aggravating factors during your visit.   Many times a simple repositioning every few hours can make a big difference.    If your physician says it is okay, do not let your pain prevent you from getting out of  bed. Be sure to call your nurse for assistance prior to getting up so you do not fall.      Before surgery, please decide your tolerable pain goal.  These faces help describe the pain ratings we use on a 0-10 scale.   Be prepared to tell us your goal and whether or not you take pain or anxiety medications at home.          BEFORE YOU COME TO THE HOSPITAL  (Pre-op instructions)  • Do not eat, drink, smoke or chew gum after midnight the night before surgery.  This also includes no mints.  • Morning of surgery take only the medicines you have been instructed with a sip of water unless otherwise instructed  by your physician.  • Do not shave, wear makeup or dark nail polish.  • Remove all jewelry including rings.  • Leave anything you consider valuable at home.  • Leave your suitcase in the car until after your surgery.  • Bring the following with you if applicable:  o Picture ID and insurance, Medicare or Medicaid cards  o Co-pay/deductible required by insurance (cash, check, credit card)  o Copy of advance directive, living will or power-of- documents if not brought to PAT  o CPAP or BIPAP mask and tubing  o Relaxation aids ( book, magazine), etc.  o Hearing aids                        ON THE DAY OF SURGERY  · On the day of surgery check in at registration located at the main entrance of the hospital.   ? You will be registered and given a beeper with instructions where to wait in the main lobby.  ? When your beeper lights up and vibrates a member of the Outpatient Surgery staff will meet you at the double doors under the stair steps and escort you to your preoperative room.   · You may have cloth compression devices placed on your legs. These help to prevent blood clots and reduce swelling in your legs.  · An IV may be inserted into one of your veins.  · In the operating room, you may be given one or more of the following:  ? A medicine to help you relax (sedative).  ? A medicine to numb the area (local  "anesthetic).  ? A medicine to make you fall asleep (general anesthetic).  ? A medicine that is injected into an area of your body to numb everything below the injection site (regional anesthetic).  · Your surgical site will be marked or identified.  · You may be given an antibiotic through your IV to help prevent infection.  Contact a health care provider if you:  · Develop a fever of more than 100.4°F (38°C) or other feelings of illness during the 48 hours before your surgery.  · Have symptoms that get worse.  Have questions or concerns about your surgery    General Anesthesia/Surgery, Adult  General anesthesia is the use of medicines to make a person \"go to sleep\" (unconscious) for a medical procedure. General anesthesia must be used for certain procedures, and is often recommended for procedures that:  · Last a long time.  · Require you to be still or in an unusual position.  · Are major and can cause blood loss.  The medicines used for general anesthesia are called general anesthetics. As well as making you unconscious for a certain amount of time, these medicines:  · Prevent pain.  · Control your blood pressure.  · Relax your muscles.  Tell a health care provider about:  · Any allergies you have.  · All medicines you are taking, including vitamins, herbs, eye drops, creams, and over-the-counter medicines.  · Any problems you or family members have had with anesthetic medicines.  · Types of anesthetics you have had in the past.  · Any blood disorders you have.  · Any surgeries you have had.  · Any medical conditions you have.  · Any recent upper respiratory, chest, or ear infections.  · Any history of:  ? Heart or lung conditions, such as heart failure, sleep apnea, asthma, or chronic obstructive pulmonary disease (COPD).  ?  service.  ? Depression or anxiety.  · Any tobacco or drug use, including marijuana or alcohol use.  · Whether you are pregnant or may be pregnant.  What are the risks?  Generally, " this is a safe procedure. However, problems may occur, including:  · Allergic reaction.  · Lung and heart problems.  · Inhaling food or liquid from the stomach into the lungs (aspiration).  · Nerve injury.  · Air in the bloodstream, which can lead to stroke.  · Extreme agitation or confusion (delirium) when you wake up from the anesthetic.  · Waking up during your procedure and being unable to move. This is rare.  These problems are more likely to develop if you are having a major surgery or if you have an advanced or serious medical condition. You can prevent some of these complications by answering all of your health care provider's questions thoroughly and by following all instructions before your procedure.  General anesthesia can cause side effects, including:  · Nausea or vomiting.  · A sore throat from the breathing tube.  · Hoarseness.  · Wheezing or coughing.  · Shaking chills.  · Tiredness.  · Body aches.  · Anxiety.  · Sleepiness or drowsiness.  · Confusion or agitation.  RISKS AND COMPLICATIONS OF SURGERY  Your health care provider will discuss possible risks and complications with you before surgery. Common risks and complications include:    · Problems due to the use of anesthetics.  · Blood loss and replacement (does not apply to minor surgical procedures).  · Temporary increase in pain due to surgery.  · Uncorrected pain or problems that the surgery was meant to correct.  · Infection.  · New damage.    What happens before the procedure?    Medicines  Ask your health care provider about:  · Changing or stopping your regular medicines. This is especially important if you are taking diabetes medicines or blood thinners.  · Taking medicines such as aspirin and ibuprofen. These medicines can thin your blood. Do not take these medicines unless your health care provider tells you to take them.  · Taking over-the-counter medicines, vitamins, herbs, and supplements. Do not take these during the week before  your procedure unless your health care provider approves them.  General instructions  · Starting 3-6 weeks before the procedure, do not use any products that contain nicotine or tobacco, such as cigarettes and e-cigarettes. If you need help quitting, ask your health care provider.  · If you brush your teeth on the morning of the procedure, make sure to spit out all of the toothpaste.  · Tell your health care provider if you become ill or develop a cold, cough, or fever.  · If instructed by your health care provider, bring your sleep apnea device with you on the day of your surgery (if applicable).  · Ask your health care provider if you will be going home the same day, the following day, or after a longer hospital stay.  ? Plan to have someone take you home from the hospital or clinic.  ? Plan to have a responsible adult care for you for at least 24 hours after you leave the hospital or clinic. This is important.  What happens during the procedure?  · You will be given anesthetics through both of the following:  ? A mask placed over your nose and mouth.  ? An IV in one of your veins.  · You may receive a medicine to help you relax (sedative).  · After you are unconscious, a breathing tube may be inserted down your throat to help you breathe. This will be removed before you wake up.  · An anesthesia specialist will stay with you throughout your procedure. He or she will:  ? Keep you comfortable and safe by continuing to give you medicines and adjusting the amount of medicine that you get.  ? Monitor your blood pressure, pulse, and oxygen levels to make sure that the anesthetics do not cause any problems.  The procedure may vary among health care providers and hospitals.  What happens after the procedure?  · Your blood pressure, temperature, heart rate, breathing rate, and blood oxygen level will be monitored until the medicines you were given have worn off.  · You will wake up in a recovery area. You may wake up  slowly.  · If you feel anxious or agitated, you may be given medicine to help you calm down.  · If you will be going home the same day, your health care provider may check to make sure you can walk, drink, and urinate.  · Your health care provider will treat any pain or side effects you have before you go home.  · Do not drive for 24 hours if you were given a sedative.  Summary  · General anesthesia is used to keep you still and prevent pain during a procedure.  · It is important to tell your healthcare provider about your medical history and any surgeries you have had, and previous experience with anesthesia.  · Follow your healthcare provider’s instructions about when to stop eating, drinking, or taking certain medicines before your procedure.  · Plan to have someone take you home from the hospital or clinic.  This information is not intended to replace advice given to you by your health care provider. Make sure you discuss any questions you have with your health care provider.  Document Released: 03/26/2009 Document Revised: 08/03/2018 Document Reviewed: 08/03/2018  [employer] Interactive Patient Education © 2019 [employer]       Fall Prevention in Hospitals, Adult  As a hospital patient, your condition and the treatments you receive can increase your risk for falls. Some additional risk factors for falls in a hospital include:  · Being in an unfamiliar environment.  · Being on bed rest.  · Your surgery.  · Taking certain medicines.  · Your tubing requirements, such as intravenous (IV) therapy or catheters.  It is important that you learn how to decrease fall risks while at the hospital. Below are important tips that can help prevent falls.  SAFETY TIPS FOR PREVENTING FALLS  Talk about your risk of falling.  · Ask your health care provider why you are at risk for falling. Is it your medicine, illness, tubing placement, or something else?  · Make a plan with your health care provider to keep you safe from  falls.  · Ask your health care provider or pharmacist about side effects of your medicines. Some medicines can make you dizzy or affect your coordination.  Ask for help.  · Ask for help before getting out of bed. You may need to press your call button.  · Ask for assistance in getting safely to the toilet.  · Ask for a walker or cane to be put at your bedside. Ask that most of the side rails on your bed be placed up before your health care provider leaves the room.  · Ask family or friends to sit with you.  · Ask for things that are out of your reach, such as your glasses, hearing aids, telephone, bedside table, or call button.  Follow these tips to avoid falling:  · Stay lying or seated, rather than standing, while waiting for help.  · Wear rubber-soled slippers or shoes whenever you walk in the hospital.  · Avoid quick, sudden movements.  ¨ Change positions slowly.  ¨ Sit on the side of your bed before standing.  ¨ Stand up slowly and wait before you start to walk.  · Let your health care provider know if there is a spill on the floor.  · Pay careful attention to the medical equipment, electrical cords, and tubes around you.  · When you need help, use your call button by your bed or in the bathroom. Wait for one of your health care providers to help you.  · If you feel dizzy or unsure of your footing, return to bed and wait for assistance.  · Avoid being distracted by the TV, telephone, or another person in your room.  · Do not lean or support yourself on rolling objects, such as IV poles or bedside tables.     This information is not intended to replace advice given to you by your health care provider. Make sure you discuss any questions you have with your health care provider.     Document Released: 12/15/2001 Document Revised: 01/08/2016 Document Reviewed: 08/25/2013  [employer] Interactive Patient Education ©2016 [employer]       [facility] 4% Patient Instruction Sheet    Chlorhexidine Before  Surgery  Chlorhexidine gluconate (CHG) is a germ-killing (antiseptic) solution that is used to clean the skin. It gets rid of the bacteria that normally live on the skin. Cleaning your skin with CHG before surgery helps lower the risk for infection after surgery.    How to use CHG solution  · You will take 2 showers, one shower the night before surgery, the second shower the morning of surgery before coming to the hospital.  · Use CHG only as told by your health care provider, and follow the instructions on the label.  · Use CHG solution while taking a shower. Follow these steps when using CHG solution (unless your health care provider gives you different instructions):  1. Start the shower.  2. Use your normal soap and shampoo to wash your face and hair.  3. Turn off the shower or move out of the shower stream.  4. Pour the CHG onto a clean washcloth. Do not use any type of brush or rough-edged sponge.  5. Starting at your neck, lather your body down to your toes. Make sure you:  6. Pay special attention to the part of your body where you will be having surgery. Scrub this area for at least 1 minute.  7. Use the full amount of CHG as directed. Usually, this is one half bottle for each shower.  8. Do not use CHG on your head or face. If the solution gets into your ears or eyes, rinse them well with water.  9. Avoid your genital area.  10. Avoid any areas of skin that have broken skin, cuts, or scrapes.  11. Scrub your back and under your arms. Make sure to wash skin folds.  12. Let the lather sit on your skin for 1-2 minutes or as long as told by your health care  provider.  13. Thoroughly rinse your entire body in the shower. Make sure that all body creases and crevices are rinsed well.  14. Dry off with a clean towel. Do not put any substances on your body afterward, such as powder, lotion, or perfume.  15. Put on clean clothes or pajamas.  16. If it is the night before your surgery, sleep in clean sheets.    What  are the risks?  Risks of using CHG include:  · A skin reaction.  · Hearing loss, if CHG gets in your ears.  · Eye injury, if CHG gets in your eyes and is not rinsed out.  · The CHG product catching fire.  Make sure that you avoid smoking and flames after applying CHG to your skin.  Do not use CHG:  · If you have a chlorhexidine allergy or have previously reacted to chlorhexidine.  · On babies younger than 2 months of age.      On the day of surgery, when you are taken to your room in Outpatient Surgery you will be given a CHG prepackaged cloth to wipe the site for your surgery.  How to use CHG prepackaged cloths  · Follow the instructions on the label.  · Use the CHG cloth on clean, dry skin. Follow these steps when using a CHG cloth (unless your health care provider gives you different instructions):  1. Using the CHG cloth, vigorously scrub the part of your body where you will be having surgery. Scrub using a back-and-forth motion for 3 minutes. The area on your body should be completely wet with CHG when you are finished scrubbing.  2. Do not rinse. Discard the cloth and let the area air-dry for 1 minute. Do not put any substances on your body afterward, such as powder, lotion, or perfume.  Contact a health care provider if:  · Your skin gets irritated after scrubbing.  · You have questions about using your solution or cloth.  Get help right away if:  · Your eyes become very red or swollen.  · Your eyes itch badly.  · Your skin itches badly and is red or swollen.  · Your hearing changes.  · You have trouble seeing.  · You have swelling or tingling in your mouth or throat.  · You have trouble breathing.  · You swallow any chlorhexidine.  Summary  · Chlorhexidine gluconate (CHG) is a germ-killing (antiseptic) solution that is used to clean the skin. Cleaning your skin with CHG before surgery helps lower the risk for infection after surgery.  · You may be given CHG to use at home. It may be in a bottle or in a  prepackaged cloth to use on your skin. Carefully follow your health care provider's instructions and the instructions on the product label.  · Do not use CHG if you have a chlorhexidine allergy.  · Contact your health care provider if your skin gets irritated after scrubbing.  This information is not intended to replace advice given to you by your health care provider. Make sure you discuss any questions you have with your health care provider.  Document Released: 09/11/2013 Document Revised: 11/15/2018 Document Reviewed: 11/15/2018  [employer] Interactive Patient Education © 2019 [employer]          PATIENT/FAMILY/RESPONSIBLE PARTY VERBALIZES UNDERSTANDING OF ABOVE EDUCATION.  COPY OF PAIN SCALE GIVEN AND REVIEWED WITH VERBALIZED UNDERSTANDING.

## 2021-08-27 ENCOUNTER — LAB (OUTPATIENT)
Dept: LAB | Facility: HOSPITAL | Age: 61
End: 2021-08-27

## 2021-08-27 LAB
QT INTERVAL: 436 MS
QTC INTERVAL: 457 MS
SARS-COV-2 ORF1AB RESP QL NAA+PROBE: NOT DETECTED

## 2021-08-27 PROCEDURE — U0004 COV-19 TEST NON-CDC HGH THRU: HCPCS | Performed by: SPECIALIST

## 2021-08-27 PROCEDURE — C9803 HOPD COVID-19 SPEC COLLECT: HCPCS | Performed by: SPECIALIST

## 2021-08-30 ENCOUNTER — ANESTHESIA (OUTPATIENT)
Dept: PERIOP | Facility: HOSPITAL | Age: 61
End: 2021-08-30

## 2021-08-30 ENCOUNTER — HOSPITAL ENCOUNTER (OUTPATIENT)
Dept: MAMMOGRAPHY | Facility: HOSPITAL | Age: 61
Discharge: HOME | End: 2021-08-30

## 2021-08-30 ENCOUNTER — ANESTHESIA EVENT (OUTPATIENT)
Dept: PERIOP | Facility: HOSPITAL | Age: 61
End: 2021-08-30

## 2021-08-30 ENCOUNTER — HOSPITAL ENCOUNTER (OUTPATIENT)
Dept: ULTRASOUND IMAGING | Facility: HOSPITAL | Age: 61
Discharge: HOME | End: 2021-08-30

## 2021-08-30 ENCOUNTER — HOSPITAL ENCOUNTER (OUTPATIENT)
Facility: HOSPITAL | Age: 61
Setting detail: HOSPITAL OUTPATIENT SURGERY
Discharge: HOME | End: 2021-08-30
Attending: SPECIALIST | Admitting: SPECIALIST

## 2021-08-30 VITALS
OXYGEN SATURATION: 94 % | SYSTOLIC BLOOD PRESSURE: 118 MMHG | HEART RATE: 78 BPM | DIASTOLIC BLOOD PRESSURE: 58 MMHG | TEMPERATURE: 98.1 F | RESPIRATION RATE: 18 BRPM

## 2021-08-30 DIAGNOSIS — N63.10 BREAST MASS, RIGHT: ICD-10-CM

## 2021-08-30 DIAGNOSIS — N60.91 BENIGN MAMMARY DYSPLASIA OF RIGHT BREAST: ICD-10-CM

## 2021-08-30 LAB
GLUCOSE BLDC GLUCOMTR-MCNC: 115 MG/DL (ref 70–130)
GLUCOSE BLDC GLUCOMTR-MCNC: 156 MG/DL (ref 70–130)

## 2021-08-30 PROCEDURE — 76098 X-RAY EXAM SURGICAL SPECIMEN: CPT

## 2021-08-30 PROCEDURE — 82962 GLUCOSE BLOOD TEST: CPT

## 2021-08-30 PROCEDURE — 25010000002 FENTANYL CITRATE (PF) 100 MCG/2ML SOLUTION: Performed by: NURSE ANESTHETIST, CERTIFIED REGISTERED

## 2021-08-30 PROCEDURE — 88307 TISSUE EXAM BY PATHOLOGIST: CPT | Performed by: SPECIALIST

## 2021-08-30 PROCEDURE — 25010000002 ONDANSETRON PER 1 MG: Performed by: NURSE ANESTHETIST, CERTIFIED REGISTERED

## 2021-08-30 PROCEDURE — 25010000003 CEFAZOLIN PER 500 MG: Performed by: SPECIALIST

## 2021-08-30 PROCEDURE — 25010000002 PROPOFOL 10 MG/ML EMULSION: Performed by: NURSE ANESTHETIST, CERTIFIED REGISTERED

## 2021-08-30 PROCEDURE — C1819 TISSUE LOCALIZATION-EXCISION: HCPCS

## 2021-08-30 RX ORDER — EPHEDRINE SULFATE 50 MG/ML
INJECTION, SOLUTION INTRAVENOUS AS NEEDED
Status: DISCONTINUED | OUTPATIENT
Start: 2021-08-30 | End: 2021-08-30 | Stop reason: SURG

## 2021-08-30 RX ORDER — FENTANYL CITRATE 50 UG/ML
25 INJECTION, SOLUTION INTRAMUSCULAR; INTRAVENOUS
Status: DISCONTINUED | OUTPATIENT
Start: 2021-08-30 | End: 2021-08-30 | Stop reason: HOSPADM

## 2021-08-30 RX ORDER — FENTANYL CITRATE 50 UG/ML
INJECTION, SOLUTION INTRAMUSCULAR; INTRAVENOUS AS NEEDED
Status: DISCONTINUED | OUTPATIENT
Start: 2021-08-30 | End: 2021-08-30 | Stop reason: SURG

## 2021-08-30 RX ORDER — OXYCODONE AND ACETAMINOPHEN 10; 325 MG/1; MG/1
1 TABLET ORAL ONCE AS NEEDED
Status: DISCONTINUED | OUTPATIENT
Start: 2021-08-30 | End: 2021-08-30 | Stop reason: HOSPADM

## 2021-08-30 RX ORDER — OXYCODONE AND ACETAMINOPHEN 7.5; 325 MG/1; MG/1
2 TABLET ORAL EVERY 4 HOURS PRN
Status: DISCONTINUED | OUTPATIENT
Start: 2021-08-30 | End: 2021-08-30 | Stop reason: HOSPADM

## 2021-08-30 RX ORDER — FLUMAZENIL 0.1 MG/ML
0.2 INJECTION INTRAVENOUS AS NEEDED
Status: DISCONTINUED | OUTPATIENT
Start: 2021-08-30 | End: 2021-08-30 | Stop reason: HOSPADM

## 2021-08-30 RX ORDER — NALOXONE HCL 0.4 MG/ML
0.4 VIAL (ML) INJECTION AS NEEDED
Status: DISCONTINUED | OUTPATIENT
Start: 2021-08-30 | End: 2021-08-30 | Stop reason: HOSPADM

## 2021-08-30 RX ORDER — SODIUM CHLORIDE 0.9 % (FLUSH) 0.9 %
3 SYRINGE (ML) INJECTION AS NEEDED
Status: DISCONTINUED | OUTPATIENT
Start: 2021-08-30 | End: 2021-08-30 | Stop reason: HOSPADM

## 2021-08-30 RX ORDER — LIDOCAINE HYDROCHLORIDE 10 MG/ML
0.5 INJECTION, SOLUTION EPIDURAL; INFILTRATION; INTRACAUDAL; PERINEURAL ONCE AS NEEDED
Status: DISCONTINUED | OUTPATIENT
Start: 2021-08-30 | End: 2021-08-30 | Stop reason: HOSPADM

## 2021-08-30 RX ORDER — ONDANSETRON 2 MG/ML
4 INJECTION INTRAMUSCULAR; INTRAVENOUS ONCE AS NEEDED
Status: DISCONTINUED | OUTPATIENT
Start: 2021-08-30 | End: 2021-08-30 | Stop reason: HOSPADM

## 2021-08-30 RX ORDER — ACETAMINOPHEN 500 MG
1000 TABLET ORAL ONCE
Status: COMPLETED | OUTPATIENT
Start: 2021-08-30 | End: 2021-08-30

## 2021-08-30 RX ORDER — ONDANSETRON 2 MG/ML
INJECTION INTRAMUSCULAR; INTRAVENOUS AS NEEDED
Status: DISCONTINUED | OUTPATIENT
Start: 2021-08-30 | End: 2021-08-30 | Stop reason: SURG

## 2021-08-30 RX ORDER — SODIUM CHLORIDE 0.9 % (FLUSH) 0.9 %
3-10 SYRINGE (ML) INJECTION AS NEEDED
Status: DISCONTINUED | OUTPATIENT
Start: 2021-08-30 | End: 2021-08-30 | Stop reason: HOSPADM

## 2021-08-30 RX ORDER — LIDOCAINE HYDROCHLORIDE 20 MG/ML
INJECTION, SOLUTION EPIDURAL; INFILTRATION; INTRACAUDAL; PERINEURAL AS NEEDED
Status: DISCONTINUED | OUTPATIENT
Start: 2021-08-30 | End: 2021-08-30 | Stop reason: SURG

## 2021-08-30 RX ORDER — SODIUM CHLORIDE, SODIUM LACTATE, POTASSIUM CHLORIDE, CALCIUM CHLORIDE 600; 310; 30; 20 MG/100ML; MG/100ML; MG/100ML; MG/100ML
1000 INJECTION, SOLUTION INTRAVENOUS CONTINUOUS
Status: DISCONTINUED | OUTPATIENT
Start: 2021-08-30 | End: 2021-08-30 | Stop reason: HOSPADM

## 2021-08-30 RX ORDER — SODIUM CHLORIDE 0.9 % (FLUSH) 0.9 %
3 SYRINGE (ML) INJECTION EVERY 12 HOURS SCHEDULED
Status: DISCONTINUED | OUTPATIENT
Start: 2021-08-30 | End: 2021-08-30 | Stop reason: HOSPADM

## 2021-08-30 RX ORDER — BUPIVACAINE HYDROCHLORIDE AND EPINEPHRINE 5; 5 MG/ML; UG/ML
INJECTION, SOLUTION PERINEURAL AS NEEDED
Status: DISCONTINUED | OUTPATIENT
Start: 2021-08-30 | End: 2021-08-30 | Stop reason: HOSPADM

## 2021-08-30 RX ORDER — MIDAZOLAM HYDROCHLORIDE 1 MG/ML
1 INJECTION INTRAMUSCULAR; INTRAVENOUS
Status: DISCONTINUED | OUTPATIENT
Start: 2021-08-30 | End: 2021-08-30 | Stop reason: HOSPADM

## 2021-08-30 RX ORDER — PROPOFOL 10 MG/ML
VIAL (ML) INTRAVENOUS AS NEEDED
Status: DISCONTINUED | OUTPATIENT
Start: 2021-08-30 | End: 2021-08-30 | Stop reason: SURG

## 2021-08-30 RX ORDER — LABETALOL HYDROCHLORIDE 5 MG/ML
5 INJECTION, SOLUTION INTRAVENOUS
Status: DISCONTINUED | OUTPATIENT
Start: 2021-08-30 | End: 2021-08-30 | Stop reason: HOSPADM

## 2021-08-30 RX ORDER — MAGNESIUM HYDROXIDE 1200 MG/15ML
LIQUID ORAL AS NEEDED
Status: DISCONTINUED | OUTPATIENT
Start: 2021-08-30 | End: 2021-08-30 | Stop reason: HOSPADM

## 2021-08-30 RX ORDER — SODIUM CHLORIDE, SODIUM LACTATE, POTASSIUM CHLORIDE, CALCIUM CHLORIDE 600; 310; 30; 20 MG/100ML; MG/100ML; MG/100ML; MG/100ML
100 INJECTION, SOLUTION INTRAVENOUS CONTINUOUS
Status: DISCONTINUED | OUTPATIENT
Start: 2021-08-30 | End: 2021-08-30 | Stop reason: HOSPADM

## 2021-08-30 RX ORDER — HYDROCODONE BITARTRATE AND ACETAMINOPHEN 7.5; 325 MG/1; MG/1
1 TABLET ORAL EVERY 4 HOURS PRN
Qty: 15 TABLET | Refills: 0 | Status: SHIPPED | OUTPATIENT
Start: 2021-08-30 | End: 2023-03-13

## 2021-08-30 RX ADMIN — FENTANYL CITRATE 100 MCG: 50 INJECTION, SOLUTION INTRAMUSCULAR; INTRAVENOUS at 09:50

## 2021-08-30 RX ADMIN — ACETAMINOPHEN 1000 MG: 500 TABLET, FILM COATED ORAL at 09:32

## 2021-08-30 RX ADMIN — EPHEDRINE SULFATE 25 MG: 50 INJECTION INTRAVENOUS at 10:09

## 2021-08-30 RX ADMIN — LIDOCAINE HYDROCHLORIDE 100 MG: 20 INJECTION, SOLUTION EPIDURAL; INFILTRATION; INTRACAUDAL; PERINEURAL at 09:50

## 2021-08-30 RX ADMIN — ONDANSETRON 4 MG: 2 INJECTION INTRAMUSCULAR; INTRAVENOUS at 10:05

## 2021-08-30 RX ADMIN — SODIUM CHLORIDE 1 G: 9 INJECTION, SOLUTION INTRAVENOUS at 09:54

## 2021-08-30 RX ADMIN — SODIUM CHLORIDE, POTASSIUM CHLORIDE, SODIUM LACTATE AND CALCIUM CHLORIDE 1000 ML: 600; 310; 30; 20 INJECTION, SOLUTION INTRAVENOUS at 07:33

## 2021-08-30 RX ADMIN — EPHEDRINE SULFATE 25 MG: 50 INJECTION INTRAVENOUS at 10:13

## 2021-08-30 RX ADMIN — PROPOFOL 150 MG: 10 INJECTION, EMULSION INTRAVENOUS at 09:50

## 2021-08-30 NOTE — ANESTHESIA PREPROCEDURE EVALUATION
Anesthesia Evaluation     Patient summary reviewed   no history of anesthetic complications:  NPO Solid Status: > 8 hours  NPO Liquid Status: > 8 hours           Airway   Mallampati: II  TM distance: >3 FB  Neck ROM: full  no difficulty expected  Dental - normal exam     Pulmonary    (+) sleep apnea on CPAP,   (-) asthma, recent URI, not a smoker  Cardiovascular   Exercise tolerance: good (4-7 METS)    (+) hypertension, hyperlipidemia,   (-) pacemaker, past MI, angina, cardiac stents, CABG      Neuro/Psych  (-) seizures, TIA, CVA  GI/Hepatic/Renal/Endo    (+) obesity,   diabetes mellitus type 2,   (-) GERD, liver disease, no renal disease    Musculoskeletal     Abdominal   (+) obese,    Substance History      OB/GYN          Other                          Anesthesia Plan    ASA 2     general     intravenous induction     Anesthetic plan, all risks, benefits, and alternatives have been provided, discussed and informed consent has been obtained with: patient.

## 2021-08-30 NOTE — ANESTHESIA PROCEDURE NOTES
Airway  Urgency: elective    Date/Time: 8/30/2021 9:51 AM  Airway not difficult    General Information and Staff    Patient location during procedure: OR  CRNA: Nicole Day CRNA    Indications and Patient Condition  Indications for airway management: airway protection    Preoxygenated: yes  Mask difficulty assessment: 0 - not attempted    Final Airway Details  Final airway type: supraglottic airway      Successful airway: LMA and I-gel  Size 4    Number of attempts at approach: 1  Assessment: lips, teeth, and gum same as pre-op and atraumatic intubation

## 2021-08-30 NOTE — ANESTHESIA POSTPROCEDURE EVALUATION
Patient: Geneva Knight    Procedure Summary     Date: 08/30/21 Room / Location:  [facility] OR  /  [facility] OR    Anesthesia Start: 0946 Anesthesia Stop: 1020    Procedure: EXCISIONAL RIGHT BREAST BIOPSY WITH NEEDLE LOCALIZATION - ULTRASOUND GUIDED (Right Breast) Diagnosis: (RIGHT BREAST MASS)    Surgeons: Martine Roy MD Provider: Nicole Day CRNA    Anesthesia Type: general ASA Status: 2          Anesthesia Type: general    Vitals  Vitals Value Taken Time   /65 08/30/21 1040   Temp 98.1 °F (36.7 °C) 08/30/21 1040   Pulse 79 08/30/21 1040   Resp 15 08/30/21 1040   SpO2 95 % 08/30/21 1040           Post Anesthesia Care and Evaluation    Patient location during evaluation: PACU  Patient participation: complete - patient participated  Level of consciousness: awake and alert  Pain management: adequate  Airway patency: patent  Anesthetic complications: No anesthetic complications    Cardiovascular status: acceptable  Respiratory status: acceptable  Hydration status: acceptable    Comments: Blood pressure 146/70, pulse 80, temperature 98.1 °F (36.7 °C), temperature source Temporal, resp. rate 18, SpO2 93 %, not currently breastfeeding.    Pt discharged from PACU based on paula score >8

## 2021-08-31 LAB
CYTO UR: NORMAL
LAB AP CASE REPORT: NORMAL
PATH REPORT.FINAL DX SPEC: NORMAL
PATH REPORT.GROSS SPEC: NORMAL

## 2021-09-08 ENCOUNTER — TRANSCRIBE ORDERS (OUTPATIENT)
Dept: ADMINISTRATIVE | Facility: HOSPITAL | Age: 61
End: 2021-09-08

## 2021-09-08 DIAGNOSIS — N60.91 BENIGN MAMMARY DYSPLASIA OF RIGHT BREAST: Primary | ICD-10-CM

## 2021-09-08 DIAGNOSIS — N62 HYPERTROPHY OF BREAST: ICD-10-CM

## 2021-09-08 DIAGNOSIS — Z80.3 FAMILY HISTORY OF MALIGNANT NEOPLASM OF BREAST: ICD-10-CM

## 2021-09-08 DIAGNOSIS — N60.89: ICD-10-CM

## 2021-10-20 DIAGNOSIS — D69.6 THROMBOCYTOPENIA (HCC): Primary | ICD-10-CM

## 2021-10-21 NOTE — PROGRESS NOTES
tablet, Rfl: 11    SITagliptin-metFORMIN HCl ER (JANUMET XR) 100-1000 MG TB24, Take 100-1,000 mg by mouth nightly , Disp: , Rfl:     metoprolol succinate (TOPROL XL) 100 MG extended release tablet, Take 1 tablet by mouth nightly , Disp: , Rfl: 1    escitalopram (LEXAPRO) 10 MG tablet, Take 10 mg by mouth nightly , Disp: , Rfl:     triamterene-hydrochlorothiazide (MAXZIDE-25) 37.5-25 MG per tablet, Take 1 tablet by mouth nightly , Disp: , Rfl:      Allergies   Allergen Reactions    Azithromycin Rash    Namenda  [Memantine Hcl] Nausea And Vomiting and Nausea Only       Social History     Tobacco Use    Smoking status: Never Smoker    Smokeless tobacco: Never Used   Substance Use Topics    Alcohol use: No    Drug use: No       Family History   Problem Relation Age of Onset    Cancer Mother     Heart Attack Father        Subjective   REVIEW OF SYSTEMS:   Review of Systems   Constitutional: Negative for chills, diaphoresis, fatigue, fever and unexpected weight change. HENT: Negative for mouth sores, nosebleeds, sore throat, trouble swallowing and voice change. Eyes: Negative for photophobia, discharge and itching. Respiratory: Negative for cough, shortness of breath and wheezing. Cardiovascular: Negative for chest pain, palpitations and leg swelling. Gastrointestinal: Negative for abdominal distention, abdominal pain, blood in stool, constipation, diarrhea, nausea and vomiting. Endocrine: Negative for cold intolerance, heat intolerance, polydipsia and polyuria. Genitourinary: Negative for difficulty urinating, dysuria, hematuria and urgency. Musculoskeletal: Positive for arthralgias (Right ankle). Negative for back pain, joint swelling and myalgias. Skin: Positive for color change (Right ankle erythema). Negative for rash. Neurological: Negative for dizziness, tremors, seizures, syncope and light-headedness. Hematological: Negative for adenopathy. Does not bruise/bleed easily. Psychiatric/Behavioral: Positive for decreased concentration. Negative for behavioral problems and suicidal ideas. The patient is not nervous/anxious. All other systems reviewed and are negative. Objective   /60   Pulse 73   Ht 5' 6\" (1.676 m)   Wt 242 lb (109.8 kg)   SpO2 93%   BMI 39.06 kg/m²     PHYSICAL EXAM:  Physical Exam  Constitutional:       Appearance: She is well-developed. HENT:      Head: Normocephalic and atraumatic. Eyes:      General: No scleral icterus. Conjunctiva/sclera: Conjunctivae normal.   Neck:      Trachea: No tracheal deviation. Cardiovascular:      Rate and Rhythm: Normal rate and regular rhythm. Heart sounds: Normal heart sounds. No murmur heard. Pulmonary:      Effort: Pulmonary effort is normal. No respiratory distress. Breath sounds: Normal breath sounds. Abdominal:      General: Bowel sounds are normal. There is no distension. Palpations: Abdomen is soft. There is hepatomegaly (Minimal tenderness). There is no fluid wave or splenomegaly (Not obviously palpable). Tenderness: There is no abdominal tenderness. Musculoskeletal:         General: Deformity (Surgical scar right ankle has healed.) present. No tenderness. Cervical back: Normal range of motion and neck supple. Right lower le+ Edema present. Left lower le+ Edema present. Lymphadenopathy:      Cervical: No cervical adenopathy. Upper Body:      Right upper body: No supraclavicular or axillary adenopathy. Left upper body: No supraclavicular or axillary adenopathy. Lower Body: No right inguinal adenopathy. No left inguinal adenopathy. Skin:     General: Skin is warm and dry. Findings: Erythema (Mild right distal leg/ankle region) present. No rash. Neurological:      Mental Status: She is alert and oriented to person, place, and time.       Coordination: Coordination normal.   Psychiatric:         Behavior: Behavior normal. Thought Content: Thought content normal.         Cognition and Memory: She exhibits impaired recent memory. CBC reviewed by me  Lab Results   Component Value Date    WBC 4.80 10/22/2021    HGB 13.2 10/22/2021    HCT 40.0 10/22/2021    MCV 88.5 10/22/2021     (L) 10/22/2021     Lab Results   Component Value Date    NEUTROABS 2.1 06/23/2021       VISIT DIAGNOSES  1. Thrombocytopenia (HCC)    2. Splenomegaly    3. Hepatic steatosis    4. Gammopathy        ASSESSMENT/PLAN:    1. Thrombocytopenia. Chronic issue-stable overall    She has previously had mild splenomegaly on imaging studies. Spleen is not overtly palpable on examination. She has hepatic steatosis. Prior serology has been unrevealing for cause of her thrombocytopenia otherwise. Serology 6/22/2021  KSENIA 2 - [id number] negative  CALR mutation - negative   KSENIA 2 Exons 12-15 - negative  MPL - negative    Serology above negative for MPN causing splenomegaly. PLT is 100,000 which is relatively stable. 2.  Gammopathy    Serology 7/28/2020  SPEP - No M spike -with immunofixation negative  QI - IgG 1232, IgA 422 (), IgM 78  Free serum light chains -   B2 M - 2.1    LDH - 510 (313-618) - WNL  CMP - total bilirubin is 1.7, alk phos 130, AST 79, ALT 54      Serology 10/27/2020  SPEP - no M spike with immunofixation negative  QI - IgG 1227, IgA 390, IgM 69  Free serum light chains -kappa 31.3, K/L ratio 2.27  B2M - 2.2 - WNL  CMP = crt 0.6 with GFR > 60, AST 56, total bili 1.5    Serology 3/23/2021  SPEP - no M spike with immunofixation negative  QI - IgG 1227, IgA 342, IgM 69 - her IgA normalized  Free serum light chains -kappa 26.5, K/L ratio 2.04 -improved  B2M - 2.2 - WNL  CMP = crt 0.7 with GFR > 60, Ca 9.7, alk phos 155, AST 52, total bilirubin normal - known fatty liver    She does not have any B symptoms-she denies any extreme fatigue, she denies night sweats, pruritus, weight loss.  She does not have any peripheral lymphadenopathy on examination. Her spleen is not overtly enlarged. She does not have any right upper quadrant tenderness as well. She has a slightly elevated IgA which actually showed improvement on follow-up 10/27/2020. Her SPEP itself does not show any M spike. She does have a slightly elevated kappa light chain with elevated kappa light chain ratio. I discussed this with her at her office visit of 2/23/2021. I discussed implications and potential need to proceed with bone marrow aspiration biopsy. Serology from 3/23/2021 revealed that her IgA actually normalized. K/L ratio had improved. Serology 6/22/2021  SPEP no M spike with immunofixation negative  QI IgG 1041, IgA 315, IgM 53 all WNL  Free serum light chains kappa 28.1 with K/L ratio 2.25    Peripheral blood flow cytometry to [facility] 6/22/2021 did not reveal any increase in blast, no B-cell monoclonality and no T-cell aberrant antigen expression    I am going to repeat her protein serology along with a 24-hour urine. I told her that the K/L ratio from her last visit had increased slightly. I again discussed bone marrow aspiration biopsy. If we see significant proteinuria, monoclonal protein issues or rise in her K/L ratio we will need to proceed with bone marrow. She is okay to have this performed in the office if needed. She does agree to go for bone survey today. PLAN  Repeat protein serology   24 urine for immunoelectrophoresis, immunofixation, Total protein  Bone survey      HEALTH MAINTENANCE    · Colon cancer screening. Most recent colonoscopy was 12/18/2017 by Dr. Kriste Skiff. 3 polyps removed from the rectum, (1 tubular adenoma and 2 hyperplastic polyps), congested rectal mucosa with biopsy benign.       · Breast cancer screening.     Bilateral screening mammogram 7/8/2021 at [facility] revealed a new finding of a 11 mm nodule in the upper outer quadrant of the right breast at the 10 o'clock position    Follow-up unilateral right mammogram 7/19/2021 at [facility] revealed reproduction of the density with ultrasound correlation revealing a septated cyst.  BI-RADS 3 probably benign    MRI breast at [facility] on 8/5/2021 revealed a 1.4 x 0.9 cm cystic mass in the right breast, middle third, outer central breast, 9 o'clock position, 6 cm from the nipple. Peripheral enhancement was found to be associated. BI-RADS 4A. Right breast needle biopsy 8/10/2021  · A few foci of atypical ductal hyperplasia  · Florid usual ductal hyperplasia (ductal epithelial hyperplasia without atypia)  · Fibrocystic changes with papillary apocrine change  · No histologic evidence of malignancy    Right breast excisional biopsy with needle localization 8/30/2021 by Dr. Claus Doll  · Focal atypical ductal hyperplasia adjacent to biopsy cavity  · Adjacent fibrocystic change with florid ductal hyperplasia, columnar cell change, columnar cell hyperplasia  · Biopsy site and adjacent fibrous tissue completely excised  · No evidence of in situ carcinoma or invasive carcinoma          · Cervical cancer screening. She reports that she has Pap smears every other year and they have been negative. Immunization History   Administered Date(s) Administered    COVID-19, Moderna, PF, 100mcg/0.5mL 03/27/2021, 04/29/2021           Orders Placed This Encounter   Procedures    XR BONE SURVEY COMPLETE    Electrophoresis Protein, Serum with Reflex to Immunofixation    Solomons/Lambda Free Lt Chains, Serum Quant    Beta 2 Microglobulin, Serum    Comprehensive Metabolic Panel    Lactate Dehydrogenase    Electrophoresis Protein Urine 24 HR        Return in about 4 months (around 2/22/2022) for With Sarkis Nettle.      Charito Villalobos PA-C  1:29 PM  10/22/2021

## 2021-10-22 ENCOUNTER — HOSPITAL ENCOUNTER (OUTPATIENT)
Dept: GENERAL RADIOLOGY | Age: 61
Discharge: HOME | End: 2021-10-22
Payer: MEDICARE

## 2021-10-22 ENCOUNTER — HOSPITAL ENCOUNTER (OUTPATIENT)
Dept: INFUSION THERAPY | Age: 61
Discharge: HOME | End: 2021-10-22
Payer: MEDICARE

## 2021-10-22 ENCOUNTER — OFFICE VISIT (OUTPATIENT)
Dept: HEMATOLOGY | Age: 61
End: 2021-10-22
Payer: MEDICARE

## 2021-10-22 VITALS
SYSTOLIC BLOOD PRESSURE: 110 MMHG | OXYGEN SATURATION: 93 % | HEART RATE: 73 BPM | HEIGHT: 66 IN | WEIGHT: 242 LBS | DIASTOLIC BLOOD PRESSURE: 60 MMHG | BODY MASS INDEX: 38.89 KG/M2

## 2021-10-22 DIAGNOSIS — D69.6 THROMBOCYTOPENIA (HCC): Primary | ICD-10-CM

## 2021-10-22 DIAGNOSIS — D47.2 GAMMOPATHY: ICD-10-CM

## 2021-10-22 DIAGNOSIS — K76.0 HEPATIC STEATOSIS: ICD-10-CM

## 2021-10-22 DIAGNOSIS — D69.6 THROMBOCYTOPENIA (HCC): ICD-10-CM

## 2021-10-22 DIAGNOSIS — R16.1 SPLENOMEGALY: ICD-10-CM

## 2021-10-22 LAB
ALBUMIN SERPL-MCNC: 4.4 G/DL (ref 3.5–5.2)
ALP BLD-CCNC: 135 U/L (ref 35–104)
ALT SERPL-CCNC: 29 U/L (ref 9–52)
ANION GAP SERPL CALCULATED.3IONS-SCNC: 8 MMOL/L (ref 7–19)
AST SERPL-CCNC: 51 U/L (ref 14–36)
BILIRUB SERPL-MCNC: 0.8 MG/DL (ref 0.2–1.3)
BUN BLDV-MCNC: 14 MG/DL (ref 7–17)
CALCIUM SERPL-MCNC: 9.5 MG/DL (ref 8.4–10.2)
CHLORIDE BLD-SCNC: 106 MMOL/L (ref 98–111)
CO2: 30 MMOL/L (ref 22–29)
CREAT SERPL-MCNC: 0.6 MG/DL (ref 0.5–1)
GFR NON-AFRICAN AMERICAN: >60
GLOBULIN: 2.6 G/DL
GLUCOSE BLD-MCNC: 133 MG/DL (ref 74–106)
HCT VFR BLD CALC: 40 % (ref 34.1–44.9)
HEMOGLOBIN: 13.2 G/DL (ref 11.2–15.7)
LACTATE DEHYDROGENASE: 492 U/L (ref 313–618)
MCH RBC QN AUTO: 29.2 PG (ref 25.6–32.2)
MCHC RBC AUTO-ENTMCNC: 33 G/DL (ref 32.3–35.5)
MCV RBC AUTO: 88.5 FL (ref 79.4–94.8)
PDW BLD-RTO: 13.2 % (ref 11.7–14.4)
PLATELET # BLD: 100 K/UL (ref 182–369)
PMV BLD AUTO: 10.6 FL (ref 7.4–10.4)
POTASSIUM SERPL-SCNC: 4.1 MMOL/L (ref 3.5–5.1)
RBC # BLD: 4.52 M/UL (ref 3.93–5.22)
SODIUM BLD-SCNC: 144 MMOL/L (ref 137–145)
TOTAL PROTEIN: 7 G/DL (ref 6.3–8.2)
WBC # BLD: 4.8 K/UL (ref 3.98–10.04)

## 2021-10-22 PROCEDURE — 99213 OFFICE O/P EST LOW 20 MIN: CPT

## 2021-10-22 PROCEDURE — 85027 COMPLETE CBC AUTOMATED: CPT

## 2021-10-22 PROCEDURE — 80053 COMPREHEN METABOLIC PANEL: CPT

## 2021-10-22 PROCEDURE — 99214 OFFICE O/P EST MOD 30 MIN: CPT | Performed by: PHYSICIAN ASSISTANT

## 2021-10-22 PROCEDURE — 83615 LACTATE (LD) (LDH) ENZYME: CPT

## 2021-10-22 PROCEDURE — 77075 RADEX OSSEOUS SURVEY COMPL: CPT

## 2021-10-22 ASSESSMENT — ENCOUNTER SYMPTOMS
PHOTOPHOBIA: 0
ABDOMINAL PAIN: 0
BACK PAIN: 0
SHORTNESS OF BREATH: 0
ABDOMINAL DISTENTION: 0
COUGH: 0
WHEEZING: 0
SORE THROAT: 0
NAUSEA: 0
VOICE CHANGE: 0
VOMITING: 0
TROUBLE SWALLOWING: 0
CONSTIPATION: 0
EYE ITCHING: 0
BLOOD IN STOOL: 0
DIARRHEA: 0
COLOR CHANGE: 1
EYE DISCHARGE: 0

## 2021-11-01 NOTE — PATIENT INSTRUCTIONS
Patient Education        A Healthy Lifestyle: Care Instructions  Your Care Instructions     A healthy lifestyle can help you feel good, stay at a healthy weight, and have plenty of energy for both work and play. A healthy lifestyle is something you can share with your whole family. A healthy lifestyle also can lower your risk for serious health problems, such as high blood pressure, heart disease, and diabetes. You can follow a few steps listed below to improve your health and the health of your family. Follow-up care is a key part of your treatment and safety. Be sure to make and go to all appointments, and call your doctor if you are having problems. It's also a good idea to know your test results and keep a list of the medicines you take. How can you care for yourself at home? · Do not eat too much sugar, fat, or fast foods. You can still have dessert and treats now and then. The goal is moderation. · Start small to improve your eating habits. Pay attention to portion sizes, drink less juice and soda pop, and eat more fruits and vegetables. ? Eat a healthy amount of food. A 3-ounce serving of meat, for example, is about the size of a deck of cards. Fill the rest of your plate with vegetables and whole grains. ? Limit the amount of soda and sports drinks you have every day. Drink more water when you are thirsty. ? Eat plenty of fruits and vegetables every day. Have an apple or some carrot sticks as an afternoon snack instead of a candy bar. Try to have fruits and/or vegetables at every meal.  · Make exercise part of your daily routine. You may want to start with simple activities, such as walking, bicycling, or slow swimming. Try to be active 30 to 60 minutes every day. You do not need to do all 30 to 60 minutes all at once. For example, you can exercise 3 times a day for 10 or 20 minutes.  Moderate exercise is safe for most people, but it is always a good idea to talk to your doctor before starting an exercise program.  · Keep moving. Toone Sender the lawn, work in the garden, or [place]. Take the stairs instead of the elevator at work. · If you smoke, quit. People who smoke have an increased risk for heart attack, stroke, cancer, and other lung illnesses. Quitting is hard, but there are ways to boost your chance of quitting tobacco for good. ? Use nicotine gum, patches, or lozenges. ? Ask your doctor about stop-smoking programs and medicines. ? Keep trying. In addition to reducing your risk of diseases in the future, you will notice some benefits soon after you stop using tobacco. If you have shortness of breath or asthma symptoms, they will likely get better within a few weeks after you quit. · Limit how much alcohol you drink. Moderate amounts of alcohol (up to 2 drinks a day for men, 1 drink a day for women) are okay. But drinking too much can lead to liver problems, high blood pressure, and other health problems. Family health  If you have a family, there are many things you can do together to improve your health. · Eat meals together as a family as often as possible. · Eat healthy foods. This includes fruits, vegetables, lean meats and dairy, and whole grains. · Include your family in your fitness plan. Most people think of activities such as jogging or tennis as the way to fitness, but there are many ways you and your family can be more active. Anything that makes you breathe hard and gets your heart pumping is exercise. Here are some tips:  ? Walk to do errands or to take your child to school or the bus.  ? Go for a family bike ride after dinner instead of watching TV. Where can you learn more? Go to https://Mi-Paytodd.healthKinnek. org and sign in to your [employer] account. Enter Q465 in the [employer] box to learn more about \"A Healthy Lifestyle: Care Instructions. \"     If you do not have an account, please click on the \"Sign Up Now\" link.   Current as of: June 16, breast. Move your fingers slowly in small coin-sized circles that overlap. ? Use three levels of pressure to feel of all your breast tissue. Use light pressure to feel the tissue close to the skin surface. Use medium pressure to feel a little deeper. Use firm pressure to feel your tissue close to your breastbone and ribs. Use each pressure level to feel your breast tissue before moving on to the next spot. ? Check your entire breast, moving up and down as if following a strip from the collarbone to the bra line, and from the armpit to the ribs. Repeat until you have covered the entire breast.  ? Repeat this procedure for your left breast, using the pads of the 3 middle fingers of your right hand. · To examine your breasts while in the shower:  ? Place one arm over your head and lightly soap your breast on that side. ? Using the pads of your fingers, gently move your hand over your breast (in the strip pattern described above), feeling carefully for any lumps or changes. ? Repeat for the other breast.  · Have your doctor inspect anything you notice to see if you need further testing. Where can you learn more? Go to https://N[employer]Gu[employer] Expresseb.FanDistro. org and sign in to your [employer] account. Enter P148 in the [facility] box to learn more about \"Breast Self-Exam: Care Instructions. \"     If you do not have an account, please click on the \"Sign Up Now\" link. Current as of: December 17, 2020               Content Version: 13.0  © 2006-2021 Healthwise, [facility]. Care instructions adapted under license by [facility] ([facility]). If you have questions about a medical condition or this instruction, always ask your healthcare professional. [place] [address] any warranty or liability for your use of this information.

## 2021-11-03 ENCOUNTER — OFFICE VISIT (OUTPATIENT)
Dept: OBGYN CLINIC | Age: 61
End: 2021-11-03
Payer: MEDICARE

## 2021-11-03 VITALS
BODY MASS INDEX: 39.87 KG/M2 | HEART RATE: 77 BPM | DIASTOLIC BLOOD PRESSURE: 72 MMHG | WEIGHT: 247 LBS | SYSTOLIC BLOOD PRESSURE: 135 MMHG

## 2021-11-03 DIAGNOSIS — Z76.89 ENCOUNTER TO ESTABLISH CARE: Primary | ICD-10-CM

## 2021-11-03 DIAGNOSIS — Z12.4 SCREENING FOR CERVICAL CANCER: ICD-10-CM

## 2021-11-03 DIAGNOSIS — Z01.419 WOMEN'S ANNUAL ROUTINE GYNECOLOGICAL EXAMINATION: ICD-10-CM

## 2021-11-03 PROCEDURE — 99386 PREV VISIT NEW AGE 40-64: CPT | Performed by: NURSE PRACTITIONER
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
## 2021-11-10 LAB
HPV COMMENT: NORMAL
HPV TYPE 16: NOT DETECTED
HPV TYPE 18: NOT DETECTED
HPVOH (OTHER TYPES): NOT DETECTED

## 2021-11-16 LAB
CHOLESTEROL, TOTAL: 164 MG/DL (ref 160–199)
CREATININE URINE: 89.1 MG/DL (ref 4.2–622)
HBA1C MFR BLD: 8.7 % (ref 4–6)
HDLC SERPL-MCNC: 64 MG/DL (ref 65–121)
LDL CHOLESTEROL CALCULATED: 86 MG/DL
MICROALBUMIN UR-MCNC: <1.2 MG/DL (ref 0–19)
MICROALBUMIN/CREAT UR-RTO: NORMAL MG/G
TRIGL SERPL-MCNC: 71 MG/DL (ref 0–149)

## 2021-11-16 ASSESSMENT — ENCOUNTER SYMPTOMS
ALLERGIC/IMMUNOLOGIC NEGATIVE: 1
GASTROINTESTINAL NEGATIVE: 1
EYES NEGATIVE: 1
RESPIRATORY NEGATIVE: 1

## 2021-12-19 ENCOUNTER — OFFICE VISIT (OUTPATIENT)
Dept: URGENT CARE | Age: 61
End: 2021-12-19
Payer: MEDICARE

## 2021-12-19 VITALS
WEIGHT: 240 LBS | BODY MASS INDEX: 38.57 KG/M2 | OXYGEN SATURATION: 94 % | SYSTOLIC BLOOD PRESSURE: 134 MMHG | TEMPERATURE: 98.5 F | HEART RATE: 76 BPM | RESPIRATION RATE: 22 BRPM | HEIGHT: 66 IN | DIASTOLIC BLOOD PRESSURE: 73 MMHG

## 2021-12-19 DIAGNOSIS — Z20.822 COVID-19 RULED OUT: ICD-10-CM

## 2021-12-19 DIAGNOSIS — J06.9 UPPER RESPIRATORY TRACT INFECTION, UNSPECIFIED TYPE: Primary | ICD-10-CM

## 2021-12-19 PROCEDURE — 99213 OFFICE O/P EST LOW 20 MIN: CPT | Performed by: NURSE PRACTITIONER

## 2021-12-19 RX ORDER — AMOXICILLIN 500 MG/1
500 CAPSULE ORAL 2 TIMES DAILY
Qty: 20 CAPSULE | Refills: 0 | Status: SHIPPED | OUTPATIENT
Start: 2021-12-19 | End: 2021-12-29

## 2021-12-19 RX ORDER — METHYLPREDNISOLONE 4 MG/1
TABLET ORAL
Qty: 1 KIT | Refills: 0 | Status: SHIPPED | OUTPATIENT
Start: 2021-12-19 | End: 2021-12-25
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
## 2021-12-20 ENCOUNTER — HOSPITAL ENCOUNTER (OUTPATIENT)
Dept: INFUSION THERAPY | Age: 61
End: 2021-12-20

## 2021-12-20 LAB — SARS-COV-2, PCR: NOT DETECTED

## 2022-02-03 NOTE — TELEPHONE ENCOUNTER
See message about patient. On HD M, W, F as per patient  -Nephrology consult in AM On HD TTS? vs MWF?  -Nephrology consult in AM  -F/u AM labs

## 2022-02-22 DIAGNOSIS — D69.6 THROMBOCYTOPENIA (HCC): Primary | ICD-10-CM

## 2022-02-22 NOTE — PROGRESS NOTES
Progress Note      Pt Name: Katy Oconnell  YOB: 1960  MRN: [id number]    Date of evaluation: 2/25/2022  History Obtained From:  patient, electronic medical record    CHIEF COMPLAINT:    Chief Complaint   Patient presents with    Follow-up     Thrombocytopenia ([address]     Current active problems  Thrombocytopenia  Splenomegaly  Hepatic steatosis  Prior EBV      HISTORY OF PRESENT ILLNESS:    Katy Oconnell is a 62 y.o.  female seen initially on 7/28/2020 referred for evaluation of mild thrombocytopenia. She does have mild splenomegaly and hepatic steatosis. She denies any specific left upper quadrant pain. She denies any new liver issues. She still has a slight limp involving her right leg from the ORIF performed by Dr. Ema Anaya on 4/21/2021 for a fractured ankle that she suffered in her kitchen. She was having issues with chronic edema of the legs but that has significantly improved. 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 She declined to have bone marrow performed. She did have a bone survey and did return a 24-hour urine after her last visit. She is diabetic and her A1c is 8.7. Blood pressure stable on her Toprol-XL, Maxide. She continues taking Aricept due to memory issues and is followed by Dr. Goldie Pearce. Mood is stable on her Lexapro.       HEMATOLOGY HISTORY: Thrombocytopenia, splenomegaly, hepatic steatosis, gammopathy  Vic Francisco was seen in initial hematology consultation on 7/28/2020 referred by SWAPNA Powell for evaluation of thrombocytopenia and splenomegaly.             Serology 6/30/2020  B12 - 852  CMP -alk phos 135, AST 52, ALT 38     · Review prior imaging studies  CT abdomen pelvis with contrast at [facility] 8/14/2018 compared to 11/21/2017 revealed hepato-steatosis but spleen read asno splenomegaly     · Current imaging study  Ultrasound liver 7/24/2020 at [facility] was compared to CT of the abdomen 8/14/2018 which revealed moderate diffuse fatty infiltration of the liver without focal lesion. Doppler sonography suggested a normal hepato-pedal portal venous circulation. Splenomegaly measuring 14.4 x 9.5 x 14.6 cm     Her initial CBC in the office on 7/28/2020 revealed a normal WBC of 5.38 with normal percent differential.  Hgb is 14.6 with MCV 94.1. PLT is 116,000.     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 I discussed the fact that her platelet count is adequate at 116,000. She denies any significant bruising or bleeding. She denies any night sweats, weight loss, extreme fatigue, pruritus.     Baseline serology will be checked however she will most likely be monitored conservatively.     Serology 7/28/2020  Fibrinogen - 261  PT/INR 12.2/1.2  PTT - 31  Folate - 18.2  Copper - 91  Zinc - 75  Antiplatelet ab - Negative  SPEP - No M spike -with immunofixation negative  QI - IgG 1232, IgA 422 (), IgM 78  Free serum light chains -   B2 M - 2.1    EBV titers  EBV Ab VCA, IgM  <36  EBV Ab VCA, IgG 136  EBV nuclear antigen Ab, IgG < 18    Consistent with prior infection    Serology 10/27/2020  SPEP - no M spike with immunofixation negative  QI - IgG 1227, IgA 390, IgM 69  Free serum light chains -kappa 31.3, K/L ratio 2.27  B2M - 2.2 - WNL  CMP = crt 0.6 with GFR > 60, AST 56, total bili 1.5    HIV 1/2 - nonreactive  Hepatitis A, B, C - negative        Past Medical History:   Diagnosis Date    Anxiety     Depression     Diabetes [address] [address]     Hypertension     Memory difficulty     Obesity 4/21/2021    Sleep apnea     c-pap        Past Surgical History:   Procedure Laterality Date    ANKLE FRACTURE SURGERY Right 4/21/2021    ANKLE OPEN REDUCTION INTERNAL FIXATION WITH POSSIBLE EXTERNAL FIXATOR performed by Eladio Nguyen MD at [facility]             Current Outpatient Medications:     clotrimazole-betamethasone (LOTRISONE) 1-0.05 % cream, APPLY CREAM TOPICALLY TO AFFECTED AND SURROUNDING AREAS IN THE [facility] AND IN THE EVENING FOR 14 DAYS, Disp: , Rfl:     ketoconazole (NIZORAL) 2 % cream, Apply topically daily, Disp: , Rfl:     levocetirizine (XYZAL) 5 MG tablet, TAKE 1 TABLET BY MOUTH ONCE DAILY FOR 90 DAYS, Disp: , Rfl:     donepezil (ARICEPT) 10 MG tablet, Take 1 tablet by mouth nightly, Disp: 30 tablet, Rfl: 11    SITagliptin-metFORMIN HCl ER (JANUMET XR) 100-1000 MG TB24, Take 100-1,000 mg by mouth nightly , Disp: , Rfl:     metoprolol succinate (TOPROL XL) 100 MG extended release tablet, Take 1 tablet by mouth nightly , Disp: , Rfl: 1    escitalopram (LEXAPRO) 10 MG tablet, Take 10 mg by mouth nightly , Disp: , Rfl:     triamterene-hydrochlorothiazide (MAXZIDE-25) 37.5-25 MG per tablet, Take 1 tablet by mouth nightly , Disp: , Rfl:     metFORMIN, OSM, (FORTAMET) 1000 MG extended release tablet, TAKE 1 TABLET BY MOUTH ONCE DAILY, Disp: , Rfl:      Allergies   Allergen Reactions    Azithromycin Rash    Namenda  [Memantine Hcl] Nausea And Vomiting and Nausea Only       Social History     Tobacco Use    Smoking status: Never Smoker    Smokeless tobacco: Never Used   Substance Use Topics    Alcohol use: No    Drug use: No       Family History   Problem Relation Age of Onset    Cancer Mother     Heart Attack Father        Subjective   REVIEW OF SYSTEMS:   Review of Systems   Constitutional: Negative for chills, diaphoresis, fatigue, fever and unexpected weight change. HENT: Negative for mouth sores, nosebleeds, sore throat, trouble swallowing and voice change. Eyes: Negative for photophobia, discharge and itching. Respiratory: Negative for cough, shortness of breath and wheezing. Cardiovascular: Negative for chest pain, palpitations and leg swelling. Gastrointestinal: Negative for abdominal distention, abdominal pain, blood in stool, constipation, diarrhea, nausea and vomiting.    Endocrine: Negative for cold intolerance, heat intolerance, polydipsia and polyuria. Genitourinary: Negative for difficulty urinating, dysuria, hematuria and urgency. Musculoskeletal: Positive for arthralgias (Right ankle). Negative for back pain, joint swelling and myalgias. Skin: Negative for rash. Neurological: Negative for dizziness, tremors, seizures, syncope and light-headedness. Hematological: Negative for adenopathy. Does not bruise/bleed easily. Psychiatric/Behavioral: Positive for decreased concentration. Negative for behavioral problems and suicidal ideas. The patient is not nervous/anxious. All other systems reviewed and are negative. Objective   /76   Pulse (!) 0   Ht 5' 6\" (1.676 m)   Wt 243 lb 14.4 oz (110.6 kg)   SpO2 98%   BMI 39.37 kg/m²     PHYSICAL EXAM:  Physical Exam  Constitutional:       Appearance: She is well-developed. HENT:      Head: Normocephalic and atraumatic. Eyes:      General: No scleral icterus. Conjunctiva/sclera: Conjunctivae normal.   Neck:      Trachea: No tracheal deviation. Cardiovascular:      Rate and Rhythm: Normal rate and regular rhythm. Heart sounds: Normal heart sounds. No murmur heard. Pulmonary:      Effort: Pulmonary effort is normal. No respiratory distress. Breath sounds: Normal breath sounds. Chest:   Breasts:      Right: No axillary adenopathy or supraclavicular adenopathy. Left: No axillary adenopathy or supraclavicular adenopathy. Abdominal:      General: Bowel sounds are normal. There is no distension. Palpations: Abdomen is soft. There is hepatomegaly (Minimal tenderness). There is no fluid wave or splenomegaly (Not obviously palpable). Tenderness: There is no abdominal tenderness. Musculoskeletal:         General: No tenderness or deformity. Cervical back: Normal range of motion and neck supple. Lymphadenopathy:      Cervical: No cervical adenopathy.       Upper Body:      Right upper body: No supraclavicular or axillary adenopathy. Left upper body: No supraclavicular or axillary adenopathy. Lower Body: No right inguinal adenopathy. No left inguinal adenopathy. Skin:     General: Skin is warm and dry. Findings: No erythema or rash. Neurological:      Mental Status: She is alert and oriented to person, place, and time. Coordination: Coordination normal.   Psychiatric:         Behavior: Behavior normal.         Thought Content: Thought content normal.         Cognition and Memory: She exhibits impaired recent memory. CBC reviewed by me  Lab Results   Component Value Date    WBC 4.60 02/25/2022    HGB 13.4 02/25/2022    HCT 40.6 02/25/2022    MCV 88.8 02/25/2022    PLT 94 (L) 02/25/2022     Lab Results   Component Value Date    NEUTROABS 2.1 06/23/2021       VISIT DIAGNOSES  1. Gammopathy    2. Thrombocytopenia (HCC)    3. Splenomegaly    4. Hepatic steatosis    5. MGUS (monoclonal gammopathy of unknown significance)        ASSESSMENT/PLAN:    1. Thrombocytopenia. Chronic issuestable overall    She has previously had mild splenomegaly on imaging studies. Spleen is not overtly palpable on examination. She has hepatic steatosis. Prior serology has been unrevealing for cause of her thrombocytopenia otherwise. Serology 6/22/2021  KSENIA 2 - [id number] negative  CALR mutation - negative   KSENIA 2 Exons 12-15 - negative  MPL - negative    Serology above negative for MPN causing splenomegaly. Peripheral blood flow cytometry to [facility] 6/22/2021 did not reveal any increase in blast, no B-cell monoclonality and no T-cell aberrant antigen expression        PLT is 94,000 today which is stable for her. I discussed potentially getting hepatic elastography to further evaluate her liver. Review of her CMP reveals minimal elevation of alk phos and AST previously. This will be rechecked today. If her liver functions appear to be more elevated then we will consider sending her for hepatic elastography.     2. elevated total protein. Her bone survey was negative. Her 24 urine did not reveal any monoclonal protein. I again discussed her mild elevated IgA level - previously normalized, K/L ratio is actually better. She does not have any palpable peripheral lymphadenopathy. She again does not want to have a bone marrow performed. She wants to be followed conservatively. C - calcium 9.5 on 10/22/2021  R - creatinine 0.6 with GFR > 60 on 10/22/2021  A - hemoglobin 13.4  B - bone survey negative on 10/22/2021    PLAN  Repeat protein serology       HEALTH MAINTENANCE    · Colon cancer screening. Most recent colonoscopy was 12/18/2017 by Dr. Kamla Hernandez. 3 polyps removed from the rectum, (1 tubular adenoma and 2 hyperplastic polyps), congested rectal mucosa with biopsy benign.       · Breast cancer screening. Bilateral screening mammogram 7/8/2021 at [facility] revealed a new finding of a 11 mm nodule in the upper outer quadrant of the right breast at the 10 o'clock position    Follow-up unilateral right mammogram 7/19/2021 at [facility] revealed reproduction of the density with ultrasound correlation revealing a septated cyst.  BI-RADS 3 probably benign    MRI breast at [facility] on 8/5/2021 revealed a 1.4 x 0.9 cm cystic mass in the right breast, middle third, outer central breast, 9 o'clock position, 6 cm from the nipple. Peripheral enhancement was found to be associated. BI-RADS 4A.     Right breast needle biopsy 8/10/2021  · A few foci of atypical ductal hyperplasia  · Florid usual ductal hyperplasia (ductal epithelial hyperplasia without atypia)  · Fibrocystic changes with papillary apocrine change  · No histologic evidence of malignancy    Right breast excisional biopsy with needle localization 8/30/2021 by Dr. Kenna Cho  · Focal atypical ductal hyperplasia adjacent to biopsy cavity  · Adjacent fibrocystic change with florid ductal hyperplasia, columnar cell change, columnar cell hyperplasia  · Biopsy site and adjacent fibrous tissue completely excised  · No evidence of in situ carcinoma or invasive carcinoma    She reports she has a right mammogram arranged for next month at [facility]    · Cervical cancer screening. She reports that she has Pap smears every other year and they have been negative. Immunization History   Administered Date(s) Administered    COVID-19, Moderna, Primary or Immunocompromised, PF, 100mcg/0.5mL 03/27/2021, 04/29/2021           Orders Placed This Encounter   Procedures    Beta 2 Microglobulin, Serum    Comprehensive Metabolic Panel   · SPEP  · Quantitative immunoglobulin  · Free serum light chain        Return in about 6 months (around 8/25/2022) for With Vero Dugan.      Lou Goldman PA-C  12:44 PM  2/25/2022

## 2022-02-25 ENCOUNTER — HOSPITAL ENCOUNTER (OUTPATIENT)
Dept: INFUSION THERAPY | Age: 62
Discharge: HOME | End: 2022-02-25
Payer: MEDICARE

## 2022-02-25 ENCOUNTER — TELEPHONE (OUTPATIENT)
Dept: INFUSION THERAPY | Age: 62
End: 2022-02-25

## 2022-02-25 ENCOUNTER — OFFICE VISIT (OUTPATIENT)
Dept: HEMATOLOGY | Age: 62
End: 2022-02-25
Payer: MEDICARE

## 2022-02-25 VITALS
DIASTOLIC BLOOD PRESSURE: 76 MMHG | BODY MASS INDEX: 39.2 KG/M2 | SYSTOLIC BLOOD PRESSURE: 126 MMHG | OXYGEN SATURATION: 98 % | HEIGHT: 66 IN | WEIGHT: 243.9 LBS

## 2022-02-25 DIAGNOSIS — D47.2 GAMMOPATHY: ICD-10-CM

## 2022-02-25 DIAGNOSIS — D47.2 MGUS (MONOCLONAL GAMMOPATHY OF UNKNOWN SIGNIFICANCE): ICD-10-CM

## 2022-02-25 DIAGNOSIS — D69.6 THROMBOCYTOPENIA (HCC): ICD-10-CM

## 2022-02-25 DIAGNOSIS — D47.2 GAMMOPATHY: Primary | ICD-10-CM

## 2022-02-25 DIAGNOSIS — R16.1 SPLENOMEGALY: ICD-10-CM

## 2022-02-25 DIAGNOSIS — R74.8 ELEVATED LIVER ENZYMES: Primary | ICD-10-CM

## 2022-02-25 DIAGNOSIS — K76.0 HEPATIC STEATOSIS: ICD-10-CM

## 2022-02-25 LAB
ALBUMIN SERPL-MCNC: 3.9 G/DL (ref 3.5–5.2)
ALP BLD-CCNC: 158 U/L (ref 35–104)
ALT SERPL-CCNC: 31 U/L (ref 9–52)
ANION GAP SERPL CALCULATED.3IONS-SCNC: 8 MMOL/L (ref 7–19)
AST SERPL-CCNC: 58 U/L (ref 14–36)
BILIRUB SERPL-MCNC: 1.5 MG/DL (ref 0.2–1.3)
BUN BLDV-MCNC: 12 MG/DL (ref 7–17)
CALCIUM SERPL-MCNC: 9.3 MG/DL (ref 8.4–10.2)
CHLORIDE BLD-SCNC: 106 MMOL/L (ref 98–111)
CO2: 29 MMOL/L (ref 22–29)
CREAT SERPL-MCNC: 0.6 MG/DL (ref 0.5–1)
GFR NON-AFRICAN AMERICAN: >60
GLOBULIN: 2.7 G/DL
GLUCOSE BLD-MCNC: 182 MG/DL (ref 74–106)
HCT VFR BLD CALC: 40.6 % (ref 34.1–44.9)
HEMOGLOBIN: 13.4 G/DL (ref 11.2–15.7)
MCH RBC QN AUTO: 29.3 PG (ref 25.6–32.2)
MCHC RBC AUTO-ENTMCNC: 33 G/DL (ref 32.3–35.5)
MCV RBC AUTO: 88.8 FL (ref 79.4–94.8)
PDW BLD-RTO: 13.3 % (ref 11.7–14.4)
PLATELET # BLD: 94 K/UL (ref 182–369)
PMV BLD AUTO: 10.6 FL (ref 7.4–10.4)
POTASSIUM SERPL-SCNC: 3.9 MMOL/L (ref 3.5–5.1)
RBC # BLD: 4.57 M/UL (ref 3.93–5.22)
SODIUM BLD-SCNC: 143 MMOL/L (ref 137–145)
TOTAL PROTEIN: 6.6 G/DL (ref 6.3–8.2)
WBC # BLD: 4.6 K/UL (ref 3.98–10.04)

## 2022-02-25 PROCEDURE — 85027 COMPLETE CBC AUTOMATED: CPT

## 2022-02-25 PROCEDURE — 99214 OFFICE O/P EST MOD 30 MIN: CPT | Performed by: PHYSICIAN ASSISTANT

## 2022-02-25 PROCEDURE — 80053 COMPREHEN METABOLIC PANEL: CPT

## 2022-02-25 PROCEDURE — 99211 OFF/OP EST MAY X REQ PHY/QHP: CPT

## 2022-02-25 RX ORDER — KETOCONAZOLE 20 MG/G
CREAM TOPICAL DAILY
COMMUNITY
End: 2022-07-12

## 2022-02-25 RX ORDER — LEVOCETIRIZINE DIHYDROCHLORIDE 5 MG/1
5 TABLET, FILM COATED ORAL NIGHTLY
COMMUNITY
Start: 2022-02-14

## 2022-02-25 RX ORDER — CLOTRIMAZOLE AND BETAMETHASONE DIPROPIONATE 10; .64 MG/G; MG/G
CREAM TOPICAL
COMMUNITY
Start: 2022-02-14 | End: 2022-07-12

## 2022-02-25 RX ORDER — METFORMIN HYDROCHLORIDE EXTENDED-RELEASE TABLETS 1000 MG/1
1000 TABLET, FILM COATED, EXTENDED RELEASE ORAL 2 TIMES DAILY WITH MEALS
COMMUNITY
Start: 2022-02-14

## 2022-02-25 ASSESSMENT — ENCOUNTER SYMPTOMS
BLOOD IN STOOL: 0
NAUSEA: 0
VOMITING: 0
ABDOMINAL PAIN: 0
DIARRHEA: 0
EYE ITCHING: 0
SORE THROAT: 0
WHEEZING: 0
ABDOMINAL DISTENTION: 0
PHOTOPHOBIA: 0
CONSTIPATION: 0
SHORTNESS OF BREATH: 0
VOICE CHANGE: 0
TROUBLE SWALLOWING: 0
EYE DISCHARGE: 0
BACK PAIN: 0
COUGH: 0

## 2022-02-25 NOTE — TELEPHONE ENCOUNTER
Called and spoke with Matthew Gottron about her increasing elevated liver enzymes. Told her she would indeed have to have a hepatic elastography done at the [facility].  She stated she understood

## 2022-02-25 NOTE — TELEPHONE ENCOUNTER
----- Message from Janet Zuniga PA-C sent at 2/25/2022  1:02 PM CST -----  Let her know that her liver functions are up a little more than they were last time. I discussed getting hepatic elastography performed and we will need to go ahead and have that arranged as an outpatient at [facility].

## 2022-02-28 LAB
+IMM: ABNORMAL
ALBUMIN SERPL-MCNC: 3.73 G/DL (ref 3.75–5.01)
ALPHA-1-GLOBULIN: 0.22 G/DL (ref 0.19–0.46)
ALPHA-2-GLOBULIN: 0.78 G/DL (ref 0.48–1.05)
BETA GLOBULIN: 0.86 G/DL (ref 0.48–1.1)
GAMMA GLOBULIN: 1.11 G/DL (ref 0.62–1.51)
IGA: 343 MG/DL (ref 68–408)
IGG: 1123 MG/DL (ref 768–1632)
IGM: 54 MG/DL (ref 35–263)
KAPPA FREE LIGHT CHAINS QNT: 27.71 MG/L (ref 3.3–19.4)
KAPPA/LAMBDA FREE LIGHT CHAIN RATIO: 1.66 (ref 0.26–1.65)
LAMBDA FREE LIGHT CHAINS QNT: 16.68 MG/L (ref 5.71–26.3)
SPE/IFE INTERPRETATION: ABNORMAL
TOTAL PROTEIN: 6.7 G/DL (ref 6.3–8.2)

## 2022-03-02 LAB — BETA-2 MICROGLOBULIN: 2.3 MG/L

## 2022-03-07 ENCOUNTER — APPOINTMENT (OUTPATIENT)
Dept: ULTRASOUND IMAGING | Facility: HOSPITAL | Age: 62
End: 2022-03-07

## 2022-03-07 ENCOUNTER — HOSPITAL ENCOUNTER (OUTPATIENT)
Dept: MAMMOGRAPHY | Facility: HOSPITAL | Age: 62
Discharge: HOME | End: 2022-03-07
Admitting: SPECIALIST

## 2022-03-07 DIAGNOSIS — N62 HYPERTROPHY OF BREAST: ICD-10-CM

## 2022-03-07 PROCEDURE — G0279 TOMOSYNTHESIS, MAMMO: HCPCS

## 2022-03-07 PROCEDURE — 77065 DX MAMMO INCL CAD UNI: CPT

## 2022-03-16 ENCOUNTER — HOSPITAL ENCOUNTER (OUTPATIENT)
Dept: ULTRASOUND IMAGING | Age: 62
Discharge: HOME | End: 2022-03-16
Payer: MEDICARE

## 2022-03-16 DIAGNOSIS — R74.8 ELEVATED LIVER ENZYMES: ICD-10-CM

## 2022-03-16 PROCEDURE — 76705 ECHO EXAM OF ABDOMEN: CPT

## 2022-03-16 PROCEDURE — 76981 USE PARENCHYMA: CPT

## 2022-03-17 ENCOUNTER — TRANSCRIBE ORDERS (OUTPATIENT)
Dept: ADMINISTRATIVE | Facility: HOSPITAL | Age: 62
End: 2022-03-17

## 2022-03-17 DIAGNOSIS — N60.11 DIFFUSE CYSTIC MASTOPATHY OF BREAST, RIGHT: Primary | ICD-10-CM

## 2022-03-18 ENCOUNTER — TELEPHONE (OUTPATIENT)
Dept: INFUSION THERAPY | Age: 62
End: 2022-03-18

## 2022-04-22 ENCOUNTER — OFFICE VISIT (OUTPATIENT)
Dept: NEUROSURGERY | Age: 62
End: 2022-04-22
Payer: MEDICARE

## 2022-04-22 VITALS
SYSTOLIC BLOOD PRESSURE: 112 MMHG | BODY MASS INDEX: 39.37 KG/M2 | HEART RATE: 77 BPM | HEIGHT: 66 IN | TEMPERATURE: 97.3 F | DIASTOLIC BLOOD PRESSURE: 73 MMHG | OXYGEN SATURATION: 94 %

## 2022-04-22 DIAGNOSIS — R47.89 WORD FINDING DIFFICULTY: ICD-10-CM

## 2022-04-22 DIAGNOSIS — R41.3 MEMORY LOSS: Primary | ICD-10-CM

## 2022-04-22 PROCEDURE — 99213 OFFICE O/P EST LOW 20 MIN: CPT | Performed by: NURSE PRACTITIONER

## 2022-04-22 RX ORDER — DONEPEZIL HYDROCHLORIDE 10 MG/1
10 TABLET, FILM COATED ORAL NIGHTLY
Qty: 30 TABLET | Refills: 11 | Status: SHIPPED | OUTPATIENT
Start: 2022-04-22

## 2022-04-22 NOTE — PROGRESS NOTES
[facility] Neurology Office Note      Patient:   Mani Mack  MR#:    [id number]  Account Number:                         YOB: 1960  Date of Evaluation:  4/22/2022  Time of Note:                          10:02 AM  Primary/Referring Physician:  SWAPNA Long - CNP   Consulting Physician:  Shelbi Berrios DNP, APRN     FOLLOW UP VISIT    Chief Complaint   Patient presents with    Follow-up     pt and family state things are about the same    Memory Loss       [facility]    Mani Mack is a 62y.o. year old female here for follow up of memory loss. She is with her  today. Doing about the same from last visit. Seems to have more difficulty with numbers, remembering dates, calculating etc. Primarily short term memory loss noted. She has quite a bit of word finding difficulty, speech difficulty. She states that she feels like her thoughts turn in circles at times. Needing reminders to complete ADLs, having difficulty with cooking/following recipes. Seems like it is taking her longer to get ADLs done. Denies gait changes or bladder incontinence. No hallucinations. Denies tremor. Needing some reminders to take medications at time,  is helping her with this. No longer driving. Denies depression/anxiety. On Aricept 10mg nightly. Tried and failed Namenda and Namzeric in the past. She has had neuropsych testing completed, no records. No overt evidence of FTD today. Family history with aunt having Pick's disease, onset was in her 46s. Following with hematology for thrombocytopenia.      Past Medical History:   Diagnosis Date    Anxiety     Depression     Diabetes ([facility] [address]     Hypertension     Memory difficulty     Obesity 4/21/2021    Sleep apnea     c-pap       Past Surgical History:   Procedure Laterality Date    ANKLE FRACTURE SURGERY Right 4/21/2021    ANKLE OPEN REDUCTION INTERNAL FIXATION WITH POSSIBLE EXTERNAL FIXATOR performed by Tad Lott [facility] at [facility]    GALLBLADDER SURGERY         Family History   Problem Relation Age of Onset    Cancer Mother     Heart Attack Father        Social History     Socioeconomic History    Marital status:      Spouse name: Not on file    Number of children: Not on file    Years of education: Not on file    Highest education level: Not on file   Occupational History    Not on file   Tobacco Use    Smoking status: Never Smoker    Smokeless tobacco: Never Used   Substance and Sexual Activity    Alcohol use: No    Drug use: No    Sexual activity: Yes   Other Topics Concern    Not on file   Social History Narrative    Not on file     Social Determinants of Health     Financial Resource Strain:     Difficulty of Paying Living Expenses: Not on file   Food Insecurity:     Worried About Running Out of Food in the Last Year: Not on file    Juan C of Food in the Last Year: Not on file   Transportation Needs:     Lack of Transportation (Medical): Not on file    Lack of Transportation (Non-Medical):  Not on file   Physical Activity:     Days of Exercise per Week: Not on file    Minutes of Exercise per Session: Not on file   Stress:     Feeling of Stress : Not on file   Social Connections:     Frequency of Communication with Friends and Family: Not on file    Frequency of Social Gatherings with Friends and Family: Not on file    Attends Zoroastrianism Services: Not on file    Active Member of [address] [place] [address] [employer] or Organizations: Not on file    Attends Club or Organization Meetings: Not on file    Marital Status: Not on file   Intimate Partner Violence:     Fear of Current or Ex-Partner: Not on file    Emotionally Abused: Not on file    Physically Abused: Not on file    Sexually Abused: Not on file   Housing Stability:     Unable to Pay for Housing in the Last Year: Not on file    Number of [place] in the Last Year: Not on file    Unstable Housing in the Last Year: Not on file       Current Outpatient Medications   Medication Sig Dispense Refill    donepezil (ARICEPT) 10 MG tablet Take 1 tablet by mouth nightly 30 tablet 11    clotrimazole-betamethasone (LOTRISONE) 1-0.05 % cream APPLY CREAM TOPICALLY TO AFFECTED AND SURROUNDING AREAS IN THE [place] AND IN THE EVENING FOR 14 DAYS      ketoconazole (NIZORAL) 2 % cream Apply topically daily      levocetirizine (XYZAL) 5 MG tablet TAKE 1 TABLET BY MOUTH ONCE DAILY FOR 90 DAYS      metFORMIN, OSM, (FORTAMET) 1000 MG extended release tablet TAKE 1 TABLET BY MOUTH ONCE DAILY      SITagliptin-metFORMIN HCl ER (JANUMET XR) 100-1000 MG TB24 Take 100-1,000 mg by mouth nightly       metoprolol succinate (TOPROL XL) 100 MG extended release tablet Take 1 tablet by mouth nightly   1    escitalopram (LEXAPRO) 10 MG tablet Take 10 mg by mouth nightly       triamterene-hydrochlorothiazide (MAXZIDE-25) 37.5-25 MG per tablet Take 1 tablet by mouth nightly        No current facility-administered medications for this visit. ALLERGIES  Allergies   Allergen Reactions    Azithromycin Rash    Namenda  [Memantine Hcl] Nausea And Vomiting and Nausea Only     REVIEW OF SYSTEMS  Constitutional: []? Fever []? Sweats []? Chills []? Recent Injury [x]? Denies all unless marked  HEENT:[]? Headache  []? Head Injury []? Hearing Loss  []? Sore Throat  []? Ear Ache [x]? Denies all unless marked  Spine:  []? Neck pain  []? Back pain  []? Sciaticia  [x]? Denies all unless marked  Cardiovascular:[]? Heart Disease []? Palpitations []? Chest Pain   [x]? Denies all unless marked  Pulmonary: []? Shortness of Breath []? Cough   [x]? Denies all unless marked  Psychiatric/Behavioral:[]? Depression []? Anxiety [x]? Denies all unless marked  Gastrointestinal: []? Nausea  []? Vomiting  []? Abdominal Pain  []? Constipation  []? Diarrhea  [x]? Denies all unless marked  Genitourinary:   []? Frequency  []? Urgency  []? Dysuria []? Incontinence  [x]? Denies all unless marked  Extremities: []? Pain  []? Swelling  [x]? Denies all unless marked  Musculoskeletal: []? Myalgias  []? Joint Pain  []? Arthritis []? Muscle Cramps []? Muscle Twitches  [x]? Denies all unless marked  Sleep: []? Insomnia[]? Snoring []? Restless Legs  []? Sleep Apnea  []? Daytime Sleepiness  [x]? Denies all unless marked  Skin:[]? Rash []? Color Change [x]? Denies all unless marked   Neurological:[]? Visual Disturbance [x]? Memory Loss []? Loss of Balance []? Slurred Speech []? Weakness []? Seizures  []? Dizziness [x]? Denies all unless marked    The MA has completed the ROS with the patient. I have reviewed it in its' entirety with the patient and agree with the documentation. PHYSICAL EXAM  /73   Pulse 77   Temp 97.3 °F (36.3 °C)   Ht 5' 6\" (1.676 m)   SpO2 94%   BMI 39.37 kg/m²     Constitutional  No acute distress    HEENT- Conjunctiva normal.  No scars, masses, or lesions over external nose or ears  Cardiac- regular rate and rhythm   Respiratory- normal effort, no use of accessory muscles  Musculoskeletal  No significant wasting of muscles noted, no bony deformities  Extremities - No clubbing, cyanosis or edema  Skin  Warm, dry, and intact. No rash, erythema, or pallor    NEUROLOGICAL EXAM    Mental status   [x]Awake, alert, oriented   [x]Affect attention and concentration appear appropriate  []Recent and remote memory appears unremarkable  []Speech normal without dysarthria or aphasia, comprehension and repetition intact.    COMMENTS:14/30 SLUMS, unchanged, speech abn intermittently; word finding difficulty    Cranial Nerves [x]No VF deficit to confrontation,  no papilledema on fundoscopic exam.  [x]PERRLA, EOMI, no nystagmus, conjugate eye movements, no ptosis  [x]Face symmetric  [x]Facial sensation intact  [x]Tongue midline no atrophy or fasciculations present  [x]Palate midline, hearing to finger rub normal bilaterally  [x]Shoulder shrug and SCM testing normal bilaterally  COMMENTS:   Motor   [x]5/5 strength x 4 extremities  [x]Normal bulk and tone  [x]No tremor present  [x]No rigidity or bradykinesia noted  COMMENTS:    Sensory  [x]Sensation intact to light touch, pin prick, vibration, and proprioception BLE  [x]Sensation intact to light touch, pin prick, vibration, and proprioception BUE  COMMENTS:   Coordination [x]FTN normal bilaterally   [x]HTS normal bilaterally  [x]OSCAR normal bilaterally. COMMENTS:   Reflexes  []Symmetric and non-pathological  [x]Toes down going bilaterally  [x]No clonus present  COMMENTS: hypoactive throughout    Gait                  [x]Normal steady gait    []Ataxic    []Spastic     []Magnetic     []Shuffling   COMMENTS:       LABS RECORD AND IMAGING REVIEW (As below and per HPI)      Lab Results   Component Value Date    WBC 4.60 02/25/2022    HGB 13.4 02/25/2022    HCT 40.6 02/25/2022    MCV 88.8 02/25/2022    PLT 94 (L) 02/25/2022     Lab Results   Component Value Date     02/25/2022    K 3.9 02/25/2022     02/25/2022    CO2 29 02/25/2022    BUN 12 02/25/2022    CREATININE 0.6 02/25/2022    GLUCOSE 182 (H) 02/25/2022    CALCIUM 9.3 02/25/2022    PROT 6.6 02/25/2022    LABALBU 3.9 02/25/2022    BILITOT 1.5 (H) 02/25/2022    ALKPHOS 158 (H) 02/25/2022    AST 58 (H) 02/25/2022    ALT 31 02/25/2022    LABGLOM >60 02/25/2022    GFRAA >59 07/12/2021    AGRATIO 1.1 10/22/2021    GLOB 2.7 02/25/2022     Prior records reviewed. ASSESSMENT:    Cindy Curran is a 62y.o. year old female here for follow up of memory loss. Doing about the same from last visit. No clear progression. Noting some difficulty with numbers now, continues to note word finding difficulty and short term memory loss. No overt mood or personality changes noted. Suspect underlying dementia, possible early onset Alzheimer's, FTD variant felt less likely. On Aricept, unable to tolerate Namenda or Namzeric in the past. Discussed additional opinion from tertiary memory center, patient and spouse defer. Diagnosis Orders   1. Memory loss     2. Word finding difficulty          PLAN:  1. Continue Aricept 10mg nightly   2. Patient and spouse defer referral to memory center. 3. Safety concerns discussed, increase supervision, monitor medications, no driving   4.  Follow up in 6 months, sooner with any worsening     Humphrey Naidu DNP, APRN

## 2022-05-12 LAB
ALBUMIN SERPL-MCNC: 4.2 G/DL (ref 3.5–5.2)
ALP BLD-CCNC: 125 U/L (ref 35–104)
ALT SERPL-CCNC: 30 U/L (ref 5–33)
ANION GAP SERPL CALCULATED.3IONS-SCNC: 10 MMOL/L (ref 7–19)
AST SERPL-CCNC: 49 U/L (ref 5–32)
BASOPHILS ABSOLUTE: 0 K/UL (ref 0–0.2)
BASOPHILS RELATIVE PERCENT: 0.5 % (ref 0–1)
BILIRUB SERPL-MCNC: 0.9 MG/DL (ref 0.2–1.2)
BUN BLDV-MCNC: 14 MG/DL (ref 8–23)
CALCIUM SERPL-MCNC: 9.3 MG/DL (ref 8.8–10.2)
CHLORIDE BLD-SCNC: 103 MMOL/L (ref 98–111)
CHOLESTEROL, TOTAL: 145 MG/DL (ref 160–199)
CO2: 27 MMOL/L (ref 22–29)
CREAT SERPL-MCNC: 0.6 MG/DL (ref 0.5–0.9)
EOSINOPHILS ABSOLUTE: 0.2 K/UL (ref 0–0.6)
EOSINOPHILS RELATIVE PERCENT: 3.5 % (ref 0–5)
GFR AFRICAN AMERICAN: >59
GFR NON-AFRICAN AMERICAN: >60
GLUCOSE BLD-MCNC: 159 MG/DL (ref 74–109)
HBA1C MFR BLD: 8.8 % (ref 4–6)
HCT VFR BLD CALC: 41.8 % (ref 37–47)
HDLC SERPL-MCNC: 51 MG/DL (ref 65–121)
HEMOGLOBIN: 13.6 G/DL (ref 12–16)
IMMATURE GRANULOCYTES #: 0 K/UL
LDL CHOLESTEROL CALCULATED: 73 MG/DL
LYMPHOCYTES ABSOLUTE: 1.6 K/UL (ref 1.1–4.5)
LYMPHOCYTES RELATIVE PERCENT: 37.6 % (ref 20–40)
MCH RBC QN AUTO: 29.1 PG (ref 27–31)
MCHC RBC AUTO-ENTMCNC: 32.5 G/DL (ref 33–37)
MCV RBC AUTO: 89.5 FL (ref 81–99)
MONOCYTES ABSOLUTE: 0.4 K/UL (ref 0–0.9)
MONOCYTES RELATIVE PERCENT: 9.6 % (ref 0–10)
NEUTROPHILS ABSOLUTE: 2.1 K/UL (ref 1.5–7.5)
NEUTROPHILS RELATIVE PERCENT: 48.6 % (ref 50–65)
PDW BLD-RTO: 13.1 % (ref 11.5–14.5)
PLATELET # BLD: 109 K/UL (ref 130–400)
PMV BLD AUTO: 11.4 FL (ref 9.4–12.3)
POTASSIUM SERPL-SCNC: 3.8 MMOL/L (ref 3.5–5)
RBC # BLD: 4.67 M/UL (ref 4.2–5.4)
SODIUM BLD-SCNC: 140 MMOL/L (ref 136–145)
TOTAL PROTEIN: 6.6 G/DL (ref 6.6–8.7)
TRIGL SERPL-MCNC: 107 MG/DL (ref 0–149)
WBC # BLD: 4.3 K/UL (ref 4.8–10.8)

## 2022-05-16 ENCOUNTER — TRANSCRIBE ORDERS (OUTPATIENT)
Dept: ADMINISTRATIVE | Facility: HOSPITAL | Age: 62
End: 2022-05-16

## 2022-05-16 DIAGNOSIS — M81.0 AGE-RELATED OSTEOPOROSIS WITHOUT CURRENT PATHOLOGICAL FRACTURE: Primary | ICD-10-CM

## 2022-05-16 DIAGNOSIS — Z12.31 ENCOUNTER FOR SCREENING MAMMOGRAM FOR MALIGNANT NEOPLASM OF BREAST: ICD-10-CM

## 2022-06-28 ENCOUNTER — HOSPITAL ENCOUNTER (INPATIENT)
Age: 62
LOS: 7 days | Discharge: HOME | DRG: 496 | End: 2022-07-05
Attending: ORTHOPAEDIC SURGERY | Admitting: ORTHOPAEDIC SURGERY
Payer: MEDICARE

## 2022-06-28 ENCOUNTER — ANESTHESIA EVENT (OUTPATIENT)
Dept: OPERATING ROOM | Age: 62
DRG: 496 | End: 2022-06-28
Payer: MEDICARE

## 2022-06-28 ENCOUNTER — ANESTHESIA (OUTPATIENT)
Dept: OPERATING ROOM | Age: 62
DRG: 496 | End: 2022-06-28
Payer: MEDICARE

## 2022-06-28 DIAGNOSIS — T81.49XA POST-OPERATIVE WOUND ABSCESS: Primary | ICD-10-CM

## 2022-06-28 DIAGNOSIS — S91.001S ANKLE WOUND, RIGHT, SEQUELA: ICD-10-CM

## 2022-06-28 LAB
GLUCOSE BLD-MCNC: 126 MG/DL (ref 70–99)
GLUCOSE BLD-MCNC: 145 MG/DL (ref 70–99)
PERFORMED ON: ABNORMAL
PERFORMED ON: ABNORMAL

## 2022-06-28 PROCEDURE — 6370000000 HC RX 637 (ALT 250 FOR IP): Performed by: ORTHOPAEDIC SURGERY

## 2022-06-28 PROCEDURE — 2720000010 HC SURG SUPPLY STERILE: Performed by: ORTHOPAEDIC SURGERY

## 2022-06-28 PROCEDURE — 7100000000 HC PACU RECOVERY - FIRST 15 MIN: Performed by: ORTHOPAEDIC SURGERY

## 2022-06-28 PROCEDURE — 3600000004 HC SURGERY LEVEL 4 BASE: Performed by: ORTHOPAEDIC SURGERY

## 2022-06-28 PROCEDURE — 3700000001 HC ADD 15 MINUTES (ANESTHESIA): Performed by: ORTHOPAEDIC SURGERY

## 2022-06-28 PROCEDURE — 87186 SC STD MICRODIL/AGAR DIL: CPT

## 2022-06-28 PROCEDURE — 0QPJ04Z REMOVAL OF INTERNAL FIXATION DEVICE FROM RIGHT FIBULA, OPEN APPROACH: ICD-10-PCS | Performed by: ORTHOPAEDIC SURGERY

## 2022-06-28 PROCEDURE — 3600000014 HC SURGERY LEVEL 4 ADDTL 15MIN: Performed by: ORTHOPAEDIC SURGERY

## 2022-06-28 PROCEDURE — 6360000002 HC RX W HCPCS

## 2022-06-28 PROCEDURE — 87176 TISSUE HOMOGENIZATION CULTR: CPT

## 2022-06-28 PROCEDURE — 2500000003 HC RX 250 WO HCPCS

## 2022-06-28 PROCEDURE — 87075 CULTR BACTERIA EXCEPT BLOOD: CPT

## 2022-06-28 PROCEDURE — 86403 PARTICLE AGGLUT ANTBDY SCRN: CPT

## 2022-06-28 PROCEDURE — 87070 CULTURE OTHR SPECIMN AEROBIC: CPT

## 2022-06-28 PROCEDURE — 6360000002 HC RX W HCPCS: Performed by: ANESTHESIOLOGY

## 2022-06-28 PROCEDURE — 87102 FUNGUS ISOLATION CULTURE: CPT

## 2022-06-28 PROCEDURE — 6360000002 HC RX W HCPCS: Performed by: ORTHOPAEDIC SURGERY

## 2022-06-28 PROCEDURE — 7100000001 HC PACU RECOVERY - ADDTL 15 MIN: Performed by: ORTHOPAEDIC SURGERY

## 2022-06-28 PROCEDURE — 2580000003 HC RX 258: Performed by: ANESTHESIOLOGY

## 2022-06-28 PROCEDURE — 3700000000 HC ANESTHESIA ATTENDED CARE: Performed by: ORTHOPAEDIC SURGERY

## 2022-06-28 PROCEDURE — 2709999900 HC NON-CHARGEABLE SUPPLY: Performed by: ORTHOPAEDIC SURGERY

## 2022-06-28 PROCEDURE — 1210000000 HC MED SURG R&B

## 2022-06-28 PROCEDURE — 82947 ASSAY GLUCOSE BLOOD QUANT: CPT

## 2022-06-28 PROCEDURE — 87205 SMEAR GRAM STAIN: CPT

## 2022-06-28 PROCEDURE — 0JDQ0ZZ EXTRACTION OF RIGHT FOOT SUBCUTANEOUS TISSUE AND FASCIA, OPEN APPROACH: ICD-10-PCS | Performed by: ORTHOPAEDIC SURGERY

## 2022-06-28 PROCEDURE — 2580000003 HC RX 258: Performed by: ORTHOPAEDIC SURGERY

## 2022-06-28 RX ORDER — ACETAMINOPHEN 325 MG/1
650 TABLET ORAL EVERY 6 HOURS
Status: DISCONTINUED | OUTPATIENT
Start: 2022-06-28 | End: 2022-07-05 | Stop reason: HOSPADM

## 2022-06-28 RX ORDER — HYDROMORPHONE HYDROCHLORIDE 1 MG/ML
0.5 INJECTION, SOLUTION INTRAMUSCULAR; INTRAVENOUS; SUBCUTANEOUS EVERY 5 MIN PRN
Status: DISCONTINUED | OUTPATIENT
Start: 2022-06-28 | End: 2022-06-28 | Stop reason: HOSPADM

## 2022-06-28 RX ORDER — SODIUM CHLORIDE 0.9 % (FLUSH) 0.9 %
5-40 SYRINGE (ML) INJECTION PRN
Status: DISCONTINUED | OUTPATIENT
Start: 2022-06-28 | End: 2022-07-05 | Stop reason: HOSPADM

## 2022-06-28 RX ORDER — METOPROLOL SUCCINATE 50 MG/1
100 TABLET, EXTENDED RELEASE ORAL NIGHTLY
Status: DISCONTINUED | OUTPATIENT
Start: 2022-06-28 | End: 2022-07-05 | Stop reason: HOSPADM

## 2022-06-28 RX ORDER — DEXTROSE MONOHYDRATE 50 MG/ML
100 INJECTION, SOLUTION INTRAVENOUS PRN
Status: DISCONTINUED | OUTPATIENT
Start: 2022-06-28 | End: 2022-06-29 | Stop reason: SDUPTHER

## 2022-06-28 RX ORDER — MORPHINE SULFATE 2 MG/ML
2 INJECTION, SOLUTION INTRAMUSCULAR; INTRAVENOUS
Status: DISCONTINUED | OUTPATIENT
Start: 2022-06-28 | End: 2022-07-05 | Stop reason: HOSPADM

## 2022-06-28 RX ORDER — TRIAMTERENE AND HYDROCHLOROTHIAZIDE 37.5; 25 MG/1; MG/1
1 TABLET ORAL NIGHTLY
Status: DISCONTINUED | OUTPATIENT
Start: 2022-06-28 | End: 2022-07-05 | Stop reason: HOSPADM

## 2022-06-28 RX ORDER — SODIUM CHLORIDE, SODIUM LACTATE, POTASSIUM CHLORIDE, CALCIUM CHLORIDE 600; 310; 30; 20 MG/100ML; MG/100ML; MG/100ML; MG/100ML
INJECTION, SOLUTION INTRAVENOUS CONTINUOUS
Status: DISCONTINUED | OUTPATIENT
Start: 2022-06-28 | End: 2022-06-28 | Stop reason: HOSPADM

## 2022-06-28 RX ORDER — SODIUM CHLORIDE 9 MG/ML
INJECTION, SOLUTION INTRAVENOUS PRN
Status: DISCONTINUED | OUTPATIENT
Start: 2022-06-28 | End: 2022-07-05 | Stop reason: HOSPADM

## 2022-06-28 RX ORDER — HYDROMORPHONE HYDROCHLORIDE 1 MG/ML
0.25 INJECTION, SOLUTION INTRAMUSCULAR; INTRAVENOUS; SUBCUTANEOUS EVERY 5 MIN PRN
Status: DISCONTINUED | OUTPATIENT
Start: 2022-06-28 | End: 2022-06-28 | Stop reason: HOSPADM

## 2022-06-28 RX ORDER — SODIUM CHLORIDE 0.9 % (FLUSH) 0.9 %
5-40 SYRINGE (ML) INJECTION EVERY 12 HOURS SCHEDULED
Status: DISCONTINUED | OUTPATIENT
Start: 2022-06-28 | End: 2022-07-05 | Stop reason: HOSPADM

## 2022-06-28 RX ORDER — OXYCODONE HYDROCHLORIDE 5 MG/1
5 TABLET ORAL EVERY 4 HOURS PRN
Status: DISCONTINUED | OUTPATIENT
Start: 2022-06-28 | End: 2022-07-05 | Stop reason: HOSPADM

## 2022-06-28 RX ORDER — ONDANSETRON 2 MG/ML
INJECTION INTRAMUSCULAR; INTRAVENOUS PRN
Status: DISCONTINUED | OUTPATIENT
Start: 2022-06-28 | End: 2022-06-28 | Stop reason: SDUPTHER

## 2022-06-28 RX ORDER — DONEPEZIL HYDROCHLORIDE 5 MG/1
10 TABLET, FILM COATED ORAL NIGHTLY
Status: DISCONTINUED | OUTPATIENT
Start: 2022-06-28 | End: 2022-07-05 | Stop reason: HOSPADM

## 2022-06-28 RX ORDER — ROPIVACAINE HYDROCHLORIDE 2 MG/ML
INJECTION, SOLUTION EPIDURAL; INFILTRATION; PERINEURAL PRN
Status: DISCONTINUED | OUTPATIENT
Start: 2022-06-28 | End: 2022-06-28 | Stop reason: ALTCHOICE

## 2022-06-28 RX ORDER — MORPHINE SULFATE 4 MG/ML
4 INJECTION, SOLUTION INTRAMUSCULAR; INTRAVENOUS
Status: DISCONTINUED | OUTPATIENT
Start: 2022-06-28 | End: 2022-07-05 | Stop reason: HOSPADM

## 2022-06-28 RX ORDER — METFORMIN HYDROCHLORIDE 500 MG/1
1000 TABLET, EXTENDED RELEASE ORAL
Status: DISCONTINUED | OUTPATIENT
Start: 2022-06-29 | End: 2022-07-05 | Stop reason: HOSPADM

## 2022-06-28 RX ORDER — CEFAZOLIN SODIUM 1 G/3ML
INJECTION, POWDER, FOR SOLUTION INTRAMUSCULAR; INTRAVENOUS PRN
Status: DISCONTINUED | OUTPATIENT
Start: 2022-06-28 | End: 2022-06-28

## 2022-06-28 RX ORDER — FENTANYL CITRATE 50 UG/ML
INJECTION, SOLUTION INTRAMUSCULAR; INTRAVENOUS PRN
Status: DISCONTINUED | OUTPATIENT
Start: 2022-06-28 | End: 2022-06-28 | Stop reason: SDUPTHER

## 2022-06-28 RX ORDER — VANCOMYCIN HYDROCHLORIDE 1 G/20ML
INJECTION, POWDER, LYOPHILIZED, FOR SOLUTION INTRAVENOUS PRN
Status: DISCONTINUED | OUTPATIENT
Start: 2022-06-28 | End: 2022-06-28 | Stop reason: SDUPTHER

## 2022-06-28 RX ORDER — BISMUTH TRIBROMOPH/PETROLATUM 5"X9"
BANDAGE TOPICAL PRN
Status: DISCONTINUED | OUTPATIENT
Start: 2022-06-28 | End: 2022-06-28 | Stop reason: ALTCHOICE

## 2022-06-28 RX ORDER — KETOCONAZOLE 20 MG/G
CREAM TOPICAL DAILY
Status: DISCONTINUED | OUTPATIENT
Start: 2022-06-28 | End: 2022-07-05 | Stop reason: HOSPADM

## 2022-06-28 RX ORDER — CEFTRIAXONE 1 G/1
INJECTION, POWDER, FOR SOLUTION INTRAMUSCULAR; INTRAVENOUS PRN
Status: DISCONTINUED | OUTPATIENT
Start: 2022-06-28 | End: 2022-06-28 | Stop reason: SDUPTHER

## 2022-06-28 RX ORDER — MELOXICAM 7.5 MG/1
7.5 TABLET ORAL DAILY
Status: DISPENSED | OUTPATIENT
Start: 2022-06-28 | End: 2022-07-01

## 2022-06-28 RX ORDER — PROPOFOL 10 MG/ML
INJECTION, EMULSION INTRAVENOUS PRN
Status: DISCONTINUED | OUTPATIENT
Start: 2022-06-28 | End: 2022-06-28 | Stop reason: SDUPTHER

## 2022-06-28 RX ORDER — CLOTRIMAZOLE AND BETAMETHASONE DIPROPIONATE 10; .64 MG/G; MG/G
CREAM TOPICAL 2 TIMES DAILY
Status: DISCONTINUED | OUTPATIENT
Start: 2022-06-28 | End: 2022-07-05 | Stop reason: HOSPADM

## 2022-06-28 RX ORDER — INSULIN LISPRO 100 [IU]/ML
0-12 INJECTION, SOLUTION INTRAVENOUS; SUBCUTANEOUS
Status: DISCONTINUED | OUTPATIENT
Start: 2022-06-28 | End: 2022-07-05 | Stop reason: HOSPADM

## 2022-06-28 RX ORDER — LIDOCAINE HYDROCHLORIDE 10 MG/ML
INJECTION, SOLUTION EPIDURAL; INFILTRATION; INTRACAUDAL; PERINEURAL PRN
Status: DISCONTINUED | OUTPATIENT
Start: 2022-06-28 | End: 2022-06-28 | Stop reason: SDUPTHER

## 2022-06-28 RX ORDER — CETIRIZINE HYDROCHLORIDE 5 MG/1
5 TABLET ORAL DAILY
Status: DISCONTINUED | OUTPATIENT
Start: 2022-06-28 | End: 2022-07-05 | Stop reason: HOSPADM

## 2022-06-28 RX ORDER — OXYCODONE HYDROCHLORIDE 5 MG/1
10 TABLET ORAL EVERY 4 HOURS PRN
Status: DISCONTINUED | OUTPATIENT
Start: 2022-06-28 | End: 2022-07-05 | Stop reason: HOSPADM

## 2022-06-28 RX ORDER — ESCITALOPRAM OXALATE 10 MG/1
10 TABLET ORAL NIGHTLY
Status: DISCONTINUED | OUTPATIENT
Start: 2022-06-28 | End: 2022-07-05 | Stop reason: HOSPADM

## 2022-06-28 RX ORDER — INSULIN LISPRO 100 [IU]/ML
0-6 INJECTION, SOLUTION INTRAVENOUS; SUBCUTANEOUS NIGHTLY
Status: DISCONTINUED | OUTPATIENT
Start: 2022-06-28 | End: 2022-07-05 | Stop reason: HOSPADM

## 2022-06-28 RX ORDER — ONDANSETRON 2 MG/ML
4 INJECTION INTRAMUSCULAR; INTRAVENOUS
Status: DISCONTINUED | OUTPATIENT
Start: 2022-06-28 | End: 2022-06-28 | Stop reason: HOSPADM

## 2022-06-28 RX ADMIN — VANCOMYCIN HYDROCHLORIDE 2000 MG: 1 INJECTION, POWDER, LYOPHILIZED, FOR SOLUTION INTRAVENOUS at 16:03

## 2022-06-28 RX ADMIN — DONEPEZIL HYDROCHLORIDE 10 MG: 5 TABLET, FILM COATED ORAL at 20:01

## 2022-06-28 RX ADMIN — ONDANSETRON 4 MG: 2 INJECTION INTRAMUSCULAR; INTRAVENOUS at 15:36

## 2022-06-28 RX ADMIN — SODIUM CHLORIDE, SODIUM LACTATE, POTASSIUM CHLORIDE, AND CALCIUM CHLORIDE: 600; 310; 30; 20 INJECTION, SOLUTION INTRAVENOUS at 13:41

## 2022-06-28 RX ADMIN — HYDROMORPHONE HYDROCHLORIDE 0.5 MG: 1 INJECTION, SOLUTION INTRAMUSCULAR; INTRAVENOUS; SUBCUTANEOUS at 17:06

## 2022-06-28 RX ADMIN — ACETAMINOPHEN 650 MG: 325 TABLET ORAL at 20:02

## 2022-06-28 RX ADMIN — CEFTRIAXONE SODIUM 2 G: 1 INJECTION, POWDER, FOR SOLUTION INTRAMUSCULAR; INTRAVENOUS at 16:03

## 2022-06-28 RX ADMIN — HYDROMORPHONE HYDROCHLORIDE 0.5 MG: 1 INJECTION, SOLUTION INTRAMUSCULAR; INTRAVENOUS; SUBCUTANEOUS at 17:26

## 2022-06-28 RX ADMIN — FENTANYL CITRATE 50 MCG: 50 INJECTION, SOLUTION INTRAMUSCULAR; INTRAVENOUS at 15:31

## 2022-06-28 RX ADMIN — FENTANYL CITRATE 50 MCG: 50 INJECTION, SOLUTION INTRAMUSCULAR; INTRAVENOUS at 15:23

## 2022-06-28 RX ADMIN — ASPIRIN 325 MG: 325 TABLET, COATED ORAL at 20:04

## 2022-06-28 RX ADMIN — FENTANYL CITRATE 50 MCG: 50 INJECTION, SOLUTION INTRAMUSCULAR; INTRAVENOUS at 15:44

## 2022-06-28 RX ADMIN — METOPROLOL SUCCINATE 100 MG: 50 TABLET, FILM COATED, EXTENDED RELEASE ORAL at 20:01

## 2022-06-28 RX ADMIN — PROPOFOL 150 MG: 10 INJECTION, EMULSION INTRAVENOUS at 15:26

## 2022-06-28 RX ADMIN — FENTANYL CITRATE 50 MCG: 50 INJECTION, SOLUTION INTRAMUSCULAR; INTRAVENOUS at 16:10

## 2022-06-28 RX ADMIN — SODIUM CHLORIDE, PRESERVATIVE FREE 10 ML: 5 INJECTION INTRAVENOUS at 20:01

## 2022-06-28 RX ADMIN — OXYCODONE 10 MG: 5 TABLET ORAL at 20:03

## 2022-06-28 RX ADMIN — ESCITALOPRAM OXALATE 10 MG: 10 TABLET ORAL at 20:02

## 2022-06-28 RX ADMIN — TRIAMTERENE AND HYDROCHLOROTHIAZIDE 1 TABLET: 37.5; 25 TABLET ORAL at 20:01

## 2022-06-28 RX ADMIN — LIDOCAINE HYDROCHLORIDE 50 MG: 10 INJECTION, SOLUTION EPIDURAL; INFILTRATION; INTRACAUDAL; PERINEURAL at 15:26

## 2022-06-28 ASSESSMENT — PAIN SCALES - GENERAL
PAINLEVEL_OUTOF10: 6
PAINLEVEL_OUTOF10: 7
PAINLEVEL_OUTOF10: 7
PAINLEVEL_OUTOF10: 10

## 2022-06-28 ASSESSMENT — LIFESTYLE VARIABLES: SMOKING_STATUS: 0

## 2022-06-28 ASSESSMENT — PAIN DESCRIPTION - ORIENTATION
ORIENTATION: RIGHT

## 2022-06-28 ASSESSMENT — PAIN DESCRIPTION - LOCATION
LOCATION: FOOT
LOCATION: ANKLE
LOCATION: ANKLE;FOOT
LOCATION: ANKLE

## 2022-06-28 ASSESSMENT — PAIN - FUNCTIONAL ASSESSMENT: PAIN_FUNCTIONAL_ASSESSMENT: 0-10

## 2022-06-28 ASSESSMENT — PAIN DESCRIPTION - DESCRIPTORS: DESCRIPTORS: SHARP;SORE

## 2022-06-28 NOTE — ANESTHESIA POSTPROCEDURE EVALUATION
Department of Anesthesiology  Postprocedure Note    Patient: Rossy Geiger  MRN: [id number]  YOB: 1960  Date of evaluation: 6/28/2022      Procedure Summary     Date: 06/28/22 Room / Location: [address]    Anesthesia Start: 1518 Anesthesia Stop: 1648    Procedure: RIGHT ANKLE HARDWARE REMOVAL WITH WOUND VAC (Right Ankle) Diagnosis:       Ankle wound, right, sequela      (Ankle wound, right, sequela [S91.001S])    Surgeons: Latasha Coughlin MD Responsible Provider: SWAPNA Diallo CRNA    Anesthesia Type: general ASA Status: 3          Anesthesia Type: No value filed.     Case Phase I: Case Score: 5    Case Phase II:        Anesthesia Post Evaluation    Patient location during evaluation: PACU  Patient participation: complete - patient participated  Level of consciousness: sleepy but conscious  Pain score: 0  Airway patency: patent  Nausea & Vomiting: no vomiting and no nausea  Complications: no  Cardiovascular status: hemodynamically stable  Respiratory status: acceptable and face mask  Hydration status: stable

## 2022-06-28 NOTE — OP NOTE
OPERATIVE NOTE    Patient:  Mariana Ferreira    Date:  6/28/2022    Medical Record Number:  [id number]    Primary Pre-Operative Diagnosis: Abscess after ORIF of right distal.  Fracture April 2021    Primary Post-Operative Diagnosis:  Same    Procedure: Right ankle removal of hardware irrigation debridement of abscess right distal fibula. Assistant: Tobi Graff      Anesthesia: General    Estimated Blood Loss:  Minimal    Tourniquet Time: 30 minutes    Specimens: Cultures x1    Complications:  None    Findings:  As above      Procedure:  Patient was brought into the operating room and general endotracheal anesthetic was placed. The extremity was prepped with chlorhexidine alcohol and sterilely draped. The leg was exsanguinated with an Ace wrap and tourniquet inflated to 300 mmHg. The medial side was approached first and she did not have much pain there. Made an incision medially careful dissection carried down with the Bovie. The screw heads were identified and the 4 oh cannulated screws removed with a screwdriver. There was no purulence medially. Her lateral incision was incised and once through dermis we had a large Amounts of purulent fluid laterally. There was a hematoma about the size of an egg over the mid aspect of the plate that was removed with a lap sponge. A curette was used to debride gently the subcu tissue. Screws were removed from the plate with a screwdriver and the plate removed. The bone surface curetted. Wound was thoroughly irrigated with 3 L pulse lavage irrigation. Fluid from the abscess was sent for culture. The abscess is caused quite a bit of thinning of the skin did not feel like it closed with with nylon without it opening up again. So I closed the subcu and skin proximally and distally with 2-0 nylon horizontal mattress sutures. Over the area of the abscess we packed the cyst wound with foam is to standard wound VAC.   The patient was awakened extubated and transferred recovery in stable condition plan be for IV antibiotic therapy.           Electronically signed by Jeniffer Torres MD on 6/28/2022 at 5:49 PM

## 2022-06-28 NOTE — ANESTHESIA PRE PROCEDURE
Department of Anesthesiology  Preprocedure Note       Name:  Maddison Daniel   Age:  62 y.o.  :  1960                                          MRN:  [id number]         Date:  2022      Surgeon: Shyam Terrell):  Zay Salcido MD    Procedure: Procedure(s):  RIGHT ANKLE HARDWARE REMOVAL WITH WOUND VAC    Medications prior to admission:   Prior to Admission medications    Medication Sig Start Date End Date Taking? Authorizing Provider   donepezil (ARICEPT) 10 MG tablet Take 1 tablet by mouth nightly 22   SWAPNA Sandoval   clotrimazole-betamethasone (LOTRISONE) 1-0.05 % cream APPLY CREAM TOPICALLY TO AFFECTED AND SURROUNDING AREAS IN THE [facility] AND IN THE EVENING FOR 14 DAYS  Patient not taking: Reported on 2022   Historical Provider, MD   ketoconazole (NIZORAL) 2 % cream Apply topically daily  Patient not taking: Reported on 2022    Historical Provider, MD   levocetirizine (XYZAL) 5 MG tablet Take 5 mg by mouth nightly  22   Historical Provider, MD   metFORMIN, OSM, (FORTAMET) 1000 MG extended release tablet Take 1,000 mg by mouth daily (with breakfast)  22   Historical Provider, MD   SITagliptin-metFORMIN HCl ER (JANUMET XR) 100-1000 MG TB24 Take 100-1,000 mg by mouth nightly     Historical Provider, MD   metoprolol succinate (TOPROL XL) 100 MG extended release tablet Take 100 mg by mouth nightly  18   Historical Provider, MD   escitalopram (LEXAPRO) 10 MG tablet Take 10 mg by mouth nightly     Historical Provider, MD   triamterene-hydrochlorothiazide (MAXZIDE-25) 37.5-25 MG per tablet Take 1 tablet by mouth nightly     Historical Provider, MD       Current medications:    No current facility-administered medications for this encounter. Allergies:     Allergies   Allergen Reactions    Azithromycin Rash    Namenda  [Memantine Hcl] Nausea And Vomiting and Nausea Only       Problem List:    Patient Active Problem List   Diagnosis Code    Hypertension I10    Sleep apnea G47.30    Spider veins I78.1    Venous reflux I87.2    Diabetes (HCC) E11.9    Colitis K52.9    Colon polyps K63.5    Diarrhea R19.7    Memory deficit R41.3    Nausea R11.0    Obesity E66.9    Gait instability R26.81    Closed right ankle fracture, initial encounter S82.891J       Past Medical History:        Diagnosis Date    Anxiety     Depression     Diabetes ([address]     Hypertension     Memory difficulty     Obesity 4/21/2021    Sleep apnea     c-pap       Past Surgical History:        Procedure Laterality Date    ANKLE FRACTURE SURGERY Right 4/21/2021    ANKLE OPEN REDUCTION INTERNAL FIXATION WITH POSSIBLE EXTERNAL FIXATOR performed by Tad Lott MD at [facility]         Social History:    Social History     Tobacco Use    Smoking status: Never Smoker    Smokeless tobacco: Never Used   Substance Use Topics    Alcohol use: No                                Counseling given: Not Answered      Vital Signs (Current):   Vitals:    06/28/22 1215   BP: (!) 134/57   Pulse: 78   Resp: 16   Temp: 99.3 °F (37.4 °C)   TempSrc: Tympanic   SpO2: 97%   Weight: 240 lb (108.9 kg)   Height: 5' 6\" (1.676 m)                                              BP Readings from Last 3 Encounters:   06/28/22 (!) 134/57   04/22/22 112/73   02/25/22 126/76       NPO Status: Time of last liquid consumption: 0100                        Time of last solid consumption: 2130                        Date of last liquid consumption: 06/27/22                        Date of last solid food consumption: 06/27/22    BMI:   Wt Readings from Last 3 Encounters:   06/28/22 240 lb (108.9 kg)   02/25/22 243 lb 14.4 oz (110.6 kg)   12/19/21 240 lb (108.9 kg)     Body mass index is 38.74 kg/m².     CBC:   Lab Results   Component Value Date    WBC 4.3 05/12/2022    RBC 4.67 05/12/2022    HGB 13.6 05/12/2022    HCT 41.8 05/12/2022    MCV 89.5 05/12/2022    RDW 13.1 05/12/2022  05/12/2022       CMP:   Lab Results   Component Value Date     05/12/2022    K 3.8 05/12/2022    K 4.1 04/23/2021     05/12/2022    CO2 27 05/12/2022    BUN 14 05/12/2022    CREATININE 0.6 05/12/2022    GFRAA >59 05/12/2022    AGRATIO 1.1 10/22/2021    LABGLOM >60 05/12/2022    GLUCOSE 159 05/12/2022    PROT 6.6 05/12/2022    PROT 7.2 03/07/2013    CALCIUM 9.3 05/12/2022    BILITOT 0.9 05/12/2022    ALKPHOS 125 05/12/2022    AST 49 05/12/2022    ALT 30 05/12/2022       POC Tests:   Recent Labs     06/28/22  1216   POCGLU 126*       Coags: No results found for: PROTIME, INR, APTT    HCG (If Applicable): No results found for: PREGTESTUR, PREGSERUM, HCG, HCGQUANT     ABGs: No results found for: PHART, PO2ART, GST5DVA, NYD9UZE, BEART, P6LIJSXC     Type & Screen (If Applicable):  No results found for: LABABO, LABRH    Drug/Infectious Status (If Applicable):  No results found for: HIV, HEPCAB    COVID-19 Screening (If Applicable):   Lab Results   Component Value Date    COVID19 Not Detected 12/19/2021           Anesthesia Evaluation  Patient summary reviewed no history of anesthetic complications:   Airway: Mallampati: III  TM distance: >3 FB   Neck ROM: full  Mouth opening: > = 3 FB   Dental: normal exam         Pulmonary:normal exam  breath sounds clear to auscultation  (+) sleep apnea: on CPAP,      (-) asthma, recent URI and not a current smoker          Patient did not smoke on day of surgery.                  Cardiovascular:  Exercise tolerance: good (>4 METS),   (+) hypertension:,     (-) pacemaker, past MI, CABG/stent and  angina    ECG reviewed  Rhythm: regular  Rate: normal           Beta Blocker:  Not on Beta Blocker         Neuro/Psych:   (+) psychiatric history (Depression):   (-) seizures, TIA and CVA           GI/Hepatic/Renal:   (+) morbid obesity     (-) GERD, liver disease and no renal disease       Endo/Other:    (+) Diabetes, .    (-) hypothyroidism, hyperthyroidism Abdominal:             Vascular:     - DVT. Other Findings:           Anesthesia Plan      general     ASA 3     (Preop famotidine)  Induction: intravenous. MIPS: Postoperative opioids intended and Prophylactic antiemetics administered. Anesthetic plan and risks discussed with patient. Use of blood products discussed with patient whom consented to blood products.                      Francia Abel MD   6/28/2022

## 2022-06-29 PROBLEM — T81.49XA POST-OPERATIVE WOUND ABSCESS: Status: ACTIVE | Noted: 2022-06-29

## 2022-06-29 LAB
ANION GAP SERPL CALCULATED.3IONS-SCNC: 12 MMOL/L (ref 7–19)
BUN BLDV-MCNC: 12 MG/DL (ref 8–23)
CALCIUM SERPL-MCNC: 8.6 MG/DL (ref 8.8–10.2)
CHLORIDE BLD-SCNC: 102 MMOL/L (ref 98–111)
CO2: 24 MMOL/L (ref 22–29)
CREAT SERPL-MCNC: 0.6 MG/DL (ref 0.5–0.9)
GFR AFRICAN AMERICAN: >59
GFR NON-AFRICAN AMERICAN: >60
GLUCOSE BLD-MCNC: 116 MG/DL (ref 70–99)
GLUCOSE BLD-MCNC: 208 MG/DL (ref 70–99)
GLUCOSE BLD-MCNC: 224 MG/DL (ref 70–99)
GLUCOSE BLD-MCNC: 225 MG/DL (ref 70–99)
GLUCOSE BLD-MCNC: 268 MG/DL (ref 74–109)
HBA1C MFR BLD: 7 % (ref 4–6)
HCT VFR BLD CALC: 36.2 % (ref 37–47)
HEMOGLOBIN: 11.5 G/DL (ref 12–16)
PERFORMED ON: ABNORMAL
POTASSIUM REFLEX MAGNESIUM: 3.8 MMOL/L (ref 3.5–5)
SODIUM BLD-SCNC: 138 MMOL/L (ref 136–145)

## 2022-06-29 PROCEDURE — 97535 SELF CARE MNGMENT TRAINING: CPT

## 2022-06-29 PROCEDURE — 85014 HEMATOCRIT: CPT

## 2022-06-29 PROCEDURE — 6360000002 HC RX W HCPCS: Performed by: ORTHOPAEDIC SURGERY

## 2022-06-29 PROCEDURE — 83036 HEMOGLOBIN GLYCOSYLATED A1C: CPT

## 2022-06-29 PROCEDURE — 97116 GAIT TRAINING THERAPY: CPT

## 2022-06-29 PROCEDURE — 97165 OT EVAL LOW COMPLEX 30 MIN: CPT

## 2022-06-29 PROCEDURE — 97161 PT EVAL LOW COMPLEX 20 MIN: CPT

## 2022-06-29 PROCEDURE — 1210000000 HC MED SURG R&B

## 2022-06-29 PROCEDURE — 2580000003 HC RX 258: Performed by: ORTHOPAEDIC SURGERY

## 2022-06-29 PROCEDURE — 80048 BASIC METABOLIC PNL TOTAL CA: CPT

## 2022-06-29 PROCEDURE — 36415 COLL VENOUS BLD VENIPUNCTURE: CPT

## 2022-06-29 PROCEDURE — 85018 HEMOGLOBIN: CPT

## 2022-06-29 PROCEDURE — 82947 ASSAY GLUCOSE BLOOD QUANT: CPT

## 2022-06-29 PROCEDURE — 6370000000 HC RX 637 (ALT 250 FOR IP): Performed by: ORTHOPAEDIC SURGERY

## 2022-06-29 RX ORDER — INSULIN LISPRO 100 [IU]/ML
0.08 INJECTION, SOLUTION INTRAVENOUS; SUBCUTANEOUS
Status: DISCONTINUED | OUTPATIENT
Start: 2022-06-29 | End: 2022-07-05 | Stop reason: HOSPADM

## 2022-06-29 RX ORDER — DEXTROSE MONOHYDRATE 50 MG/ML
100 INJECTION, SOLUTION INTRAVENOUS PRN
Status: DISCONTINUED | OUTPATIENT
Start: 2022-06-29 | End: 2022-07-05 | Stop reason: HOSPADM

## 2022-06-29 RX ORDER — INSULIN LISPRO 100 [IU]/ML
0-3 INJECTION, SOLUTION INTRAVENOUS; SUBCUTANEOUS NIGHTLY
Status: DISCONTINUED | OUTPATIENT
Start: 2022-06-29 | End: 2022-07-05 | Stop reason: HOSPADM

## 2022-06-29 RX ORDER — INSULIN GLARGINE 100 [IU]/ML
0.25 INJECTION, SOLUTION SUBCUTANEOUS NIGHTLY
Status: DISCONTINUED | OUTPATIENT
Start: 2022-06-29 | End: 2022-07-05 | Stop reason: HOSPADM

## 2022-06-29 RX ORDER — INSULIN LISPRO 100 [IU]/ML
0-6 INJECTION, SOLUTION INTRAVENOUS; SUBCUTANEOUS
Status: DISCONTINUED | OUTPATIENT
Start: 2022-06-29 | End: 2022-06-29

## 2022-06-29 RX ADMIN — CLOTRIMAZOLE AND BETAMETHASONE DIPROPIONATE: 10; .5 CREAM TOPICAL at 08:19

## 2022-06-29 RX ADMIN — WATER 1000 MG: 1 INJECTION INTRAMUSCULAR; INTRAVENOUS; SUBCUTANEOUS at 16:45

## 2022-06-29 RX ADMIN — ACETAMINOPHEN 650 MG: 325 TABLET ORAL at 02:09

## 2022-06-29 RX ADMIN — ACETAMINOPHEN 650 MG: 325 TABLET ORAL at 06:59

## 2022-06-29 RX ADMIN — VANCOMYCIN HYDROCHLORIDE 1500 MG: 5 INJECTION, POWDER, LYOPHILIZED, FOR SOLUTION INTRAVENOUS at 04:41

## 2022-06-29 RX ADMIN — TRIAMTERENE AND HYDROCHLOROTHIAZIDE 1 TABLET: 37.5; 25 TABLET ORAL at 22:48

## 2022-06-29 RX ADMIN — OXYCODONE 10 MG: 5 TABLET ORAL at 15:21

## 2022-06-29 RX ADMIN — INSULIN LISPRO 2 UNITS: 100 INJECTION, SOLUTION INTRAVENOUS; SUBCUTANEOUS at 08:29

## 2022-06-29 RX ADMIN — DONEPEZIL HYDROCHLORIDE 10 MG: 5 TABLET, FILM COATED ORAL at 22:48

## 2022-06-29 RX ADMIN — INSULIN GLARGINE 27 UNITS: 100 INJECTION, SOLUTION SUBCUTANEOUS at 22:49

## 2022-06-29 RX ADMIN — MELOXICAM 7.5 MG: 7.5 TABLET ORAL at 08:19

## 2022-06-29 RX ADMIN — KETOCONAZOLE: 20 CREAM TOPICAL at 08:20

## 2022-06-29 RX ADMIN — METOPROLOL SUCCINATE 100 MG: 50 TABLET, FILM COATED, EXTENDED RELEASE ORAL at 22:48

## 2022-06-29 RX ADMIN — CETIRIZINE HYDROCHLORIDE 5 MG: 5 TABLET ORAL at 08:19

## 2022-06-29 RX ADMIN — INSULIN LISPRO 9 UNITS: 100 INJECTION, SOLUTION INTRAVENOUS; SUBCUTANEOUS at 13:10

## 2022-06-29 RX ADMIN — METFORMIN HYDROCHLORIDE 1000 MG: 500 TABLET, EXTENDED RELEASE ORAL at 08:19

## 2022-06-29 RX ADMIN — INSULIN LISPRO 2 UNITS: 100 INJECTION, SOLUTION INTRAVENOUS; SUBCUTANEOUS at 22:49

## 2022-06-29 RX ADMIN — SODIUM CHLORIDE, PRESERVATIVE FREE 10 ML: 5 INJECTION INTRAVENOUS at 08:20

## 2022-06-29 RX ADMIN — INSULIN LISPRO 4 UNITS: 100 INJECTION, SOLUTION INTRAVENOUS; SUBCUTANEOUS at 13:05

## 2022-06-29 RX ADMIN — OXYCODONE 10 MG: 5 TABLET ORAL at 22:48

## 2022-06-29 RX ADMIN — ESCITALOPRAM OXALATE 10 MG: 10 TABLET ORAL at 22:48

## 2022-06-29 RX ADMIN — ACETAMINOPHEN 650 MG: 325 TABLET ORAL at 13:10

## 2022-06-29 RX ADMIN — INSULIN LISPRO 9 UNITS: 100 INJECTION, SOLUTION INTRAVENOUS; SUBCUTANEOUS at 08:30

## 2022-06-29 RX ADMIN — ASPIRIN 325 MG: 325 TABLET, COATED ORAL at 22:48

## 2022-06-29 RX ADMIN — ASPIRIN 325 MG: 325 TABLET, COATED ORAL at 08:19

## 2022-06-29 ASSESSMENT — PAIN DESCRIPTION - LOCATION
LOCATION: ANKLE
LOCATION: FOOT;LEG
LOCATION: ANKLE

## 2022-06-29 ASSESSMENT — PAIN SCALES - GENERAL
PAINLEVEL_OUTOF10: 7
PAINLEVEL_OUTOF10: 4
PAINLEVEL_OUTOF10: 3
PAINLEVEL_OUTOF10: 7
PAINLEVEL_OUTOF10: 8

## 2022-06-29 ASSESSMENT — PAIN DESCRIPTION - DESCRIPTORS
DESCRIPTORS: ACHING;SORE
DESCRIPTORS: ACHING;SORE
DESCRIPTORS: CRAMPING;ACHING

## 2022-06-29 ASSESSMENT — PAIN DESCRIPTION - ORIENTATION
ORIENTATION: RIGHT

## 2022-06-29 ASSESSMENT — PAIN - FUNCTIONAL ASSESSMENT: PAIN_FUNCTIONAL_ASSESSMENT: PREVENTS OR INTERFERES SOME ACTIVE ACTIVITIES AND ADLS

## 2022-06-29 NOTE — CARE COORDINATION
2018  Patient Name: Juan Bey  : 1962  Medical Record: [id number]    MEDICATIONS  Current Outpatient Prescriptions   Medication Sig Dispense Refill    predniSONE (DELTASONE) 10 MG tablet 2 tablets daily for 2 weeks then 1 tablet daily for 2 weeks then stop 42 tablet 0    umeclidinium-vilanterol (ANORO ELLIPTA) 62.5-25 MCG/INH AEPB inhaler Inhale 1 puff into the lungs daily 1 puff 5    albuterol sulfate HFA (PROAIR HFA) 108 (90 Base) MCG/ACT inhaler Inhale 2 puffs into the lungs every 6 hours as needed for Wheezing 1 Inhaler 3    permethrin (ELIMITE) 5 % cream Apply topically as directed 2 Tube 0    Multiple Vitamins-Minerals (THERAPEUTIC MULTIVITAMIN-MINERALS) tablet Take 1 tablet by mouth daily      ferrous sulfate 325 (65 Fe) MG tablet Take 325 mg by mouth daily (with breakfast)      vitamin D (CHOLECALCIFEROL) 1000 UNIT TABS tablet Take 1,000 Units by mouth daily      fluticasone (FLONASE) 50 MCG/ACT nasal spray 2 sprays by Nasal route daily (Patient taking differently: 2 sprays by Nasal route daily as needed for Rhinitis or Allergies ) 1 Bottle 0     No current facility-administered medications for this visit. ALLERGIES  Allergies   Allergen Reactions    Aspirin Itching         Comments  No specialty comments available. Background history:   Juan Bey is a 55 y.o. female with past medical history of brain aneurysm and indicated by pneumonia and tracheostomy, COPD, hepatitis C who is being seen for follow up evaluation of Abnormal CT chest and PFTs. She has a history of brain aneurysm diagnosed in  status post rupture. Her hospital course was complicated by pneumonia requiring tracheostomy. She has had multiple episodes of pneumonia since then. She also has COPD. She presented with shortness of breath and fatigue 3 months ago which led to the further workup including CT chest and stress echo.   Her CT chest revealed extensive bilateral upper lobe predominant Spoke with patient regarding MD orders for [facility] services. Patient agreeable and has chosen [facility]. Referral Faxed. [address] [place] [address] [phone]. [phone]. Please notify [address] [place] [address] when patient discharges and fax DC Summary,  DC med list and any new [facility] orders. The Patient and/or patient representative was provided with a choice of provider and agrees   with the discharge plan. [x] Yes [] No    Freedom of choice list was provided with basic dialogue that supports the patient's individualized plan of care/goals, treatment preferences and shares the quality data associated with the providers.  [x] Yes [] No  Electronically signed by Brenden Tate on 6/29/2022 at 9:36 AM peribronchial vascular and subpleural micronodular opacities with tree-in-bud appearance. PFT was done to evaluate further which revealed decreased diffusion capacity of 60%, FEV1/FVC ratio of 75% and decrease in total lung capacity consistent with moderate restrictive lung disease. She had workup to rule out connective tissue disease which showed positive ABRAHAN 1:320 homogeneous pattern, positive dsDNA by OCTAVIANO and negative by cathidia method. She has negative RNP, anti-Noriega, anti-scleroderma antibodies. ESR and CRP is normal.  She denies malar rash, photosensitivity, oral or nasal ulcers, history of pleurisy or pericarditis, miscarriages, pregnancy complications, blood clots, skin tightening, sclerodactyly, severe heartburn. She has occasional dysphagia, dry eyes and dry mouth and diffuse thinning of hair. She also has joint pain mainly in her hands located in the DIP joints associated with stiffness in the morning lasting for couple of hours. She denies family history of connective tissue disease. She also has persistent fatigue. She continues to have shortness of breath. She denies fever, chills, weight loss or night sweats or cough. -Reviewed notes by Dr. Phoebe Perez lung disease-recommended rheumatology evaluation and repeat CT scan. Autoimmune evaluation with positive ABRAHAN. Interim history: She presents for a follow-up of restrictive lung disease, osteoarthritis. She continues to complain of shortness of breath. She recently underwent bronchoscopy with right upper lobe biopsy which showed bronchiolitis which was felt to be secondary to an offending agent [mold, smoking, inhalation of chemicals, packs, hot tub]. She was advised to remove the offending agent and placed on prednisone. She hasn't noticed any significant improvement yet. Workup so far shows negative RNP, anti-Smith, anti-scleroderma, anticentromere, SSA antibody.   She continues to complain of (chronic obstructive pulmonary disease) ([facility] 8/31/2017    History of hepatitis C - tx w interferon, cure 8/31/2017    Migraine     Obesity (BMI 30-39.9) 6/22/2018    Restless legs syndrome (RLS) 9/27/2018    Simple chronic bronchitis ([facility] 8/31/2017    Start Breo 9/2017     Past Surgical History:   Procedure Laterality Date   [place] BRONCHOSCOPY  10/25/2018    BRONCHOSCOPY ALVEOLAR LAVAGE performed by Hermine Duane, MD at [facility] W/TRANSBRONCHIAL LUNG BX 1 LOBE N/A 10/25/2018    BRONCHOSCOPY/TRANSBRONCHIAL BIOPSY WITH FLUOROSCOPY performed by Hermine Duane, MD at [facility]       Social History     Social History    Marital status:      Spouse name: N/A    Number of children: N/A    Years of education: N/A     Occupational History    Not on file.      Social History Main Topics    Smoking status: Former Smoker     Packs/day: 2.00     Years: 22.00     Types: Cigarettes    Smokeless tobacco: Never Used      Comment: 20 years ago    Alcohol use No    Drug use: No    Sexual activity: Yes     Partners: Male     Other Topics Concern    Not on file     Social History Narrative    No narrative on file     Family History   Problem Relation Age of Onset    Alzheimer's Disease Father     Parkinsonism Father     High Blood Pressure Sister     Ovarian Cancer Sister     Cancer Brother     Breast Cancer Neg Hx     Depression Neg Hx          PHYSICAL EXAM   Vitals:    11/14/18 1514   BP: 113/73   Site: Left Upper Arm   Pulse: 76   Temp: 97.9 °F (36.6 °C)   Weight: 191 lb (86.6 kg)   Height: 5' 1\" (1.549 m)     Physical Exam  Constitutional:  Well developed, well nourished, no acute distress, non-toxic appearance   Musculoskeletal:    RIGHT  Swell  Tender  ROM  LEFT  Swell  Tender  ROM    DIP2  0  0  [place]   0  0  Heberden    DIP3  0  0  [place]   0  0  FULL    DIP4  0  0  FULL   0  0  FULL MCV 93.2 10/03/2018    MCH 31.1 10/03/2018    MCHC 33.4 10/03/2018    RDW 13.9 10/03/2018    LYMPHOPCT 31.1 10/03/2018    MONOPCT 9.2 10/03/2018    BASOPCT 0.5 10/03/2018    MONOSABS 0.8 10/03/2018    LYMPHSABS 2.6 10/03/2018    EOSABS 0.3 10/03/2018    BASOSABS 0.0 10/03/2018       Chemistry        Component Value Date/Time     10/03/2018 0943    K 4.4 10/03/2018 0943    K 4.1 04/13/2018 1349     10/03/2018 0943    CO2 26 10/03/2018 0943    BUN 13 10/03/2018 0943    CREATININE 0.9 10/03/2018 0943        Component Value Date/Time    CALCIUM 9.5 10/03/2018 0943    ALKPHOS 89 10/03/2018 0943    AST 18 10/03/2018 0943    ALT 17 10/03/2018 0943    BILITOT 0.4 10/03/2018 0943          Lab Results   Component Value Date    SEDRATE 22 10/03/2018     Lab Results   Component Value Date    CRP 5.3 (H) 10/03/2018     Lab Results   Component Value Date    ABRAHAN POSITIVE 06/22/2018     Lab Results   Component Value Date    RF <10.0 06/22/2018    CCPABIGG 2 06/22/2018     Lab Results   Component Value Date    ABRAHAN POSITIVE 06/22/2018    ANATITER 1:320 06/22/2018    ANAINT see below 06/22/2018    JOSE Mays MD,PhD 06/22/2018     Lab Results   Component Value Date    DSDNAG Detected 06/22/2018    [address] [facility] <1:10 06/22/2018     No results found for: Gildardo Ply  Lab Results   Component Value Date    SMAB Negative 06/22/2018    RNPAB Negative 06/22/2018     Lab Results   Component Value Date    CENTABIGG 0 10/03/2018     Lab Results   Component Value Date    ACE 19 10/15/2018     Lab Results   Component Value Date    JO1 0 10/03/2018    VITD25 83.3 09/27/2018    [id number] 0 06/22/2018       Radiology:    CTA 4/5/2018  No evidence of pulmonary embolism. Extensive bilateral upper lobe predominant   peribronchovascular and subpleural micronodular opacities some of which   demonstrate tree-in-bud appearance, findings suggestive of infectious   bronchiolitis or other atypical infectious/inflammatory process.     ASSESSMENT AND PLAN      Assessment/Plan:      ASSESSMENT:    1. Restrictive lung disease    2. Primary osteoarthritis involving multiple joints    3. Positive ABRAHAN (antinuclear antibody)        PLAN:   1. Restrictive lung disease  CT chest with bilateral upper lobe predominant subpleural micronodular opacities with tree-in-bud appearance  -PFT with DLCO 60% with restrictive lung disease  -Bronchoscopy with right upper lobe biopsy consistent with bronchiolitis-possibly due to AN offending agent(PETS, Molds, hot tubs, smoke, chemicals]   -Positive ABRAHAN 1:320 homogeneous pattern, dsDNA detected by OCTAVIANO method but negative by Crithidia method, negative anti-Noriega, RNP, anti-scleroderma antibody, ANCA by IFA , negative MPO/MS 3, anticentromere, SSA  -No other signs and symptoms of lupus/systemic rheumatic disease  -Doubt related to connective tissue disease    2. Primary osteoarthritis involving multiple joints  Continue Tylenol or OTC NSAIDs as needed    3. Positive ABRAHAN (antinuclear antibody)  Implications of low titer positive ABRAHAN were discussed with patient. About 15-20% of normal healthy individuals at her age may have low titer positive ABRAHAN of unclear clinical significance. Other serologies negative    The patient indicates understanding of these issues and agrees with the plan. Return if symptoms worsen or fail to improve. The risks and benefits of my recommendations, as well as other treatment options, benefits and side effects were discussed with the patient. All questions were answered. ######################################################################    I thank you for giving me the opportunity to participate in [facility]. If you have any questions or concerns please feel free to contact me. I look forward to following  Sofya Chen along with you.       Electronically signed by: Grupo Otoole MD, 11/14/2018 3:55 PM    Documentation was done using voice recognition dragon

## 2022-06-29 NOTE — CONSULTS
INFECTIOUS DISEASES CONSULT NOTE    Patient:  Kaylin David 62 y.o. female  ROOM # [unfilled]  YOB: 1960  MRN: [id number]  CSN:  [id number]  Admit date: 6/28/2022   Admitting Physician: Jose Barrow [facility],*  Primary Care Physician: Juany Chavez, APRN - CNP  REFERRING PROVIDER: Nelia Tyson    Reason for Consultation: Right distal fibular abscess after ORIF on 4/21 - status post removal of hardware yesterday    History of Present Illness/Chief Complaint: Pleasant 71-year-old woman. She indicates she had a right ankle fracture 1 year ago. From what she describes it was a closed fracture. She underwent open reduction internal fixation. She indicates she did well. She had no problems with poor wound healing or drainage post surgery. She indicates after having been casted for a while, the bone healed and she was able to be up and ambulatory without limitation. She indicates about 4 days ago she awoke and it was red and swollen. She had some discomfort. She presented to the hospital.  She had evidence of a distal fibular abscess. She underwent irrigation, debridement, and hardware removal.  She has been on antibiotic treatment with ceftriaxone. Infectious disease asked to evaluate and offer recommendations.     Current Scheduled Medications:    insulin glargine  0.25 Units/kg SubCUTAneous Nightly    insulin lispro  0.08 Units/kg SubCUTAneous TID WC    insulin lispro  0-6 Units SubCUTAneous TID WC    insulin lispro  0-3 Units SubCUTAneous Nightly    clotrimazole-betamethasone   Topical BID    donepezil  10 mg Oral Nightly    escitalopram  10 mg Oral Nightly    ketoconazole   Topical Daily    cetirizine  5 mg Oral Daily    metFORMIN  1,000 mg Oral Daily with breakfast    metoprolol succinate  100 mg Oral Nightly    triamterene-hydroCHLOROthiazide  1 tablet Oral Nightly    sodium chloride flush  5-40 mL IntraVENous 2 times per day    acetaminophen  650 mg Oral Q6H    meloxicam  7.5 mg Oral Daily    aspirin  325 mg Oral BID    cefTRIAXone (ROCEPHIN) IV  1,000 mg IntraVENous Q24H    insulin lispro  0-12 Units SubCUTAneous TID WC    insulin lispro  0-6 Units SubCUTAneous Nightly     Current PRN Medications:  glucose, dextrose bolus **OR** dextrose bolus, glucagon (rDNA), dextrose, sodium chloride flush, sodium chloride, oxyCODONE **OR** oxyCODONE, morphine **OR** morphine    Allergies: Allergies   Allergen Reactions    Azithromycin Rash    Namenda  [Memantine Hcl] Nausea And Vomiting and Nausea Only     Past Medical History: She reports a history of memory problems. Diabetes mellitus. Hypertension. Sleep apnea. Past Surgical History: Right ankle surgery. Cholecystectomy. Social History: . No tobacco, alcohol, or illicit drug use. Family History: Noncontributory    Exposure History: No close contacts of been ill    Review of Systems: No chest pain or chest pressure. No cough or sputum production. No nausea or vomiting. No diarrhea. No urinary symptoms. Vital Signs:  /76   Pulse 57   Temp 96.8 °F (36 °C) (Temporal)   Resp 18   Ht 5' 6\" (1.676 m)   Wt 240 lb (108.9 kg)   SpO2 95%   Breastfeeding No   BMI 38.74 kg/m²  Temp (24hrs), Av.7 °F (36.5 °C), Min:96.3 °F (35.7 °C), Max:99.3 °F (37.4 °C)    Physical Exam:   Vital signs reviewed. Alert, pleasant, no distress  Lungs clear to auscultation without crackles or wheezes  Heart regular rate and rhythm without murmur  Abdomen is soft and nontender  Right lower extremity with wound VAC in place. Mild erythema. Intact sensation and capillary refill involving the right foot  Skin without drug rash    Lab Results:  CBC:   Recent Labs     22  0231   HGB 11.5*   HCT 36.2*     CMP:   Recent Labs     22  0231      K 3.8      CO2 24   BUN 12   CREATININE 0.6   CALCIUM 8.6*   GLUCOSE 268*     Culture:     Surgical cultures from right ankle on 2022:   Ankle #2-right ankle periosteum-no white blood cells or organisms-culture Staph aureus  Ankle #1 right ankle abscess-moderate gram-positive cocci in clusters, many white blood cells and culture growing Staph aureus    Radiology: None    Impression:   Abscess/osteomyelitis right distal fibula-Staph aureus recovered on operative culture  Diabetes mellitus  Obesity  Sleep apnea    Recommendations:    Suggest antibiotic treatment with vancomycin  Await susceptibility on Staphylococcus aureus  If MSSA will transition to nafcillin or cefazolin  PICC line placement  Anticipating 4 to 6-week course of intravenous antibiotic therapy  Will write home IV antibiotic orders once we have susceptibility testing available  Continue to follow    Deon Wiggins MD  06/29/22  8:54 AM

## 2022-06-29 NOTE — PROGRESS NOTES
Physical Therapy  Facility/Department: [facility] 3 KRISTAN/VAS/MED  Physical Therapy Initial Assessment    Name: Ever Hernandez  : 1960  MRN: [id number]  Date of Service: 2022    Discharge Recommendations:  Home with assist PRN          Patient Diagnosis(es): The encounter diagnosis was Ankle wound, right, sequela. Past Medical History:  has a past medical history of Anxiety, Depression, Diabetes ([address] Hypertension, Memory difficulty, Obesity, and Sleep apnea. Past Surgical History:  has a past surgical history that includes Gallbladder surgery; Ankle fracture surgery (Right, 2021); and Ankle surgery (Right, 2022). Assessment   Body Structures, Functions, Activity Limitations Requiring Skilled Therapeutic Intervention: Decreased functional mobility ; Decreased ROM; Decreased strength; Increased pain;Decreased endurance  Assessment: Pt would benefit from skilled PT in this setting to progress her mobility post op R ankle  Therapy Prognosis: Good  Activity Tolerance  Activity Tolerance: Patient tolerated treatment well     Plan   Plan  Plan: 5-7 times per week  Plan weeks: 2  Current Treatment Recommendations: Strengthening,ROM,Functional mobility training,Transfer training,Gait training,Pain management,Safety education & training,Positioning,Patient/Caregiver education & training,Therapeutic activities  Plan Comment: wbat R LE  Safety Devices  Type of Devices: Call light within reach,Gait belt,Left in chair     Restrictions  Restrictions/Precautions  Restrictions/Precautions: Weight Bearing  Lower Extremity Weight Bearing Restrictions  Right Lower Extremity Weight Bearing: Weight Bearing As Tolerated  Position Activity Restriction  Other position/activity restrictions: has a wound vac     Subjective   General  Diagnosis: R ankle fx 2021. now s/p R ankle I&D and removal of hardware due to abscess  Follows Commands: Within Functional Limits  General Comment  Comments: has a wound vac on R Demonstration;Verbal  Barriers to Learning: None  Education Outcome: Verbalized understanding      Therapy Time   Individual Concurrent Group Co-treatment   Time In           Time Out           Minutes                   Annamaria Runner, PT    Electronically signed by Annamaria Runner, PT on 6/29/2022 at 10:27 AM

## 2022-06-29 NOTE — PLAN OF CARE
Problem: Discharge Planning  Goal: Discharge to home or other facility with appropriate resources  Outcome: Progressing  Flowsheets  Taken 6/28/2022 2011 by Frederick Barkley RN  Discharge to home or other facility with appropriate resources: Identify barriers to discharge with patient and caregiver  Taken 6/28/2022 1808 by Jamey Mon RN  Discharge to home or other facility with appropriate resources: Identify barriers to discharge with patient and caregiver     Problem: Chronic Conditions and Co-morbidities  Goal: Patient's chronic conditions and co-morbidity symptoms are monitored and maintained or improved  Outcome: Progressing  Flowsheets (Taken 6/28/2022 2011)  Care Plan - Patient's Chronic Conditions and Co-Morbidity Symptoms are Monitored and Maintained or Improved: Monitor and assess patient's chronic conditions and comorbid symptoms for stability, deterioration, or improvement     Problem: Pain  Goal: Verbalizes/displays adequate comfort level or baseline comfort level  Outcome: Progressing     Problem: Safety - Adult  Goal: Free from fall injury  Outcome: Progressing  Flowsheets (Taken 6/29/2022 0151)  Free From Fall Injury: Instruct family/caregiver on patient safety     Problem: ABCDS Injury Assessment  Goal: Absence of physical injury  Outcome: Progressing  Flowsheets (Taken 6/29/2022 0151)  Absence of Physical Injury: Implement safety measures based on patient assessment

## 2022-06-29 NOTE — PLAN OF CARE
Problem: Discharge Planning  Goal: Discharge to home or other facility with appropriate resources  6/29/2022 1425 by Jaxson Galdamez RN  Outcome: Progressing  6/29/2022 0154 by Dunia Kim RN  Outcome: Progressing  Flowsheets  Taken 6/28/2022 2011 by Dunia Kim RN  Discharge to home or other facility with appropriate resources: Identify barriers to discharge with patient and caregiver  Taken 6/28/2022 1808 by Tacho Santacruz RN  Discharge to home or other facility with appropriate resources: Identify barriers to discharge with patient and caregiver     Problem: Chronic Conditions and Co-morbidities  Goal: Patient's chronic conditions and co-morbidity symptoms are monitored and maintained or improved  6/29/2022 1425 by Jaxson Galdamez RN  Outcome: Progressing  6/29/2022 0154 by Dunia Kim RN  Outcome: Progressing  Flowsheets (Taken 6/28/2022 2011)  Care Plan - Patient's Chronic Conditions and Co-Morbidity Symptoms are Monitored and Maintained or Improved: Monitor and assess patient's chronic conditions and comorbid symptoms for stability, deterioration, or improvement     Problem: Pain  Goal: Verbalizes/displays adequate comfort level or baseline comfort level  6/29/2022 1425 by Jaxson Galdamez RN  Outcome: Progressing  6/29/2022 0154 by Dunia Kim RN  Outcome: Progressing     Problem: Safety - Adult  Goal: Free from fall injury  6/29/2022 1425 by Jaxson Galdamez RN  Outcome: Progressing  6/29/2022 0154 by Dunia Kim RN  Outcome: Progressing  Flowsheets (Taken 6/29/2022 0151)  Free From Fall Injury: Instruct family/caregiver on patient safety     Problem: ABCDS Injury Assessment  Goal: Absence of physical injury  6/29/2022 1425 by Jaxson Galdamez RN  Outcome: Progressing  6/29/2022 0154 by Dunia Kim RN  Outcome: Progressing  Flowsheets (Taken 6/29/2022 0151)  Absence of Physical Injury: Implement safety measures based on patient assessment

## 2022-06-29 NOTE — CARE COORDINATION
CM met w/patient and spouse at the bedside. Pt reports, she fx R ankle 5/21, and noted 4-5 days ago ankle was swelling. Pt had used a w/c initial after fx ankle but is now using walker. Spouse is able to assist with care. ID will be consulted regarding antibiotic tx. SHERIF graf f/u   06/29/22 0482   Service Assessment   Patient Orientation Alert and Oriented   Cognition Alert   History Provided By Patient;Significant Other   Primary Caregiver Spouse   Support Systems None   PCP Verified by CM Yes   Prior Functional Level Independent in ADLs/IADLs   Current Functional Level Independent in ADLs/IADLs   Can patient return to prior living arrangement Yes   Ability to make needs known: Good   Family able to assist with home care needs: Yes   Social/Functional History   Lives With Spouse   Type of [address] One level   Home Access Stairs to enter with rails; Ramped entrance   Bathroom Shower/Tub Tub/Shower unit   Bathroom Toilet Standard   Bathroom Accessibility Accessible   Receives Help From Family   ADL Assistance Independent   Homemaking Assistance Independent   Ambulation Assistance Independent   Active  Yes   Mode of Transportation Car   Discharge Planning   Type of Residence House   Living Arrangements Spouse/Significant Other   Current Services Prior To Admission C-pap  ([facility])   [facility] Medications Yes   Patient expects to be discharged to: Unknown   One/Two Story Residence One story   Services At/After Discharge   Transition of Care Consult (CM Consult) [facility] Provided? No   Condition of Participation: Discharge Planning   Freedom of Choice list was provided with basic dialogue that supports the patient's individualized plan of care/goals, treatment preferences, and shares the quality data associated with the providers?   Yes     PCP verified Yes    Patient Contact Information:    [address]  [phone] (home)   Telephone Information:   Mobile [phone]     Above information verified? [x]   Yes  []   No      Emergency Contacts:    Extended Emergency Contact Information  Primary Emergency Contact: Harrison Yi  Address: [address] [place], [place] [facility] [address] Phone: [phone]  Mobile Phone: [phone]  Relation: Spouse  Secondary Emergency Contact: Navdeep Yi   [facility] [address] Phone: [phone]  Relation: Child      Have you been vaccinated for COVID-19 (SARS-CoV-2)? [x]   Yes  []   No                   If so, when?     Which :         []   Pfizer-BioNTech  [x]   Moderna x2 + 1 booster  []   Fuentes Rahman  []   Other:         Pharmacy:    [facility] [address], [facility] [address] [facility] [phone]  [address] [place]  [address] [address]  Phone: [phone] Fax: [address] [place] [address], [address] [place] [address] [phone] [place] [phone]  [address]  [address] [place] [address]  Phone: [phone] Fax: [phone]          Patient Deficits:    []   Yes   [x]   No

## 2022-06-29 NOTE — PROGRESS NOTES
Subjective:     Post-Operative Day: 1 No complaints   Objective:     Patient Vitals for the past 24 hrs:   BP Temp Temp src Pulse Resp SpO2 Height Weight   06/29/22 0625 100/76 96.8 °F (36 °C) Temporal 57 18 95 % -- --   06/29/22 0207 118/63 (!) 96.3 °F (35.7 °C) Temporal 62 18 93 % -- --   06/28/22 2218 (!) 114/54 97 °F (36.1 °C) Temporal 69 18 93 % -- --   06/28/22 2011 -- -- -- 80 -- -- -- --   06/28/22 2001 -- -- -- -- 18 -- -- --   06/28/22 1953 (!) 145/60 97.3 °F (36.3 °C) Temporal 82 18 94 % -- --   06/28/22 1740 139/63 -- -- 88 14 93 % -- --   06/28/22 1735 (!) 151/65 98.5 °F (36.9 °C) -- 87 15 93 % -- --   06/28/22 1730 (!) 148/72 -- -- 85 18 93 % -- --   06/28/22 1715 (!) 154/73 -- -- 87 17 91 % -- --   06/28/22 1710 (!) 150/82 -- -- 81 20 92 % -- --   06/28/22 1705 (!) 161/75 -- -- 81 20 91 % -- --   06/28/22 1700 (!) 150/92 -- -- 88 16 90 % -- --   06/28/22 1655 (!) 174/85 -- -- 87 23 94 % -- --   06/28/22 1650 (!) 144/118 -- -- 89 26 94 % -- --   06/28/22 1648 -- 98 °F (36.7 °C) Temporal -- -- -- -- --   06/28/22 1645 (!) 144/89 98.5 °F (36.9 °C) Temporal 86 17 92 % -- --   06/28/22 1215 (!) 134/57 99.3 °F (37.4 °C) Tympanic 78 16 97 % 5' 6\" (1.676 m) 240 lb (108.9 kg)       General: alert, appears stated age and cooperative   Wound: Dressing clean,dry and intact and mod swelling    Neurovascular: Exam normal             Data Review:  Recent Labs     06/29/22  0231   HGB 11.5*     Recent Labs     06/29/22  0231      K 3.8   CREATININE 0.6   GLUCOSE 268*     Recent Labs     06/28/22  1216 06/28/22  1955 06/29/22  0657   POCGLU 126* 145* 224*     No orders to display         Assessment:     Status Post right distal fibular abscess I&D removal of hardware after ankle ORIF 4/21. Doing well postoperatively.  . Acute postoperative anemia    Plan:   Leave drain in for now  Follow cultures  Consult Infectious Disease  IV antibiotics (Vanco/Cefepime)  Pain control  PT/OT  DVT prophylaxis  Ice and

## 2022-06-29 NOTE — PROGRESS NOTES
Occupational Therapy Initial Assessment  Date: 2022   Patient Name: Ever Hernandez  MRN: [id number]     : 1960    Date of Service: 2022    Discharge Recommendations:  24 hour supervision or assist (pt planning to get [facility] PT once home; intial 24/7 support, likely PRN assist)  OT Equipment Recommendations  Equipment Needed:  (manual w/c)    Assessment   Assessment: OT evaluation and single-tx completed. Pt does not display any deficits that would warrant further OT services in this setting. OT's major concern is ambulatory ADLs, which can be sufficiently addressed by PT. AE/DME planning addressed this session with pt/spouse requesting manual w/c for home use. OT does not anticipate any environmental barriers to D/C home once medically cleared if initial 24/7 assist is available for safety. No Skilled OT: Safe to return home; No OT goals identified (No OT goals beyond ambulation being addressed by PT.)  REQUIRES OT FOLLOW-UP: No  Activity Tolerance  Activity Tolerance: Patient Tolerated treatment well              Patient Diagnosis(es): The encounter diagnosis was Ankle wound, right, sequela.              Restrictions  Restrictions/Precautions  Restrictions/Precautions: Weight Bearing (Vaughn Fall Score 45)  Lower Extremity Weight Bearing Restrictions  Right Lower Extremity Weight Bearing: Weight Bearing As Tolerated  Position Activity Restriction  Other position/activity restrictions: has a wound vac    Subjective      Pain Assessment  Pain Level: 7    Social/Functional History  Social/Functional History  Lives With: Spouse  Type of Home: House  Home Layout: One level  Home Access: Ramped entrance  Bathroom Shower/Tub: Tub/Shower unit  Bathroom Toilet: Handicap height  Bathroom Equipment: Tub transfer bench,3-in-1 commode  Home Equipment: Walker, standard  ADL Assistance: Independent  Ambulation Assistance: Independent  Transfer Assistance: Independent  Additional Comments: Pt/spouse are interested in manual w/c for in house use; bathroom is farther away from kitchen and bedroom (in comparison to current amb distance). Pt's doorframes would not be wide enough for w/c, but pt can have r/w in room before and wheel in for repetitive trips. Objective   Pain Assessment  Pain Level: 7          Observation/Palpation  Edema: R lower leg  Toilet Transfers  Toilet - Technique: Stand step  Equipment Used: Extra wide bedside commode  Toilet Transfer: Contact guard assistance  Toilet Transfers Comments: with r/w  ADL  Feeding: Independent;Setup  Grooming: Independent;Setup  Grooming Skilled Clinical Factors: seated; sup standing with DME  UE Bathing: Supervision  UE Bathing Skilled Clinical Factors: seated  LE Bathing: Stand by assistance;Contact guard assistance  LE Bathing Skilled Clinical Factors: seated  UE Dressing: Supervision;Setup  UE Dressing Skilled Clinical Factors: seated EOB  LE Dressing: Contact guard assistance  Toileting: Stand by assistance;Contact guard assistance        Bed mobility  Supine to Sit: Unable to assess  Sit to Supine: Unable to assess  Bed Mobility Comments: (I) prior with PT. Pt up in recliner upon eval.  Transfers  Stand Step Transfers: Contact guard assistance  Sit to stand: Contact guard assistance  Stand to sit: Contact guard assistance     Cognition  Overall Cognitive Status: WNL               Gross Assessment  AROM: Within functional limits  Strength: Within functional limits  Coordination: Within functional limits                      Included Treatment  Tx consisted of: pt/family education; transfer training; DME/AE training/discussion; activity tolerance/balance challenges. (Treatment time: 15 min)        Plan   Plan  Plan Comment: EVAL ONLY    Goals  Short Term Goals  Short Term Goal 1: Pt/family will verbalize/demo: AE/DME options; recommended therapeutic activities; homemaking/IADL strategies; energy conservation techniques; and fall prevention strategies (GOAL MET). HAFSA Padilla/L  Electronically signed by Nely PEREYRA/L on 6/29/2022 at 2:39 PM.

## 2022-06-30 LAB
ANION GAP SERPL CALCULATED.3IONS-SCNC: 12 MMOL/L (ref 7–19)
BUN BLDV-MCNC: 11 MG/DL (ref 8–23)
CALCIUM SERPL-MCNC: 8.5 MG/DL (ref 8.8–10.2)
CHLORIDE BLD-SCNC: 102 MMOL/L (ref 98–111)
CO2: 26 MMOL/L (ref 22–29)
CREAT SERPL-MCNC: 0.5 MG/DL (ref 0.5–0.9)
GFR AFRICAN AMERICAN: >59
GFR NON-AFRICAN AMERICAN: >60
GLUCOSE BLD-MCNC: 112 MG/DL (ref 70–99)
GLUCOSE BLD-MCNC: 114 MG/DL (ref 74–109)
GLUCOSE BLD-MCNC: 117 MG/DL (ref 70–99)
GLUCOSE BLD-MCNC: 130 MG/DL (ref 70–99)
GLUCOSE BLD-MCNC: 151 MG/DL (ref 70–99)
GLUCOSE BLD-MCNC: 155 MG/DL (ref 70–99)
HCT VFR BLD CALC: 38.3 % (ref 37–47)
HEMOGLOBIN: 11.5 G/DL (ref 12–16)
MAGNESIUM: 1.8 MG/DL (ref 1.6–2.4)
PERFORMED ON: ABNORMAL
POTASSIUM REFLEX MAGNESIUM: 3.4 MMOL/L (ref 3.5–5)
REASON FOR REJECTION: NORMAL
REJECTED TEST: NORMAL
SODIUM BLD-SCNC: 140 MMOL/L (ref 136–145)

## 2022-06-30 PROCEDURE — 83735 ASSAY OF MAGNESIUM: CPT

## 2022-06-30 PROCEDURE — 1210000000 HC MED SURG R&B

## 2022-06-30 PROCEDURE — 82947 ASSAY GLUCOSE BLOOD QUANT: CPT

## 2022-06-30 PROCEDURE — 2580000003 HC RX 258: Performed by: ORTHOPAEDIC SURGERY

## 2022-06-30 PROCEDURE — 6360000002 HC RX W HCPCS: Performed by: ORTHOPAEDIC SURGERY

## 2022-06-30 PROCEDURE — 80048 BASIC METABOLIC PNL TOTAL CA: CPT

## 2022-06-30 PROCEDURE — 36415 COLL VENOUS BLD VENIPUNCTURE: CPT

## 2022-06-30 PROCEDURE — 6370000000 HC RX 637 (ALT 250 FOR IP): Performed by: ORTHOPAEDIC SURGERY

## 2022-06-30 PROCEDURE — 85018 HEMOGLOBIN: CPT

## 2022-06-30 PROCEDURE — 85014 HEMATOCRIT: CPT

## 2022-06-30 RX ADMIN — INSULIN LISPRO 2 UNITS: 100 INJECTION, SOLUTION INTRAVENOUS; SUBCUTANEOUS at 14:00

## 2022-06-30 RX ADMIN — METFORMIN HYDROCHLORIDE 1000 MG: 500 TABLET, EXTENDED RELEASE ORAL at 08:34

## 2022-06-30 RX ADMIN — INSULIN GLARGINE 27 UNITS: 100 INJECTION, SOLUTION SUBCUTANEOUS at 22:47

## 2022-06-30 RX ADMIN — CLOTRIMAZOLE AND BETAMETHASONE DIPROPIONATE: 10; .5 CREAM TOPICAL at 08:36

## 2022-06-30 RX ADMIN — ASPIRIN 325 MG: 325 TABLET, COATED ORAL at 08:34

## 2022-06-30 RX ADMIN — KETOCONAZOLE: 20 CREAM TOPICAL at 08:36

## 2022-06-30 RX ADMIN — SODIUM CHLORIDE, PRESERVATIVE FREE 10 ML: 5 INJECTION INTRAVENOUS at 08:35

## 2022-06-30 RX ADMIN — ACETAMINOPHEN 650 MG: 325 TABLET ORAL at 02:02

## 2022-06-30 RX ADMIN — OXYCODONE 10 MG: 5 TABLET ORAL at 20:14

## 2022-06-30 RX ADMIN — DONEPEZIL HYDROCHLORIDE 10 MG: 5 TABLET, FILM COATED ORAL at 20:14

## 2022-06-30 RX ADMIN — ACETAMINOPHEN 650 MG: 325 TABLET ORAL at 08:34

## 2022-06-30 RX ADMIN — METOPROLOL SUCCINATE 100 MG: 50 TABLET, FILM COATED, EXTENDED RELEASE ORAL at 20:14

## 2022-06-30 RX ADMIN — ESCITALOPRAM OXALATE 10 MG: 10 TABLET ORAL at 20:14

## 2022-06-30 RX ADMIN — INSULIN LISPRO 9 UNITS: 100 INJECTION, SOLUTION INTRAVENOUS; SUBCUTANEOUS at 14:00

## 2022-06-30 RX ADMIN — VANCOMYCIN HYDROCHLORIDE 1250 MG: 10 INJECTION, POWDER, LYOPHILIZED, FOR SOLUTION INTRAVENOUS at 22:46

## 2022-06-30 RX ADMIN — MELOXICAM 7.5 MG: 7.5 TABLET ORAL at 08:34

## 2022-06-30 RX ADMIN — ACETAMINOPHEN 650 MG: 325 TABLET ORAL at 15:12

## 2022-06-30 RX ADMIN — ASPIRIN 325 MG: 325 TABLET, COATED ORAL at 20:14

## 2022-06-30 RX ADMIN — INSULIN LISPRO 9 UNITS: 100 INJECTION, SOLUTION INTRAVENOUS; SUBCUTANEOUS at 18:13

## 2022-06-30 RX ADMIN — ACETAMINOPHEN 650 MG: 325 TABLET ORAL at 20:13

## 2022-06-30 RX ADMIN — VANCOMYCIN HYDROCHLORIDE 1250 MG: 10 INJECTION, POWDER, LYOPHILIZED, FOR SOLUTION INTRAVENOUS at 11:24

## 2022-06-30 RX ADMIN — CETIRIZINE HYDROCHLORIDE 5 MG: 5 TABLET ORAL at 08:34

## 2022-06-30 RX ADMIN — TRIAMTERENE AND HYDROCHLOROTHIAZIDE 1 TABLET: 37.5; 25 TABLET ORAL at 20:14

## 2022-06-30 ASSESSMENT — PAIN SCALES - GENERAL
PAINLEVEL_OUTOF10: 4
PAINLEVEL_OUTOF10: 6
PAINLEVEL_OUTOF10: 4
PAINLEVEL_OUTOF10: 8

## 2022-06-30 ASSESSMENT — PAIN DESCRIPTION - ORIENTATION: ORIENTATION: RIGHT

## 2022-06-30 ASSESSMENT — PAIN DESCRIPTION - LOCATION: LOCATION: LEG;FOOT

## 2022-06-30 ASSESSMENT — PAIN - FUNCTIONAL ASSESSMENT: PAIN_FUNCTIONAL_ASSESSMENT: PREVENTS OR INTERFERES SOME ACTIVE ACTIVITIES AND ADLS

## 2022-06-30 ASSESSMENT — PAIN DESCRIPTION - DESCRIPTORS: DESCRIPTORS: CRAMPING

## 2022-06-30 NOTE — PROGRESS NOTES
[facility] Pharmacokinetic Monitoring Service - Vancomycin     Ever Hernandez is a 62 y.o. female starting on vancomycin therapy for bone and joint infection. Pharmacy consulted by Dr. Nataly David for monitoring and adjustment. Target Concentration: Goal AUC/FÁTIMA 400-600 mg*hr/L    Additional Antimicrobials: None ordered at this time    Pertinent Laboratory Values: Wt Readings from Last 1 Encounters:   06/28/22 240 lb (108.9 kg)     Temp Readings from Last 1 Encounters:   06/30/22 97.9 °F (36.6 °C) (Temporal)     Estimated Creatinine Clearance: 149 mL/min (based on SCr of 0.5 mg/dL). Recent Labs     06/29/22  0231 06/30/22  0612   CREATININE 0.6 0.5     Procalcitonin: None ordered at this time    Pertinent Cultures:  Culture Date Source Results   06/28/22 Body Fluid from Ankle Staphylococcus aureus   06/28/22 Tissue from Ankle Staphylococcus aureus   MRSA Nasal Swab: N/A. Non-respiratory infection.     Plan:  Dosing recommendations based on Bayesian software  Start Vancomycin 1250 mg IV every 12 hours  Anticipated AUC of 454 and trough concentration of 14.2 at steady state  Renal labs as indicated   Vancomycin concentration ordered for 07/01/22 @ 0930   Pharmacy will continue to monitor patient and adjust therapy as indicated    Thank you for the consult,  Henry Shields [facility]  6/30/2022 8:52 AM

## 2022-06-30 NOTE — CARE COORDINATION
Per ID consult patient will need IV antibiotics. CM met with pt and spouse who confirm this, and are stating \"home with IV antibiotics\" Pt gave permission to send information to [facility] for potential pricing on antibiotic. Stuart Khanna #[id number] (ECN:[id number]) (:1960 62 y.o.  F) (Adm: 22)  [facility] MED [id number]    PCP    Maria Elena Arias    Demographics  Comment        Address Home Phone Work Phone Mobile Phone    [address] [facility] [address] [address] [phone]    Social Security Number Insurance Information Marital Status Gnosticism     Shan Maker MEDICARE  None     Status   No [002]       Insurance Payors as of 2022      [facility] MEDICARE    Plan: ANTHEM MEDIBLUE ESSENTIAL/PLUS Group: [id number] Member: [id number]   Effective from: 2020 Subscriber: NATALIE BOYLE Subscriber ID: [id number]   Guarantor: NATALIE BOYLE       Documents Filed to Patient    Power of  Living Will Clinical Unknown Study Attachment Consent Form ABN Waiver After Visit Summary Lab Result Scan Code Status MyChart Status Advance Care Planning    Not on File  Not on File  Not on File  Not on File  Not on File  Not on File  Filed  Filed  FULL [Updated on 22 3988]  Active Jump to the Activity        Auth/Cert Information     Open [facility]Cert [facility] [de-identified]       Emergency Contact(s)    Name Relation Home Work [place] Spouse [phone]  [address] Child [phone]       Admission Information      Current Information    Attending Provider Admitting Provider Admission Type Admission Status   MD Jermaine Lopez MD Elective Confirmed Admission          Admission Date/Time Discharge Date Hospital Service Auth/Cert Status   80/69/51  11:48 AM  [facility] Unit Room/Bed    [facility] 3 KRISTAN/VAS/MED [id number] [facility] Account    Name Acct ID Class Status Primary Coverage   [place], [address] ESSENTIAL/PLUS            Guarantor Account (for Hospital Account [de-identified])    Name Relation to Pt Service Area Active?  Acct Type   [facility] Yes Personal/Family   Address Phone     [address] [facility] [address] [place] [address] [phone])              Coverage Information (for Hospital Account [de-identified])    F/O Payor/Plan Precert #   BCBS MEDICARE/ANTHEM MEDIBLUE ESSENTIAL/PLUS    Subscriber Subscriber #   [facility] [id number]   Address Phone   [address]   [address]    Electronically signed by Clari Cummins RN on 6/30/2022 at 1:39 PM

## 2022-06-30 NOTE — PROGRESS NOTES
Subjective:     Post-Operative Day: 2 No complaints   Objective:     Patient Vitals for the past 24 hrs:   BP Temp Temp src Pulse Resp SpO2   06/30/22 0127 (!) 128/53 97.9 °F (36.6 °C) Temporal 75 16 90 %   06/29/22 2248 -- -- -- -- 16 --   06/29/22 2210 (!) 161/79 97.7 °F (36.5 °C) Temporal 82 18 94 %   06/29/22 1730 (!) 104/51 97.6 °F (36.4 °C) -- 76 16 95 %   06/29/22 1200 (!) 101/49 97 °F (36.1 °C) -- 68 16 94 %       General: alert, appears stated age and cooperative   Wound: Dressing clean,dry and intact and mod swelling    Neurovascular: Exam normal             Data Review:  Recent Labs     06/29/22  0231 06/30/22  0248   HGB 11.5* 11.5*     Recent Labs     06/29/22  0231      K 3.8   CREATININE 0.6   GLUCOSE 268*     Recent Labs     06/29/22  0657 06/29/22  1132 06/29/22  1558 06/29/22  1950 06/30/22  0129   POCGLU 224* 225* 116* 208* 151*     No orders to display   cx right distal fibula growing staph.  sens pending      Assessment:     Status Post right distal fibular abscess I&D removal of hardware after ankle ORIF 4/21. Doing well postoperatively.  . Acute postoperative anemia    Plan:   Wound vac will change tomorrow and check wound  Follow cultures  Consult Infectious Disease  IV antibiotics (Vanco/Cefepime)  Pain control  PT/OT  DVT prophylaxis  Ice and elevate  Discharge Home this week

## 2022-06-30 NOTE — CARE COORDINATION
CM reached out to [facility] to advise needing potential pricing for antibiotic therapy. Documentation has been sent.   Electronically signed by Breonna Wiggins RN on 6/30/2022 at 1:44 PM

## 2022-06-30 NOTE — PROGRESS NOTES
Infectious Diseases Progress Note    Patient:  Ever Hernandez  YOB: 1960  MRN: [id number]   Admit date: 6/28/2022   Admitting Physician: Madiha Beaver [facility],*  Primary Care Physician: Kayla Holguin, APRN - CNP    Chief Complaint/Interval History:  She feels okay. No new symptoms. Daughter In law at bedside. She had some previous redness and swelling right lower extremity which continues to improve. Tolerating antibiotic treatment without diarrhea or rash. Talked with them about diagnosis of right fibular abscess and probable osteomyelitis. Explained PICC line placement and arrangements for 4 to 6-week course of home IV antibiotic treatment. They were comfortable with what I discussed and agreeable. In/Out    Intake/Output Summary (Last 24 hours) at 6/30/2022 0907  Last data filed at 6/30/2022 0747  Gross per 24 hour   Intake 900 ml   Output 2050 ml   Net -1150 ml     Allergies:    Allergies   Allergen Reactions    Azithromycin Rash    Namenda  [Memantine Hcl] Nausea And Vomiting and Nausea Only     Current Meds: vancomycin (VANCOCIN) intermittent dosing (placeholder), RX Placeholder  vancomycin (VANCOCIN) 1,250 mg in dextrose 5 % 250 mL IVPB, Q12H  insulin glargine (LANTUS) injection vial 27 Units, Nightly  insulin lispro (HUMALOG) injection vial 9 Units, TID WC  insulin lispro (HUMALOG) injection vial 0-3 Units, Nightly  glucose chewable tablet 16 g, PRN  dextrose bolus 10% 125 mL, PRN   Or  dextrose bolus 10% 250 mL, PRN  glucagon (rDNA) injection 1 mg, PRN  dextrose 5 % solution, PRN  clotrimazole-betamethasone (LOTRISONE) cream, BID  donepezil (ARICEPT) tablet 10 mg, Nightly  escitalopram (LEXAPRO) tablet 10 mg, Nightly  ketoconazole (NIZORAL) 2 % cream, Daily  cetirizine (ZYRTEC) tablet 5 mg, Daily  metFORMIN (GLUCOPHAGE-XR) extended release tablet 1,000 mg, Daily with breakfast  metoprolol succinate (TOPROL XL) extended release tablet 100 mg, Nightly  triamterene-hydroCHLOROthiazide susceptibility  If MSSA will plan transition to nafcillin or cefazolin  Suggest PICC line placement  Will write home IV antibiotic recommendations as soon as we have final culture results available  Continue to follow    Gilles Ortiz MD

## 2022-06-30 NOTE — PROGRESS NOTES
Physician Progress Note      Sherry Farley  CSN #:                  [id number]  :                       1960  ADMIT DATE:       2022 11:48 AM  DISCH DATE:  RESPONDING  PROVIDER #:        Keenan Recinos MD          QUERY TEXT:    Patient admitted with abscess of the right ORIF at the ankle. Per Op note   dated 22 documentation of I&D. To accurately reflect the procedure   performed please document if debridement was excisional or nonexcisional and   the deepest depth of tissue removed as down to and including: The medical record reflects the following:  Risk Factors: Abscess Post Op for Fracture in 2021  Clinical Indicators: OP note \"right ankle removal of hardware irrigation   debridement of abscess right distal fibula\"  OP note further mentions \"subcu   and skin\", cultures growing Staph  Treatment: I&D, Cultures, IV Vancomycin and IV Cefepime. Thank you in advance,    Sheree Srivastava, RN-BSN, [facility], [place]  Clinical   [facility], [facility] [email or web address]  Options provided:  -- Nonexcisional debridement of skin  -- Excisional debridement of skin  -- Nonexcisional debridement of subcutaneous tissue  -- Excisional debridement of subcutaneous tissue  -- Nonexcisional debridement of fascia  -- Excisional debridement of fascia  -- Nonexcisional debridement of muscle  -- Excisional debridement of muscle  -- Nonexcisional debridement of bone  -- Excisional debridement of bone  -- Other - I will add my own diagnosis  -- Disagree - Not applicable / Not valid  -- Disagree - Clinically unable to determine / Unknown  -- Refer to Clinical Documentation Reviewer    PROVIDER RESPONSE TEXT:    Non-excisional debridement of subcutaneous tissue of right ankle was performed   during procedure on 22.     Query created by: Tramaine Rivera on 2022 12:56 PM      Electronically signed by:  Aravind Giron Greg Ely MD 6/30/2022 11:45 AM

## 2022-06-30 NOTE — PLAN OF CARE
Problem: Discharge Planning  Goal: Discharge to home or other facility with appropriate resources  6/30/2022 1545 by Kalli Ralph RN  Outcome: Progressing  6/30/2022 0356 by Jonnie Stewart RN  Outcome: Progressing  Flowsheets (Taken 6/30/2022 0133)  Discharge to home or other facility with appropriate resources: Identify barriers to discharge with patient and caregiver     Problem: Chronic Conditions and Co-morbidities  Goal: Patient's chronic conditions and co-morbidity symptoms are monitored and maintained or improved  6/30/2022 1545 by Kalli Ralph RN  Outcome: Progressing  6/30/2022 0356 by Jonnie Stewart RN  Outcome: Progressing  Flowsheets (Taken 6/30/2022 0133)  Care Plan - Patient's Chronic Conditions and Co-Morbidity Symptoms are Monitored and Maintained or Improved: Monitor and assess patient's chronic conditions and comorbid symptoms for stability, deterioration, or improvement     Problem: Pain  Goal: Verbalizes/displays adequate comfort level or baseline comfort level  6/30/2022 1545 by Kalli Ralph RN  Outcome: Progressing  6/30/2022 0356 by Jonnie Stewart RN  Outcome: Progressing     Problem: Safety - Adult  Goal: Free from fall injury  6/30/2022 1545 by Kalli Ralph RN  Outcome: Progressing  6/30/2022 0356 by Jonnie Stewart RN  Outcome: Progressing     Problem: ABCDS Injury Assessment  Goal: Absence of physical injury  6/30/2022 1545 by Kalli Ralph RN  Outcome: Progressing  6/30/2022 0356 by Jonnie Stewart RN  Outcome: Progressing

## 2022-07-01 LAB
ALBUMIN SERPL-MCNC: 2.9 G/DL (ref 3.5–5.2)
ALP BLD-CCNC: 119 U/L (ref 35–104)
ALT SERPL-CCNC: 13 U/L (ref 5–33)
ANION GAP SERPL CALCULATED.3IONS-SCNC: 10 MMOL/L (ref 7–19)
AST SERPL-CCNC: 24 U/L (ref 5–32)
BASOPHILS ABSOLUTE: 0 K/UL (ref 0–0.2)
BASOPHILS RELATIVE PERCENT: 0.4 % (ref 0–1)
BILIRUB SERPL-MCNC: 0.3 MG/DL (ref 0.2–1.2)
BUN BLDV-MCNC: 12 MG/DL (ref 8–23)
C-REACTIVE PROTEIN: 3.81 MG/DL (ref 0–0.5)
CALCIUM SERPL-MCNC: 8.3 MG/DL (ref 8.8–10.2)
CHLORIDE BLD-SCNC: 102 MMOL/L (ref 98–111)
CO2: 27 MMOL/L (ref 22–29)
CREAT SERPL-MCNC: 0.7 MG/DL (ref 0.5–0.9)
EOSINOPHILS ABSOLUTE: 0.2 K/UL (ref 0–0.6)
EOSINOPHILS RELATIVE PERCENT: 3.8 % (ref 0–5)
GFR AFRICAN AMERICAN: >59
GFR NON-AFRICAN AMERICAN: >60
GLUCOSE BLD-MCNC: 115 MG/DL (ref 70–99)
GLUCOSE BLD-MCNC: 115 MG/DL (ref 70–99)
GLUCOSE BLD-MCNC: 124 MG/DL (ref 74–109)
GLUCOSE BLD-MCNC: 164 MG/DL (ref 70–99)
GLUCOSE BLD-MCNC: 195 MG/DL (ref 70–99)
HCT VFR BLD CALC: 37.1 % (ref 37–47)
HEMOGLOBIN: 11.7 G/DL (ref 12–16)
IMMATURE GRANULOCYTES #: 0 K/UL
LYMPHOCYTES ABSOLUTE: 1.6 K/UL (ref 1.1–4.5)
LYMPHOCYTES RELATIVE PERCENT: 30.8 % (ref 20–40)
MCH RBC QN AUTO: 29.3 PG (ref 27–31)
MCHC RBC AUTO-ENTMCNC: 31.5 G/DL (ref 33–37)
MCV RBC AUTO: 93 FL (ref 81–99)
MONOCYTES ABSOLUTE: 0.5 K/UL (ref 0–0.9)
MONOCYTES RELATIVE PERCENT: 8.7 % (ref 0–10)
NEUTROPHILS ABSOLUTE: 3 K/UL (ref 1.5–7.5)
NEUTROPHILS RELATIVE PERCENT: 55.7 % (ref 50–65)
PDW BLD-RTO: 13.2 % (ref 11.5–14.5)
PERFORMED ON: ABNORMAL
PLATELET # BLD: 201 K/UL (ref 130–400)
PMV BLD AUTO: 10.1 FL (ref 9.4–12.3)
POTASSIUM REFLEX MAGNESIUM: 3.6 MMOL/L (ref 3.5–5)
RBC # BLD: 3.99 M/UL (ref 4.2–5.4)
SODIUM BLD-SCNC: 139 MMOL/L (ref 136–145)
TOTAL PROTEIN: 5.5 G/DL (ref 6.6–8.7)
VANCOMYCIN TROUGH: 9.9 UG/ML (ref 10–20)
WBC # BLD: 5.3 K/UL (ref 4.8–10.8)

## 2022-07-01 PROCEDURE — 80202 ASSAY OF VANCOMYCIN: CPT

## 2022-07-01 PROCEDURE — 6360000002 HC RX W HCPCS: Performed by: ORTHOPAEDIC SURGERY

## 2022-07-01 PROCEDURE — 85025 COMPLETE CBC W/AUTO DIFF WBC: CPT

## 2022-07-01 PROCEDURE — 82947 ASSAY GLUCOSE BLOOD QUANT: CPT

## 2022-07-01 PROCEDURE — 36415 COLL VENOUS BLD VENIPUNCTURE: CPT

## 2022-07-01 PROCEDURE — 6370000000 HC RX 637 (ALT 250 FOR IP): Performed by: ORTHOPAEDIC SURGERY

## 2022-07-01 PROCEDURE — 6360000002 HC RX W HCPCS: Performed by: INTERNAL MEDICINE

## 2022-07-01 PROCEDURE — 86140 C-REACTIVE PROTEIN: CPT

## 2022-07-01 PROCEDURE — 02HV33Z INSERTION OF INFUSION DEVICE INTO SUPERIOR VENA CAVA, PERCUTANEOUS APPROACH: ICD-10-PCS | Performed by: INTERNAL MEDICINE

## 2022-07-01 PROCEDURE — 36569 INSJ PICC 5 YR+ W/O IMAGING: CPT

## 2022-07-01 PROCEDURE — 76937 US GUIDE VASCULAR ACCESS: CPT

## 2022-07-01 PROCEDURE — 2580000003 HC RX 258: Performed by: INTERNAL MEDICINE

## 2022-07-01 PROCEDURE — 2580000003 HC RX 258: Performed by: ORTHOPAEDIC SURGERY

## 2022-07-01 PROCEDURE — C1751 CATH, INF, PER/CENT/MIDLINE: HCPCS

## 2022-07-01 PROCEDURE — 1210000000 HC MED SURG R&B

## 2022-07-01 PROCEDURE — 80053 COMPREHEN METABOLIC PANEL: CPT

## 2022-07-01 RX ORDER — SODIUM CHLORIDE 9 MG/ML
25 INJECTION, SOLUTION INTRAVENOUS PRN
Status: DISCONTINUED | OUTPATIENT
Start: 2022-07-01 | End: 2022-07-05 | Stop reason: HOSPADM

## 2022-07-01 RX ORDER — SODIUM CHLORIDE 0.9 % (FLUSH) 0.9 %
10 SYRINGE (ML) INJECTION PRN
Status: DISCONTINUED | OUTPATIENT
Start: 2022-07-01 | End: 2022-07-05 | Stop reason: HOSPADM

## 2022-07-01 RX ORDER — SODIUM CHLORIDE 0.9 % (FLUSH) 0.9 %
10 SYRINGE (ML) INJECTION EVERY 12 HOURS SCHEDULED
Status: DISCONTINUED | OUTPATIENT
Start: 2022-07-01 | End: 2022-07-05 | Stop reason: HOSPADM

## 2022-07-01 RX ORDER — LIDOCAINE HYDROCHLORIDE 10 MG/ML
5 INJECTION, SOLUTION EPIDURAL; INFILTRATION; INTRACAUDAL; PERINEURAL ONCE
Status: DISCONTINUED | OUTPATIENT
Start: 2022-07-01 | End: 2022-07-05 | Stop reason: HOSPADM

## 2022-07-01 RX ADMIN — ESCITALOPRAM OXALATE 10 MG: 10 TABLET ORAL at 22:06

## 2022-07-01 RX ADMIN — WATER 2000 MG: 1 INJECTION INTRAMUSCULAR; INTRAVENOUS; SUBCUTANEOUS at 22:06

## 2022-07-01 RX ADMIN — ACETAMINOPHEN 650 MG: 325 TABLET ORAL at 20:02

## 2022-07-01 RX ADMIN — CETIRIZINE HYDROCHLORIDE 5 MG: 5 TABLET ORAL at 09:20

## 2022-07-01 RX ADMIN — DONEPEZIL HYDROCHLORIDE 10 MG: 5 TABLET, FILM COATED ORAL at 22:07

## 2022-07-01 RX ADMIN — ASPIRIN 325 MG: 325 TABLET, COATED ORAL at 22:07

## 2022-07-01 RX ADMIN — ACETAMINOPHEN 650 MG: 325 TABLET ORAL at 14:56

## 2022-07-01 RX ADMIN — CLOTRIMAZOLE AND BETAMETHASONE DIPROPIONATE: 10; .5 CREAM TOPICAL at 09:21

## 2022-07-01 RX ADMIN — WATER 2000 MG: 1 INJECTION INTRAMUSCULAR; INTRAVENOUS; SUBCUTANEOUS at 14:57

## 2022-07-01 RX ADMIN — CLOTRIMAZOLE AND BETAMETHASONE DIPROPIONATE: 10; .5 CREAM TOPICAL at 22:11

## 2022-07-01 RX ADMIN — ACETAMINOPHEN 650 MG: 325 TABLET ORAL at 01:33

## 2022-07-01 RX ADMIN — KETOCONAZOLE: 20 CREAM TOPICAL at 09:21

## 2022-07-01 RX ADMIN — OXYCODONE 10 MG: 5 TABLET ORAL at 01:33

## 2022-07-01 RX ADMIN — SODIUM CHLORIDE, PRESERVATIVE FREE 10 ML: 5 INJECTION INTRAVENOUS at 22:12

## 2022-07-01 RX ADMIN — INSULIN LISPRO 2 UNITS: 100 INJECTION, SOLUTION INTRAVENOUS; SUBCUTANEOUS at 12:30

## 2022-07-01 RX ADMIN — METFORMIN HYDROCHLORIDE 1000 MG: 500 TABLET, EXTENDED RELEASE ORAL at 09:20

## 2022-07-01 RX ADMIN — TRIAMTERENE AND HYDROCHLOROTHIAZIDE 1 TABLET: 37.5; 25 TABLET ORAL at 22:06

## 2022-07-01 RX ADMIN — INSULIN GLARGINE 27 UNITS: 100 INJECTION, SOLUTION SUBCUTANEOUS at 22:06

## 2022-07-01 RX ADMIN — INSULIN LISPRO 1 UNITS: 100 INJECTION, SOLUTION INTRAVENOUS; SUBCUTANEOUS at 22:06

## 2022-07-01 RX ADMIN — INSULIN LISPRO 9 UNITS: 100 INJECTION, SOLUTION INTRAVENOUS; SUBCUTANEOUS at 12:30

## 2022-07-01 RX ADMIN — MORPHINE SULFATE 2 MG: 2 INJECTION, SOLUTION INTRAMUSCULAR; INTRAVENOUS at 16:21

## 2022-07-01 RX ADMIN — METOPROLOL SUCCINATE 100 MG: 50 TABLET, FILM COATED, EXTENDED RELEASE ORAL at 22:07

## 2022-07-01 RX ADMIN — ASPIRIN 325 MG: 325 TABLET, COATED ORAL at 09:20

## 2022-07-01 RX ADMIN — ACETAMINOPHEN 650 MG: 325 TABLET ORAL at 09:20

## 2022-07-01 RX ADMIN — SODIUM CHLORIDE, PRESERVATIVE FREE 10 ML: 5 INJECTION INTRAVENOUS at 09:21

## 2022-07-01 ASSESSMENT — PAIN SCALES - GENERAL
PAINLEVEL_OUTOF10: 7
PAINLEVEL_OUTOF10: 8

## 2022-07-01 ASSESSMENT — PAIN DESCRIPTION - ORIENTATION: ORIENTATION: RIGHT

## 2022-07-01 ASSESSMENT — PAIN DESCRIPTION - LOCATION: LOCATION: LEG

## 2022-07-01 NOTE — PROCEDURES
[facility] Vascular Access Team:  PICC Line Insertion Procedure Note    Cyrus Cameron  [id number]   Admitted - 6/28/2022 11:48 AM  Admission Diagnosis -   Ankle wound, right, sequela [S91.001S]  Post-operative wound abscess [T81.49XA]    Attending Physician - Karis Self,*  Ordering Physician - Karis Vásquez,*    Active LDAs -   PICC Single Lumen 54/34/19 Right Cephalic (Active)   Number of days: 0       Peripheral IV 06/28/22 Left;Dorsal Hand (Active)   Number of days: 2       PROCEDURE: Insertion of 4.5 Macanese Single Lumen PICC    INDICATION(S): Long Term IV therapy, Home IV therapy    MEDICATIONS: Local medication only. PROCEDURE DETAILS:  Informed consent was obtained for the procedure, including sedation. Risks of lung perforation, hemorrhage, and adverse drug reaction were discussed. 4.5 Macanese Single Lumen PICC inserted to the Right Cephalic per hospital protocol. Blood return: yes    FINDINGS:  The Right Cephalic vein was visualized using the ultrasound and deemed a suitable vessel. The area was prepped with Chlorhexidine and draped in sterile fashion using full max barrier draping. The area was anesthetized with 3 cc's of 1% Lidocaine. The vein was accessed using US guidance. The guidewire was advanced through the needle to secure the vein. The needle was removed and a peel-a-way Sheath was placed over the guidewire. Guidewire was removed with atraumatic tip intact. The 4.5 Macanese Single Lumen PICC was trimmed at 41 cm and inserted into the Sheath. Using VPS technology, the PICC line was advanced until PICC tip was in the SVC. The peel-a-way sheath was removed and PICC was inserted until the CAJ was reached. The PICC was advanced until P wave deflection was captured. The PICC was withdrawn until the optimal P wave amplitude was obtained. Approximately 2 cm exposed. Internal components of the PICC were removed, all lumens had brisk blood return and was flushed with 10 cc of NS. The PICC was secured using a securement device and a Biopatch was placed over the insertion site. A Tegaderm was placed over the securement device and insertion site. Dressing was dated and initialed with external measurement marked. Patient did tolerate procedure well. IMPRESSION/PLAN:  1. Successful insertion of 4.5 Senegalese Single Lumen PICC  2. PICC Brochure given to patient for care instructions  3. PICC Line is ready to be used. 4. Please change tubing prior to using new PICC line. Make sure to label, date and use alcohol caps on new tubing and alcohol caps on unused ports.            Electronically Signed By: Moose Rincon RN, [facility]-BC on 7/1/2022 at 11:49 AM

## 2022-07-01 NOTE — PROGRESS NOTES
Susceptibility results now available from operative culture on June 28, 2022. Susceptibility listed below. Below the susceptibility is my home IV antibiotic recommendations. These have been sent to  as well. PICC line was placed today. Operative culture June 28, 2022:  Staphylococcus aureus (1)    Antibiotic Interpretation Microscan  Method Status    benzylpenicillin Resistant   BACTERIAL SUSCEPTIBILITY PANEL BY FÁTIMA     clindamycin Resistant   BACTERIAL SUSCEPTIBILITY PANEL BY FÁTIMA     erythromycin Resistant >=8 mcg/mL BACTERIAL SUSCEPTIBILITY PANEL BY FÁTIMA     inducible clindamycin resistance  Pos mcg/mL BACTERIAL SUSCEPTIBILITY PANEL BY FÁTIMA     moxifloxacin Sensitive <=0.25 mcg/mL BACTERIAL SUSCEPTIBILITY PANEL BY FÁTIMA     oxacillin Sensitive <=0.25 mcg/mL BACTERIAL SUSCEPTIBILITY PANEL BY FÁTIMA     tetracycline Sensitive <=1 mcg/mL BACTERIAL SUSCEPTIBILITY PANEL BY FÁTIMA     trimethoprim-sulfamethoxazole Sensitive <=10 mcg/mL BACTERIAL SUSCEPTIBILITY PANEL BY FÁTIMA     vancomycin Sensitive 1 mcg/mL BACTERIAL SUSCEPTIBILITY PANEL BY FÁTIMA       Home IV antibiotic orders:  1. Diagnosis-abscess right distal fibula/osteomyelitis. Underwent irrigation, debridement, and hardware removal on June 28, 2022. Microbiology-methicillin susceptible Staph aureus. 2.  IV access-PICC line  3. Orthopedic Duarte Helm MD  Primary care SWAPNA Staton  4. Antibiotic order:  Cefazolin 2 g IV every 8 hours  Stop cefazolin after last dose on August 8, 2022 (6 weeks)  5. Laboratory monitoring:  CMP, CBC, CRP every Monday or Tuesday while on intravenous antibiotic therapy  6.   Follow-up appointment 2 weeks after hospital discharge    Prosper Huddleston MD

## 2022-07-01 NOTE — PROGRESS NOTES
Request to change \"wound vac dressing. \" Patient is found with Prevena Plus 125 7 day pump alarming with canister full of red bloody exudate. There are only 4 days left showing on the countdown dial of the pump. Contacted [facility] rep and confirmed this was only 7 day pump. The wound vac dressing visibly no longer under negative pressure as black foam is \"puffed back up\" under vac drape. Exhaustive search with central supply and OR supply and no Prevena plus canisters or any more Prevena Plus kits available. Patient switched to hospital wound vac and -125 mmHg pressure showing. Will try to get home vac approved for patient, but will be on Tuesday with holiday weekend before approved and available.     Electronically signed by Zulema Daigle RN, [facility] on 7/1/2022 at 4:10 PM

## 2022-07-01 NOTE — CARE COORDINATION
CM met with pt/spouse and advised that pricing for Ancef IV is forthcoming. Order has [facility] to [facility]. [facility] will be calling pt/spouse when insurance is checked and pricing determined for Ancef. . Also the wound vac device for patient has been ordered and most likely cannot be obtained until 75/2022. If pt cannot afford home IV's, will need SNF choices.  CM to f/u  Electronically signed by Nighat Townsend RN on 7/1/2022 at 5:33 PM

## 2022-07-01 NOTE — PROGRESS NOTES
Subjective:     Post-Operative Day: 3 No complaints   Objective:     Patient Vitals for the past 24 hrs:   BP Temp Temp src Pulse Resp SpO2   07/01/22 0206 125/67 97.7 °F (36.5 °C) -- 57 18 92 %   06/30/22 1745 (!) 114/56 97.1 °F (36.2 °C) -- 75 16 96 %   06/30/22 1147 131/68 97.9 °F (36.6 °C) Temporal 65 16 94 %       General: alert, appears stated age and cooperative   Wound: Dressing clean,dry and intact and mod swelling . Dec swelling   Neurovascular: Exam normal             Data Review:  Recent Labs     06/30/22  0248 07/01/22  0252   HGB 11.5* 11.7*     Recent Labs     07/01/22  0252      K 3.6   CREATININE 0.7   GLUCOSE 124*     Recent Labs     06/30/22  0129 06/30/22  0736 06/30/22  1145 06/30/22  1639 06/30/22 2010   POCGLU 151* 117* 155* 130* 112*     No orders to display   cx right distal fibula growing staph.  sens pending      Assessment:     Status Post right distal fibular abscess I&D removal of hardware after ankle ORIF 4/21. Doing well postoperatively.  . Acute postoperative anemia    Plan:   Wound vac will change today  pic line  Protein shakes with meals  Follow cultures  Consult Infectious Disease  IV antibiotics (Vanco/Cefepime)  Pain control  PT/OT  DVT prophylaxis  Ice and elevate  Discharge Home today

## 2022-07-01 NOTE — PROGRESS NOTES
[facility]  Nurse  Consult Note       NAME:  [id number] [facility] RECORD NUMBER:  [id number]  AGE: 62 y.o. GENDER: female  : 1960  TODAY'S DATE:  2022    Subjective   Reason for Wound Nurse Evaluation and Assessment: wound vac dressing change      Amanda Reese is a 62 y.o. female referred by:   [x] Physician  [x] Nursing  [] Other:     Wound Identification:  Wound Type: surgical  Contributing Factors: edema and diabetes    Wound History: Patient had I&D with hardware removal 22  Current Wound Care Treatment:  NPWT    Patient Goal of Care:  [x] Wound Healing  [] Odor Control  [] Palliative Care  [x] Pain Control   [] Other:         PAST MEDICAL HISTORY        Diagnosis Date    Anxiety     Depression     Diabetes ([facility] [address]     Hypertension     Memory difficulty     Obesity 2021    Sleep apnea     c-pap       PAST SURGICAL HISTORY    Past Surgical History:   Procedure Laterality Date    ANKLE FRACTURE SURGERY Right 2021    ANKLE OPEN REDUCTION INTERNAL FIXATION WITH POSSIBLE EXTERNAL FIXATOR performed by Adelia Siemens, MD at [address] Right 2022    RIGHT ANKLE HARDWARE REMOVAL WITH WOUND VAC performed by Adelia Siemens, MD at [address]    Family History   Problem Relation Age of Onset    Cancer Mother     Heart Attack Father        SOCIAL HISTORY    Social History     Tobacco Use    Smoking status: Never Smoker    Smokeless tobacco: Never Used   Vaping Use    Vaping Use: Never used   Substance Use Topics    Alcohol use: No    Drug use: No       ALLERGIES    Allergies   Allergen Reactions    Azithromycin Rash    Namenda  [Memantine Hcl] Nausea And Vomiting and Nausea Only       MEDICATIONS    No current facility-administered medications on file prior to encounter.      Current Outpatient Medications on File Prior to Encounter   Medication Sig Dispense Refill    donepezil (ARICEPT) 10 MG tablet Take 1 tablet by mouth nightly 30 tablet 11    clotrimazole-betamethasone (LOTRISONE) 1-0.05 % cream APPLY CREAM TOPICALLY TO AFFECTED AND SURROUNDING AREAS IN THE [facility] AND IN THE EVENING FOR 14 DAYS (Patient not taking: Reported on 6/28/2022)      ketoconazole (NIZORAL) 2 % cream Apply topically daily (Patient not taking: Reported on 6/28/2022)      levocetirizine (XYZAL) 5 MG tablet Take 5 mg by mouth nightly       metFORMIN, OSM, (FORTAMET) 1000 MG extended release tablet Take 1,000 mg by mouth daily (with breakfast)       SITagliptin-metFORMIN HCl ER (JANUMET XR) 100-1000 MG TB24 Take 100-1,000 mg by mouth nightly       metoprolol succinate (TOPROL XL) 100 MG extended release tablet Take 100 mg by mouth nightly   1    escitalopram (LEXAPRO) 10 MG tablet Take 10 mg by mouth nightly       triamterene-hydrochlorothiazide (MAXZIDE-25) 37.5-25 MG per tablet Take 1 tablet by mouth nightly          Objective    /71   Pulse 56   Temp 96.8 °F (36 °C)   Resp 22   Ht 5' 6\" (1.676 m)   Wt 240 lb (108.9 kg)   SpO2 92%   Breastfeeding No   BMI 38.74 kg/m²     LABS:  WBC:    Lab Results   Component Value Date/Time    WBC 5.3 07/01/2022 02:52 AM     H/H:    Lab Results   Component Value Date/Time    HGB 11.7 07/01/2022 02:52 AM    HCT 37.1 07/01/2022 02:52 AM     PTT:  No results found for: APTT, PTT[APTT}  PT/INR:  No results found for: PROTIME, INR  HgBA1c:    Lab Results   Component Value Date/Time    LABA1C 7.0 06/29/2022 02:31 AM       Assessment   Macario Risk Score: Macario Scale Score: 21    Patient Active Problem List   Diagnosis Code    Hypertension I10    Sleep apnea G47.30    Spider veins I78.1    Venous reflux I87.2    Diabetes (HCC) E11.9    Colitis K52.9    Colon polyps K63.5    Diarrhea R19.7    Memory deficit R41.3    Nausea R11.0    Obesity E66.9    Gait instability R26.81    Closed right ankle fracture, initial encounter S82.731N    Post-operative wound abscess T81. 49XA       Measurements:  Negative Pressure Wound Therapy Other (Comment) Right;Lateral (Active)   $ Standard NPWT <=50 sq cm PER TX $ Yes 07/01/22 1710   Wound Type Surgical 07/01/22 1710   Unit Type 3M KCI Vac Ulta 07/01/22 1710   Dressing Type White Foam;Black Foam 07/01/22 1710   Number of pieces used 3 07/01/22 1710   Number of pieces removed 3 07/01/22 1710   Cycle Continuous 07/01/22 1710   Target Pressure (mmHg) 125 07/01/22 1710   Canister changed?  Yes 07/01/22 1710   Dressing Status New dressing applied 07/01/22 1710   Dressing Changed Changed/New 07/01/22 1710   Drainage Amount Small 07/01/22 1710   Drainage Description Sanguinous 07/01/22 1710   Dressing Change Due 07/04/22 07/01/22 1710   Wound Assessment Exposed structure bone;Exposed structure fascia 07/01/22 1710   Irina-wound Assessment Blanchable erythema;Edematous 07/01/22 1710   Odor None 07/01/22 1710   Number of days: 3       Wound 06/28/22 Ankle Lateral;Right (Active)   Wound Image   07/01/22 1710   Wound Etiology Surgical 07/01/22 1710   Dressing Status New dressing applied 07/01/22 1710   Wound Cleansed Irrigated with saline;Cleansed with saline 07/01/22 1710   Dressing/Treatment Negative pressure wound therapy 07/01/22 1710   Dressing Change Due 07/04/22 07/01/22 1710   Wound Length (cm) 7 cm 07/01/22 1710   Wound Width (cm) 2.3 cm 07/01/22 1710   Wound Depth (cm) 2.8 cm 07/01/22 1710   Wound Surface Area (cm^2) 16.1 cm^2 07/01/22 1710   Wound Volume (cm^3) 45.08 cm^3 07/01/22 1710   Wound Assessment Exposed structure bone;Exposed structure fascia;Pink/red 07/01/22 1710   Drainage Amount Small 07/01/22 1710   Drainage Description Sanguinous 07/01/22 1710   Odor None 07/01/22 1710   Irina-wound Assessment Blanchable erythema;Edematous 07/01/22 1710   Margins Defined edges 07/01/22 1710   Wound Thickness Description not for Pressure Injury Full thickness 07/01/22 1710   Number of days: 3     Incision 04/21/21 Ankle Anterior;Right (Active) Number of days: 436         Response to treatment:  Well tolerated by patient., With complaints of pain. Pain Assessment:  Severity:  10 / 10  Quality of pain: sharp, burning, tender  Wound Pain Timing/Severity: intermittent  Premedicated: Yes    Plan   Plan of Care: Wound 06/28/22 Ankle Lateral;Right-Dressing/Treatment: Negative pressure wound therapy    Specialty Bed Required : N/A   [] Low Air Loss   [] Pressure Redistribution  [] Fluid Immersion  [] Bariatric  [] Total Pressure Relief  [] Other:     Current Diet: ADULT ORAL NUTRITION SUPPLEMENT; Standard High Calorie/High Protein Oral Supplement  ADULT DIET; Regular  Dietician consult:  N/A    Discharge Plan:  Placement for patient upon discharge: home with support    Patient appropriate for Outpatient [address] [facility] [address]: N/A    Referrals:  []   [] 2003 [employer]  [] Supplies  [] Other    Patient/Caregiver Teaching:  Level of patient/caregiver understanding able to:   [] Indicates understanding       [] Needs reinforcement  [] Unsuccessful      [] Verbal Understanding  [] Demonstrated understanding       [] No evidence of learning  [] Refused teaching         [] N/A       Wound vac dressing to right lateral ankle changed. Black and white foam removed from wound bed. Open wound is adjacent to 5 cm closed incision to the superior portion of open wound. Wound and periwound cleaned with NS. White foam to wound bed with black foam over. Contact layer placed over incision line with fenestrated vac drape over incision and black foam over drape. All sealed with vac drape, -125 mmHg applied. No leaks or alarms on wound vac.     Electronically signed by   Sara Meeks RN, [facility] 7/1/2022

## 2022-07-02 LAB
ANAEROBIC CULTURE: ABNORMAL
ANAEROBIC CULTURE: ABNORMAL
CULTURE SURGICAL: ABNORMAL
CULTURE SURGICAL: ABNORMAL
GLUCOSE BLD-MCNC: 126 MG/DL (ref 70–99)
GLUCOSE BLD-MCNC: 184 MG/DL (ref 70–99)
GLUCOSE BLD-MCNC: 208 MG/DL (ref 70–99)
GLUCOSE BLD-MCNC: 211 MG/DL (ref 70–99)
GRAM STAIN RESULT: ABNORMAL
GRAM STAIN RESULT: ABNORMAL
ORGANISM: ABNORMAL
ORGANISM: ABNORMAL
PERFORMED ON: ABNORMAL

## 2022-07-02 PROCEDURE — 2580000003 HC RX 258: Performed by: INTERNAL MEDICINE

## 2022-07-02 PROCEDURE — 1210000000 HC MED SURG R&B

## 2022-07-02 PROCEDURE — 6360000002 HC RX W HCPCS: Performed by: INTERNAL MEDICINE

## 2022-07-02 PROCEDURE — 2580000003 HC RX 258: Performed by: ORTHOPAEDIC SURGERY

## 2022-07-02 PROCEDURE — 82947 ASSAY GLUCOSE BLOOD QUANT: CPT

## 2022-07-02 PROCEDURE — 6370000000 HC RX 637 (ALT 250 FOR IP): Performed by: ORTHOPAEDIC SURGERY

## 2022-07-02 RX ORDER — HYDROCODONE BITARTRATE AND ACETAMINOPHEN 7.5; 325 MG/1; MG/1
1 TABLET ORAL EVERY 4 HOURS PRN
Qty: 20 TABLET | Refills: 0 | Status: SHIPPED | OUTPATIENT
Start: 2022-07-02 | End: 2022-07-05

## 2022-07-02 RX ORDER — ASPIRIN 81 MG/1
81 TABLET ORAL 2 TIMES DAILY
Qty: 60 TABLET | Refills: 0 | Status: SHIPPED | OUTPATIENT
Start: 2022-07-02 | End: 2022-08-26

## 2022-07-02 RX ADMIN — WATER 2000 MG: 1 INJECTION INTRAMUSCULAR; INTRAVENOUS; SUBCUTANEOUS at 05:43

## 2022-07-02 RX ADMIN — INSULIN GLARGINE 27 UNITS: 100 INJECTION, SOLUTION SUBCUTANEOUS at 20:52

## 2022-07-02 RX ADMIN — ACETAMINOPHEN 650 MG: 325 TABLET ORAL at 08:18

## 2022-07-02 RX ADMIN — INSULIN LISPRO 4 UNITS: 100 INJECTION, SOLUTION INTRAVENOUS; SUBCUTANEOUS at 13:34

## 2022-07-02 RX ADMIN — INSULIN LISPRO 2 UNITS: 100 INJECTION, SOLUTION INTRAVENOUS; SUBCUTANEOUS at 18:11

## 2022-07-02 RX ADMIN — CETIRIZINE HYDROCHLORIDE 5 MG: 5 TABLET ORAL at 08:18

## 2022-07-02 RX ADMIN — INSULIN LISPRO 2 UNITS: 100 INJECTION, SOLUTION INTRAVENOUS; SUBCUTANEOUS at 20:53

## 2022-07-02 RX ADMIN — OXYCODONE 10 MG: 5 TABLET ORAL at 02:09

## 2022-07-02 RX ADMIN — TRIAMTERENE AND HYDROCHLOROTHIAZIDE 1 TABLET: 37.5; 25 TABLET ORAL at 20:52

## 2022-07-02 RX ADMIN — ACETAMINOPHEN 650 MG: 325 TABLET ORAL at 20:52

## 2022-07-02 RX ADMIN — DONEPEZIL HYDROCHLORIDE 10 MG: 5 TABLET, FILM COATED ORAL at 20:52

## 2022-07-02 RX ADMIN — ASPIRIN 325 MG: 325 TABLET, COATED ORAL at 20:52

## 2022-07-02 RX ADMIN — METOPROLOL SUCCINATE 100 MG: 50 TABLET, FILM COATED, EXTENDED RELEASE ORAL at 20:52

## 2022-07-02 RX ADMIN — WATER 2000 MG: 1 INJECTION INTRAMUSCULAR; INTRAVENOUS; SUBCUTANEOUS at 21:10

## 2022-07-02 RX ADMIN — INSULIN LISPRO 9 UNITS: 100 INJECTION, SOLUTION INTRAVENOUS; SUBCUTANEOUS at 18:12

## 2022-07-02 RX ADMIN — ASPIRIN 325 MG: 325 TABLET, COATED ORAL at 08:18

## 2022-07-02 RX ADMIN — ACETAMINOPHEN 650 MG: 325 TABLET ORAL at 02:09

## 2022-07-02 RX ADMIN — OXYCODONE 5 MG: 5 TABLET ORAL at 19:17

## 2022-07-02 RX ADMIN — SODIUM CHLORIDE, PRESERVATIVE FREE 10 ML: 5 INJECTION INTRAVENOUS at 21:02

## 2022-07-02 RX ADMIN — ACETAMINOPHEN 650 MG: 325 TABLET ORAL at 13:53

## 2022-07-02 RX ADMIN — SODIUM CHLORIDE, PRESERVATIVE FREE 10 ML: 5 INJECTION INTRAVENOUS at 21:03

## 2022-07-02 RX ADMIN — WATER 2000 MG: 1 INJECTION INTRAMUSCULAR; INTRAVENOUS; SUBCUTANEOUS at 13:53

## 2022-07-02 RX ADMIN — INSULIN LISPRO 9 UNITS: 100 INJECTION, SOLUTION INTRAVENOUS; SUBCUTANEOUS at 13:35

## 2022-07-02 RX ADMIN — INSULIN LISPRO 2 UNITS: 100 INJECTION, SOLUTION INTRAVENOUS; SUBCUTANEOUS at 21:00

## 2022-07-02 RX ADMIN — ESCITALOPRAM OXALATE 10 MG: 10 TABLET ORAL at 20:52

## 2022-07-02 RX ADMIN — METFORMIN HYDROCHLORIDE 1000 MG: 500 TABLET, EXTENDED RELEASE ORAL at 08:18

## 2022-07-02 ASSESSMENT — PAIN SCALES - GENERAL
PAINLEVEL_OUTOF10: 7
PAINLEVEL_OUTOF10: 9
PAINLEVEL_OUTOF10: 6

## 2022-07-02 ASSESSMENT — PAIN DESCRIPTION - LOCATION
LOCATION: LEG
LOCATION: ANKLE

## 2022-07-02 ASSESSMENT — PAIN DESCRIPTION - ORIENTATION
ORIENTATION: RIGHT
ORIENTATION: RIGHT

## 2022-07-02 ASSESSMENT — PAIN DESCRIPTION - DESCRIPTORS: DESCRIPTORS: DISCOMFORT

## 2022-07-02 NOTE — PROGRESS NOTES
Subjective:     Post-Operative Day: 4 No complaints   Objective:     Patient Vitals for the past 24 hrs:   BP Temp Temp src Pulse Resp SpO2   07/02/22 0646 (!) 106/47 97 °F (36.1 °C) Temporal 59 18 94 %   07/02/22 0026 132/64 98.1 °F (36.7 °C) Temporal 88 18 94 %   07/01/22 2130 -- -- -- 81 -- --   07/01/22 1851 131/73 97.7 °F (36.5 °C) Temporal 91 18 91 %       General: alert, appears stated age and cooperative   Wound: Dressing clean,dry and intact and mod swelling . Dec swelling   Neurovascular: Exam normal             Data Review:  Recent Labs     06/30/22  0248 07/01/22  0252   HGB 11.5* 11.7*     Recent Labs     07/01/22  0252      K 3.6   CREATININE 0.7   GLUCOSE 124*     Recent Labs     06/30/22 2010 07/01/22  0736 07/01/22  1153 07/01/22  1705 07/01/22  2031   POCGLU 112* 115* 195* 115* 164*     No orders to display   cx right distal fibula growing mssa. Assessment:     Status Post right distal fibular abscess I&D removal of hardware after ankle ORIF 4/21. Doing well postoperatively.  . Acute postoperative anemia    Plan:   Leave wound vac  pic line  IV ancef  Pain control  PT/OT  DVT prophylaxis  Ice and elevate  Discharge Home today

## 2022-07-02 NOTE — PROGRESS NOTES
Infectious Diseases Progress Note    Patient:  Malu Chicago  YOB: 1960  MRN: [id number]   Admit date: 6/28/2022   Admitting Physician: [facility],*  Primary Care Physician: SWAPNA Modi - CNP    Chief Complaint/Interval History:  She feels okay. No new symptoms. No diarrhea or rash. She indicates she is planning on discharge home Tuesday. They have support at home to assist with IV antibiotic treatment. In/Out    Intake/Output Summary (Last 24 hours) at 7/2/2022 0910  Last data filed at 7/2/2022 0548  Gross per 24 hour   Intake 1505 ml   Output 20 ml   Net 1485 ml     Allergies:    Allergies   Allergen Reactions    Azithromycin Rash    Namenda  [Memantine Hcl] Nausea And Vomiting and Nausea Only     Current Meds: lidocaine PF 1 % injection 5 mL, Once  sodium chloride flush 0.9 % injection 10 mL, 2 times per day  sodium chloride flush 0.9 % injection 10 mL, PRN  0.9 % sodium chloride infusion, PRN  ceFAZolin (ANCEF) 2,000 mg in sterile water 20 mL IV syringe, Q8H  insulin glargine (LANTUS) injection vial 27 Units, Nightly  insulin lispro (HUMALOG) injection vial 9 Units, TID WC  insulin lispro (HUMALOG) injection vial 0-3 Units, Nightly  glucose chewable tablet 16 g, PRN  dextrose bolus 10% 125 mL, PRN   Or  dextrose bolus 10% 250 mL, PRN  glucagon (rDNA) injection 1 mg, PRN  dextrose 5 % solution, PRN  clotrimazole-betamethasone (LOTRISONE) cream, BID  donepezil (ARICEPT) tablet 10 mg, Nightly  escitalopram (LEXAPRO) tablet 10 mg, Nightly  ketoconazole (NIZORAL) 2 % cream, Daily  cetirizine (ZYRTEC) tablet 5 mg, Daily  metFORMIN (GLUCOPHAGE-XR) extended release tablet 1,000 mg, Daily with breakfast  metoprolol succinate (TOPROL XL) extended release tablet 100 mg, Nightly  triamterene-hydroCHLOROthiazide (MAXZIDE-25) 37.5-25 MG per tablet 1 tablet, Nightly  sodium chloride flush 0.9 % injection 5-40 mL, 2 times per day  sodium chloride flush 0.9 % injection 5-40 mL, PRN  0.9 SUSCEPTIBILITY PANEL BY FÁTIMA       vancomycin Sensitive 1 mcg/mL BACTERIAL SUSCEPTIBILITY PANEL BY FÁTIMA        Radiology: None    Additional Studies Reviewed:  None    Impression:  Abscess/osteomyelitis distal right fibula  Diabetes mellitus  Obesity  Sleep apnea    Recommendations:  Continue cefazolin  Home IV antibiotic orders have been written yesterday  Copy placed below  Likely discharge home early in the week    Home IV antibiotic orders:  1. Diagnosis-abscess right distal fibula/osteomyelitis. Underwent irrigation, debridement, and hardware removal on June 28, 2022. Microbiology-methicillin susceptible Staph aureus. 2.  IV access-PICC line  3. Orthopedic Ramya Ascencio MD  Primary care SWAPNA Hoover  4. Antibiotic order:  Cefazolin 2 g IV every 8 hours  Stop cefazolin after last dose on August 8, 2022 (6 weeks)  5. Laboratory monitoring:  CMP, CBC, CRP every Monday or Tuesday while on intravenous antibiotic therapy  6.   Follow-up appointment 2 weeks after hospital discharge    Mane Pritchett MD

## 2022-07-03 LAB
GLUCOSE BLD-MCNC: 139 MG/DL (ref 70–99)
GLUCOSE BLD-MCNC: 150 MG/DL (ref 70–99)
GLUCOSE BLD-MCNC: 164 MG/DL (ref 70–99)
GLUCOSE BLD-MCNC: 207 MG/DL (ref 70–99)
PERFORMED ON: ABNORMAL

## 2022-07-03 PROCEDURE — 6360000002 HC RX W HCPCS: Performed by: INTERNAL MEDICINE

## 2022-07-03 PROCEDURE — 6370000000 HC RX 637 (ALT 250 FOR IP): Performed by: ORTHOPAEDIC SURGERY

## 2022-07-03 PROCEDURE — 1210000000 HC MED SURG R&B

## 2022-07-03 PROCEDURE — 2580000003 HC RX 258: Performed by: ORTHOPAEDIC SURGERY

## 2022-07-03 PROCEDURE — 82947 ASSAY GLUCOSE BLOOD QUANT: CPT

## 2022-07-03 PROCEDURE — 2580000003 HC RX 258: Performed by: INTERNAL MEDICINE

## 2022-07-03 RX ADMIN — SODIUM CHLORIDE, PRESERVATIVE FREE 10 ML: 5 INJECTION INTRAVENOUS at 21:51

## 2022-07-03 RX ADMIN — SODIUM CHLORIDE, PRESERVATIVE FREE 10 ML: 5 INJECTION INTRAVENOUS at 21:57

## 2022-07-03 RX ADMIN — OXYCODONE 5 MG: 5 TABLET ORAL at 01:17

## 2022-07-03 RX ADMIN — WATER 2000 MG: 1 INJECTION INTRAMUSCULAR; INTRAVENOUS; SUBCUTANEOUS at 05:40

## 2022-07-03 RX ADMIN — ACETAMINOPHEN 650 MG: 325 TABLET ORAL at 21:51

## 2022-07-03 RX ADMIN — SODIUM CHLORIDE, PRESERVATIVE FREE 10 ML: 5 INJECTION INTRAVENOUS at 09:09

## 2022-07-03 RX ADMIN — WATER 2000 MG: 1 INJECTION INTRAMUSCULAR; INTRAVENOUS; SUBCUTANEOUS at 21:51

## 2022-07-03 RX ADMIN — ACETAMINOPHEN 650 MG: 325 TABLET ORAL at 13:51

## 2022-07-03 RX ADMIN — ACETAMINOPHEN 650 MG: 325 TABLET ORAL at 09:03

## 2022-07-03 RX ADMIN — CLOTRIMAZOLE AND BETAMETHASONE DIPROPIONATE: 10; .5 CREAM TOPICAL at 21:52

## 2022-07-03 RX ADMIN — INSULIN LISPRO 1 UNITS: 100 INJECTION, SOLUTION INTRAVENOUS; SUBCUTANEOUS at 22:31

## 2022-07-03 RX ADMIN — INSULIN LISPRO 4 UNITS: 100 INJECTION, SOLUTION INTRAVENOUS; SUBCUTANEOUS at 12:09

## 2022-07-03 RX ADMIN — METFORMIN HYDROCHLORIDE 1000 MG: 500 TABLET, EXTENDED RELEASE ORAL at 09:03

## 2022-07-03 RX ADMIN — ASPIRIN 325 MG: 325 TABLET, COATED ORAL at 09:03

## 2022-07-03 RX ADMIN — CETIRIZINE HYDROCHLORIDE 5 MG: 5 TABLET ORAL at 09:03

## 2022-07-03 RX ADMIN — OXYCODONE 5 MG: 5 TABLET ORAL at 09:03

## 2022-07-03 RX ADMIN — INSULIN LISPRO 9 UNITS: 100 INJECTION, SOLUTION INTRAVENOUS; SUBCUTANEOUS at 09:32

## 2022-07-03 RX ADMIN — WATER 2000 MG: 1 INJECTION INTRAMUSCULAR; INTRAVENOUS; SUBCUTANEOUS at 13:52

## 2022-07-03 RX ADMIN — DONEPEZIL HYDROCHLORIDE 10 MG: 5 TABLET, FILM COATED ORAL at 21:52

## 2022-07-03 RX ADMIN — ASPIRIN 325 MG: 325 TABLET, COATED ORAL at 21:52

## 2022-07-03 RX ADMIN — CLOTRIMAZOLE AND BETAMETHASONE DIPROPIONATE: 10; .5 CREAM TOPICAL at 09:14

## 2022-07-03 RX ADMIN — METOPROLOL SUCCINATE 100 MG: 50 TABLET, FILM COATED, EXTENDED RELEASE ORAL at 21:52

## 2022-07-03 RX ADMIN — OXYCODONE 10 MG: 5 TABLET ORAL at 21:51

## 2022-07-03 RX ADMIN — ACETAMINOPHEN 650 MG: 325 TABLET ORAL at 01:44

## 2022-07-03 RX ADMIN — TRIAMTERENE AND HYDROCHLOROTHIAZIDE 1 TABLET: 37.5; 25 TABLET ORAL at 21:51

## 2022-07-03 RX ADMIN — INSULIN LISPRO 9 UNITS: 100 INJECTION, SOLUTION INTRAVENOUS; SUBCUTANEOUS at 17:08

## 2022-07-03 RX ADMIN — INSULIN GLARGINE 27 UNITS: 100 INJECTION, SOLUTION SUBCUTANEOUS at 22:31

## 2022-07-03 RX ADMIN — INSULIN LISPRO 9 UNITS: 100 INJECTION, SOLUTION INTRAVENOUS; SUBCUTANEOUS at 12:08

## 2022-07-03 RX ADMIN — KETOCONAZOLE: 20 CREAM TOPICAL at 09:14

## 2022-07-03 RX ADMIN — ESCITALOPRAM OXALATE 10 MG: 10 TABLET ORAL at 21:51

## 2022-07-03 ASSESSMENT — PAIN SCALES - GENERAL
PAINLEVEL_OUTOF10: 3
PAINLEVEL_OUTOF10: 0
PAINLEVEL_OUTOF10: 5
PAINLEVEL_OUTOF10: 5
PAINLEVEL_OUTOF10: 0
PAINLEVEL_OUTOF10: 7
PAINLEVEL_OUTOF10: 5

## 2022-07-03 ASSESSMENT — PAIN DESCRIPTION - LOCATION
LOCATION: LEG
LOCATION: ANKLE
LOCATION: FOOT
LOCATION: FOOT
LOCATION: LEG
LOCATION: FOOT

## 2022-07-03 ASSESSMENT — PAIN DESCRIPTION - ORIENTATION
ORIENTATION: RIGHT
ORIENTATION: RIGHT
ORIENTATION: RIGHT;LOWER
ORIENTATION: RIGHT

## 2022-07-03 ASSESSMENT — PAIN DESCRIPTION - DESCRIPTORS
DESCRIPTORS: ACHING
DESCRIPTORS: DISCOMFORT;BURNING

## 2022-07-04 LAB
ALBUMIN SERPL-MCNC: 3.2 G/DL (ref 3.5–5.2)
ALP BLD-CCNC: 98 U/L (ref 35–104)
ALT SERPL-CCNC: 12 U/L (ref 5–33)
ANION GAP SERPL CALCULATED.3IONS-SCNC: 8 MMOL/L (ref 7–19)
AST SERPL-CCNC: 46 U/L (ref 5–32)
BASOPHILS ABSOLUTE: 0 K/UL (ref 0–0.2)
BASOPHILS RELATIVE PERCENT: 0.7 % (ref 0–1)
BILIRUB SERPL-MCNC: 0.4 MG/DL (ref 0.2–1.2)
BUN BLDV-MCNC: 15 MG/DL (ref 8–23)
C-REACTIVE PROTEIN: 0.73 MG/DL (ref 0–0.5)
CALCIUM SERPL-MCNC: 9.1 MG/DL (ref 8.8–10.2)
CHLORIDE BLD-SCNC: 101 MMOL/L (ref 98–111)
CO2: 30 MMOL/L (ref 22–29)
CREAT SERPL-MCNC: 0.6 MG/DL (ref 0.5–0.9)
EOSINOPHILS ABSOLUTE: 0.1 K/UL (ref 0–0.6)
EOSINOPHILS RELATIVE PERCENT: 3.1 % (ref 0–5)
GFR AFRICAN AMERICAN: >59
GFR NON-AFRICAN AMERICAN: >60
GLUCOSE BLD-MCNC: 121 MG/DL (ref 70–99)
GLUCOSE BLD-MCNC: 146 MG/DL (ref 70–99)
GLUCOSE BLD-MCNC: 146 MG/DL (ref 70–99)
GLUCOSE BLD-MCNC: 228 MG/DL (ref 70–99)
GLUCOSE BLD-MCNC: 235 MG/DL (ref 74–109)
HCT VFR BLD CALC: 40.3 % (ref 37–47)
HEMOGLOBIN: 12.6 G/DL (ref 12–16)
IMMATURE GRANULOCYTES #: 0 K/UL
LYMPHOCYTES ABSOLUTE: 1.1 K/UL (ref 1.1–4.5)
LYMPHOCYTES RELATIVE PERCENT: 26 % (ref 20–40)
MCH RBC QN AUTO: 29.2 PG (ref 27–31)
MCHC RBC AUTO-ENTMCNC: 31.3 G/DL (ref 33–37)
MCV RBC AUTO: 93.5 FL (ref 81–99)
MONOCYTES ABSOLUTE: 0.4 K/UL (ref 0–0.9)
MONOCYTES RELATIVE PERCENT: 8.4 % (ref 0–10)
NEUTROPHILS ABSOLUTE: 2.6 K/UL (ref 1.5–7.5)
NEUTROPHILS RELATIVE PERCENT: 61.1 % (ref 50–65)
PDW BLD-RTO: 13.1 % (ref 11.5–14.5)
PERFORMED ON: ABNORMAL
PLATELET # BLD: 202 K/UL (ref 130–400)
PMV BLD AUTO: 9.2 FL (ref 9.4–12.3)
POTASSIUM SERPL-SCNC: 3.9 MMOL/L (ref 3.5–5)
RBC # BLD: 4.31 M/UL (ref 4.2–5.4)
SODIUM BLD-SCNC: 139 MMOL/L (ref 136–145)
TOTAL PROTEIN: 6.5 G/DL (ref 6.6–8.7)
WBC # BLD: 4.2 K/UL (ref 4.8–10.8)

## 2022-07-04 PROCEDURE — 85025 COMPLETE CBC W/AUTO DIFF WBC: CPT

## 2022-07-04 PROCEDURE — 2580000003 HC RX 258: Performed by: INTERNAL MEDICINE

## 2022-07-04 PROCEDURE — 82947 ASSAY GLUCOSE BLOOD QUANT: CPT

## 2022-07-04 PROCEDURE — 6360000002 HC RX W HCPCS: Performed by: INTERNAL MEDICINE

## 2022-07-04 PROCEDURE — 6370000000 HC RX 637 (ALT 250 FOR IP): Performed by: ORTHOPAEDIC SURGERY

## 2022-07-04 PROCEDURE — 86140 C-REACTIVE PROTEIN: CPT

## 2022-07-04 PROCEDURE — 2580000003 HC RX 258: Performed by: ORTHOPAEDIC SURGERY

## 2022-07-04 PROCEDURE — 36415 COLL VENOUS BLD VENIPUNCTURE: CPT

## 2022-07-04 PROCEDURE — 80053 COMPREHEN METABOLIC PANEL: CPT

## 2022-07-04 PROCEDURE — 1210000000 HC MED SURG R&B

## 2022-07-04 RX ADMIN — WATER 2000 MG: 1 INJECTION INTRAMUSCULAR; INTRAVENOUS; SUBCUTANEOUS at 21:32

## 2022-07-04 RX ADMIN — INSULIN LISPRO 9 UNITS: 100 INJECTION, SOLUTION INTRAVENOUS; SUBCUTANEOUS at 13:01

## 2022-07-04 RX ADMIN — CLOTRIMAZOLE AND BETAMETHASONE DIPROPIONATE: 10; .5 CREAM TOPICAL at 21:40

## 2022-07-04 RX ADMIN — ACETAMINOPHEN 650 MG: 325 TABLET ORAL at 13:02

## 2022-07-04 RX ADMIN — ACETAMINOPHEN 650 MG: 325 TABLET ORAL at 04:00

## 2022-07-04 RX ADMIN — INSULIN LISPRO 4 UNITS: 100 INJECTION, SOLUTION INTRAVENOUS; SUBCUTANEOUS at 13:00

## 2022-07-04 RX ADMIN — METFORMIN HYDROCHLORIDE 1000 MG: 500 TABLET, EXTENDED RELEASE ORAL at 08:35

## 2022-07-04 RX ADMIN — KETOCONAZOLE: 20 CREAM TOPICAL at 08:35

## 2022-07-04 RX ADMIN — SODIUM CHLORIDE, PRESERVATIVE FREE 10 ML: 5 INJECTION INTRAVENOUS at 21:39

## 2022-07-04 RX ADMIN — INSULIN LISPRO 9 UNITS: 100 INJECTION, SOLUTION INTRAVENOUS; SUBCUTANEOUS at 17:42

## 2022-07-04 RX ADMIN — CLOTRIMAZOLE AND BETAMETHASONE DIPROPIONATE: 10; .5 CREAM TOPICAL at 08:34

## 2022-07-04 RX ADMIN — METOPROLOL SUCCINATE 100 MG: 50 TABLET, FILM COATED, EXTENDED RELEASE ORAL at 21:43

## 2022-07-04 RX ADMIN — INSULIN LISPRO 2 UNITS: 100 INJECTION, SOLUTION INTRAVENOUS; SUBCUTANEOUS at 17:42

## 2022-07-04 RX ADMIN — ESCITALOPRAM OXALATE 10 MG: 10 TABLET ORAL at 21:31

## 2022-07-04 RX ADMIN — WATER 2000 MG: 1 INJECTION INTRAMUSCULAR; INTRAVENOUS; SUBCUTANEOUS at 05:10

## 2022-07-04 RX ADMIN — ASPIRIN 325 MG: 325 TABLET, COATED ORAL at 08:35

## 2022-07-04 RX ADMIN — DONEPEZIL HYDROCHLORIDE 10 MG: 5 TABLET, FILM COATED ORAL at 21:31

## 2022-07-04 RX ADMIN — SODIUM CHLORIDE, PRESERVATIVE FREE 10 ML: 5 INJECTION INTRAVENOUS at 08:37

## 2022-07-04 RX ADMIN — CETIRIZINE HYDROCHLORIDE 5 MG: 5 TABLET ORAL at 08:35

## 2022-07-04 RX ADMIN — WATER 2000 MG: 1 INJECTION INTRAMUSCULAR; INTRAVENOUS; SUBCUTANEOUS at 13:02

## 2022-07-04 RX ADMIN — INSULIN GLARGINE 27 UNITS: 100 INJECTION, SOLUTION SUBCUTANEOUS at 21:40

## 2022-07-04 RX ADMIN — TRIAMTERENE AND HYDROCHLOROTHIAZIDE 1 TABLET: 37.5; 25 TABLET ORAL at 21:31

## 2022-07-04 RX ADMIN — ACETAMINOPHEN 650 MG: 325 TABLET ORAL at 21:31

## 2022-07-04 RX ADMIN — OXYCODONE 5 MG: 5 TABLET ORAL at 21:31

## 2022-07-04 RX ADMIN — ASPIRIN 325 MG: 325 TABLET, COATED ORAL at 21:31

## 2022-07-04 RX ADMIN — INSULIN LISPRO 1 UNITS: 100 INJECTION, SOLUTION INTRAVENOUS; SUBCUTANEOUS at 21:41

## 2022-07-04 RX ADMIN — ACETAMINOPHEN 650 MG: 325 TABLET ORAL at 08:35

## 2022-07-04 ASSESSMENT — PAIN SCALES - GENERAL
PAINLEVEL_OUTOF10: 5
PAINLEVEL_OUTOF10: 5
PAINLEVEL_OUTOF10: 2
PAINLEVEL_OUTOF10: 2

## 2022-07-04 ASSESSMENT — PAIN DESCRIPTION - DESCRIPTORS
DESCRIPTORS: DULL
DESCRIPTORS: THROBBING
DESCRIPTORS: DISCOMFORT
DESCRIPTORS: DISCOMFORT

## 2022-07-04 ASSESSMENT — PAIN DESCRIPTION - LOCATION
LOCATION: ANKLE;LEG
LOCATION: FOOT
LOCATION: ANKLE
LOCATION: LEG

## 2022-07-04 ASSESSMENT — PAIN - FUNCTIONAL ASSESSMENT
PAIN_FUNCTIONAL_ASSESSMENT: ACTIVITIES ARE NOT PREVENTED
PAIN_FUNCTIONAL_ASSESSMENT: PREVENTS OR INTERFERES SOME ACTIVE ACTIVITIES AND ADLS

## 2022-07-04 ASSESSMENT — PAIN DESCRIPTION - PAIN TYPE: TYPE: SURGICAL PAIN

## 2022-07-04 ASSESSMENT — PAIN DESCRIPTION - FREQUENCY: FREQUENCY: CONTINUOUS

## 2022-07-04 ASSESSMENT — PAIN DESCRIPTION - ORIENTATION
ORIENTATION: RIGHT

## 2022-07-04 ASSESSMENT — PAIN DESCRIPTION - ONSET: ONSET: ON-GOING

## 2022-07-04 NOTE — PROGRESS NOTES
Infectious Diseases Progress Note    Patient:  Val Platt  YOB: 1960  MRN: [id number]   Admit date: 6/28/2022   Admitting Physician: Jonetta Oppenheim Maralyn Barbone,*  Primary Care Physician: SWAPNA Brandt - CNP    Chief Complaint/Interval History: She feels okay. No new symptoms. Tolerating antibiotic treat without side effect. No pain at her line site. She has little bit of soreness at right lateral ankle area wound VAC site. Seems to have expected level of discomfort. Erythema swelling right lower extremities continue to improve. No cardiopulmonary complaints. Probable discharge home tomorrow with wound VAC arrangements in place. In/Out    Intake/Output Summary (Last 24 hours) at 7/4/2022 0902  Last data filed at 7/4/2022 0501  Gross per 24 hour   Intake 1640 ml   Output 10 ml   Net 1630 ml     Allergies:    Allergies   Allergen Reactions    Azithromycin Rash    Namenda  [Memantine Hcl] Nausea And Vomiting and Nausea Only     Current Meds: lidocaine PF 1 % injection 5 mL, Once  sodium chloride flush 0.9 % injection 10 mL, 2 times per day  sodium chloride flush 0.9 % injection 10 mL, PRN  0.9 % sodium chloride infusion, PRN  ceFAZolin (ANCEF) 2,000 mg in sterile water 20 mL IV syringe, Q8H  insulin glargine (LANTUS) injection vial 27 Units, Nightly  insulin lispro (HUMALOG) injection vial 9 Units, TID WC  insulin lispro (HUMALOG) injection vial 0-3 Units, Nightly  glucose chewable tablet 16 g, PRN  dextrose bolus 10% 125 mL, PRN   Or  dextrose bolus 10% 250 mL, PRN  glucagon (rDNA) injection 1 mg, PRN  dextrose 5 % solution, PRN  clotrimazole-betamethasone (LOTRISONE) cream, BID  donepezil (ARICEPT) tablet 10 mg, Nightly  escitalopram (LEXAPRO) tablet 10 mg, Nightly  ketoconazole (NIZORAL) 2 % cream, Daily  cetirizine (ZYRTEC) tablet 5 mg, Daily  metFORMIN (GLUCOPHAGE-XR) extended release tablet 1,000 mg, Daily with breakfast  metoprolol succinate (TOPROL XL) extended release tablet 100 mg, Nightly  triamterene-hydroCHLOROthiazide (MAXZIDE-25) 37.5-25 MG per tablet 1 tablet, Nightly  sodium chloride flush 0.9 % injection 5-40 mL, 2 times per day  sodium chloride flush 0.9 % injection 5-40 mL, PRN  0.9 % sodium chloride infusion, PRN  acetaminophen (TYLENOL) tablet 650 mg, Q6H  oxyCODONE (ROXICODONE) immediate release tablet 5 mg, Q4H PRN   Or  oxyCODONE (ROXICODONE) immediate release tablet 10 mg, Q4H PRN  morphine (PF) injection 2 mg, Q1H PRN   Or  morphine sulfate (PF) injection 4 mg, Q1H PRN  aspirin EC tablet 325 mg, BID  insulin lispro (HUMALOG) injection vial 0-12 Units, TID WC  insulin lispro (HUMALOG) injection vial 0-6 Units, Nightly      Review of Systems see HPI. No cardiopulmonary symptoms. VitalSigns:  BP (!) 106/55   Pulse 64   Temp 97 °F (36.1 °C) (Temporal)   Resp 16   Ht 5' 6\" (1.676 m)   Wt 240 lb (108.9 kg)   SpO2 92%   Breastfeeding No   BMI 38.74 kg/m²      Physical Exam  Line/IV (PICC) site: No erythema, warmth, induration, or tenderness. Right lateral ankle area with wound VAC in place  Previous edema right lower extremity shown improvement  Erythema right lower extremity has essentially resolved  Skin without rash    Lab Results:  Previous surgical cultures right ankle area June 28, 2022-[facility]    Radiology: None    Additional Studies Reviewed:  None    Impression:  Abscess/osteomyelitis right fibula  Diabetes mellitus  Obesity  Sleep apnea    Recommendations:  Appears to be tolerating antibiotic treatment without side effect  Continue treatment cefazolin  She will continue wound VAC treatment  Home IV antibiotic orders copied below  We will send laboratory work today (CMP, CBC, CRP)  Probable discharge tomorrow with wound VAC in place and home IV antibiotic arrangements in place    Home IV antibiotic orders:  1.  Diagnosis-abscess right distal fibula/osteomyelitis.  Underwent irrigation, debridement, and hardware removal on June 28, 2022.  Microbiology-methicillin susceptible Staph aureus.   2.  IV access-PICC line  3.  Orthopedic Sandy Mason MD  Primary care SWAPNA Devine  4.  Antibiotic order:  Cefazolin 2 g IV every 8 hours  Stop cefazolin after last dose on August 8, 2022 (6 weeks)  5.  Laboratory monitoring:  CMP, CBC, CRP every Monday or Tuesday while on intravenous antibiotic therapy  6.  Follow-up appointment 2 weeks after hospital discharge    Bethanie Damian MD

## 2022-07-04 NOTE — PLAN OF CARE
Problem: Discharge Planning  Goal: Discharge to home or other facility with appropriate resources  Outcome: Progressing  Flowsheets (Taken 7/4/2022 1310)  Discharge to home or other facility with appropriate resources: Identify barriers to discharge with patient and caregiver     Problem: Chronic Conditions and Co-morbidities  Goal: Patient's chronic conditions and co-morbidity symptoms are monitored and maintained or improved  Outcome: Progressing  Flowsheets (Taken 7/4/2022 1310)  Care Plan - Patient's Chronic Conditions and Co-Morbidity Symptoms are Monitored and Maintained or Improved: Monitor and assess patient's chronic conditions and comorbid symptoms for stability, deterioration, or improvement     Problem: Pain  Goal: Verbalizes/displays adequate comfort level or baseline comfort level  Outcome: Progressing     Problem: Safety - Adult  Goal: Free from fall injury  Outcome: Progressing  Flowsheets  Taken 7/4/2022 1548 by Telly Lees RN  Free From Fall Injury: Instruct family/caregiver on patient safety  Taken 7/4/2022 0244 by Omaira Olsen RN  Free From Fall Injury: Instruct family/caregiver on patient safety     Problem: ABCDS Injury Assessment  Goal: Absence of physical injury  Outcome: Progressing  Flowsheets  Taken 7/4/2022 1548 by Telly Lees RN  Absence of Physical Injury: Implement safety measures based on patient assessment  Taken 7/4/2022 0244 by Omaira Olsen RN  Absence of Physical Injury: Implement safety measures based on patient assessment

## 2022-07-05 VITALS
OXYGEN SATURATION: 98 % | SYSTOLIC BLOOD PRESSURE: 102 MMHG | BODY MASS INDEX: 38.57 KG/M2 | TEMPERATURE: 97.9 F | DIASTOLIC BLOOD PRESSURE: 74 MMHG | WEIGHT: 240 LBS | HEART RATE: 81 BPM | RESPIRATION RATE: 18 BRPM | HEIGHT: 66 IN

## 2022-07-05 LAB
GLUCOSE BLD-MCNC: 124 MG/DL (ref 70–99)
GLUCOSE BLD-MCNC: 199 MG/DL (ref 70–99)
PERFORMED ON: ABNORMAL
PERFORMED ON: ABNORMAL

## 2022-07-05 PROCEDURE — 2580000003 HC RX 258: Performed by: INTERNAL MEDICINE

## 2022-07-05 PROCEDURE — 82947 ASSAY GLUCOSE BLOOD QUANT: CPT

## 2022-07-05 PROCEDURE — 6360000002 HC RX W HCPCS: Performed by: ORTHOPAEDIC SURGERY

## 2022-07-05 PROCEDURE — 6360000002 HC RX W HCPCS: Performed by: INTERNAL MEDICINE

## 2022-07-05 PROCEDURE — 6370000000 HC RX 637 (ALT 250 FOR IP): Performed by: ORTHOPAEDIC SURGERY

## 2022-07-05 PROCEDURE — 2580000003 HC RX 258: Performed by: ORTHOPAEDIC SURGERY

## 2022-07-05 RX ORDER — CALCIUM CARBONATE 200(500)MG
500 TABLET,CHEWABLE ORAL 3 TIMES DAILY PRN
Status: DISCONTINUED | OUTPATIENT
Start: 2022-07-05 | End: 2022-07-05 | Stop reason: HOSPADM

## 2022-07-05 RX ADMIN — ASPIRIN 325 MG: 325 TABLET, COATED ORAL at 09:10

## 2022-07-05 RX ADMIN — ACETAMINOPHEN 650 MG: 325 TABLET ORAL at 14:58

## 2022-07-05 RX ADMIN — METFORMIN HYDROCHLORIDE 1000 MG: 500 TABLET, EXTENDED RELEASE ORAL at 09:10

## 2022-07-05 RX ADMIN — SODIUM CHLORIDE, PRESERVATIVE FREE 10 ML: 5 INJECTION INTRAVENOUS at 09:15

## 2022-07-05 RX ADMIN — ACETAMINOPHEN 650 MG: 325 TABLET ORAL at 09:10

## 2022-07-05 RX ADMIN — CETIRIZINE HYDROCHLORIDE 5 MG: 5 TABLET ORAL at 09:10

## 2022-07-05 RX ADMIN — CLOTRIMAZOLE AND BETAMETHASONE DIPROPIONATE: 10; .5 CREAM TOPICAL at 09:10

## 2022-07-05 RX ADMIN — INSULIN LISPRO 2 UNITS: 100 INJECTION, SOLUTION INTRAVENOUS; SUBCUTANEOUS at 11:44

## 2022-07-05 RX ADMIN — KETOCONAZOLE: 20 CREAM TOPICAL at 09:11

## 2022-07-05 RX ADMIN — SODIUM CHLORIDE, PRESERVATIVE FREE 10 ML: 5 INJECTION INTRAVENOUS at 09:13

## 2022-07-05 RX ADMIN — WATER 2000 MG: 1 INJECTION INTRAMUSCULAR; INTRAVENOUS; SUBCUTANEOUS at 04:14

## 2022-07-05 RX ADMIN — MORPHINE SULFATE 2 MG: 2 INJECTION, SOLUTION INTRAMUSCULAR; INTRAVENOUS at 10:14

## 2022-07-05 RX ADMIN — CALCIUM CARBONATE (ANTACID) CHEW TAB 500 MG 500 MG: 500 CHEW TAB at 07:08

## 2022-07-05 RX ADMIN — WATER 2000 MG: 1 INJECTION INTRAMUSCULAR; INTRAVENOUS; SUBCUTANEOUS at 14:57

## 2022-07-05 RX ADMIN — ACETAMINOPHEN 650 MG: 325 TABLET ORAL at 04:14

## 2022-07-05 RX ADMIN — INSULIN LISPRO 9 UNITS: 100 INJECTION, SOLUTION INTRAVENOUS; SUBCUTANEOUS at 11:43

## 2022-07-05 RX ADMIN — INSULIN LISPRO 9 UNITS: 100 INJECTION, SOLUTION INTRAVENOUS; SUBCUTANEOUS at 11:40

## 2022-07-05 ASSESSMENT — PAIN SCALES - GENERAL
PAINLEVEL_OUTOF10: 1
PAINLEVEL_OUTOF10: 0
PAINLEVEL_OUTOF10: 3

## 2022-07-05 ASSESSMENT — PAIN DESCRIPTION - ORIENTATION: ORIENTATION: RIGHT

## 2022-07-05 ASSESSMENT — PAIN SCALES - WONG BAKER: WONGBAKER_NUMERICALRESPONSE: 2

## 2022-07-05 ASSESSMENT — PAIN DESCRIPTION - LOCATION: LOCATION: FOOT;ANKLE

## 2022-07-05 ASSESSMENT — PAIN DESCRIPTION - DESCRIPTORS: DESCRIPTORS: DISCOMFORT

## 2022-07-05 NOTE — CARE COORDINATION
abx and vac approved. meds will be delivered to home tonight. [facility] set up. Pt wanted WC, ordered from   [employer] per pt request. Will have wc delivered to home address.   Electronically signed by Lili Rosnethal RN on 7/5/2022 at 3:43 PM

## 2022-07-05 NOTE — DISCHARGE INSTR - DIET

## 2022-07-05 NOTE — CARE COORDINATION
Important Message from [facility] letter explained and provided to patient and spouse  by Varinder Bolivar RN CM. All questions answered. Patient voiced understanding. Copy placed in soft chart. Patient has been waiting on paperwork and ready to dc. Did not want to wait the 4 hour time span once she was ready to dc.   Electronically signed by Isabela Reynolds RN on 7/5/2022 at 4:09 PM

## 2022-07-05 NOTE — DISCHARGE INSTR - ACTIVITY
Rest, Drink plenty of fluids. Report any signs or symptoms of infection such as fever, chills, increased redness.

## 2022-07-05 NOTE — DISCHARGE SUMMARY
[facility] [address] [place] [address]  Dr. Cara Basurto  Discharge Summary      The Padmaja Arnold is a 62 y.o. female underwent removal of plate and screws right distal fibula irrigation debridement lateral ankle abscess procedure and placement of wound VAC without complication. Padmaja Arnold was admitted to the floor following their recovery in the PACU.      Discharge Diagnosis  right methicillin sensitive staph RES abscess right distal fibula after ORIF of right ankle fracture a year ago    Current Inpatient Medications    Current Facility-Administered Medications: calcium carbonate (TUMS) chewable tablet 500 mg, 500 mg, Oral, TID PRN  lidocaine PF 1 % injection 5 mL, 5 mL, IntraDERmal, Once  sodium chloride flush 0.9 % injection 10 mL, 10 mL, IntraVENous, 2 times per day  sodium chloride flush 0.9 % injection 10 mL, 10 mL, IntraVENous, PRN  0.9 % sodium chloride infusion, 25 mL, IntraVENous, PRN  ceFAZolin (ANCEF) 2,000 mg in sterile water 20 mL IV syringe, 2,000 mg, IntraVENous, Q8H  insulin glargine (LANTUS) injection vial 27 Units, 0.25 Units/kg, SubCUTAneous, Nightly  insulin lispro (HUMALOG) injection vial 9 Units, 0.08 Units/kg, SubCUTAneous, TID WC  insulin lispro (HUMALOG) injection vial 0-3 Units, 0-3 Units, SubCUTAneous, Nightly  glucose chewable tablet 16 g, 4 tablet, Oral, PRN  dextrose bolus 10% 125 mL, 125 mL, IntraVENous, PRN **OR** dextrose bolus 10% 250 mL, 250 mL, IntraVENous, PRN  glucagon (rDNA) injection 1 mg, 1 mg, IntraMUSCular, PRN  dextrose 5 % solution, 100 mL/hr, IntraVENous, PRN  clotrimazole-betamethasone (LOTRISONE) cream, , Topical, BID  donepezil (ARICEPT) tablet 10 mg, 10 mg, Oral, Nightly  escitalopram (LEXAPRO) tablet 10 mg, 10 mg, Oral, Nightly  ketoconazole (NIZORAL) 2 % cream, , Topical, Daily  cetirizine (ZYRTEC) tablet 5 mg, 5 mg, Oral, Daily  metFORMIN (GLUCOPHAGE-XR) extended release tablet 1,000 mg, 1,000 mg, Oral, Daily with breakfast  metoprolol succinate (TOPROL XL) extended release tablet 100 mg, 100 mg, Oral, Nightly  triamterene-hydroCHLOROthiazide (MAXZIDE-25) 37.5-25 MG per tablet 1 tablet, 1 tablet, Oral, Nightly  sodium chloride flush 0.9 % injection 5-40 mL, 5-40 mL, IntraVENous, 2 times per day  sodium chloride flush 0.9 % injection 5-40 mL, 5-40 mL, IntraVENous, PRN  0.9 % sodium chloride infusion, , IntraVENous, PRN  acetaminophen (TYLENOL) tablet 650 mg, 650 mg, Oral, Q6H  oxyCODONE (ROXICODONE) immediate release tablet 5 mg, 5 mg, Oral, Q4H PRN **OR** oxyCODONE (ROXICODONE) immediate release tablet 10 mg, 10 mg, Oral, Q4H PRN  morphine (PF) injection 2 mg, 2 mg, IntraVENous, Q1H PRN **OR** morphine sulfate (PF) injection 4 mg, 4 mg, IntraVENous, Q1H PRN  aspirin EC tablet 325 mg, 325 mg, Oral, BID  insulin lispro (HUMALOG) injection vial 0-12 Units, 0-12 Units, SubCUTAneous, TID WC  insulin lispro (HUMALOG) injection vial 0-6 Units, 0-6 Units, SubCUTAneous, Nightly    Post-operatively the patients diet was advanced as tolerated and their incision was checked on POD #1. The incision is clean, dry and intact with no signs of infection. The patient remained neurovascularly intact in the extremity and had intact pulses distally. Physical therapy and occupational therapy were consulted and began working with the patient post-operatively. The patient progressed with PT/OT as would be expected and continued to improve through their stay. The patients pain was initially controlled with IV medications but we were able to transition to oral pain medications soon after arrival to the floor and their pain remained under good control through their hospital stay. From a medical standpoint the patient remained stable and continued to have the medicine team follow throughout their stay. The patients dressing was changed/incision was checked on day of d/c. The patient will be discharged at this time to Home.   Patient was seen by infectious disease and placed on Ancef 2 g IV every 8 hours for 1 month. She had a PICC line placed. She will also go home with a wound VAC. Condition on Discharge: Stable    Plan  Followup 2 weeks. Patient was instructed on the use of pain medications, the signs and symptoms of infection, and was given our number to call should they have any questions or concerns following discharge.

## 2022-07-05 NOTE — CARE COORDINATION
Per Amilcar Andrews at [facility], cost is $214.78 per week. Amilcar Andrews has spoke with pt and pts spouse. They are agreeable to to cost.  Waiting to see when the meds can be delivered to the home and pending the final word on the wound vac approval from [facility].    Electronically signed by Renetta Pantoja RN on 7/5/2022 at 11:39 AM

## 2022-07-05 NOTE — PROGRESS NOTES
[facility]  Follow-up Progress Note       NAME:  Soham Miller RECORD NUMBER:  [id number]  AGE:  62 y.o. GENDER:  female  :  1960  TODAY'S DATE:  2022    Subjective:   Wound Identification:  Wound Type: surgical  Contributing Factors: diabetes        Patient Goal of Care:  [x] Wound Healing  [] Odor Control  [] Palliative Care  [x] Pain Control   [] Other:     Objective:    /65   Pulse 76   Temp 97.3 °F (36.3 °C) (Temporal)   Resp 17   Ht 5' 6\" (1.676 m)   Wt 240 lb (108.9 kg)   SpO2 94%   Breastfeeding No   BMI 38.74 kg/m²   Macario Risk Score: Macario Scale Score: 20  Assessment:   Measurements:  Negative Pressure Wound Therapy Other (Comment) Right;Lateral (Active)   $ Standard NPWT <=50 sq cm PER TX $ Yes 22 1046   Wound Type Surgical 22 1046   Unit Type 3M KCI Vac Ulta 22 1046   Dressing Type White Foam;Black Foam 22 1046   Number of pieces used 2 22 1046   Number of pieces removed 3 22 1046   Cycle Continuous 22 1046   Target Pressure (mmHg) 125 22 1046   Canister changed?  No 22 1046   Dressing Status New dressing applied 22 1046   Dressing Changed Changed/New 22 1046   Drainage Amount Small 22 1046   Drainage Description Sanguinous 22 1046   Dressing Change Due 22 1046   Output (ml) 10 ml 22 2151   Wound Assessment Exposed structure bone;Granulation tissue 22 1046   Irina-wound Assessment Blanchable erythema 22 1046   Odor None 22 1046   Number of days: 6       Wound 22 Ankle Lateral;Right (Active)   Wound Image   22 1046   Wound Etiology Surgical 22 1046   Dressing Status New dressing applied 22 1046   Wound Cleansed Irrigated with saline;Cleansed with saline 22 1046   Dressing/Treatment Negative pressure wound therapy 07/05/22 1046   Dressing Change Due 07/08/22 07/05/22 1046   Wound Length (cm) 7 cm 07/05/22 1046   Wound Width (cm) 1.5 cm 07/05/22 1046   Wound Depth (cm) 1.5 cm 07/05/22 1046   Wound Surface Area (cm^2) 10.5 cm^2 07/05/22 1046   Change in Wound Size % (l*w) 34.78 07/05/22 1046   Wound Volume (cm^3) 15.75 cm^3 07/05/22 1046   Wound Healing % 65 07/05/22 1046   Wound Assessment Granulation tissue; Exposed structure bone;Pink/red 07/05/22 1046   Drainage Amount Small 07/05/22 1046   Drainage Description Sanguinous 07/05/22 1046   Odor None 07/05/22 1046   Irina-wound Assessment Blanchable erythema 07/05/22 1046   Margins Defined edges; Attached edges 07/05/22 1046   Wound Thickness Description not for Pressure Injury Full thickness 07/05/22 1046   Number of days: 6       Response to treatment:  Well tolerated by patient., With complaints of pain. Pain Assessment:  Severity:  5 / 10  Quality of pain: sharp, tender  Wound Pain Timing/Severity: intermittent  Premedicated: Yes  Plan:   Plan of Care: Wound 06/28/22 Ankle Lateral;Right-Dressing/Treatment: Negative pressure wound therapy    Specialty Bed Required : N/A   [] Low Air Loss   [] Pressure Redistribution  [] Fluid Immersion  [] Bariatric  [] Total Pressure Relief  [] Other:     Current Diet: ADULT DIET;  Regular; 4 carb choices (60 gm/meal)  ADULT ORAL NUTRITION SUPPLEMENT; Diabetic Oral Supplement  Dietician consult:  Yes    Discharge Plan:  Placement for patient upon discharge: home with support   Patient appropriate for Outpatient [address]: Yes    Referrals:  []   [] [facility]  [] Supplies  [] Other    Patient/Caregiver Teaching:  Level of patient/caregiver understanding able to:   [x] Indicates understanding       [] Needs reinforcement  [] Unsuccessful      [x] Verbal Understanding  [] Demonstrated understanding       [] No evidence of learning  [] Refused teaching         [] N/A       Wound vac dressing changed. Patient pretreated with prn pain meds by primary nurse. Black and white foam removed from wound bed. Wound is showing granulation at this assessment. Edema to periwound is gone and erythema is reduced. Wound bed and periwound skin cleaned with NS. Skin prep to periwound skin. Hydrocolloid used to window pane open wound with silver contact layer placed over suture line to protect from drape. White foam placed to wound bed, covered with black foam. All sealed with vac drape and -125 mmHg applied. No leaks or alarms noted on wound vac. Patient tolerated dressing change much better than prior change. Pain was reduced. Educated patient and spouse on taking prescribed oral pain meds 30-45 mins before home health changes dressings. Educated on need to tightly manage blood sugar for healing, and care of healing wound. Both verbalized understanding. Pt and spouse had multiple questions about going home with wound vac. All questions answered. Home wound vac application was submitted for insurance approval on Friday 7/1/22. Anticipate approval later today and release of home vac with discharge.     Electronically signed by Harpreet Espino RN, [facility] on 7/5/2022 at 10:49 AM

## 2022-07-05 NOTE — PROGRESS NOTES
Comprehensive Nutrition Assessment    Type and Reason for Visit:  Initial,RD Nutrition Re-Screen/LOS    Nutrition Recommendations/Plan:   1. Modify current diet order and ONS      Malnutrition Assessment:  Malnutrition Status:  No malnutrition (07/05/22 0830)    Context:  Acute Illness     Findings of the 6 clinical characteristics of malnutrition:  Energy Intake:  No significant decrease in energy intake  Weight Loss:  No significant weight loss     Body Fat Loss:  No significant body fat loss     Muscle Mass Loss:  No significant muscle mass loss    Fluid Accumulation:  Mild Extremities   Strength:  Not Performed    Nutrition Assessment:    Following for LOS x 7 days. Pt is well nourished AEB fat and muscle mass. PO intake remains good with intake ranging %. Modifying current diet to include Carb Control and changing OONS to Glucerna    Nutrition Related Findings:    Accuchek's 146-228    Glucose 235 Wound Type: Multiple,Surgical Incision,Wound Vac       Current Nutrition Intake & Therapies:    Average Meal Intake: 51-75%,%  Average Supplements Intake: 51-75%,%  ADULT DIET; Regular; 4 carb choices (60 gm/meal)  ADULT ORAL NUTRITION SUPPLEMENT; Diabetic Oral Supplement    Anthropometric Measures:  Height: 5' 6\" (167.6 cm)  Ideal Body Weight (IBW): 130 lbs (59 kg)    Admission Body Weight: 240 lb (108.9 kg)  Current Body Weight: 240 lb (108.9 kg), 184.6 % IBW.     Current BMI (kg/m2): 38.8  Usual Body Weight: 243 lb (110.2 kg)  % Weight Change (Calculated): -1.2  BMI Categories: Obese Class 2 (BMI 35.0 -39.9)    Estimated Daily Nutrient Needs:  Energy Requirements Based On: Kcal/kg  Weight Used for Energy Requirements: Current  Energy (kcal/day): 2335-1001 kcals/kg  Weight Used for Protein Requirements: Ideal  Protein (g/day): 71-118g  Method Used for Fluid Requirements: 1 ml/kcal  Fluid (ml/day): 8554-3316 ml    Nutrition Diagnosis:   · Increased nutrient needs,Altered nutrition-related lab values related to acute injury/trauma,increase demand for energy/nutrients,endocrine dysfuntion (Regular diet order) as evidenced by wounds,lab values      Nutrition Interventions:   Food and/or Nutrient Delivery: Modify Oral Nutrition Supplement,Modify Current Diet  Nutrition Education/Counseling: No recommendation at this time  Coordination of Nutrition Care: Continue to monitor while inpatient       Goals:     Goals: Meet at least 75% of estimated needs,PO intake 50% or greater       Nutrition Monitoring and Evaluation:   Behavioral-Environmental Outcomes: None Identified  Food/Nutrient Intake Outcomes: Food and Nutrient Intake,Supplement Intake  Physical Signs/Symptoms Outcomes: Biochemical Data,Weight,Skin,Fluid Status or Edema    Discharge Planning:    Continue current diet     Constance Galvez MS, RD, LD  Contact: [phone]

## 2022-07-05 NOTE — PROGRESS NOTES
CLINICAL PHARMACY NOTE: MEDS TO BEDS    Total # of Prescriptions Filled: 2   The following medications were delivered to the patient:  · Aspirin 81mg  · Hydrocodone-acetaminophen 7.5-325mg    Additional Documentation:  The patients  paid with cash, the medications were handed to the patient at her bedside.

## 2022-07-05 NOTE — PLAN OF CARE
Problem: Discharge Planning  Goal: Discharge to home or other facility with appropriate resources  Outcome: Progressing     Problem: Chronic Conditions and Co-morbidities  Goal: Patient's chronic conditions and co-morbidity symptoms are monitored and maintained or improved  Outcome: Progressing     Problem: Pain  Goal: Verbalizes/displays adequate comfort level or baseline comfort level  Outcome: Progressing     Problem: Safety - Adult  Goal: Free from fall injury  Outcome: Progressing  Flowsheets (Taken 7/4/2022 2155 by Angela Duenas RN)  Free From Fall Injury: Instruct family/caregiver on patient safety     Problem: ABCDS Injury Assessment  Goal: Absence of physical injury  Outcome: Progressing  Flowsheets (Taken 7/4/2022 2155 by Angela Duenas RN)  Absence of Physical Injury: Implement safety measures based on patient assessment

## 2022-07-05 NOTE — PROGRESS NOTES
Subjective:     Post-Operative Day: 7 No complaints   Objective:     Patient Vitals for the past 24 hrs:   BP Temp Temp src Pulse Resp SpO2 Height   07/05/22 1145 102/74 97.9 °F (36.6 °C) -- 81 18 98 % --   07/05/22 0813 -- -- -- -- -- -- 5' 6\" (1.676 m)   07/05/22 0634 127/65 97.3 °F (36.3 °C) Temporal 76 17 94 % --   07/04/22 2151 -- -- -- 71 -- -- --   07/04/22 1711 (!) 135/52 98.1 °F (36.7 °C) Temporal 75 16 94 % --   07/04/22 1310 -- -- -- 72 -- -- --       General: alert, appears stated age and cooperative   Wound: Dressing clean,dry and intact and mod swelling . Dec swelling   Neurovascular: Exam normal             Data Review:  Recent Labs     07/04/22  1031   HGB 12.6     Recent Labs     07/04/22  1031      K 3.9   CREATININE 0.6   GLUCOSE 235*     Recent Labs     07/04/22  1117 07/04/22  1711 07/04/22  2138 07/05/22  0751 07/05/22  1107   POCGLU 228* 146* 146* 124* 199*     No orders to display   cx right distal fibula growing mssa. Assessment:     Status Post right distal fibular abscess I&D removal of hardware after ankle ORIF 4/21. Doing well postoperatively.  . Acute postoperative anemia    Plan:   Leave wound vac  pic line  IV ancef  Pain control  PT/OT  DVT prophylaxis  Ice and elevate  Discharge Home today

## 2022-07-05 NOTE — CARE COORDINATION
All info faxed to [facility]. WC will be delivered to pts home.   Electronically signed by Evaristo Jefferson RN on 7/5/2022 at 4:07 PM

## 2022-07-11 ENCOUNTER — APPOINTMENT (OUTPATIENT)
Dept: BONE DENSITY | Facility: HOSPITAL | Age: 62
End: 2022-07-11

## 2022-07-11 ENCOUNTER — APPOINTMENT (OUTPATIENT)
Dept: MAMMOGRAPHY | Facility: HOSPITAL | Age: 62
End: 2022-07-11

## 2022-07-12 ENCOUNTER — HOSPITAL ENCOUNTER (OUTPATIENT)
Dept: WOUND CARE | Age: 62
Discharge: HOME | End: 2022-07-12
Payer: MEDICARE

## 2022-07-12 VITALS
HEIGHT: 66 IN | RESPIRATION RATE: 18 BRPM | DIASTOLIC BLOOD PRESSURE: 73 MMHG | HEART RATE: 77 BPM | SYSTOLIC BLOOD PRESSURE: 121 MMHG | BODY MASS INDEX: 37.77 KG/M2 | WEIGHT: 235 LBS | TEMPERATURE: 97.7 F

## 2022-07-12 DIAGNOSIS — T81.49XA POST-OPERATIVE WOUND ABSCESS: Primary | ICD-10-CM

## 2022-07-12 DIAGNOSIS — L97.312 NON-PRESSURE CHRONIC ULCER OF RIGHT ANKLE WITH FAT LAYER EXPOSED (HCC): Chronic | ICD-10-CM

## 2022-07-12 PROCEDURE — 99213 OFFICE O/P EST LOW 20 MIN: CPT

## 2022-07-12 PROCEDURE — 99204 OFFICE O/P NEW MOD 45 MIN: CPT | Performed by: SURGERY

## 2022-07-12 PROCEDURE — 11042 DBRDMT SUBQ TIS 1ST 20SQCM/<: CPT

## 2022-07-12 PROCEDURE — 11042 DBRDMT SUBQ TIS 1ST 20SQCM/<: CPT | Performed by: SURGERY

## 2022-07-12 ASSESSMENT — PAIN DESCRIPTION - ORIENTATION: ORIENTATION: RIGHT

## 2022-07-12 ASSESSMENT — PAIN DESCRIPTION - PAIN TYPE: TYPE: ACUTE PAIN

## 2022-07-12 ASSESSMENT — PAIN SCALES - GENERAL: PAINLEVEL_OUTOF10: 4

## 2022-07-12 ASSESSMENT — PAIN DESCRIPTION - DESCRIPTORS: DESCRIPTORS: SORE

## 2022-07-12 ASSESSMENT — PAIN DESCRIPTION - LOCATION: LOCATION: ANKLE

## 2022-07-12 NOTE — PROGRESS NOTES
WOUND CARE HISTORY AND PHYSICAL    Patient Care Team:  Urban SWAPNA Alarcon - CNP as PCP - General (Nurse Practitioner)  Brynn Reynoso as Lorie Small [address], DO as Consulting Physician (Neurology)  SWAPNA Sanchez as Advanced Practice Nurse (Neurology)     CC:     Deric De La Garza is a 62 y.o. female who presents today for wound evaluation. Wound Type: non-healing surgical  Wound Location: right ankle(s): lateral  Modifying factors: edema and diabetes    Patient Active Problem List   Diagnosis Code    Hypertension I10    Sleep apnea G47.30    Spider veins I78.1    Venous reflux I87.2    Diabetes ([facility]) E11.9    Colitis K52.9    Colon polyps K63.5    Diarrhea R19.7    Memory deficit R41.3    Nausea R11.0    Obesity E66.9    Gait instability R26.81    Closed right ankle fracture, initial encounter S82.891A    Post-operative wound abscess T81.49XA    Non-pressure chronic ulcer of right ankle with fat layer exposed ([address] L97.312       HPI:  She reports she developed a wound on right ankle. This started 1 week(s) ago. She believes this is not healing. She has been applying nothing. She has not had  fever or chills. She has/with diabetes mellitus.     Deric De La Garza is a 62 y.o. female with the following history reviewed and recorded in [facility]:  Patient Active Problem List    Diagnosis Date Noted    Non-pressure chronic ulcer of right ankle with fat layer exposed ([address] 07/12/2022     Priority: Medium    Post-operative wound abscess 06/29/2022     Priority: Medium    Obesity 04/21/2021    Gait instability 04/21/2021    Closed right ankle fracture, initial encounter 04/21/2021    Colon polyps 12/18/2017    Colitis 11/28/2017    Diarrhea 11/28/2017    Memory deficit 11/28/2017    Nausea 11/28/2017    Spider veins 10/28/2015    Venous reflux 10/28/2015    Hypertension     Sleep apnea      c-pap      Diabetes [facility])      Current Outpatient Medications   Medication Sig Dispense Refill    ceFAZolin (ANCEF) infusion Infuse 2,000 mg intravenously every 8 hours for 28 days Compound per protocol 168 g 0    aspirin EC 81 MG EC tablet Take 1 tablet by mouth 2 times daily 60 tablet 0    ceFAZolin (ANCEF) infusion Infuse 2,000 mg intravenously every 8 hours Compound per protocol 180 g 1    donepezil (ARICEPT) 10 MG tablet Take 1 tablet by mouth nightly 30 tablet 11    levocetirizine (XYZAL) 5 MG tablet Take 5 mg by mouth nightly       metFORMIN, OSM, (FORTAMET) 1000 MG extended release tablet Take 1,000 mg by mouth 2 times daily (with meals)       metoprolol succinate (TOPROL XL) 100 MG extended release tablet Take 100 mg by mouth nightly   1    escitalopram (LEXAPRO) 10 MG tablet Take 10 mg by mouth nightly       triamterene-hydrochlorothiazide (MAXZIDE-25) 37.5-25 MG per tablet Take 1 tablet by mouth nightly        No current facility-administered medications for this encounter. Allergies: Azithromycin and Namenda  [memantine hcl]  Past Medical History:   Diagnosis Date    Anxiety     Depression     [facility]     Hypertension     Memory difficulty     Obesity 4/21/2021    Sleep apnea     c-pap       Past Surgical History:   Procedure Laterality Date    ANKLE FRACTURE SURGERY Right 4/21/2021    ANKLE OPEN REDUCTION INTERNAL FIXATION WITH POSSIBLE EXTERNAL FIXATOR performed by Effie Hunt MD at [address] [place] [address] Right 6/28/2022    RIGHT ANKLE HARDWARE REMOVAL WITH WOUND VAC performed by Effie Hunt MD at [facility]       Family History   Problem Relation Age of Onset    Cancer Mother     Heart Attack Father      Social History     Tobacco Use    Smoking status: Never Smoker    Smokeless tobacco: Never Used   Substance Use Topics    Alcohol use: No         Review of Systems    Constitutional - no significant activity change, appetite change, or unexpected weight change. No fever or chills.   No diaphoresis or significant fatigue. HENT - no significant rhinorrhea or epistaxis. No tinnitus or significant hearing loss. Eyes - no sudden vision change or amaurosis. Respiratory - no significant shortness of breath, wheezing, or stridor. No apnea, cough, or chest tightness associated with shortness of breath. Cardiovascular - no chest pain, syncope, or significant dizziness. No palpitations. Patient reports no claudication. Gastrointestinal - no abdominal swelling or pain. No blood in stool. No severe constipation, diarrhea, nausea, or vomiting. Genitourinary - No difficulty urinating, dysuria, frequency, or urgency. No flank pain or hematuria. Musculoskeletal - no back pain, gait disturbance, or myalgia. Skin - no color change, rash, pallor. Neurologic - no dizziness, facial asymmetry, or light headedness. No seizures. No speech difficulty or lateralizing weakness. Hematologic - no easy bruising or excessive bleeding. Psychiatric - no severe anxiety or nervousness. No confusion. All other review of systems are negative. Physical Exam    /73   Pulse 77   Temp 97.7 °F (36.5 °C) (Temporal)   Resp 18   Ht 5' 6\" (1.676 m)   Wt 235 lb (106.6 kg)   BMI 37.93 kg/m²     Constitutional - well developed, well nourished. No diaphoresis or acute distress. HENT - head normocephalic. Right external ear canal appears normal.  Left external ear canal appears normal.  Septum appears midline. Lips,teeth,gums normal  Eyes - conjunctiva normal.  EOMS normal.  No exudate. No icterus. PERRLA  Neck- ROM appears normal, no tracheal deviation. Cardiovascular - Regular rate and rhythm. Heart sounds are normal.  No murmur, rub, or gallop. Carotid pulses are 2+ to palpation bilaterally without bruit. Extremities - Radial and brachial pulses are 2+ to palpation bilaterally.  Femoral pulses: present 2+bilaterally;Popliteal pulses: present 2+bilaterally Right DP: present 1+; Right PT present 1+; Left DP: present 1+; Left PT: present 1+  No cyanosis, clubbing. 1/= edema. No signs atheroembolic event. Pulmonary - effort appears normal.  No respiratory distress. Lungs - Breath sounds normal. No wheezes or rales. GI - Abdomen - soft, non tender, bowel sounds X 4 quadrants. No guarding or rebound tenderness. No distension or palpable mass. Genitourinary - deferred. Musculoskeletal - ROM appears normal. 1+ edema. Skin: warm,dry  Neurologic - alert and oriented X 3. Sensation normal  Psychiatric - mood, affect, and behavior appear normal.  Judgment and thought processes appear normal.  Wound - right ankle post surgical    Wound Picture:            Assessment     right lateral ankle wound    1. Post-operative wound abscess    2. Non-pressure chronic ulcer of right ankle with fat layer exposed ([address]          Procedure Note:    Debridement: After risks benefits and expected outcomes were discussed with the patient an Excisional Debridement was performed on 1 . Anesthetic: lidocaine gel    Using curette the wound was sharply debrided    down through and including the removal of viable and non-viable epidermis, dermis and subcutaneous tissue. Devitalized Tissue Debrided:  fibrin, biofilm, slough, necrotic/eschar and exudateand epidermis, dermis and subcutaneous tissue. Percent of Wound Debrided: 100%    Total Surface Area Debrided:  7.8 sq cm       Post Debridement Measurements and Assessment:  Negative Pressure Wound Therapy Other (Comment) Right;Lateral (Active)   $ Standard NPWT <=50 sq cm PER TX $ Yes 07/05/22 1046   Wound Type Surgical 07/12/22 1037   Unit Type 3M KCI Vac Ulta 07/12/22 1037   Dressing Type Black Foam 07/12/22 1037   Number of pieces used 2 07/05/22 1046   Number of pieces removed 2 07/12/22 1037   Cycle Continuous 07/05/22 1046   Target Pressure (mmHg) 125 07/05/22 1046   Canister changed?  No 07/05/22 1046   Dressing Status Old drainage noted 07/12/22 1037   Dressing Changed Changed/New 07/05/22 1046   Drainage Amount Small 07/12/22 1037   Drainage Description Sanguinous 07/12/22 1037   Dressing Change Due 07/08/22 07/05/22 1046   Output (ml) 10 ml 07/04/22 2151   Wound Assessment Exposed structure bone;Granulation tissue 07/12/22 1037   Irina-wound Assessment Blanchable erythema 07/12/22 1037   Odor None 07/12/22 1037   Number of days: 14       Wound 06/28/22 Ankle Lateral;Right Wound 1, right ankle, surgical (Active)   Wound Image   07/12/22 1037   Wound Etiology Surgical 07/12/22 1037   Dressing Status New dressing applied 07/12/22 1240   Wound Cleansed Soap and water 07/12/22 1037   Dressing/Treatment Moist to dry;Packing;Roll gauze; Ace wrap 07/12/22 1240   Dressing Change Due 07/08/22 07/05/22 1046   Wound Length (cm) 6.5 cm 07/12/22 1037   Wound Width (cm) 1.2 cm 07/12/22 1037   Wound Depth (cm) 1.3 cm 07/12/22 1037   Wound Surface Area (cm^2) 7.8 cm^2 07/12/22 1037   Change in Wound Size % (l*w) 51.55 07/12/22 1037   Wound Volume (cm^3) 10.14 cm^3 07/12/22 1037   Wound Healing % 78 07/12/22 1037   Post-Procedure Length (cm) 6.5 cm 07/12/22 1235   Post-Procedure Width (cm) 1.2 cm 07/12/22 1235   Post-Procedure Depth (cm) 1.3 cm 07/12/22 1235   Post-Procedure Surface Area (cm^2) 7.8 cm^2 07/12/22 1235   Post-Procedure Volume (cm^3) 10.14 cm^3 07/12/22 1235   Distance Tunneling (cm) 0 cm 07/12/22 1037   Tunneling Position ___ O'Clock 0 07/12/22 1037   Undermining Starts ___ O'Clock 0 07/12/22 1037   Undermining Ends___ O'Clock 0 07/12/22 1037   Undermining Maxium Distance (cm) 0 07/12/22 1037   Wound Assessment Granulation tissue; Exposed structure bone;Pink/red 07/12/22 1037   Drainage Amount Small 07/12/22 1037   Drainage Description Sanguinous 07/12/22 1037   Odor None 07/12/22 1037   Irina-wound Assessment Blanchable erythema 07/12/22 1037   Margins Attached edges 07/12/22 1037   Wound Thickness Description not for Pressure Injury Full thickness 07/12/22 1037   Number of days: 14          The patients pain isPain Level: 4 Pain Type: Acute pain 0. Please refer to nursing measurements and assessment regarding wound pre and post debridement. Bleeding: Minimal    Hemostasis:   by pressure    Response to treatment:  Well tolerated by patient. Plan for wound - Dress per physician order    Treatment:     Compression : Yes   Offloading : No   Dressing : SEE AVS   Additional Therapy : saline BID RTO 1 week. Hold vac 1 weeek   Discussed appropriate home care of this wound. Wound redressed. Patient instructions were given. Follow up: 1 week. Recommend no smoking  Offloading instructions given    Discharge Instructions         [address] [facility]   Physician Orders and Discharge Instructions  [address] [facility] [place]  [place], [address]  Telephone: [phone]    NAME:  He Sequeira:  1960  MEDICAL RECORD NUMBER:  [id number]  DATE:  7/12/2022    Discharge condition: Stable    Discharge to: Home    Left via:Private automobile    Accompanied by:  Family member    ECF/HHA:  [facility] wound vac this week    Please go downstairs in this building to Room 103 to register. The procedure will be completed in Room 401. Please be aware of appointment time for this procedure. Please arrive to room 103 at 2:15 pm on 7/19/22 for procedure. Please do not smoke prior to test.    Dressing Orders:  Right Leg wash with soap and water. Place moistened saline gauze to wound bed, cover with dry gauze, roll gauze and medipore tape. Apply  6 inch ace wrap from toes to knee. Change dressing twice daily. Right leg sutures clean with soap and water, pat dry, cover with dry gauze, roll gauze, medipore tape, Apply 6 inch ace wrap from toes to knee. Change dressing daily. Treatment Orders:  Protein rich diet (unless restricted by your physician); Multivitamin daily;  Elevate legs above the level of your heart when sitting 3-4 times daily for at least one hour each time, avoid standing for long periods of time. Follow up with Orthopedic as directed      [facility] follow up visit ________________________1 week with Dr. Quinn_________________________________________________________  (Please note your next appointment above and if you are unable to keep, kindly give a 24 hour notice. Thank you.)          If you experience any of the following, please call the [address] [place] [address] during business hours:    * Increase in Pain  * Temperature over 101  * Increase in drainage from your wound  * Drainage with a foul odor  * Bleeding  * Increase in swelling  * Need for compression bandage changes due to slippage, breakthrough drainage. If you need medical attention outside of the business hours of the [address] [place] [address] [address] please contact your PCP or go to the nearest emergency room.           Electronically signed by Luis Paris MD on 7/12/22 at 6:37 PM CDT

## 2022-07-12 NOTE — PLAN OF CARE
Problem: Chronic Conditions and Co-morbidities  Goal: Patient's chronic conditions and co-morbidity symptoms are monitored and maintained or improved  Outcome: Progressing     Problem: Discharge Planning  Goal: Discharge to home or other facility with appropriate resources  Outcome: Progressing     Problem: Pain  Goal: Verbalizes/displays adequate comfort level or baseline comfort level  Outcome: Progressing     Problem: Wound:  Goal: Will show signs of wound healing; wound closure and no evidence of infection  Description: Will show signs of wound healing; wound closure and no evidence of infection  Outcome: Progressing     Problem: Weight control:  Goal: Ability to maintain an optimal weight for height and age will be supported  Description: Ability to maintain an optimal weight for height and age will be supported  Outcome: Progressing     Problem: Falls - Risk of:  Goal: Will remain free from falls  Description: Will remain free from falls  Outcome: Progressing     Problem: Blood Glucose:  Goal: Ability to maintain appropriate glucose levels will improve  Description: Ability to maintain appropriate glucose levels will improve  Outcome: Progressing

## 2022-07-13 RX ORDER — LIDOCAINE HYDROCHLORIDE 20 MG/ML
JELLY TOPICAL ONCE
Status: CANCELLED | OUTPATIENT
Start: 2022-07-13 | End: 2022-07-13

## 2022-07-13 RX ORDER — CLOBETASOL PROPIONATE 0.5 MG/G
OINTMENT TOPICAL ONCE
Status: CANCELLED | OUTPATIENT
Start: 2022-07-13 | End: 2022-07-13

## 2022-07-13 RX ORDER — BETAMETHASONE DIPROPIONATE 0.05 %
OINTMENT (GRAM) TOPICAL ONCE
Status: CANCELLED | OUTPATIENT
Start: 2022-07-13 | End: 2022-07-13

## 2022-07-13 RX ORDER — LIDOCAINE 40 MG/G
CREAM TOPICAL ONCE
Status: CANCELLED | OUTPATIENT
Start: 2022-07-13 | End: 2022-07-13

## 2022-07-13 RX ORDER — BACITRACIN ZINC AND POLYMYXIN B SULFATE 500; 1000 [USP'U]/G; [USP'U]/G
OINTMENT TOPICAL ONCE
Status: CANCELLED | OUTPATIENT
Start: 2022-07-13 | End: 2022-07-13

## 2022-07-13 RX ORDER — GINSENG 100 MG
CAPSULE ORAL ONCE
Status: CANCELLED | OUTPATIENT
Start: 2022-07-13 | End: 2022-07-13

## 2022-07-13 RX ORDER — GENTAMICIN SULFATE 1 MG/G
OINTMENT TOPICAL ONCE
Status: CANCELLED | OUTPATIENT
Start: 2022-07-13 | End: 2022-07-13

## 2022-07-13 RX ORDER — LIDOCAINE 50 MG/G
OINTMENT TOPICAL ONCE
Status: CANCELLED | OUTPATIENT
Start: 2022-07-13 | End: 2022-07-13

## 2022-07-13 RX ORDER — LIDOCAINE HYDROCHLORIDE 40 MG/ML
SOLUTION TOPICAL ONCE
Status: CANCELLED | OUTPATIENT
Start: 2022-07-13 | End: 2022-07-13

## 2022-07-13 RX ORDER — BACITRACIN, NEOMYCIN, POLYMYXIN B 400; 3.5; 5 [USP'U]/G; MG/G; [USP'U]/G
OINTMENT TOPICAL ONCE
Status: CANCELLED | OUTPATIENT
Start: 2022-07-13 | End: 2022-07-13

## 2022-07-15 LAB
ALBUMIN SERPL-MCNC: 3.7 G/DL (ref 3.5–5.2)
ALP BLD-CCNC: 105 U/L (ref 35–104)
ALT SERPL-CCNC: 8 U/L (ref 5–33)
ANION GAP SERPL CALCULATED.3IONS-SCNC: 11 MMOL/L (ref 7–19)
AST SERPL-CCNC: 46 U/L (ref 5–32)
BILIRUB SERPL-MCNC: 0.6 MG/DL (ref 0.2–1.2)
BUN BLDV-MCNC: 19 MG/DL (ref 8–23)
CALCIUM SERPL-MCNC: 9.2 MG/DL (ref 8.8–10.2)
CHLORIDE BLD-SCNC: 105 MMOL/L (ref 98–111)
CO2: 26 MMOL/L (ref 22–29)
CREAT SERPL-MCNC: 0.6 MG/DL (ref 0.5–0.9)
GFR AFRICAN AMERICAN: >59
GFR NON-AFRICAN AMERICAN: >60
GLUCOSE BLD-MCNC: 121 MG/DL (ref 74–109)
HBA1C MFR BLD: 6.9 % (ref 4–6)
POTASSIUM SERPL-SCNC: 4.5 MMOL/L (ref 3.5–5)
SODIUM BLD-SCNC: 142 MMOL/L (ref 136–145)
TOTAL PROTEIN: 6.9 G/DL (ref 6.6–8.7)

## 2022-07-19 ENCOUNTER — HOSPITAL ENCOUNTER (OUTPATIENT)
Dept: NON INVASIVE DIAGNOSTICS | Age: 62
Discharge: HOME | End: 2022-07-19
Payer: MEDICARE

## 2022-07-19 ENCOUNTER — HOSPITAL ENCOUNTER (OUTPATIENT)
Dept: WOUND CARE | Age: 62
Discharge: HOME | End: 2022-07-19
Payer: MEDICARE

## 2022-07-19 VITALS
SYSTOLIC BLOOD PRESSURE: 103 MMHG | RESPIRATION RATE: 18 BRPM | HEART RATE: 75 BPM | BODY MASS INDEX: 37.77 KG/M2 | HEIGHT: 66 IN | DIASTOLIC BLOOD PRESSURE: 64 MMHG | WEIGHT: 235 LBS | TEMPERATURE: 98 F

## 2022-07-19 DIAGNOSIS — E11.622 TYPE 2 DIABETES MELLITUS WITH OTHER SKIN ULCER, UNSPECIFIED WHETHER LONG TERM INSULIN USE (HCC): Chronic | ICD-10-CM

## 2022-07-19 DIAGNOSIS — L98.499 TYPE 2 DIABETES MELLITUS WITH OTHER SKIN ULCER, UNSPECIFIED WHETHER LONG TERM INSULIN USE (HCC): Chronic | ICD-10-CM

## 2022-07-19 DIAGNOSIS — L97.312 NON-PRESSURE CHRONIC ULCER OF RIGHT ANKLE WITH FAT LAYER EXPOSED (HCC): Primary | ICD-10-CM

## 2022-07-19 DIAGNOSIS — T81.49XA POST-OPERATIVE WOUND ABSCESS: ICD-10-CM

## 2022-07-19 DIAGNOSIS — T81.49XA POST-OPERATIVE WOUND ABSCESS: Chronic | ICD-10-CM

## 2022-07-19 PROCEDURE — 11042 DBRDMT SUBQ TIS 1ST 20SQCM/<: CPT | Performed by: SURGERY

## 2022-07-19 PROCEDURE — 93923 UPR/LXTR ART STDY 3+ LVLS: CPT

## 2022-07-19 PROCEDURE — 6370000000 HC RX 637 (ALT 250 FOR IP): Performed by: SURGERY

## 2022-07-19 PROCEDURE — 11042 DBRDMT SUBQ TIS 1ST 20SQCM/<: CPT

## 2022-07-19 RX ORDER — LIDOCAINE 40 MG/G
CREAM TOPICAL ONCE
Status: CANCELLED | OUTPATIENT
Start: 2022-07-19 | End: 2022-07-19

## 2022-07-19 RX ORDER — GINSENG 100 MG
CAPSULE ORAL ONCE
Status: CANCELLED | OUTPATIENT
Start: 2022-07-19 | End: 2022-07-19

## 2022-07-19 RX ORDER — BACITRACIN ZINC AND POLYMYXIN B SULFATE 500; 1000 [USP'U]/G; [USP'U]/G
OINTMENT TOPICAL ONCE
Status: CANCELLED | OUTPATIENT
Start: 2022-07-19 | End: 2022-07-19

## 2022-07-19 RX ORDER — LIDOCAINE 50 MG/G
OINTMENT TOPICAL ONCE
Status: CANCELLED | OUTPATIENT
Start: 2022-07-19 | End: 2022-07-19

## 2022-07-19 RX ORDER — CLOBETASOL PROPIONATE 0.5 MG/G
OINTMENT TOPICAL ONCE
Status: CANCELLED | OUTPATIENT
Start: 2022-07-19 | End: 2022-07-19

## 2022-07-19 RX ORDER — LIDOCAINE HYDROCHLORIDE 20 MG/ML
JELLY TOPICAL ONCE
Status: CANCELLED | OUTPATIENT
Start: 2022-07-19 | End: 2022-07-19

## 2022-07-19 RX ORDER — BETAMETHASONE DIPROPIONATE 0.05 %
OINTMENT (GRAM) TOPICAL ONCE
Status: CANCELLED | OUTPATIENT
Start: 2022-07-19 | End: 2022-07-19

## 2022-07-19 RX ORDER — LIDOCAINE HYDROCHLORIDE 20 MG/ML
JELLY TOPICAL ONCE
Status: COMPLETED | OUTPATIENT
Start: 2022-07-19 | End: 2022-07-19

## 2022-07-19 RX ORDER — LIDOCAINE HYDROCHLORIDE 40 MG/ML
SOLUTION TOPICAL ONCE
Status: CANCELLED | OUTPATIENT
Start: 2022-07-19 | End: 2022-07-19

## 2022-07-19 RX ORDER — BACITRACIN, NEOMYCIN, POLYMYXIN B 400; 3.5; 5 [USP'U]/G; MG/G; [USP'U]/G
OINTMENT TOPICAL ONCE
Status: CANCELLED | OUTPATIENT
Start: 2022-07-19 | End: 2022-07-19

## 2022-07-19 RX ORDER — GENTAMICIN SULFATE 1 MG/G
OINTMENT TOPICAL ONCE
Status: CANCELLED | OUTPATIENT
Start: 2022-07-19 | End: 2022-07-19

## 2022-07-19 RX ADMIN — LIDOCAINE HYDROCHLORIDE: 20 JELLY TOPICAL at 14:37

## 2022-07-19 NOTE — PROGRESS NOTES
[place] Application   Below Knee    NAME:  Deric De La Garza  YOB: 1960  MEDICAL RECORD NUMBER:  [id number]  DATE:  7/19/2022    Jere Power boot: Applied moisturizing agent to dry skin as needed. Appied primary and secondary dressing as ordered. Applied Unna roll from toes to knee overlapping each time. Applied ace wrap or coban from toes to below the knee. Instructed patient/caregiver to keep dressing dry and intact. DO NOT REMOVE DRESSING. Instructed pt/family/caregiver to report excessive draining, loose bandage, wet dressing, severe pain or tingling in toes. Applied [place] dressing below the knee to right lower leg. Unna Boot(s) were applied per  Guidelines.      Electronically signed by Ronnie Street RN on 7/19/2022 at 3:44 PM

## 2022-07-19 NOTE — DISCHARGE INSTRUCTIONS
[address] [facility]   Physician Orders and Discharge Instructions  [address]  Telephone: [phone]    NAME:  Myriam Salmon  YOB: 1960  MEDICAL RECORD NUMBER:  [id number]  DATE:  @ED@    Discharge condition: Stable    Discharge to: Home    Left via:Private automobile    Accompanied by:  Family member     ECF/HHA:  [facility] to change on Coflex on Monday and Friday and [address] [facility] to change on Wednesdays   Hold wound vac one more week    Dressing Orders: Right Lower Ext Ulcer  Promogran base of wound  Absorbant Layer ( draw andria ) Pink Coflex Unnaboot, Keep clean and Dry  Elevate feet to level or above heart 3-4 times daily and as needed to for 30 minutes to reduce swelling  Remove wraps and call office if- Wraps get wet, Cause increased pain, Slide down or you are unable to make your next scheduled appointment   Multi Vitamin, High Protein diet as tolerated    Follow up with Orthopedic as directed    [address] [place] [address],[address] follow up visit ____________1 week_________________  (Please note your next appointment above and if you are unable to keep, kindly give a 24 hour notice. Thank you.)    If you experience any of the following, please call the [facility] during business hours:    * Increase in Pain  * Temperature over 101  * Increase in drainage from your wound  * Drainage with a foul odor  * Bleeding  * Increase in swelling  * Need for compression bandage changes due to slippage, breakthrough drainage. If you need medical attention outside of the business hours of the [facility] please contact your PCP or go to the nearest emergency room.

## 2022-07-19 NOTE — PROGRESS NOTES
[address]   Progress Note and Procedure Note      [address] [facility] RECORD NUMBER:  [id number]  AGE: 62 y.o. GENDER: female  : 1960  EPISODE DATE:  2022    Subjective:     Chief Complaint   Patient presents with    Wound Check         HISTORY of PRESENT ILLNESS HPI     Ernesto Almonte is a 62 y.o. female who presents today for wound/ulcer evaluation.    Wound Context: Pt with RLE surgical wound here for eval/treat  Wound/Ulcer Pain Timing/Severity: none  Quality of pain: N/A  Severity:  0 / 10   Modifying Factors: None  Associated Signs/Symptoms: edema    Ulcer Identification:  Ulcer Type: non-healing surgical  Contributing Factors: edema and diabetes    Wound: Surgical incision        PAST MEDICAL HISTORY        Diagnosis Date    Anxiety     Depression     Diabetes ([facility] [address]     Hypertension     Memory difficulty     Obesity 2021    Sleep apnea     c-pap       PAST SURGICAL HISTORY    Past Surgical History:   Procedure Laterality Date    ANKLE FRACTURE SURGERY Right 2021    ANKLE OPEN REDUCTION INTERNAL FIXATION WITH POSSIBLE EXTERNAL FIXATOR performed by Jennifer Martinez MD at [place] [address] Right 2022    RIGHT ANKLE HARDWARE REMOVAL WITH WOUND VAC performed by Jennifer Martinez MD at [facility] OR    GALLBLADDER SURGERY         FAMILY HISTORY    Family History   Problem Relation Age of Onset    Cancer Mother     Heart Attack Father        SOCIAL HISTORY    Social History     Tobacco Use    Smoking status: Never    Smokeless tobacco: Never   Vaping Use    Vaping Use: Never used   Substance Use Topics    Alcohol use: No    Drug use: No       ALLERGIES    Allergies   Allergen Reactions    Azithromycin Rash    Namenda  [Memantine Hcl] Nausea And Vomiting and Nausea Only       MEDICATIONS    Current Outpatient Medications on File Prior to Encounter   Medication Sig Dispense Refill    ceFAZolin (ANCEF) infusion Infuse 2,000 mg intravenously every 8 hours for 28 days Compound per protocol 168 g 0    aspirin EC 81 MG EC tablet Take 1 tablet by mouth 2 times daily 60 tablet 0    ceFAZolin (ANCEF) infusion Infuse 2,000 mg intravenously every 8 hours Compound per protocol 180 g 1    donepezil (ARICEPT) 10 MG tablet Take 1 tablet by mouth nightly 30 tablet 11    levocetirizine (XYZAL) 5 MG tablet Take 5 mg by mouth nightly       metFORMIN, OSM, (FORTAMET) 1000 MG extended release tablet Take 1,000 mg by mouth 2 times daily (with meals)       metoprolol succinate (TOPROL XL) 100 MG extended release tablet Take 100 mg by mouth nightly   1    escitalopram (LEXAPRO) 10 MG tablet Take 10 mg by mouth nightly       triamterene-hydrochlorothiazide (MAXZIDE-25) 37.5-25 MG per tablet Take 1 tablet by mouth nightly        No current facility-administered medications on file prior to encounter. REVIEW OF SYSTEMS    A comprehensive review of systems was negative.     Objective:      /64   Pulse 75   Temp 98 °F (36.7 °C) (Temporal)   Resp 18   Ht 5' 6\" (1.676 m)   Wt 235 lb (106.6 kg)   BMI 37.93 kg/m²     Wt Readings from Last 3 Encounters:   07/19/22 235 lb (106.6 kg)   07/12/22 235 lb (106.6 kg)   06/28/22 240 lb (108.9 kg)       PHYSICAL EXAM    General Appearance: alert and oriented to person, place and time, well developed and well- nourished, in no acute distress  Skin: warm and dry, no rash or erythema  Head: normocephalic and atraumatic  Eyes: pupils equal, round, and reactive to light, extraocular eye movements intact, conjunctivae normal  ENT: tympanic membrane, external ear and ear canal normal bilaterally, nose without deformity, nasal mucosa and turbinates normal without polyps, lips teeth and gums normal  Neck: supple and non-tender without mass, no thyromegaly or thyroid nodules, no cervical lymphadenopathy  Pulmonary/Chest: clear to auscultation bilaterally- no wheezes, rales or rhonchi, normal air movement, no respiratory distress  Cardiovascular: normal rate, regular rhythm, normal S1 and S2, no murmurs, rubs, clicks, or gallops, distal pulses intact, no carotid bruits  Abdomen: soft, non-tender, non-distended, normal bowel sounds, no masses or organomegaly  Extremities: no cyanosis, clubbing or edema  Musculoskeletal: normal range of motion, no joint swelling, deformity or tenderness  Neurologic: reflexes normal and symmetric, no cranial nerve deficit, gait, coordination and speech normal, skin sensation normal      Assessment:      Problem List Items Addressed This Visit       Post-operative wound abscess (Chronic)    * (Principal) Non-pressure chronic ulcer of right ankle with fat layer exposed ([facility] [address] - Primary (Chronic)    Relevant Orders    Initiate Outpatient Wound Care Protocol    Diabetes ([facility] [address] (Chronic)        Procedure Note  Indications:  Based on my examination of this patient's wound(s)/ulcer(s) today, debridement is required to promote healing and evaluate the wound base. Performed by: Luis Paris MD    Consent obtained:  Yes    Time out taken:  Yes    Pain Control: Anesthetic  Anesthetic: 2% Lidocaine Gel Topical       Debridement:Excisional Debridement    Using curette the wound(s)/ulcer(s) was/were sharply debrided down through and including the removal of epidermis, dermis, and subcutaneous tissue.         Devitalized Tissue Debrided:  fibrin, biofilm, slough, necrotic/eschar, and exudate      Pre Debridement Measurements:  Are located in the Wound/Ulcer Documentation Flow Sheet    Wound/Ulcer #: 1    Percent of Wound(s)/Ulcer(s) Debrided: 100%    Total Surface Area Debrided:  7.2 sq cm       Diabetic/Pressure/Non Pressure Ulcers only:  Ulcer: Diabetic ulcer, fat layer exposed             Post Debridement Measurements:    Wound/Ulcer Descriptions are Pre Debridement --EXCEPT MEASUREMENTS    Negative Pressure Wound Therapy Other (Comment) Right;Lateral (Active)   $ Standard NPWT <=50 sq cm PER TX $ Yes 07/05/22 1046   Wound Type Surgical 07/12/22 1037   Unit Type 3M KCI Vac Ulta 07/12/22 1037   Dressing Type Black Foam 07/12/22 1037   Number of pieces used 2 07/05/22 1046   Number of pieces removed 2 07/12/22 1037   Cycle Continuous 07/05/22 1046   Target Pressure (mmHg) 125 07/05/22 1046   Canister changed? No 07/05/22 1046   Dressing Status Old drainage noted 07/12/22 1037   Dressing Changed Changed/New 07/05/22 1046   Drainage Amount Small 07/12/22 1037   Drainage Description Sanguinous 07/12/22 1037   Dressing Change Due 07/08/22 07/05/22 1046   Output (ml) 10 ml 07/04/22 2151   Wound Assessment Exposed structure bone;Granulation tissue 07/12/22 1037   Irina-wound Assessment Blanchable erythema 07/12/22 1037   Odor None 07/12/22 1037   Number of days: 20       Wound 06/28/22 Ankle Lateral;Right Wound 1, right ankle, surgical (Active)   Wound Image   07/19/22 1439   Wound Etiology Surgical 07/19/22 1439   Dressing Status Old drainage noted 07/19/22 1439   Wound Cleansed Soap and water 07/19/22 1439   Dressing/Treatment Moist to dry;Packing;Roll gauze; Ace wrap 07/12/22 1240   Dressing Change Due 07/08/22 07/05/22 1046   Wound Length (cm) 6 cm 07/19/22 1439   Wound Width (cm) 1.2 cm 07/19/22 1439   Wound Depth (cm) 1.3 cm 07/19/22 1439   Wound Surface Area (cm^2) 7.2 cm^2 07/19/22 1439   Change in Wound Size % (l*w) 55.28 07/19/22 1439   Wound Volume (cm^3) 9.36 cm^3 07/19/22 1439   Wound Healing % 79 07/19/22 1439   Post-Procedure Length (cm) 6 cm 07/19/22 1501   Post-Procedure Width (cm) 1.2 cm 07/19/22 1501   Post-Procedure Depth (cm) 1.3 cm 07/19/22 1501   Post-Procedure Surface Area (cm^2) 7.2 cm^2 07/19/22 1501   Post-Procedure Volume (cm^3) 9.36 cm^3 07/19/22 1501   Distance Tunneling (cm) 0 cm 07/12/22 1037   Tunneling Position ___ O'Clock 0 07/12/22 1037   Undermining Starts ___ O'Clock 0 07/12/22 1037   Undermining Ends___ O'Clock 0 07/12/22 1037   Undermining Maxium Distance (cm) 0 07/12/22 1037   Wound Assessment Granulation tissue;Pink/red 07/19/22 1439   Drainage Amount Small 07/19/22 1439   Drainage Description Serosanguinous 07/19/22 1439   Odor None 07/19/22 1439   Irina-wound Assessment Blanchable erythema 07/19/22 1439   Margins Attached edges 07/19/22 1439   Wound Thickness Description not for Pressure Injury Full thickness 07/19/22 1439   Number of days: 20             Estimated Blood Loss:  Minimal    Hemostasis Achieved:  by pressure    Procedural Pain:  0  / 10     Post Procedural Pain:  0 / 10     Response to treatment:  Well tolerated by patient. Plan:     Problem List Items Addressed This Visit       Post-operative wound abscess (Chronic)    * (Principal) Non-pressure chronic ulcer of right ankle with fat layer exposed ([address] - Primary (Chronic)    Relevant Orders    Initiate Outpatient Wound Care Protocol    Diabetes ([facility] [address] (Chronic)       DESTINY ok Start coflex add promogran RTO 1 week    Treatment Note please see attached Discharge Instructions    In my professional opinion this patient would benefit from HBO Therapy: Undecided    Written patient dismissal instructions given to patient and signed by patient or POA.          Discharge [address] and Hyperbaric Oxygen Therapy   Physician Orders and Discharge Instructions  [address]  Telephone: [phone]    NAME:  Sadia Zhong:  1960  MEDICAL RECORD NUMBER:  [id number]  DATE:  @ED@    Discharge condition: Stable    Discharge to: Home    Left via:Private automobile    Accompanied by:  Family member     ECF/HHA:  [facility] to change on Coflex on Monday and Friday and [facility] to change on Wednesdays   Hold wound vac one more week    Dressing Orders: Right Lower Ext Ulcer  Promogran base of wound  Absorbant Layer, Pink Coflex Unnaboot, Keep clean and Dry  Elevate feet to level or above heart 3-4 times daily and as needed to for 30 minutes to reduce swelling  Remove wraps and call office if- Wraps get wet, Cause increased pain, Slide down or you are unable to make your next scheduled appointment   Multi Vitamin, High Protein diet as tolerated    Follow up with Orthopedic as directed    [address] [place] [address],[address] follow up visit ____________1 week_________________  (Please note your next appointment above and if you are unable to keep, kindly give a 24 hour notice. Thank you.)          If you experience any of the following, please call the [employer] during business hours:    * Increase in Pain  * Temperature over 101  * Increase in drainage from your wound  * Drainage with a foul odor  * Bleeding  * Increase in swelling  * Need for compression bandage changes due to slippage, breakthrough drainage. If you need medical attention outside of the business hours of the e|tab please contact your PCP or go to the nearest emergency room.          Electronically signed by Pricilla Medina MD on 7/19/2022 at 3:13 PM

## 2022-07-19 NOTE — PLAN OF CARE
Problem: Chronic Conditions and Co-morbidities  Goal: Patient's chronic conditions and co-morbidity symptoms are monitored and maintained or improved  Outcome: Progressing     Problem: Discharge Planning  Goal: Discharge to home or other facility with appropriate resources  Outcome: Progressing     Problem: Chronic Conditions and Co-morbidities  Goal: Patient's chronic conditions and co-morbidity symptoms are monitored and maintained or improved  Outcome: Progressing     Problem: Pain  Goal: Verbalizes/displays adequate comfort level or baseline comfort level  Outcome: Progressing     Problem: Wound:  Goal: Will show signs of wound healing; wound closure and no evidence of infection  Description: Will show signs of wound healing; wound closure and no evidence of infection  Outcome: Progressing     Problem: Weight control:  Goal: Ability to maintain an optimal weight for height and age will be supported  Description: Ability to maintain an optimal weight for height and age will be supported  Outcome: Progressing     Problem: Falls - Risk of:  Goal: Will remain free from falls  Description: Will remain free from falls  Outcome: Progressing     Problem: Blood Glucose:  Goal: Ability to maintain appropriate glucose levels will improve  Description: Ability to maintain appropriate glucose levels will improve  Outcome: Progressing

## 2022-07-22 NOTE — PROGRESS NOTES
Physician Progress Note      Curry Reyes  CSN #:                  [id number]  :                       1960  ADMIT DATE:       2022 11:48 AM  DISCH DATE:        2022 6:06 PM  RESPONDING  PROVIDER #:        Amandeep Blanco MD          QUERY TEXT:    Pt admitted with Abscess/Osteomyelitis of previous fracture and ORIF. Pt noted   to have removal of fixation devices. If possible, please document in progress   notes and discharge summary the relationship, if any, between   Abscess/Osteomyelitis and Internal Fixation Device. The medical record reflects the following:  Risk Factors: Previous Ankle Fracture with ORIF  Clinical Indicators: Discharge summary states \"right methicillin sensitive   staph RES abscess right distal fibula after ORIF\"  Treatment: Removal of ORIF with I&D, IV antibiotics Vanco and Cefipime. Thank you in advance,    Claire Monroy RN-BSN, [facility]  Clinical   [facility],  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     Query created by: Maida Martins on 2022 3:31 PM      Electronically signed by:  Amandeep Blanco MD 2022 3:07 PM

## 2022-07-26 ENCOUNTER — HOSPITAL ENCOUNTER (OUTPATIENT)
Dept: BONE DENSITY | Facility: HOSPITAL | Age: 62
Discharge: HOME | End: 2022-07-26

## 2022-07-26 ENCOUNTER — HOSPITAL ENCOUNTER (OUTPATIENT)
Dept: MAMMOGRAPHY | Facility: HOSPITAL | Age: 62
Discharge: HOME | End: 2022-07-26

## 2022-07-26 DIAGNOSIS — M81.0 AGE-RELATED OSTEOPOROSIS WITHOUT CURRENT PATHOLOGICAL FRACTURE: ICD-10-CM

## 2022-07-26 DIAGNOSIS — Z12.31 ENCOUNTER FOR SCREENING MAMMOGRAM FOR MALIGNANT NEOPLASM OF BREAST: ICD-10-CM

## 2022-07-26 PROCEDURE — 77067 SCR MAMMO BI INCL CAD: CPT

## 2022-07-26 PROCEDURE — 77063 BREAST TOMOSYNTHESIS BI: CPT

## 2022-07-26 PROCEDURE — 77080 DXA BONE DENSITY AXIAL: CPT

## 2022-07-27 ENCOUNTER — HOSPITAL ENCOUNTER (OUTPATIENT)
Dept: WOUND CARE | Age: 62
Discharge: HOME | End: 2022-07-27
Payer: MEDICARE

## 2022-07-27 VITALS
SYSTOLIC BLOOD PRESSURE: 105 MMHG | HEART RATE: 77 BPM | DIASTOLIC BLOOD PRESSURE: 65 MMHG | TEMPERATURE: 98 F | RESPIRATION RATE: 18 BRPM

## 2022-07-27 DIAGNOSIS — E11.622 TYPE 2 DIABETES MELLITUS WITH OTHER SKIN ULCER, UNSPECIFIED WHETHER LONG TERM INSULIN USE (HCC): Chronic | ICD-10-CM

## 2022-07-27 DIAGNOSIS — L97.312 NON-PRESSURE CHRONIC ULCER OF RIGHT ANKLE WITH FAT LAYER EXPOSED (HCC): Primary | ICD-10-CM

## 2022-07-27 DIAGNOSIS — T81.49XA POST-OPERATIVE WOUND ABSCESS: Chronic | ICD-10-CM

## 2022-07-27 DIAGNOSIS — L98.499 TYPE 2 DIABETES MELLITUS WITH OTHER SKIN ULCER, UNSPECIFIED WHETHER LONG TERM INSULIN USE (HCC): Chronic | ICD-10-CM

## 2022-07-27 PROCEDURE — 11042 DBRDMT SUBQ TIS 1ST 20SQCM/<: CPT | Performed by: SURGERY

## 2022-07-27 PROCEDURE — 11042 DBRDMT SUBQ TIS 1ST 20SQCM/<: CPT

## 2022-07-27 RX ORDER — GENTAMICIN SULFATE 1 MG/G
OINTMENT TOPICAL ONCE
Status: CANCELLED | OUTPATIENT
Start: 2022-07-27 | End: 2022-07-27

## 2022-07-27 RX ORDER — CLOBETASOL PROPIONATE 0.5 MG/G
OINTMENT TOPICAL ONCE
Status: CANCELLED | OUTPATIENT
Start: 2022-07-27 | End: 2022-07-27

## 2022-07-27 RX ORDER — BETAMETHASONE DIPROPIONATE 0.05 %
OINTMENT (GRAM) TOPICAL ONCE
Status: CANCELLED | OUTPATIENT
Start: 2022-07-27 | End: 2022-07-27

## 2022-07-27 RX ORDER — LIDOCAINE 40 MG/G
CREAM TOPICAL ONCE
Status: CANCELLED | OUTPATIENT
Start: 2022-07-27 | End: 2022-07-27

## 2022-07-27 RX ORDER — LIDOCAINE 50 MG/G
OINTMENT TOPICAL ONCE
Status: CANCELLED | OUTPATIENT
Start: 2022-07-27 | End: 2022-07-27

## 2022-07-27 RX ORDER — LIDOCAINE HYDROCHLORIDE 20 MG/ML
JELLY TOPICAL ONCE
Status: CANCELLED | OUTPATIENT
Start: 2022-07-27 | End: 2022-07-27

## 2022-07-27 RX ORDER — BACITRACIN ZINC AND POLYMYXIN B SULFATE 500; 1000 [USP'U]/G; [USP'U]/G
OINTMENT TOPICAL ONCE
Status: CANCELLED | OUTPATIENT
Start: 2022-07-27 | End: 2022-07-27

## 2022-07-27 RX ORDER — GINSENG 100 MG
CAPSULE ORAL ONCE
Status: CANCELLED | OUTPATIENT
Start: 2022-07-27 | End: 2022-07-27

## 2022-07-27 RX ORDER — BACITRACIN, NEOMYCIN, POLYMYXIN B 400; 3.5; 5 [USP'U]/G; MG/G; [USP'U]/G
OINTMENT TOPICAL ONCE
Status: CANCELLED | OUTPATIENT
Start: 2022-07-27 | End: 2022-07-27

## 2022-07-27 RX ORDER — LIDOCAINE HYDROCHLORIDE 40 MG/ML
SOLUTION TOPICAL ONCE
Status: CANCELLED | OUTPATIENT
Start: 2022-07-27 | End: 2022-07-27

## 2022-07-27 NOTE — PROGRESS NOTES
[place] Application   Below Knee    NAME:  Amanda Reese  YOB: 1960  MEDICAL RECORD NUMBER:  [id number]  DATE:  7/27/2022    [x] Removed old Loletha Ten Sleep boot if indicated and wash leg with mild soap and water. [x] Applied moisturizing agent to dry skin as needed. [x] Appied primary and secondary dressing as ordered    [] Applied Unna roll from toes to knee overlapping each time.   [] Applied ace wrap or coban from toes to below the knee. [] Secured with tape and/or metal clips covered with tape. [x] Instructed patient/caregiver to keep dressing dry and intact. DO NOT REMOVE DRESSING. [x] Instructed pt/family/caregiver to report excessive draining, loose bandage, wet dressing, severe pain or tingling in toes. [x] Applied [place] dressing below the knee to Right lower leg(s)       Unna Boot(s) were applied per  Guidelines.      Electronically signed by Gage Cross RN on 7/27/2022 at 1:44 PM

## 2022-07-27 NOTE — PROGRESS NOTES
[address]   Progress Note and Procedure Note      [address] [facility] RECORD NUMBER:  [id number]  AGE: 62 y.o. GENDER: female  : 1960  EPISODE DATE:  2022    Subjective:     Chief Complaint   Patient presents with    Wound Check                HISTORY of PRESENT ILLNESS HPI     Yariel Hoyos is a 62 y.o. female who presents today for wound/ulcer evaluation.    Wound Context: Pt with R ankle surgical wound here for eval/treat  Wound/Ulcer Pain Timing/Severity: none  Quality of pain: N/A  Severity:  0 / 10   Modifying Factors: None  Associated Signs/Symptoms: none    Ulcer Identification:  Ulcer Type: non-healing surgical  Contributing Factors: edema    Wound: Nonhealing surgical due to infection        PAST MEDICAL HISTORY        Diagnosis Date    Anxiety     Depression     Diabetes ([address]     Hypertension     Memory difficulty     Obesity 2021    Sleep apnea     c-pap       PAST SURGICAL HISTORY    Past Surgical History:   Procedure Laterality Date    ANKLE FRACTURE SURGERY Right 2021    ANKLE OPEN REDUCTION INTERNAL FIXATION WITH POSSIBLE EXTERNAL FIXATOR performed by Liudmila Newby MD at [place] [address] Right 2022    RIGHT ANKLE HARDWARE REMOVAL WITH WOUND VAC performed by Liudmila Newby MD at [facility] OR    GALLBLADDER SURGERY         FAMILY HISTORY    Family History   Problem Relation Age of Onset    Cancer Mother     Heart Attack Father        SOCIAL HISTORY    Social History     Tobacco Use    Smoking status: Never    Smokeless tobacco: Never   Vaping Use    Vaping Use: Never used   Substance Use Topics    Alcohol use: No    Drug use: No       ALLERGIES    Allergies   Allergen Reactions    Azithromycin Rash    Namenda  [Memantine Hcl] Nausea And Vomiting and Nausea Only       MEDICATIONS    Current Outpatient Medications on File Prior to Encounter   Medication Sig Dispense Refill    ceFAZolin (ANCEF) infusion Infuse 2,000 mg and S2, no murmurs, rubs, clicks, or gallops, distal pulses intact, no carotid bruits  Abdomen: soft, non-tender, non-distended, normal bowel sounds, no masses or organomegaly  Extremities: no cyanosis, clubbing or edema  Musculoskeletal: normal range of motion, no joint swelling, deformity or tenderness  Neurologic: reflexes normal and symmetric, no cranial nerve deficit, gait, coordination and speech normal, skin sensation normal      Assessment:      Problem List Items Addressed This Visit       Post-operative wound abscess (Chronic)    * (Principal) Non-pressure chronic ulcer of right ankle with fat layer exposed ([facility] [address] - Primary (Chronic)    Relevant Orders    Initiate Outpatient Wound Care Protocol    Diabetes ([facility] [address] (Chronic)        Procedure Note  Indications:  Based on my examination of this patient's wound(s)/ulcer(s) today, debridement is required to promote healing and evaluate the wound base. Performed by: Jose Shelton MD    Consent obtained:  Yes    Time out taken:  Yes    Pain Control: Anesthetic  Anesthetic: None       Debridement:Excisional Debridement    Using curette the wound(s)/ulcer(s) was/were sharply debrided down through and including the removal of epidermis, dermis, and subcutaneous tissue.         Devitalized Tissue Debrided:  fibrin, biofilm, slough, necrotic/eschar, and exudate      Pre Debridement Measurements:  Are located in the Wound/Ulcer Documentation Flow Sheet    Wound/Ulcer #: 1    Percent of Wound(s)/Ulcer(s) Debrided: 100%    Total Surface Area Debrided:  5.5 sq cm       Diabetic/Pressure/Non Pressure Ulcers only:  Ulcer: Non-Pressure ulcer, fat layer exposed           Post Debridement Measurements:    Wound/Ulcer Descriptions are Pre Debridement --EXCEPT MEASUREMENTS    Negative Pressure Wound Therapy Other (Comment) Right;Lateral (Active)   $ Standard NPWT <=50 sq cm PER TX $ Yes 07/05/22 1046   Wound Type Surgical 07/12/22 1037   Unit Type 3M KCI Vac Ulta 07/12/22 1037   Dressing Type Black Foam 07/12/22 1037   Number of pieces used 2 07/05/22 1046   Number of pieces removed 2 07/12/22 1037   Cycle Continuous 07/05/22 1046   Target Pressure (mmHg) 125 07/05/22 1046   Canister changed?  No 07/05/22 1046   Dressing Status Old drainage noted 07/12/22 1037   Dressing Changed Changed/New 07/05/22 1046   Drainage Amount Small 07/12/22 1037   Drainage Description Sanguinous 07/12/22 1037   Dressing Change Due 07/08/22 07/05/22 1046   Output (ml) 10 ml 07/04/22 2151   Wound Assessment Exposed structure bone;Granulation tissue 07/12/22 1037   Irina-wound Assessment Blanchable erythema 07/12/22 1037   Odor None 07/12/22 1037   Number of days: 28       Wound 06/28/22 Ankle Lateral;Right Wound 1, right ankle, surgical (Active)   Wound Image   07/27/22 1331   Wound Etiology Surgical 07/27/22 1331   Dressing Status Old drainage noted 07/27/22 1331   Wound Cleansed Soap and water 07/27/22 1331   Dressing/Treatment Collagen 07/27/22 1340   Offloading for Diabetic Foot Ulcers Offloading not ordered 07/27/22 1331   Dressing Change Due 07/08/22 07/05/22 1046   Wound Length (cm) 5 cm 07/27/22 1331   Wound Width (cm) 1.1 cm 07/27/22 1331   Wound Depth (cm) 0.8 cm 07/27/22 1331   Wound Surface Area (cm^2) 5.5 cm^2 07/27/22 1331   Change in Wound Size % (l*w) 65.84 07/27/22 1331   Wound Volume (cm^3) 4.4 cm^3 07/27/22 1331   Wound Healing % 90 07/27/22 1331   Post-Procedure Length (cm) 5 cm 07/27/22 1340   Post-Procedure Width (cm) 1.1 cm 07/27/22 1340   Post-Procedure Depth (cm) 0.8 cm 07/27/22 1340   Post-Procedure Surface Area (cm^2) 5.5 cm^2 07/27/22 1340   Post-Procedure Volume (cm^3) 4.4 cm^3 07/27/22 1340   Distance Tunneling (cm) 0 cm 07/12/22 1037   Tunneling Position ___ O'Clock 0 07/12/22 1037   Undermining Starts ___ O'Clock 0 07/12/22 1037   Undermining Ends___ O'Clock 0 07/12/22 1037   Undermining Maxium Distance (cm) 0 07/12/22 1037   Wound Assessment [facility] 07/27/22 1331   Drainage

## 2022-07-27 NOTE — DISCHARGE INSTRUCTIONS
[address] and Hyperbaric Oxygen Therapy   Physician Orders and Discharge Instructions  [address]  Telephone: [phone]    NAME:  Jack Ventura  YOB: 1960  MEDICAL RECORD NUMBER:  [id number]  DATE:  @ED@    Discharge condition: Stable    Discharge to: Home    Left via:Private automobile    Accompanied by: Spouse     ECF/HHA:  [facility] to change on Coflex on Monday and Friday and [facility] to change on Wednesdays  D/C Wound Vac     Dressing Orders: Right Lower Ext Ulcer  Promogran base of wound  Absorbant Layer, Pink Coflex Unnaboot, Keep clean and Dry  Elevate feet to level or above heart 3-4 times daily and as needed to for 30 minutes to reduce swelling  Remove wraps and call office if- Wraps get wet, Cause increased pain, Slide down or you are unable to make your next scheduled appointment   Multi Vitamin, High Protein diet as tolerated     Follow up with Orthopedic as directed    [facility] follow up visit _____________1 week________________  (Please note your next appointment above and if you are unable to keep, kindly give a 24 hour notice. Thank you.)    If you experience any of the following, please call the [employer] [facility] [facility] during business hours:    * Increase in Pain  * Temperature over 101  * Increase in drainage from your wound  * Drainage with a foul odor  * Bleeding  * Increase in swelling  * Need for compression bandage changes due to slippage, breakthrough drainage. If you need medical attention outside of the business hours of the [employer] [facility] please contact your PCP or go to the nearest emergency room.

## 2022-08-02 LAB
FUNGUS (MYCOLOGY) CULTURE: NORMAL
FUNGUS (MYCOLOGY) CULTURE: NORMAL
KOH PREP: NORMAL
KOH PREP: NORMAL

## 2022-08-03 ENCOUNTER — HOSPITAL ENCOUNTER (OUTPATIENT)
Dept: WOUND CARE | Age: 62
Discharge: HOME | End: 2022-08-03
Payer: MEDICARE

## 2022-08-03 VITALS
DIASTOLIC BLOOD PRESSURE: 68 MMHG | RESPIRATION RATE: 18 BRPM | SYSTOLIC BLOOD PRESSURE: 108 MMHG | HEART RATE: 72 BPM | TEMPERATURE: 98 F

## 2022-08-03 DIAGNOSIS — T81.49XA POST-OPERATIVE WOUND ABSCESS: Chronic | ICD-10-CM

## 2022-08-03 DIAGNOSIS — L98.499 TYPE 2 DIABETES MELLITUS WITH OTHER SKIN ULCER, UNSPECIFIED WHETHER LONG TERM INSULIN USE (HCC): Chronic | ICD-10-CM

## 2022-08-03 DIAGNOSIS — E11.622 TYPE 2 DIABETES MELLITUS WITH OTHER SKIN ULCER, UNSPECIFIED WHETHER LONG TERM INSULIN USE (HCC): Chronic | ICD-10-CM

## 2022-08-03 DIAGNOSIS — L97.312 NON-PRESSURE CHRONIC ULCER OF RIGHT ANKLE WITH FAT LAYER EXPOSED (HCC): Primary | ICD-10-CM

## 2022-08-03 PROCEDURE — 6370000000 HC RX 637 (ALT 250 FOR IP): Performed by: SURGERY

## 2022-08-03 PROCEDURE — 11042 DBRDMT SUBQ TIS 1ST 20SQCM/<: CPT

## 2022-08-03 PROCEDURE — 11042 DBRDMT SUBQ TIS 1ST 20SQCM/<: CPT | Performed by: SURGERY

## 2022-08-03 RX ORDER — CLOBETASOL PROPIONATE 0.5 MG/G
OINTMENT TOPICAL ONCE
Status: DISCONTINUED | OUTPATIENT
Start: 2022-08-03 | End: 2022-08-05 | Stop reason: HOSPADM

## 2022-08-03 RX ORDER — LIDOCAINE HYDROCHLORIDE 20 MG/ML
JELLY TOPICAL ONCE
Status: COMPLETED | OUTPATIENT
Start: 2022-08-03 | End: 2022-08-03

## 2022-08-03 RX ORDER — BACITRACIN, NEOMYCIN, POLYMYXIN B 400; 3.5; 5 [USP'U]/G; MG/G; [USP'U]/G
OINTMENT TOPICAL ONCE
Status: CANCELLED | OUTPATIENT
Start: 2022-08-03 | End: 2022-08-03

## 2022-08-03 RX ORDER — LIDOCAINE 50 MG/G
OINTMENT TOPICAL ONCE
Status: DISCONTINUED | OUTPATIENT
Start: 2022-08-03 | End: 2022-08-05 | Stop reason: HOSPADM

## 2022-08-03 RX ORDER — LIDOCAINE 40 MG/G
CREAM TOPICAL ONCE
Status: CANCELLED | OUTPATIENT
Start: 2022-08-03 | End: 2022-08-03

## 2022-08-03 RX ORDER — GINSENG 100 MG
CAPSULE ORAL ONCE
Status: DISCONTINUED | OUTPATIENT
Start: 2022-08-03 | End: 2022-08-05 | Stop reason: HOSPADM

## 2022-08-03 RX ORDER — GENTAMICIN SULFATE 1 MG/G
OINTMENT TOPICAL ONCE
Status: DISCONTINUED | OUTPATIENT
Start: 2022-08-03 | End: 2022-08-05 | Stop reason: HOSPADM

## 2022-08-03 RX ORDER — CLOBETASOL PROPIONATE 0.5 MG/G
OINTMENT TOPICAL ONCE
Status: CANCELLED | OUTPATIENT
Start: 2022-08-03 | End: 2022-08-03

## 2022-08-03 RX ORDER — BACITRACIN, NEOMYCIN, POLYMYXIN B 400; 3.5; 5 [USP'U]/G; MG/G; [USP'U]/G
OINTMENT TOPICAL ONCE
Status: DISCONTINUED | OUTPATIENT
Start: 2022-08-03 | End: 2022-08-05 | Stop reason: HOSPADM

## 2022-08-03 RX ORDER — LIDOCAINE HYDROCHLORIDE 20 MG/ML
JELLY TOPICAL ONCE
Status: CANCELLED | OUTPATIENT
Start: 2022-08-03 | End: 2022-08-03

## 2022-08-03 RX ORDER — BETAMETHASONE DIPROPIONATE 0.05 %
OINTMENT (GRAM) TOPICAL ONCE
Status: CANCELLED | OUTPATIENT
Start: 2022-08-03 | End: 2022-08-03

## 2022-08-03 RX ORDER — LIDOCAINE HYDROCHLORIDE 40 MG/ML
SOLUTION TOPICAL ONCE
Status: CANCELLED | OUTPATIENT
Start: 2022-08-03 | End: 2022-08-03

## 2022-08-03 RX ORDER — BETAMETHASONE DIPROPIONATE 0.05 %
OINTMENT (GRAM) TOPICAL ONCE
Status: DISCONTINUED | OUTPATIENT
Start: 2022-08-03 | End: 2022-08-05 | Stop reason: HOSPADM

## 2022-08-03 RX ORDER — LIDOCAINE HYDROCHLORIDE 20 MG/ML
JELLY TOPICAL ONCE
Status: DISCONTINUED | OUTPATIENT
Start: 2022-08-03 | End: 2022-08-05 | Stop reason: HOSPADM

## 2022-08-03 RX ORDER — BACITRACIN ZINC AND POLYMYXIN B SULFATE 500; 1000 [USP'U]/G; [USP'U]/G
OINTMENT TOPICAL ONCE
Status: CANCELLED | OUTPATIENT
Start: 2022-08-03 | End: 2022-08-03

## 2022-08-03 RX ORDER — GENTAMICIN SULFATE 1 MG/G
OINTMENT TOPICAL ONCE
Status: CANCELLED | OUTPATIENT
Start: 2022-08-03 | End: 2022-08-03

## 2022-08-03 RX ORDER — LIDOCAINE 40 MG/G
CREAM TOPICAL ONCE
Status: DISCONTINUED | OUTPATIENT
Start: 2022-08-03 | End: 2022-08-05 | Stop reason: HOSPADM

## 2022-08-03 RX ORDER — LIDOCAINE 50 MG/G
OINTMENT TOPICAL ONCE
Status: CANCELLED | OUTPATIENT
Start: 2022-08-03 | End: 2022-08-03

## 2022-08-03 RX ORDER — GINSENG 100 MG
CAPSULE ORAL ONCE
Status: CANCELLED | OUTPATIENT
Start: 2022-08-03 | End: 2022-08-03

## 2022-08-03 RX ORDER — LIDOCAINE HYDROCHLORIDE 40 MG/ML
SOLUTION TOPICAL ONCE
Status: DISCONTINUED | OUTPATIENT
Start: 2022-08-03 | End: 2022-08-05 | Stop reason: HOSPADM

## 2022-08-03 RX ORDER — BACITRACIN ZINC AND POLYMYXIN B SULFATE 500; 1000 [USP'U]/G; [USP'U]/G
OINTMENT TOPICAL ONCE
Status: DISCONTINUED | OUTPATIENT
Start: 2022-08-03 | End: 2022-08-05 | Stop reason: HOSPADM

## 2022-08-03 RX ADMIN — LIDOCAINE HYDROCHLORIDE: 20 JELLY TOPICAL at 14:04

## 2022-08-03 NOTE — PROGRESS NOTES
mouth 2 times daily 60 tablet 0    ceFAZolin (ANCEF) infusion Infuse 2,000 mg intravenously every 8 hours Compound per protocol 180 g 1    donepezil (ARICEPT) 10 MG tablet Take 1 tablet by mouth nightly 30 tablet 11    levocetirizine (XYZAL) 5 MG tablet Take 5 mg by mouth nightly       metFORMIN, OSM, (FORTAMET) 1000 MG extended release tablet Take 1,000 mg by mouth 2 times daily (with meals)       metoprolol succinate (TOPROL XL) 100 MG extended release tablet Take 100 mg by mouth nightly   1    escitalopram (LEXAPRO) 10 MG tablet Take 10 mg by mouth nightly       triamterene-hydrochlorothiazide (MAXZIDE-25) 37.5-25 MG per tablet Take 1 tablet by mouth nightly        No current facility-administered medications on file prior to encounter. REVIEW OF SYSTEMS    A comprehensive review of systems was negative.     Objective:      /68   Pulse 72   Temp 98 °F (36.7 °C) (Temporal)   Resp 18     Wt Readings from Last 3 Encounters:   07/19/22 235 lb (106.6 kg)   07/12/22 235 lb (106.6 kg)   06/28/22 240 lb (108.9 kg)       PHYSICAL EXAM    General Appearance: alert and oriented to person, place and time, well developed and well- nourished, in no acute distress  Skin: warm and dry, no rash or erythema  Head: normocephalic and atraumatic  Eyes: pupils equal, round, and reactive to light, extraocular eye movements intact, conjunctivae normal  ENT: tympanic membrane, external ear and ear canal normal bilaterally, nose without deformity, nasal mucosa and turbinates normal without polyps, lips teeth and gums normal  Neck: supple and non-tender without mass, no thyromegaly or thyroid nodules, no cervical lymphadenopathy  Pulmonary/Chest: clear to auscultation bilaterally- no wheezes, rales or rhonchi, normal air movement, no respiratory distress  Cardiovascular: normal rate, regular rhythm, normal S1 and S2, no murmurs, rubs, clicks, or gallops, distal pulses intact, no carotid bruits  Abdomen: soft, non-tender, non-distended, normal bowel sounds, no masses or organomegaly  Extremities: no cyanosis, clubbing or edema  Musculoskeletal: normal range of motion, no joint swelling, deformity or tenderness  Neurologic: reflexes normal and symmetric, no cranial nerve deficit, gait, coordination and speech normal, skin sensation normal      Assessment:      Problem List Items Addressed This Visit       Post-operative wound abscess (Chronic)    * (Principal) Non-pressure chronic ulcer of right ankle with fat layer exposed (HCC) - Primary (Chronic)    Relevant Medications    Lidocaine 2 % GEL    lidocaine 4 % jelly    lidocaine (XYLOCAINE) 2 % jelly    lidocaine (XYLOCAINE) 4 % external solution    lidocaine (LMX) 4 % cream    lidocaine (XYLOCAINE) 5 % ointment    Lidocaine HCl 2 % LIQD    bacitracin ointment    bacitracin-polymyxin b (POLYSPORIN) ointment    betamethasone dipropionate (DIPROLENE) 0.05 % ointment    cadexomer iodine (IODOSORB) 0.9 % gel    clobetasol (TEMOVATE) 0.05 % ointment    collagenase ointment    gentamicin (GARAMYCIN) 0.1 % ointment    neomycin-bacitracin-polymyxin (NEOSPORIN) ointment    mupirocin (BACTROBAN) 2 % ointment    silver sulfADIAZINE (SILVADENE) 1 % cream    Sodium Hypochlorite (DAKINS) 0.125 % external solution    Sodium Hypochlorite (DAKINS) 0.25 % external solution    triamcinolone (KENALOG) 0.1 % ointment    Other Relevant Orders    Initiate Outpatient Wound Care Protocol    Diabetes ([facility] [address] (Chronic)        Procedure Note  Indications:  Based on my examination of this patient's wound(s)/ulcer(s) today, debridement is required to promote healing and evaluate the wound base.     Performed by: Pricilla Medina MD    Consent obtained:  Yes    Time out taken:  Yes    Pain Control: Anesthetic  Anesthetic: 2% Lidocaine Gel Topical       Debridement:Excisional Debridement    Using curette the wound(s)/ulcer(s) was/were sharply debrided down through and including the removal of epidermis, dermis, and subcutaneous tissue. Devitalized Tissue Debrided:  fibrin, biofilm, slough, necrotic/eschar, and exudate      Pre Debridement Measurements:  Are located in the Wound/Ulcer Documentation Flow Sheet    Wound/Ulcer #: 1    Percent of Wound(s)/Ulcer(s) Debrided: 100%    Total Surface Area Debrided:  2.25 sq cm       Diabetic/Pressure/Non Pressure Ulcers only:  Ulcer: Diabetic ulcer, fat layer exposed             Post Debridement Measurements:    Wound/Ulcer Descriptions are Pre Debridement --EXCEPT MEASUREMENTS    Negative Pressure Wound Therapy Other (Comment) Right;Lateral (Active)   $ Standard NPWT <=50 sq cm PER TX $ Yes 07/05/22 1046   Wound Type Surgical 07/12/22 1037   Unit Type 3M KCI Vac Ulta 07/12/22 1037   Dressing Type Black Foam 07/12/22 1037   Number of pieces used 2 07/05/22 1046   Number of pieces removed 2 07/12/22 1037   Cycle Continuous 07/05/22 1046   Target Pressure (mmHg) 125 07/05/22 1046   Canister changed?  No 07/05/22 1046   Dressing Status Old drainage noted 07/12/22 1037   Dressing Changed Changed/New 07/05/22 1046   Drainage Amount Small 07/12/22 1037   Drainage Description Sanguinous 07/12/22 1037   Dressing Change Due 07/08/22 07/05/22 1046   Output (ml) 10 ml 07/04/22 2151   Wound Assessment Exposed structure bone;Granulation tissue 07/12/22 1037   Irina-wound Assessment Blanchable erythema 07/12/22 1037   Odor None 07/12/22 1037   Number of days: 35       Wound 06/28/22 Ankle Lateral;Right Wound 1, right ankle, surgical (Active)   Wound Image   08/03/22 1408   Wound Etiology Surgical 08/03/22 1408   Dressing Status Old drainage noted 08/03/22 1408   Wound Cleansed Soap and water 08/03/22 1408   Dressing/Treatment Collagen 07/27/22 1340   Offloading for Diabetic Foot Ulcers Offloading not ordered 08/03/22 1408   Dressing Change Due 07/08/22 07/05/22 1046   Wound Length (cm) 4.5 cm 08/03/22 1408   Wound Width (cm) 0.5 cm 08/03/22 1408   Wound Depth (cm) 0.5 cm 08/03/22 1408   Wound Surface Area (cm^2) 2.25 cm^2 08/03/22 1408   Change in Wound Size % (l*w) 86.02 08/03/22 1408   Wound Volume (cm^3) 1.125 cm^3 08/03/22 1408   Wound Healing % 98 08/03/22 1408   Post-Procedure Length (cm) 4.5 cm 08/03/22 1418   Post-Procedure Width (cm) 0.5 cm 08/03/22 1418   Post-Procedure Depth (cm) 0.5 cm 08/03/22 1418   Post-Procedure Surface Area (cm^2) 2.25 cm^2 08/03/22 1418   Post-Procedure Volume (cm^3) 1.125 cm^3 08/03/22 1418   Distance Tunneling (cm) 0 cm 07/12/22 1037   Tunneling Position ___ O'Clock 0 07/12/22 1037   Undermining Starts ___ O'Clock 0 07/12/22 1037   Undermining Ends___ O'Clock 0 07/12/22 1037   Undermining Maxium Distance (cm) 0 07/12/22 1037   Wound Assessment Slough 08/03/22 1408   Drainage Amount Moderate 08/03/22 1408   Drainage Description Serosanguinous 08/03/22 1408   Odor None 08/03/22 1408   Irina-wound Assessment Blanchable erythema 08/03/22 1408   Margins Attached edges 08/03/22 1408   Wound Thickness Description not for Pressure Injury Full thickness 08/03/22 1408   Number of days: 35             Estimated Blood Loss:  Minimal    Hemostasis Achieved:  by pressure    Procedural Pain:  0  / 10     Post Procedural Pain:  0 / 10     Response to treatment:  Well tolerated by patient.          Plan:     Problem List Items Addressed This Visit       Post-operative wound abscess (Chronic)    * (Principal) Non-pressure chronic ulcer of right ankle with fat layer exposed ([address] - Primary (Chronic)    Relevant Medications    Lidocaine 2 % GEL    lidocaine 4 % jelly    lidocaine (XYLOCAINE) 2 % jelly    lidocaine (XYLOCAINE) 4 % external solution    lidocaine (LMX) 4 % cream    lidocaine (XYLOCAINE) 5 % ointment    Lidocaine HCl 2 % LIQD    bacitracin ointment    bacitracin-polymyxin b (POLYSPORIN) ointment    betamethasone dipropionate (DIPROLENE) 0.05 % ointment    cadexomer iodine (IODOSORB) 0.9 % gel    clobetasol (TEMOVATE) 0.05 % ointment    collagenase ointment    gentamicin (GARAMYCIN) 0.1 % ointment    neomycin-bacitracin-polymyxin (NEOSPORIN) ointment    mupirocin (BACTROBAN) 2 % ointment    silver sulfADIAZINE (SILVADENE) 1 % cream    Sodium Hypochlorite (DAKINS) 0.125 % external solution    Sodium Hypochlorite (DAKINS) 0.25 % external solution    triamcinolone (KENALOG) 0.1 % ointment    Other Relevant Orders    Initiate Outpatient Wound Care Protocol    Diabetes ([address] (Chronic)       Cont wrap RTO 1 week    Treatment Note please see attached Discharge Instructions    In my professional opinion this patient would benefit from HBO Therapy: No    Written patient dismissal instructions given to patient and signed by patient or POA.          Discharge [address] and Hyperbaric Oxygen Therapy   Physician Orders and Discharge Instructions  [address]  Telephone: [phone]    NAME:  Lolis Farias:  1960  MEDICAL RECORD NUMBER:  [id number]  DATE:  @ED@    Discharge condition: Stable    Discharge to: Home    Left via:Private automobile    Accompanied by: Self    Accompanied by: Spouse     ECF/HHA:  [address] [id number] to change on Coflex on Monday and Friday and [facility] to Change on Wednesday    Dressing Orders: Right Lower Ext Ulcer  Promogran base of wound  Absorbant Layer, Pink Coflex [place], Keep clean and Dry  Elevate feet to level or above heart 3-4 times daily and as needed to for 30 minutes to reduce swelling  Remove wraps and call office if- Wraps get wet, Cause increased pain, Slide down or you are unable to make your next scheduled appointment   Multi Vitamin, High Protein diet as tolerated  Ok to remove wrap on appointment day and right before [facility] gets there to get a shower     Follow up with Orthopedic as directed    [facility] follow up visit _____________1 week________________  (Please note your next appointment above and if you are unable to keep, kindly give a 24 hour notice. Thank you.)    If you experience any of the following, please call the [address] during business hours:    * Increase in Pain  * Temperature over 101  * Increase in drainage from your wound  * Drainage with a foul odor  * Bleeding  * Increase in swelling  * Need for compression bandage changes due to slippage, breakthrough drainage. If you need medical attention outside of the business hours of the [address] please contact your PCP or go to the nearest emergency room.          Electronically signed by Zannie Cranker, MD on 8/3/2022 at 2:20 PM

## 2022-08-03 NOTE — DISCHARGE INSTRUCTIONS
[address] and Hyperbaric Oxygen Therapy   Physician Orders and Discharge Instructions  [address]  Telephone: [phone]    NAME:  Tru Miner  YOB: 1960  MEDICAL RECORD NUMBER:  [id number]  DATE:  @ED@    Discharge condition: Stable    Discharge to: Home    Left via:Private automobile    Accompanied by: Self    Accompanied by: Spouse     ECF/HHA:  [facility] to change on Coflex on Monday and Friday and [facility] to Change on Wednesday    Dressing Orders: Right Lower Ext Ulcer  Promogran base of wound  Absorbant Layer, Pink Coflex [place], Keep clean and Dry  Elevate feet to level or above heart 3-4 times daily and as needed to for 30 minutes to reduce swelling  Remove wraps and call office if- Wraps get wet, Cause increased pain, Slide down or you are unable to make your next scheduled appointment   Multi Vitamin, High Protein diet as tolerated  Ok to remove wrap on appointment day and right before [address] gets there to get a shower     Follow up with Orthopedic as directed    [facility] follow up visit _____________1 week________________  (Please note your next appointment above and if you are unable to keep, kindly give a 24 hour notice. Thank you.)    If you experience any of the following, please call the [address] [place] [address] during business hours:    * Increase in Pain  * Temperature over 101  * Increase in drainage from your wound  * Drainage with a foul odor  * Bleeding  * Increase in swelling  * Need for compression bandage changes due to slippage, breakthrough drainage. If you need medical attention outside of the business hours of the [facility] please contact your PCP or go to the nearest emergency room.

## 2022-08-03 NOTE — PROGRESS NOTES
[place] Application   Below Knee    NAME:  Amanda Reese  YOB: 1960  MEDICAL RECORD NUMBER:  [id number]  DATE:  8/3/2022    [x] Removed old L[place]tha [place] boot if indicated and wash leg with mild soap and water. [x] Applied moisturizing agent to dry skin as needed. [x] Appied primary and secondary dressing as ordered     Instructed patient/caregiver to keep dressing dry and intact. DO NOT REMOVE DRESSING. [x] Instructed pt/family/caregiver to report excessive draining, loose bandage, wet dressing, severe pain or tingling in toes. [] Applied [place] dressing below the knee to Right lower leg(s)       Unna Boot(s) were applied per  Guidelines.      Electronically signed by Gage Cross RN on 8/3/2022 at 2:20 PM

## 2022-08-03 NOTE — PLAN OF CARE
Problem: Chronic Conditions and Co-morbidities  Goal: Patient's chronic conditions and co-morbidity symptoms are monitored and maintained or improved  Outcome: Progressing     Problem: Discharge Planning  Goal: Discharge to home or other facility with appropriate resources  Outcome: Progressing     Problem: Wound:  Goal: Will show signs of wound healing; wound closure and no evidence of infection  Description: Will show signs of wound healing; wound closure and no evidence of infection  Outcome: Progressing     Problem: Weight control:  Goal: Ability to maintain an optimal weight for height and age will be supported  Description: Ability to maintain an optimal weight for height and age will be supported  Outcome: Progressing     Problem: Falls - Risk of:  Goal: Will remain free from falls  Description: Will remain free from falls  Outcome: Progressing     Problem: Blood Glucose:  Goal: Ability to maintain appropriate glucose levels will improve  Description: Ability to maintain appropriate glucose levels will improve  Outcome: Progressing

## 2022-08-10 ENCOUNTER — HOSPITAL ENCOUNTER (OUTPATIENT)
Dept: WOUND CARE | Age: 62
Discharge: HOME | End: 2022-08-10
Payer: MEDICARE

## 2022-08-10 VITALS
WEIGHT: 235 LBS | DIASTOLIC BLOOD PRESSURE: 68 MMHG | HEART RATE: 74 BPM | SYSTOLIC BLOOD PRESSURE: 118 MMHG | HEIGHT: 66 IN | BODY MASS INDEX: 37.77 KG/M2 | RESPIRATION RATE: 18 BRPM | TEMPERATURE: 97.3 F

## 2022-08-10 DIAGNOSIS — L97.312 NON-PRESSURE CHRONIC ULCER OF RIGHT ANKLE WITH FAT LAYER EXPOSED (HCC): Primary | ICD-10-CM

## 2022-08-10 DIAGNOSIS — E11.622 TYPE 2 DIABETES MELLITUS WITH OTHER SKIN ULCER, UNSPECIFIED WHETHER LONG TERM INSULIN USE (HCC): Chronic | ICD-10-CM

## 2022-08-10 DIAGNOSIS — L98.499 TYPE 2 DIABETES MELLITUS WITH OTHER SKIN ULCER, UNSPECIFIED WHETHER LONG TERM INSULIN USE (HCC): Chronic | ICD-10-CM

## 2022-08-10 DIAGNOSIS — T81.49XA POST-OPERATIVE WOUND ABSCESS: Chronic | ICD-10-CM

## 2022-08-10 LAB
BASOPHILS ABSOLUTE: 0 K/UL (ref 0–0.2)
BASOPHILS RELATIVE PERCENT: 0.5 % (ref 0–1)
EOSINOPHILS ABSOLUTE: 0.2 K/UL (ref 0–0.6)
EOSINOPHILS RELATIVE PERCENT: 4.4 % (ref 0–5)
HCT VFR BLD CALC: 41.6 % (ref 37–47)
HEMOGLOBIN: 13 G/DL (ref 12–16)
IMMATURE GRANULOCYTES #: 0 K/UL
LYMPHOCYTES ABSOLUTE: 2 K/UL (ref 1.1–4.5)
LYMPHOCYTES RELATIVE PERCENT: 47.3 % (ref 20–40)
MCH RBC QN AUTO: 28.8 PG (ref 27–31)
MCHC RBC AUTO-ENTMCNC: 31.3 G/DL (ref 33–37)
MCV RBC AUTO: 92 FL (ref 81–99)
MONOCYTES ABSOLUTE: 0.3 K/UL (ref 0–0.9)
MONOCYTES RELATIVE PERCENT: 6.3 % (ref 0–10)
NEUTROPHILS ABSOLUTE: 1.8 K/UL (ref 1.5–7.5)
NEUTROPHILS RELATIVE PERCENT: 41.3 % (ref 50–65)
PDW BLD-RTO: 13.2 % (ref 11.5–14.5)
PLATELET # BLD: 118 K/UL (ref 130–400)
PMV BLD AUTO: 11 FL (ref 9.4–12.3)
RBC # BLD: 4.52 M/UL (ref 4.2–5.4)
WBC # BLD: 4.3 K/UL (ref 4.8–10.8)

## 2022-08-10 PROCEDURE — 97597 DBRDMT OPN WND 1ST 20 CM/<: CPT

## 2022-08-10 PROCEDURE — 97597 DBRDMT OPN WND 1ST 20 CM/<: CPT | Performed by: SURGERY

## 2022-08-10 PROCEDURE — 6370000000 HC RX 637 (ALT 250 FOR IP): Performed by: SURGERY

## 2022-08-10 RX ORDER — GENTAMICIN SULFATE 1 MG/G
OINTMENT TOPICAL ONCE
Status: CANCELLED | OUTPATIENT
Start: 2022-08-10 | End: 2022-08-10

## 2022-08-10 RX ORDER — BACITRACIN, NEOMYCIN, POLYMYXIN B 400; 3.5; 5 [USP'U]/G; MG/G; [USP'U]/G
OINTMENT TOPICAL ONCE
Status: CANCELLED | OUTPATIENT
Start: 2022-08-10 | End: 2022-08-10

## 2022-08-10 RX ORDER — LIDOCAINE HYDROCHLORIDE 20 MG/ML
JELLY TOPICAL ONCE
Status: CANCELLED | OUTPATIENT
Start: 2022-08-10 | End: 2022-08-10

## 2022-08-10 RX ORDER — BACITRACIN ZINC AND POLYMYXIN B SULFATE 500; 1000 [USP'U]/G; [USP'U]/G
OINTMENT TOPICAL ONCE
Status: CANCELLED | OUTPATIENT
Start: 2022-08-10 | End: 2022-08-10

## 2022-08-10 RX ORDER — GINSENG 100 MG
CAPSULE ORAL ONCE
Status: CANCELLED | OUTPATIENT
Start: 2022-08-10 | End: 2022-08-10

## 2022-08-10 RX ORDER — LIDOCAINE HYDROCHLORIDE 40 MG/ML
SOLUTION TOPICAL ONCE
Status: CANCELLED | OUTPATIENT
Start: 2022-08-10 | End: 2022-08-10

## 2022-08-10 RX ORDER — CLOBETASOL PROPIONATE 0.5 MG/G
OINTMENT TOPICAL ONCE
Status: CANCELLED | OUTPATIENT
Start: 2022-08-10 | End: 2022-08-10

## 2022-08-10 RX ORDER — LIDOCAINE HYDROCHLORIDE 20 MG/ML
JELLY TOPICAL ONCE
Status: COMPLETED | OUTPATIENT
Start: 2022-08-10 | End: 2022-08-10

## 2022-08-10 RX ORDER — LIDOCAINE 50 MG/G
OINTMENT TOPICAL ONCE
Status: CANCELLED | OUTPATIENT
Start: 2022-08-10 | End: 2022-08-10

## 2022-08-10 RX ORDER — LIDOCAINE 40 MG/G
CREAM TOPICAL ONCE
Status: CANCELLED | OUTPATIENT
Start: 2022-08-10 | End: 2022-08-10

## 2022-08-10 RX ORDER — BETAMETHASONE DIPROPIONATE 0.05 %
OINTMENT (GRAM) TOPICAL ONCE
Status: CANCELLED | OUTPATIENT
Start: 2022-08-10 | End: 2022-08-10

## 2022-08-10 RX ADMIN — LIDOCAINE HYDROCHLORIDE: 20 JELLY TOPICAL at 11:24

## 2022-08-10 NOTE — PROGRESS NOTES
[address]   Progress Note and Procedure Note      [address] [facility] RECORD NUMBER:  [id number]  AGE: 62 y.o. GENDER: female  : 1960  EPISODE DATE:  8/10/2022    Subjective:     Chief Complaint   Patient presents with    Wound Check     Pt states doing well         HISTORY of PRESENT ILLNESS HPI     Helen Montenegro is a 62 y.o. female who presents today for wound/ulcer evaluation.    Wound Context: Pt with R ankle wound here for eval/treat  Wound/Ulcer Pain Timing/Severity: none  Quality of pain: N/A  Severity:  0 / 10   Modifying Factors: None  Associated Signs/Symptoms: none    Ulcer Identification:  Ulcer Type: non-healing surgical  Contributing Factors: edema    Wound: Surgical incision        PAST MEDICAL HISTORY        Diagnosis Date    Anxiety     Depression     Diabetes ([facility] [address]     Hypertension     Memory difficulty     Obesity 2021    Sleep apnea     c-pap       PAST SURGICAL HISTORY    Past Surgical History:   Procedure Laterality Date    ANKLE FRACTURE SURGERY Right 2021    ANKLE OPEN REDUCTION INTERNAL FIXATION WITH POSSIBLE EXTERNAL FIXATOR performed by Katelyn Maloney MD at [place] [address] Right 2022    RIGHT ANKLE HARDWARE REMOVAL WITH WOUND VAC performed by Katelyn Maloney MD at [facility] OR    GALLBLADDER SURGERY         FAMILY HISTORY    Family History   Problem Relation Age of Onset    Cancer Mother     Heart Attack Father        SOCIAL HISTORY    Social History     Tobacco Use    Smoking status: Never    Smokeless tobacco: Never   Vaping Use    Vaping Use: Never used   Substance Use Topics    Alcohol use: No    Drug use: No       ALLERGIES    Allergies   Allergen Reactions    Azithromycin Rash    Namenda  [Memantine Hcl] Nausea And Vomiting and Nausea Only       MEDICATIONS    Current Outpatient Medications on File Prior to Encounter   Medication Sig Dispense Refill    aspirin EC 81 MG EC tablet Take 1 tablet by mouth 2 times daily 60 tablet 0    donepezil (ARICEPT) 10 MG tablet Take 1 tablet by mouth nightly 30 tablet 11    levocetirizine (XYZAL) 5 MG tablet Take 5 mg by mouth nightly       metFORMIN, OSM, (FORTAMET) 1000 MG extended release tablet Take 1,000 mg by mouth 2 times daily (with meals)       metoprolol succinate (TOPROL XL) 100 MG extended release tablet Take 100 mg by mouth nightly   1    escitalopram (LEXAPRO) 10 MG tablet Take 10 mg by mouth nightly       triamterene-hydrochlorothiazide (MAXZIDE-25) 37.5-25 MG per tablet Take 1 tablet by mouth nightly        No current facility-administered medications on file prior to encounter. REVIEW OF SYSTEMS    A comprehensive review of systems was negative.     Objective:      /68   Pulse 74   Temp 97.3 °F (36.3 °C) (Temporal)   Resp 18   Ht 5' 6\" (1.676 m)   Wt 235 lb (106.6 kg)   BMI 37.93 kg/m²     Wt Readings from Last 3 Encounters:   08/10/22 235 lb (106.6 kg)   07/19/22 235 lb (106.6 kg)   07/12/22 235 lb (106.6 kg)       PHYSICAL EXAM    General Appearance: alert and oriented to person, place and time, well developed and well- nourished, in no acute distress  Skin: warm and dry, no rash or erythema  Head: normocephalic and atraumatic  Eyes: pupils equal, round, and reactive to light, extraocular eye movements intact, conjunctivae normal  ENT: tympanic membrane, external ear and ear canal normal bilaterally, nose without deformity, nasal mucosa and turbinates normal without polyps, lips teeth and gums normal  Neck: supple and non-tender without mass, no thyromegaly or thyroid nodules, no cervical lymphadenopathy  Pulmonary/Chest: clear to auscultation bilaterally- no wheezes, rales or rhonchi, normal air movement, no respiratory distress  Cardiovascular: normal rate, regular rhythm, normal S1 and S2, no murmurs, rubs, clicks, or gallops, distal pulses intact, no carotid bruits  Abdomen: soft, non-tender, non-distended, normal bowel sounds, no masses or organomegaly  Extremities: no cyanosis, clubbing or edema  Musculoskeletal: normal range of motion, no joint swelling, deformity or tenderness  Neurologic: reflexes normal and symmetric, no cranial nerve deficit, gait, coordination and speech normal, skin sensation normal      Assessment:      Problem List Items Addressed This Visit       Post-operative wound abscess (Chronic)    * (Principal) Non-pressure chronic ulcer of right ankle with fat layer exposed ([address] - Primary (Chronic)    Relevant Orders    Initiate Outpatient Wound Care Protocol    Diabetes ([facility] [address] (Chronic)        Procedure Note  Indications:  Based on my examination of this patient's wound(s)/ulcer(s) today, debridement is required to promote healing and evaluate the wound base. Performed by: Gregorio Bernal MD    Consent obtained:  Yes    Time out taken:  Yes    Pain Control: Anesthetic  Anesthetic: 2% Lidocaine Gel Topical       Debridement:Non-excisional Debridement    Using curette the wound(s)/ulcer(s) was/were sharply debrided down through and including the removal of epidermis and dermis.         Devitalized Tissue Debrided:  fibrin, biofilm, slough, necrotic/eschar, and exudate      Pre Debridement Measurements:  Are located in the Wound/Ulcer Documentation Flow Sheet    Wound/Ulcer #: 1    Percent of Wound(s)/Ulcer(s) Debrided: 100%    Total Surface Area Debrided:  1.68 sq cm       Diabetic/Pressure/Non Pressure Ulcers only:  Ulcer: Non-Pressure ulcer, fat layer exposed             Post Debridement Measurements:    Wound/Ulcer Descriptions are Pre Debridement --EXCEPT MEASUREMENTS    Negative Pressure Wound Therapy Other (Comment) Right;Lateral (Active)   Wound Type Surgical 07/12/22 1037   Unit Type 3M KCI Vac Ulta 07/12/22 1037   Dressing Type Black Foam 07/12/22 1037   Number of pieces removed 2 07/12/22 1037   Dressing Status Old drainage noted 07/12/22 1037   Drainage Amount Small 07/12/22 1037   Drainage Description Sanguinous 07/12/22 1037   Wound Assessment Exposed structure bone;Granulation tissue 07/12/22 1037   Irina-wound Assessment Blanchable erythema 07/12/22 1037   Odor None 07/12/22 1037   Number of days: 42       Wound 06/28/22 Ankle Lateral;Right Wound 1, right ankle, surgical (Active)   Wound Image   08/10/22 1128   Wound Etiology Surgical 08/10/22 1128   Dressing Status Old drainage noted 08/10/22 1128   Wound Cleansed Soap and water 08/10/22 1128   Dressing/Treatment Collagen 07/27/22 1340   Offloading for Diabetic Foot Ulcers Offloading not ordered 08/03/22 1408   Wound Length (cm) 5.2 cm 08/10/22 1128   Wound Width (cm) 0.7 cm 08/10/22 1128   Wound Depth (cm) 0.2 cm 08/10/22 1128   Wound Surface Area (cm^2) 3.64 cm^2 08/10/22 1128   Change in Wound Size % (l*w) 77.39 08/10/22 1128   Wound Volume (cm^3) 0.728 cm^3 08/10/22 1128   Wound Healing % 98 08/10/22 1128   Post-Procedure Length (cm) 4.2 cm 08/10/22 1147   Post-Procedure Width (cm) 0.4 cm 08/10/22 1147   Post-Procedure Depth (cm) 0.2 cm 08/10/22 1147   Post-Procedure Surface Area (cm^2) 1.68 cm^2 08/10/22 1147   Post-Procedure Volume (cm^3) 0.336 cm^3 08/10/22 1147   Distance Tunneling (cm) 0 cm 08/10/22 1128   Tunneling Position ___ O'Clock 0 08/10/22 1128   Undermining Starts ___ O'Clock 0 08/10/22 1128   Undermining Ends___ O'Clock 0 08/10/22 1128   Undermining Maxium Distance (cm) 0 08/10/22 1128   Wound Assessment Slough 08/10/22 1128   Drainage Amount Moderate 08/10/22 1128   Drainage Description Serosanguinous 08/10/22 1128   Odor None 08/10/22 1128   Irina-wound Assessment Blanchable erythema 08/10/22 1128   Margins Attached edges 08/10/22 1128   Wound Thickness Description not for Pressure Injury Full thickness 08/10/22 1128   Number of days: 42             Estimated Blood Loss:  Minimal    Hemostasis Achieved:  by pressure    Procedural Pain:  0  / 10     Post Procedural Pain:  0 / 10     Response to treatment:  Well tolerated by patient.          Plan: Problem List Items Addressed This Visit       Post-operative wound abscess (Chronic)    * (Principal) Non-pressure chronic ulcer of right ankle with fat layer exposed ([address] - Primary (Chronic)    Relevant Orders    Initiate Outpatient Wound Care Protocol    Diabetes ([address] (Chronic)       Cont current care DC vac  RTO 1 week    Treatment Note please see attached Discharge Instructions    In my professional opinion this patient would benefit from HBO Therapy: No    Written patient dismissal instructions given to patient and signed by patient or POA. Discharge [address] and Hyperbaric Oxygen Therapy   Physician Orders and Discharge Instructions  [address]  Telephone: [phone]    NAME:  Lolis Farias:  1960  MEDICAL RECORD NUMBER:  [id number]  DATE:  @ED@    Discharge condition: Stable    Discharge to: Home    Left via:Private automobile    Accompanied by: Self      ECF/HHA:  [address] [facility]      Dressing Orders: Right Lower Ext Ulcer  Promogran base of wound  Absorbant Layer, Pink Coflex Unnaboot, Keep clean and Dry  Elevate feet to level or above heart 3-4 times daily and as needed to for 30 minutes to reduce swelling  Remove wraps and call office if- Wraps get wet, Cause increased pain, Slide down or you are unable to make your next scheduled appointment   Multi Vitamin, High Protein diet as tolerated  Ok to remove wrap on appointment day and right before [address] gets there to get a shower     Follow up with Orthopedic as directed    [facility] follow up visit ____________1 week_________________  (Please note your next appointment above and if you are unable to keep, kindly give a 24 hour notice.  Thank you.)    If you experience any of the following, please call the [address] during business hours:    * Increase in Pain  * Temperature over 101  * Increase in drainage from your wound  * Drainage with a foul odor  * Bleeding  * Increase in swelling  * Need for compression bandage changes due to slippage, breakthrough drainage. If you need medical attention outside of the business hours of the [address] [place] [address] Road please contact your PCP or go to the nearest emergency room.          Electronically signed by Kristina Munoz MD on 8/10/2022 at 11:48 AM

## 2022-08-10 NOTE — PLAN OF CARE
[place] Application   Below Knee    NAME:  Hilario Cruz  YOB: 1960  MEDICAL RECORD NUMBER:  [id number]  DATE:  8/10/2022    Luciano Ken boot: Applied moisturizing agent to dry skin as needed. Appied primary and secondary dressing as ordered. Applied Unna roll from toes to knee overlapping each time. Applied ace wrap or coban from toes to below the knee. Instructed patient/caregiver to keep dressing dry and intact. DO NOT REMOVE DRESSING. Instructed pt/family/caregiver to report excessive draining, loose bandage, wet dressing, severe pain or tingling in toes. Applied [place] dressing below the knee to right lower leg. Unna Boot(s) were applied per  Guidelines.      Electronically signed by Noam Foster RN on 8/10/2022 at 12:05 PM

## 2022-08-17 ENCOUNTER — HOSPITAL ENCOUNTER (OUTPATIENT)
Dept: WOUND CARE | Age: 62
Discharge: HOME | End: 2022-08-17
Payer: MEDICARE

## 2022-08-17 VITALS
HEART RATE: 69 BPM | DIASTOLIC BLOOD PRESSURE: 67 MMHG | TEMPERATURE: 97 F | RESPIRATION RATE: 18 BRPM | SYSTOLIC BLOOD PRESSURE: 109 MMHG

## 2022-08-17 DIAGNOSIS — L98.499 TYPE 2 DIABETES MELLITUS WITH OTHER SKIN ULCER, UNSPECIFIED WHETHER LONG TERM INSULIN USE (HCC): Chronic | ICD-10-CM

## 2022-08-17 DIAGNOSIS — E11.622 TYPE 2 DIABETES MELLITUS WITH OTHER SKIN ULCER, UNSPECIFIED WHETHER LONG TERM INSULIN USE (HCC): Chronic | ICD-10-CM

## 2022-08-17 DIAGNOSIS — T81.49XA POST-OPERATIVE WOUND ABSCESS: Chronic | ICD-10-CM

## 2022-08-17 DIAGNOSIS — L97.312 NON-PRESSURE CHRONIC ULCER OF RIGHT ANKLE WITH FAT LAYER EXPOSED (HCC): Primary | ICD-10-CM

## 2022-08-17 PROCEDURE — 97597 DBRDMT OPN WND 1ST 20 CM/<: CPT | Performed by: SURGERY

## 2022-08-17 PROCEDURE — 97597 DBRDMT OPN WND 1ST 20 CM/<: CPT

## 2022-08-17 RX ORDER — LIDOCAINE 50 MG/G
OINTMENT TOPICAL ONCE
Status: CANCELLED | OUTPATIENT
Start: 2022-08-17 | End: 2022-08-17

## 2022-08-17 RX ORDER — BACITRACIN, NEOMYCIN, POLYMYXIN B 400; 3.5; 5 [USP'U]/G; MG/G; [USP'U]/G
OINTMENT TOPICAL ONCE
Status: CANCELLED | OUTPATIENT
Start: 2022-08-17 | End: 2022-08-17

## 2022-08-17 RX ORDER — LIDOCAINE 40 MG/G
CREAM TOPICAL ONCE
Status: CANCELLED | OUTPATIENT
Start: 2022-08-17 | End: 2022-08-17

## 2022-08-17 RX ORDER — GENTAMICIN SULFATE 1 MG/G
OINTMENT TOPICAL ONCE
Status: CANCELLED | OUTPATIENT
Start: 2022-08-17 | End: 2022-08-17

## 2022-08-17 RX ORDER — LIDOCAINE HYDROCHLORIDE 40 MG/ML
SOLUTION TOPICAL ONCE
Status: CANCELLED | OUTPATIENT
Start: 2022-08-17 | End: 2022-08-17

## 2022-08-17 RX ORDER — LIDOCAINE HYDROCHLORIDE 20 MG/ML
JELLY TOPICAL ONCE
Status: CANCELLED | OUTPATIENT
Start: 2022-08-17 | End: 2022-08-17

## 2022-08-17 RX ORDER — GINSENG 100 MG
CAPSULE ORAL ONCE
Status: CANCELLED | OUTPATIENT
Start: 2022-08-17 | End: 2022-08-17

## 2022-08-17 RX ORDER — BACITRACIN ZINC AND POLYMYXIN B SULFATE 500; 1000 [USP'U]/G; [USP'U]/G
OINTMENT TOPICAL ONCE
Status: CANCELLED | OUTPATIENT
Start: 2022-08-17 | End: 2022-08-17

## 2022-08-17 RX ORDER — CLOBETASOL PROPIONATE 0.5 MG/G
OINTMENT TOPICAL ONCE
Status: CANCELLED | OUTPATIENT
Start: 2022-08-17 | End: 2022-08-17

## 2022-08-17 RX ORDER — BETAMETHASONE DIPROPIONATE 0.05 %
OINTMENT (GRAM) TOPICAL ONCE
Status: CANCELLED | OUTPATIENT
Start: 2022-08-17 | End: 2022-08-17

## 2022-08-17 NOTE — PROGRESS NOTES
[address]   Progress Note and Procedure Note      [address] [facility] RECORD NUMBER:  [id number]  AGE: 62 y.o. GENDER: female  : 1960  EPISODE DATE:  2022    Subjective:     Chief Complaint   Patient presents with    Wound Check         HISTORY of PRESENT ILLNESS HPI     Ever Hernandez is a 62 y.o. female who presents today for wound/ulcer evaluation.    Wound Context: Pt with RLE wound here for eval/treat  Wound/Ulcer Pain Timing/Severity: none  Quality of pain: N/A  Severity:  0 / 10   Modifying Factors: None  Associated Signs/Symptoms: none    Ulcer Identification:  Ulcer Type: non-healing surgical  Contributing Factors: edema    Wound: Surgical incision        PAST MEDICAL HISTORY        Diagnosis Date    Anxiety     Depression     Diabetes ([facility] [address]     Hypertension     Memory difficulty     Obesity 2021    Sleep apnea     c-pap       PAST SURGICAL HISTORY    Past Surgical History:   Procedure Laterality Date    ANKLE FRACTURE SURGERY Right 2021    ANKLE OPEN REDUCTION INTERNAL FIXATION WITH POSSIBLE EXTERNAL FIXATOR performed by Semaj Richter MD at [place] [address] Right 2022    RIGHT ANKLE HARDWARE REMOVAL WITH WOUND VAC performed by Semaj Richter MD at [facility] OR    GALLBLADDER SURGERY         FAMILY HISTORY    Family History   Problem Relation Age of Onset    Cancer Mother     Heart Attack Father        SOCIAL HISTORY    Social History     Tobacco Use    Smoking status: Never    Smokeless tobacco: Never   Vaping Use    Vaping Use: Never used   Substance Use Topics    Alcohol use: No    Drug use: No       ALLERGIES    Allergies   Allergen Reactions    Azithromycin Rash    Namenda  [Memantine Hcl] Nausea And Vomiting and Nausea Only       MEDICATIONS    Current Outpatient Medications on File Prior to Encounter   Medication Sig Dispense Refill    aspirin EC 81 MG EC tablet Take 1 tablet by mouth 2 times daily 60 tablet 0 donepezil (ARICEPT) 10 MG tablet Take 1 tablet by mouth nightly 30 tablet 11    levocetirizine (XYZAL) 5 MG tablet Take 5 mg by mouth nightly       metFORMIN, OSM, (FORTAMET) 1000 MG extended release tablet Take 1,000 mg by mouth 2 times daily (with meals)       metoprolol succinate (TOPROL XL) 100 MG extended release tablet Take 100 mg by mouth nightly   1    escitalopram (LEXAPRO) 10 MG tablet Take 10 mg by mouth nightly       triamterene-hydrochlorothiazide (MAXZIDE-25) 37.5-25 MG per tablet Take 1 tablet by mouth nightly        No current facility-administered medications on file prior to encounter. REVIEW OF SYSTEMS    A comprehensive review of systems was negative.     Objective:      /67   Pulse 69   Temp 97 °F (36.1 °C) (Temporal)   Resp 18     Wt Readings from Last 3 Encounters:   08/10/22 235 lb (106.6 kg)   07/19/22 235 lb (106.6 kg)   07/12/22 235 lb (106.6 kg)       PHYSICAL EXAM    General Appearance: alert and oriented to person, place and time, well developed and well- nourished, in no acute distress  Skin: warm and dry, no rash or erythema  Head: normocephalic and atraumatic  Eyes: pupils equal, round, and reactive to light, extraocular eye movements intact, conjunctivae normal  ENT: tympanic membrane, external ear and ear canal normal bilaterally, nose without deformity, nasal mucosa and turbinates normal without polyps, lips teeth and gums normal  Neck: supple and non-tender without mass, no thyromegaly or thyroid nodules, no cervical lymphadenopathy  Pulmonary/Chest: clear to auscultation bilaterally- no wheezes, rales or rhonchi, normal air movement, no respiratory distress  Cardiovascular: normal rate, regular rhythm, normal S1 and S2, no murmurs, rubs, clicks, or gallops, distal pulses intact, no carotid bruits  Abdomen: soft, non-tender, non-distended, normal bowel sounds, no masses or organomegaly  Extremities: no cyanosis, clubbing or edema  Musculoskeletal: normal range of motion, no joint swelling, deformity or tenderness  Neurologic: reflexes normal and symmetric, no cranial nerve deficit, gait, coordination and speech normal, skin sensation normal      Assessment:      Problem List Items Addressed This Visit       Post-operative wound abscess (Chronic)    * (Principal) Non-pressure chronic ulcer of right ankle with fat layer exposed ([address] - Primary (Chronic)    Relevant Orders    Initiate Outpatient Wound Care Protocol    Diabetes ([address] (Chronic)        Procedure Note  Indications:  Based on my examination of this patient's wound(s)/ulcer(s) today, debridement is required to promote healing and evaluate the wound base. Performed by: Zannie Cranker, MD    Consent obtained:  Yes    Time out taken:  Yes    Pain Control: Anesthetic  Anesthetic: None       Debridement:Non-excisional Debridement    Using curette the wound(s)/ulcer(s) was/were sharply debrided down through and including the removal of epidermis and dermis.         Devitalized Tissue Debrided:  fibrin, biofilm, slough, necrotic/eschar, and exudate      Pre Debridement Measurements:  Are located in the Wound/Ulcer Documentation Flow Sheet    Wound/Ulcer #: 1    Percent of Wound(s)/Ulcer(s) Debrided: 100%    Total Surface Area Debrided:  0.75 sq cm       Diabetic/Pressure/Non Pressure Ulcers only:  Ulcer: Non-Pressure ulcer, fat layer exposed           Post Debridement Measurements:    Wound/Ulcer Descriptions are Pre Debridement --EXCEPT MEASUREMENTS    Negative Pressure Wound Therapy Other (Comment) Right;Lateral (Active)   Wound Type Surgical 07/12/22 1037   Unit Type 3M KCI Vac Ulta 07/12/22 1037   Dressing Type Black Foam 07/12/22 1037   Number of pieces removed 2 07/12/22 1037   Dressing Status Old drainage noted 07/12/22 1037   Drainage Amount Small 07/12/22 1037   Drainage Description Sanguinous 07/12/22 1037   Wound Assessment Exposed structure bone;Granulation tissue 07/12/22 1037   Irina-wound Assessment (Principal) Non-pressure chronic ulcer of right ankle with fat layer exposed ([address] - Primary (Chronic)    Relevant Orders    Initiate Outpatient Wound Care Protocol    Diabetes ([address] (Chronic)       Cont current care RTO 1 week    Treatment Note please see attached Discharge Instructions    In my professional opinion this patient would benefit from HBO Therapy: No    Written patient dismissal instructions given to patient and signed by patient or POA. Discharge [address] and Hyperbaric Oxygen Therapy   Physician Orders and Discharge Instructions  [address]  Telephone: [phone]    NAME:  Herman Do:  1960  MEDICAL RECORD NUMBER:  [id number]  DATE:  @ED@    Discharge condition: Stable    Discharge to: Home    Left via:Private automobile    Accompanied by: Self     ECF/HHA:  [facility]     Dressing Orders: Right Lower Ext Ulcer  Promogran base of wound  Absorbant Layer, Pink Coflex Unnaboot, Keep clean and Dry  Elevate feet to level or above heart 3-4 times daily and as needed to for 30 minutes to reduce swelling  Remove wraps and call office if- Wraps get wet, Cause increased pain, Slide down or you are unable to make your next scheduled appointment   Multi Vitamin, High Protein diet as tolerated  Ok to remove wrap on appointment day and right before [address] gets there to get a shower     Follow up with Orthopedic as directed    [address] [place] [address],[address] follow up visit ____________1 week_________________  (Please note your next appointment above and if you are unable to keep, kindly give a 24 hour notice.  Thank you.)    If you experience any of the following, please call the [facility] [address] during business hours:    * Increase in Pain  * Temperature over 101  * Increase in drainage from your wound  * Drainage with a foul odor  * Bleeding  * Increase in swelling  * Need for compression bandage changes due to slippage, breakthrough drainage. If you need medical attention outside of the business hours of the [address] [place] [address] Road please contact your PCP or go to the nearest emergency room.          Electronically signed by Concha Meade MD on 8/17/2022 at 1:49 PM

## 2022-08-17 NOTE — PLAN OF CARE
Problem: Discharge Planning  Goal: Discharge to home or other facility with appropriate resources  Outcome: Progressing     Problem: Wound:  Goal: Will show signs of wound healing; wound closure and no evidence of infection  Description: Will show signs of wound healing; wound closure and no evidence of infection  Outcome: Progressing     Problem: Weight control:  Goal: Ability to maintain an optimal weight for height and age will be supported  Description: Ability to maintain an optimal weight for height and age will be supported  Outcome: Progressing     Problem: Falls - Risk of:  Goal: Will remain free from falls  Description: Will remain free from falls  Outcome: Progressing     Problem: Blood Glucose:  Goal: Ability to maintain appropriate glucose levels will improve  Description: Ability to maintain appropriate glucose levels will improve  Outcome: Progressing

## 2022-08-17 NOTE — DISCHARGE INSTRUCTIONS
[address] and Hyperbaric Oxygen Therapy   Physician Orders and Discharge Instructions  [address]  Telephone: [phone]    NAME:  Mariana Ferreira  YOB: 1960  MEDICAL RECORD NUMBER:  [id number]  DATE:  @ED@    Discharge condition: Stable    Discharge to: Home    Left via:Private automobile    Accompanied by: Self     [facility]:  [facility]     Dressing Orders: Right Lower Ext Ulcer  Promogran base of wound  Absorbant Layer, Pink Coflex Unnaboot, Keep clean and Dry  Elevate feet to level or above heart 3-4 times daily and as needed to for 30 minutes to reduce swelling  Remove wraps and call office if- Wraps get wet, Cause increased pain, Slide down or you are unable to make your next scheduled appointment   Multi Vitamin, High Protein diet as tolerated  Ok to remove wrap on appointment day and right before [address] gets there to get a shower     Follow up with Orthopedic as directed    [facility] follow up visit ____________1 week_________________  (Please note your next appointment above and if you are unable to keep, kindly give a 24 hour notice. Thank you.)    If you experience any of the following, please call the [address] [place] [address] during business hours:    * Increase in Pain  * Temperature over 101  * Increase in drainage from your wound  * Drainage with a foul odor  * Bleeding  * Increase in swelling  * Need for compression bandage changes due to slippage, breakthrough drainage. If you need medical attention outside of the business hours of the [facility] please contact your PCP or go to the nearest emergency room.

## 2022-08-17 NOTE — WOUND CARE
[place] Application   Below Knee    NAME:  Larry Mccormick  YOB: 1960  MEDICAL RECORD NUMBER:  [id number]  DATE:  8/17/2022    [x] Removed old Dakota Lewis boot if indicated and wash leg with mild soap and water. [x] Applied moisturizing agent to dry skin as needed. [x] Appied primary and secondary dressing as ordered    structed patient/caregiver to keep dressing dry and intact. DO NOT REMOVE DRESSING. [x] Instructed pt/family/caregiver to report excessive draining, loose bandage, wet dressing, severe pain or tingling in toes. [x] Applied [place] dressing below the knee to Right lower leg(s)       Unna Boot(s) were applied per  Guidelines.      Electronically signed by Cheyenne Osuna RN on 8/17/2022 at 1:48 PM

## 2022-08-24 ENCOUNTER — HOSPITAL ENCOUNTER (OUTPATIENT)
Dept: WOUND CARE | Age: 62
Discharge: HOME | End: 2022-08-24
Payer: MEDICARE

## 2022-08-24 VITALS
SYSTOLIC BLOOD PRESSURE: 111 MMHG | HEART RATE: 82 BPM | DIASTOLIC BLOOD PRESSURE: 68 MMHG | TEMPERATURE: 98 F | RESPIRATION RATE: 18 BRPM

## 2022-08-24 DIAGNOSIS — L97.312 NON-PRESSURE CHRONIC ULCER OF RIGHT ANKLE WITH FAT LAYER EXPOSED (HCC): Primary | Chronic | ICD-10-CM

## 2022-08-24 DIAGNOSIS — L98.499 TYPE 2 DIABETES MELLITUS WITH OTHER SKIN ULCER, UNSPECIFIED WHETHER LONG TERM INSULIN USE (HCC): Chronic | ICD-10-CM

## 2022-08-24 DIAGNOSIS — T81.49XA POST-OPERATIVE WOUND ABSCESS: Chronic | ICD-10-CM

## 2022-08-24 DIAGNOSIS — E11.622 TYPE 2 DIABETES MELLITUS WITH OTHER SKIN ULCER, UNSPECIFIED WHETHER LONG TERM INSULIN USE (HCC): Chronic | ICD-10-CM

## 2022-08-24 PROCEDURE — 97597 DBRDMT OPN WND 1ST 20 CM/<: CPT | Performed by: SURGERY

## 2022-08-24 PROCEDURE — 97597 DBRDMT OPN WND 1ST 20 CM/<: CPT

## 2022-08-24 PROCEDURE — 99213 OFFICE O/P EST LOW 20 MIN: CPT

## 2022-08-24 RX ORDER — BACITRACIN, NEOMYCIN, POLYMYXIN B 400; 3.5; 5 [USP'U]/G; MG/G; [USP'U]/G
OINTMENT TOPICAL ONCE
Status: CANCELLED | OUTPATIENT
Start: 2022-08-24 | End: 2022-08-24

## 2022-08-24 RX ORDER — BETAMETHASONE DIPROPIONATE 0.05 %
OINTMENT (GRAM) TOPICAL ONCE
Status: CANCELLED | OUTPATIENT
Start: 2022-08-24 | End: 2022-08-24

## 2022-08-24 RX ORDER — LIDOCAINE 50 MG/G
OINTMENT TOPICAL ONCE
Status: CANCELLED | OUTPATIENT
Start: 2022-08-24 | End: 2022-08-24

## 2022-08-24 RX ORDER — LIDOCAINE HYDROCHLORIDE 20 MG/ML
JELLY TOPICAL ONCE
Status: CANCELLED | OUTPATIENT
Start: 2022-08-24 | End: 2022-08-24

## 2022-08-24 RX ORDER — LIDOCAINE HYDROCHLORIDE 40 MG/ML
SOLUTION TOPICAL ONCE
Status: CANCELLED | OUTPATIENT
Start: 2022-08-24 | End: 2022-08-24

## 2022-08-24 RX ORDER — GINSENG 100 MG
CAPSULE ORAL ONCE
Status: CANCELLED | OUTPATIENT
Start: 2022-08-24 | End: 2022-08-24

## 2022-08-24 RX ORDER — GENTAMICIN SULFATE 1 MG/G
OINTMENT TOPICAL ONCE
Status: CANCELLED | OUTPATIENT
Start: 2022-08-24 | End: 2022-08-24

## 2022-08-24 RX ORDER — BACITRACIN ZINC AND POLYMYXIN B SULFATE 500; 1000 [USP'U]/G; [USP'U]/G
OINTMENT TOPICAL ONCE
Status: CANCELLED | OUTPATIENT
Start: 2022-08-24 | End: 2022-08-24

## 2022-08-24 RX ORDER — CLOBETASOL PROPIONATE 0.5 MG/G
OINTMENT TOPICAL ONCE
Status: CANCELLED | OUTPATIENT
Start: 2022-08-24 | End: 2022-08-24

## 2022-08-24 RX ORDER — LIDOCAINE 40 MG/G
CREAM TOPICAL ONCE
Status: CANCELLED | OUTPATIENT
Start: 2022-08-24 | End: 2022-08-24

## 2022-08-24 NOTE — DISCHARGE INSTRUCTIONS
[address] [facility]   Physician Orders and Discharge Instructions  [address]  Telephone: [phone]    NAME:  Poncho Barahona  YOB: 1960  MEDICAL RECORD NUMBER:  [id number]  DATE:  @ED@    Discharge condition: Stable    Discharge to: Home    Left via:Private automobile    Accompanied by: Self     [facility]:  [facility]     Dressing Orders: Right Lower Ext Ulcer  Mepilex Border to open area, change every 2-3 days  Wear compression stockings daily as tolerated   Elevate feet to level or above heart 3-4 times daily and as needed to for 30 minutes to reduce swelling  Multi Vitamin, High Protein diet as tolerated    [facility] follow up visit ____________1 week_________________  (Please note your next appointment above and if you are unable to keep, kindly give a 24 hour notice. Thank you.)    If you experience any of the following, please call the [facility] during business hours:    * Increase in Pain  * Temperature over 101  * Increase in drainage from your wound  * Drainage with a foul odor  * Bleeding  * Increase in swelling  * Need for compression bandage changes due to slippage, breakthrough drainage. If you need medical attention outside of the business hours of the [facility] please contact your PCP or go to the nearest emergency room.

## 2022-08-24 NOTE — PROGRESS NOTES
donepezil (ARICEPT) 10 MG tablet Take 1 tablet by mouth nightly 30 tablet 11    levocetirizine (XYZAL) 5 MG tablet Take 5 mg by mouth nightly       metFORMIN, OSM, (FORTAMET) 1000 MG extended release tablet Take 1,000 mg by mouth 2 times daily (with meals)       metoprolol succinate (TOPROL XL) 100 MG extended release tablet Take 100 mg by mouth nightly   1    escitalopram (LEXAPRO) 10 MG tablet Take 10 mg by mouth nightly       triamterene-hydrochlorothiazide (MAXZIDE-25) 37.5-25 MG per tablet Take 1 tablet by mouth nightly        No current facility-administered medications on file prior to encounter. REVIEW OF SYSTEMS    A comprehensive review of systems was negative.     Objective:      /68   Pulse 82   Temp 98 °F (36.7 °C) (Temporal)   Resp 18     Wt Readings from Last 3 Encounters:   08/10/22 235 lb (106.6 kg)   07/19/22 235 lb (106.6 kg)   07/12/22 235 lb (106.6 kg)       PHYSICAL EXAM    General Appearance: alert and oriented to person, place and time, well developed and well- nourished, in no acute distress  Skin: warm and dry, no rash or erythema  Head: normocephalic and atraumatic  Eyes: pupils equal, round, and reactive to light, extraocular eye movements intact, conjunctivae normal  ENT: tympanic membrane, external ear and ear canal normal bilaterally, nose without deformity, nasal mucosa and turbinates normal without polyps, lips teeth and gums normal  Neck: supple and non-tender without mass, no thyromegaly or thyroid nodules, no cervical lymphadenopathy  Pulmonary/Chest: clear to auscultation bilaterally- no wheezes, rales or rhonchi, normal air movement, no respiratory distress  Cardiovascular: normal rate, regular rhythm, normal S1 and S2, no murmurs, rubs, clicks, or gallops, distal pulses intact, no carotid bruits  Abdomen: soft, non-tender, non-distended, normal bowel sounds, no masses or organomegaly  Extremities: no cyanosis, clubbing or edema  Musculoskeletal: normal range of motion, no joint swelling, deformity or tenderness  Neurologic: reflexes normal and symmetric, no cranial nerve deficit, gait, coordination and speech normal, skin sensation normal      Assessment:      Problem List Items Addressed This Visit       Post-operative wound abscess (Chronic)    * (Principal) Non-pressure chronic ulcer of right ankle with fat layer exposed ([address] - Primary (Chronic)    Relevant Orders    Initiate Outpatient Wound Care Protocol    Diabetes ([address] (Chronic)        Procedure Note  Indications:  Based on my examination of this patient's wound(s)/ulcer(s) today, debridement is required to promote healing and evaluate the wound base. Performed by: Meghana Caban MD    Consent obtained:  Yes    Time out taken:  Yes    Pain Control: Anesthetic  Anesthetic: None       Debridement:Non-excisional Debridement    Using curette and forceps the wound(s)/ulcer(s) was/were sharply debrided down through and including the removal of epidermis and dermis.         Devitalized Tissue Debrided:  fibrin, biofilm, slough, necrotic/eschar, and exudate      Pre Debridement Measurements:  Are located in the Wound/Ulcer Documentation Flow Sheet    Wound/Ulcer #: 1    Percent of Wound(s)/Ulcer(s) Debrided: 100%    Total Surface Area Debrided:  0.06 sq cm       Diabetic/Pressure/Non Pressure Ulcers only:  Ulcer: Non-Pressure ulcer, fat layer exposed           Post Debridement Measurements:    Wound/Ulcer Descriptions are Pre Debridement --EXCEPT MEASUREMENTS    Negative Pressure Wound Therapy Other (Comment) Right;Lateral (Active)   Wound Type Surgical 07/12/22 1037   Unit Type 3M KCI Vac Ulta 07/12/22 1037   Dressing Type Black Foam 07/12/22 1037   Number of pieces removed 2 07/12/22 1037   Dressing Status Old drainage noted 07/12/22 1037   Drainage Amount Small 07/12/22 1037   Drainage Description Sanguinous 07/12/22 1037   Wound Assessment Exposed structure bone;Granulation tissue 07/12/22 1037   Irina-wound Assessment Blanchable erythema 07/12/22 1037   Odor None 07/12/22 1037   Number of days: 56       Wound 06/28/22 Ankle Lateral;Right Wound 1, right ankle, surgical (Active)   Wound Image   08/24/22 1406   Wound Etiology Surgical 08/24/22 1406   Dressing Status Dry 08/24/22 1406   Wound Cleansed Soap and water 08/24/22 1406   Dressing/Treatment Adhesive bandage 08/24/22 1406   Offloading for Diabetic Foot Ulcers Offloading not ordered 08/24/22 1406   Wound Length (cm) 0.3 cm 08/24/22 1422   Wound Width (cm) 0.2 cm 08/24/22 1422   Wound Depth (cm) 0.1 cm 08/24/22 1422   Wound Surface Area (cm^2) 0.06 cm^2 08/24/22 1422   Change in Wound Size % (l*w) 99.63 08/24/22 1422   Wound Volume (cm^3) 0.006 cm^3 08/24/22 1422   Wound Healing % 100 08/24/22 1422   Post-Procedure Length (cm) 0.3 cm 08/24/22 1431   Post-Procedure Width (cm) 0.2 cm 08/24/22 1431   Post-Procedure Depth (cm) 0.1 cm 08/24/22 1431   Post-Procedure Surface Area (cm^2) 0.06 cm^2 08/24/22 1431   Post-Procedure Volume (cm^3) 0.006 cm^3 08/24/22 1431   Distance Tunneling (cm) 0 cm 08/10/22 1128   Tunneling Position ___ O'Clock 0 08/10/22 1128   Undermining Starts ___ O'Clock 0 08/10/22 1128   Undermining Ends___ O'Clock 0 08/10/22 1128   Undermining Maxium Distance (cm) 0 08/10/22 1128   Wound Assessment Dry 08/24/22 1406   Drainage Amount None 08/24/22 1406   Drainage Description Serosanguinous 08/17/22 1334   Odor None 08/24/22 1406   Irina-wound Assessment Blanchable erythema 08/17/22 1334   Margins Attached edges 08/24/22 1406   Wound Thickness Description not for Pressure Injury Full thickness 08/17/22 1334   Number of days: 56             Estimated Blood Loss:  Minimal    Hemostasis Achieved:  by pressure    Procedural Pain:  0  / 10     Post Procedural Pain:  0 / 10     Response to treatment:  Well tolerated by patient.          Plan:     Problem List Items Addressed This Visit       Post-operative wound abscess (Chronic)    * (Principal) Non-pressure chronic ulcer of right ankle with fat layer exposed ([address] - Primary (Chronic)    Relevant Orders    Initiate Outpatient Wound Care Protocol    Diabetes ([address] (Chronic)       Wound almost healed mepilex and stockings. RTO 1 week dc HH    Treatment Note please see attached Discharge Instructions    In my professional opinion this patient would benefit from HBO Therapy: No    Written patient dismissal instructions given to patient and signed by patient or POA. Discharge [address] and [facility] O[facility] Therapy   Physician Orders and Discharge Instructions  [address]  Telephone: [phone]    NAME:  [place] Honey:  1960  MEDICAL RECORD NUMBER:  [id number]  DATE:  @ED@    Discharge condition: Stable    Discharge to: Home    Left via:Private automobile    Accompanied by: Self     ECF/HHA:  [facility]     Dressing Orders: Right Lower Ext Ulcer  Mepilex Border to open area, change every 2-3 days  Wear compression stockings daily as tolerated   Elevate feet to level or above heart 3-4 times daily and as needed to for 30 minutes to reduce swelling  Multi Vitamin, High Protein diet as tolerated    [address] [place] [address],[address] follow up visit ____________1 week_________________  (Please note your next appointment above and if you are unable to keep, kindly give a 24 hour notice. Thank you.)    If you experience any of the following, please call the [facility] during business hours:    * Increase in Pain  * Temperature over 101  * Increase in drainage from your wound  * Drainage with a foul odor  * Bleeding  * Increase in swelling  * Need for compression bandage changes due to slippage, breakthrough drainage. If you need medical attention outside of the business hours of the [facility] please contact your PCP or go to the nearest emergency room.          Electronically signed by Reza Flores MD on 8/24/2022 at 2:32 PM

## 2022-08-25 DIAGNOSIS — D47.2 MGUS (MONOCLONAL GAMMOPATHY OF UNKNOWN SIGNIFICANCE): ICD-10-CM

## 2022-08-25 DIAGNOSIS — R16.1 SPLENOMEGALY: ICD-10-CM

## 2022-08-25 DIAGNOSIS — D47.2 GAMMOPATHY: Primary | ICD-10-CM

## 2022-08-25 DIAGNOSIS — D69.6 THROMBOCYTOPENIA (HCC): ICD-10-CM

## 2022-08-25 NOTE — PROGRESS NOTES
Progress Note      Pt Name: Mariana Ferreira  YOB: 1960  MRN: [id number]    Date of evaluation: 8/26/2022  History Obtained From:  patient, electronic medical record    CHIEF COMPLAINT:    Chief Complaint   Patient presents with    Follow-up     Gammopathy     Current active problems  Thrombocytopenia  Splenomegaly  Hepatic steatosis  Prior EBV      HISTORY OF PRESENT ILLNESS:    Mariana Ferreira is a 62 y.o.  female followed in the office with mild thrombocytopenia, IgA kappa monoclonal gammopathy since 7/28/2020. She does have mild splenomegaly and hepatic steatosis. She denies any specific left upper quadrant pain. She denies any new liver issues. She did have ORIF of the right leg by Dr. Shaquille Yanes on 4/21/2021. Unfortunately she did not developed a staph infection and the remington was subsequently removed with wound VAC placed. She continues to be seen by wound care, wound is nearly completely resolved. She does have a probable IgA kappa monoclonal gammopathy-MGUS. She has previously declined bone marrow. She is diabetic but reports her A1c has been doing better. Blood pressure stable on her Toprol- nightly, Maxide 37.5-25 nightly. She continues taking Aricept 10 mg nightly due to memory issues and is followed by Dr. Kelly Fajardo. Mood is stable on her Lexapro. HEMATOLOGY HISTORY: Thrombocytopenia, splenomegaly, hepatic steatosis, gammopathy  Zaira Eagle was seen in initial hematology consultation on 7/28/2020 referred by SWAPNA Smith for evaluation of thrombocytopenia and splenomegaly.              Serology 6/30/2020  B12 - 852  CMP -alk phos 135, AST 52, ALT 38     Review prior imaging studies  CT abdomen pelvis with contrast at [address] 8/14/2018 compared to 11/21/2017 revealed hepato-steatosis but spleen read as-no splenomegaly     Current imaging study  Ultrasound liver 7/24/2020 at [facility] was compared to CT of the abdomen 8/14/2018 which revealed moderate diffuse fatty infiltration of the liver without focal lesion. Doppler sonography suggested a normal hepato-pedal portal venous circulation. Splenomegaly measuring 14.4 x 9.5 x 14.6 cm     Her initial CBC in the office on 7/28/2020 revealed a normal WBC of 5.38 with normal percent differential.  Hgb is 14.6 with MCV 94.1. PLT is 116,000. 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 I discussed the fact that her platelet count is adequate at 116,000. She denies any significant bruising or bleeding. She denies any night sweats, weight loss, extreme fatigue, pruritus. Baseline serology will be checked however she will most likely be monitored conservatively.     Serology 7/28/2020  Fibrinogen - 261  PT/INR 12.2/1.2  PTT - 31  Folate - 18.2  Copper - 91  Zinc - 75  Antiplatelet ab - Negative  SPEP - No M spike -with immunofixation negative  QI - IgG 1232, IgA 422 (), IgM 78  Free serum light chains -   B2 M - 2.1    EBV titers  EBV Ab VCA, IgM  <36  EBV Ab VCA, IgG 136  EBV nuclear antigen Ab, IgG < 18    Consistent with prior infection    Serology 10/27/2020  SPEP - no M spike with immunofixation negative  QI - IgG 1227, IgA 390, IgM 69  Free serum light chains -kappa 31.3, K/L ratio 2.27  B2M - 2.2 - WNL  CMP = crt 0.6 with GFR > 60, AST 56, total bili 1.5    HIV 1/2 - nonreactive  Hepatitis A, B, C - negative        Past Medical History:   Diagnosis Date    Anxiety     Depression     Diabetes [address] [address] [facility]     Hypertension     Memory difficulty     Obesity 4/21/2021    Sleep apnea     c-pap        Past Surgical History:   Procedure Laterality Date    ANKLE FRACTURE SURGERY Right 4/21/2021    ANKLE OPEN REDUCTION INTERNAL FIXATION WITH POSSIBLE EXTERNAL FIXATOR performed by Adelia Siemens, MD at [place] [address] Right 6/28/2022    RIGHT ANKLE HARDWARE REMOVAL WITH WOUND VAC performed by Adelia Siemens, MD at [address] [place] [address] Outpatient Medications:     donepezil (ARICEPT) 10 MG tablet, Take 1 tablet by mouth nightly, Disp: 30 tablet, Rfl: 11    levocetirizine (XYZAL) 5 MG tablet, Take 5 mg by mouth nightly , Disp: , Rfl:     metFORMIN, OSM, (FORTAMET) 1000 MG extended release tablet, Take 1,000 mg by mouth 2 times daily (with meals) , Disp: , Rfl:     metoprolol succinate (TOPROL XL) 100 MG extended release tablet, Take 100 mg by mouth nightly , Disp: , Rfl: 1    escitalopram (LEXAPRO) 10 MG tablet, Take 10 mg by mouth nightly , Disp: , Rfl:     triamterene-hydrochlorothiazide (MAXZIDE-25) 37.5-25 MG per tablet, Take 1 tablet by mouth nightly , Disp: , Rfl:      Allergies   Allergen Reactions    Azithromycin Rash    Namenda  [Memantine Hcl] Nausea And Vomiting and Nausea Only       Social History     Tobacco Use    Smoking status: Never    Smokeless tobacco: Never   Vaping Use    Vaping Use: Never used   Substance Use Topics    Alcohol use: No    Drug use: No       Family History   Problem Relation Age of Onset    Cancer Mother     Heart Attack Father        Subjective   REVIEW OF SYSTEMS:   Review of Systems   Constitutional:  Negative for chills, diaphoresis, fatigue, fever and unexpected weight change. HENT:  Negative for mouth sores, nosebleeds, sore throat, trouble swallowing and voice change. Eyes:  Negative for photophobia, discharge and itching. Respiratory:  Negative for cough, shortness of breath and wheezing. Cardiovascular:  Negative for chest pain, palpitations and leg swelling. Gastrointestinal:  Negative for abdominal distention, abdominal pain, blood in stool, constipation, diarrhea, nausea and vomiting. Endocrine: Negative for cold intolerance, heat intolerance, polydipsia and polyuria. Genitourinary:  Negative for difficulty urinating, dysuria, hematuria and urgency. Musculoskeletal:  Positive for arthralgias (Right ankle). Negative for back pain, joint swelling and myalgias.    Skin:  Positive for wound (Right leg-improving). Negative for rash. Neurological:  Negative for dizziness, tremors, seizures, syncope and light-headedness. Hematological:  Negative for adenopathy. Does not bruise/bleed easily. Psychiatric/Behavioral:  Positive for decreased concentration. Negative for behavioral problems and suicidal ideas. The patient is not nervous/anxious. All other systems reviewed and are negative. Objective   /70   Pulse 70   Ht 5' 6\" (1.676 m)   Wt 231 lb 3.2 oz (104.9 kg)   SpO2 96%   BMI 37.32 kg/m²     PHYSICAL EXAM:  Physical Exam  Constitutional:       Appearance: She is well-developed. HENT:      Head: Normocephalic and atraumatic. Eyes:      General: No scleral icterus. Conjunctiva/sclera: Conjunctivae normal.   Neck:      Trachea: No tracheal deviation. Cardiovascular:      Rate and Rhythm: Normal rate and regular rhythm. Heart sounds: Normal heart sounds. No murmur heard. Pulmonary:      Effort: Pulmonary effort is normal. No respiratory distress. Breath sounds: Normal breath sounds. Chest:   Breasts:     Right: No axillary adenopathy or supraclavicular adenopathy. Left: No axillary adenopathy or supraclavicular adenopathy. Abdominal:      General: Bowel sounds are normal. There is no distension. Palpations: Abdomen is soft. There is hepatomegaly (Minimal tenderness). There is no fluid wave or splenomegaly (Not obviously palpable). Tenderness: no abdominal tenderness   Musculoskeletal:         General: No tenderness or deformity. Cervical back: Normal range of motion and neck supple. Lymphadenopathy:      Cervical: No cervical adenopathy. Upper Body:      Right upper body: No supraclavicular or axillary adenopathy. Left upper body: No supraclavicular or axillary adenopathy. Lower Body: No right inguinal adenopathy. No left inguinal adenopathy. Skin:     General: Skin is warm and dry.       Findings: Rash (Bilateral malar aspect) and wound (Bandage right lower extremity) present. No erythema. Neurological:      Mental Status: She is alert and oriented to person, place, and time. Coordination: Coordination normal.   Psychiatric:         Behavior: Behavior normal.         Thought Content: Thought content normal.         Cognition and Memory: She exhibits impaired recent memory. CBC reviewed by me  Lab Results   Component Value Date    WBC 3.75 (L) 08/26/2022    HGB 12.7 08/26/2022    HCT 41.1 08/26/2022    MCV 93.4 08/26/2022    PLT 86 (L) 08/26/2022     Lab Results   Component Value Date    NEUTROABS 1.82 08/26/2022       VISIT DIAGNOSES  1. Gammopathy    2. MGUS (monoclonal gammopathy of unknown significance)    3. Thrombocytopenia (HCC)    4. Splenomegaly    5. Hepatic steatosis        ASSESSMENT/PLAN:    1. Thrombocytopenia/splenomegaly. Serology 6/22/2021  KSENIA 2 - V617F negative  CALR mutation - negative   KSENIA 2 Exons 12-15 - negative  MPL - negative    Serology above negative for MPN causing splenomegaly. Peripheral blood flow cytometry to [facility] 6/22/2021 did not reveal any increase in blast, no B-cell monoclonality and no T-cell aberrant antigen expression      [facility] LIVER at [facility] 3/16/2022  Hepatic steatosis  Hepatosplenomegaly -hepatic right lobe measures 16.5 cm, spleen measures 14.1 x 6.7 x 14.3 cm    Recent ultrasound on 3/16/2022 actually showed stability to possible improvement of splenomegaly noted on prior ultrasound of 7/24/2020. Direct comparison was not given. PLT today has decreased further to 86,000. She does have a bilateral malar rash today. MAGALYS will be checked.       2.  IgA kappa MGUS    Serology 7/28/2020  SPEP - No M spike -with immunofixation negative  QI - IgG 1232, IgA 422 (), IgM 78  Free serum light chains -   B2 M - 2.1    LDH - 510 (286-779) - WNL  CMP - total bilirubin is 1.7, alk phos 130, AST 79, ALT 54      Serology 10/27/2020  SPEP - no M spike with immunofixation negative  QI - IgG 1227, IgA 390, IgM 69  Free serum light chains -kappa 31.3, K/L ratio 2.27  B2M - 2.2 - WNL  CMP = crt 0.6 with GFR > 60, AST 56, total bili 1.5    Serology 3/23/2021  SPEP - no M spike with immunofixation negative  QI - IgG 1227, IgA 342, IgM 69 - her IgA normalized  Free serum light chains -kappa 26.5, K/L ratio 2.04 -improved  B2M - 2.2 - WNL  CMP = crt 0.7 with GFR > 60, Ca 9.7, alk phos 155, AST 52, total bilirubin normal - known fatty liver    She has a slightly elevated IgA which actually showed improvement on follow-up 10/27/2020. Her SPEP itself does not show any M spike. She does have a slightly elevated kappa light chain with elevated kappa light chain ratio. I discussed this with her at her office visit of 2/23/2021. I discussed implications and potential need to proceed with bone marrow aspiration biopsy. Serology from 3/23/2021 revealed that her IgA actually normalized. K/L ratio had improved. Serology 6/22/2021  SPEP no M spike with immunofixation negative  QI IgG 1041, IgA 315, IgM 53 all WNL  Free serum light chains kappa 28.1 with K/L ratio 2.25    Peripheral blood flow cytometry to [facility] 6/22/2021 did not reveal any increase in blast, no B-cell monoclonality and no T-cell aberrant antigen expression    Serology 10/22/2021  SPEP - no M spike negative immunofixation  QI - IgG 1106, IgA 375 (), IgM 64  Free serum light chains - Kappa 28.8, Lambda 17.1, K/L ratio 1.68  B2M - 2.3  LDH - 492  CMP - crt 0.6 with GFR >60, Ca 9.5    Bone survey 10/22/2021 at [facility] was negative    24-hour urine returned 11/2/2021 was negative for monoclonal protein, no elevated total protein. Her bone survey was negative. Her 24 urine did not reveal any monoclonal protein.      Serology 2/25/2022  SPEP - no M spike negative immunofixation  QI - IgG 1123, IgA 343, IgM 54  Free serum light chains - Kappa 27.71, Lambda 16.68, K/L 1.66    B2M - 2.3    CMP - crt 0.6 with GFR >60, Ca 9.3        C - calcium 9.2 on 7/15/2022  R - creatinine 0.6 with GFR > 60 on 7/15/2022  A - hemoglobin 12.7  B - bone survey negative on 10/22/2021    PLAN  Repeat protein serology         With the progressive thrombocytopenia of unknown cause and with an IgA kappa monoclonal protein I again discussed bone marrow aspiration and biopsy with her. I discussed the procedure itself, risk, side effects, expectations. I discussed procedure being performed in the office versus at [facility]. She has subsequently agreed to proceed with bone marrow, this will be performed in [facility]. HEALTH MAINTENANCE    Colon cancer screening. Most recent colonoscopy was 12/18/2017 by Dr. Selma Corbin. 3 polyps removed from the rectum, (1 tubular adenoma and 2 hyperplastic polyps), congested rectal mucosa with biopsy benign. Breast cancer screening. Bilateral screening mammogram at [facility] on 7/26/2022 was BI-RADS 1. Cervical cancer screening. She reports that she has Pap smears every other year and they have been negative. Immunization History   Administered Date(s) Administered    COVID-19, MODERNA BLUE border, Primary or Immunocompromised, (age 12y+), IM, 100 mcg/0.5mL 03/27/2021, 04/29/2021           Orders Placed This Encounter   Procedures    Comprehensive Metabolic Panel    MONOCLONAL PROTEIN AND FLC, SERUM    Lactate Dehydrogenase    MAGALYS Screen with Reflex   Schedule bone marrow in [facility] in 3 to 4 weeks        Return for [facility] 2 weeks after bone marrow.      Rosamaria Caba PA-C  1:05 PM  8/26/2022

## 2022-08-26 ENCOUNTER — OFFICE VISIT (OUTPATIENT)
Dept: HEMATOLOGY | Age: 62
End: 2022-08-26
Payer: MEDICARE

## 2022-08-26 ENCOUNTER — HOSPITAL ENCOUNTER (OUTPATIENT)
Dept: INFUSION THERAPY | Age: 62
Discharge: HOME | End: 2022-08-26
Payer: MEDICARE

## 2022-08-26 VITALS
SYSTOLIC BLOOD PRESSURE: 106 MMHG | DIASTOLIC BLOOD PRESSURE: 70 MMHG | HEART RATE: 70 BPM | WEIGHT: 231.2 LBS | BODY MASS INDEX: 37.16 KG/M2 | HEIGHT: 66 IN | OXYGEN SATURATION: 96 %

## 2022-08-26 DIAGNOSIS — D47.2 MGUS (MONOCLONAL GAMMOPATHY OF UNKNOWN SIGNIFICANCE): ICD-10-CM

## 2022-08-26 DIAGNOSIS — R16.1 SPLENOMEGALY: ICD-10-CM

## 2022-08-26 DIAGNOSIS — D69.6 THROMBOCYTOPENIA (HCC): ICD-10-CM

## 2022-08-26 DIAGNOSIS — K76.0 HEPATIC STEATOSIS: ICD-10-CM

## 2022-08-26 DIAGNOSIS — D47.2 GAMMOPATHY: Primary | ICD-10-CM

## 2022-08-26 DIAGNOSIS — D47.2 GAMMOPATHY: ICD-10-CM

## 2022-08-26 LAB
ALBUMIN SERPL-MCNC: 4.2 G/DL (ref 3.5–5.2)
ALP BLD-CCNC: 140 U/L (ref 35–104)
ALT SERPL-CCNC: 28 U/L (ref 9–52)
ANION GAP SERPL CALCULATED.3IONS-SCNC: 10 MMOL/L (ref 7–19)
AST SERPL-CCNC: 52 U/L (ref 14–36)
BASOPHILS ABSOLUTE: 0.02 K/UL (ref 0.01–0.08)
BASOPHILS RELATIVE PERCENT: 0.5 % (ref 0.1–1.2)
BILIRUB SERPL-MCNC: 0.7 MG/DL (ref 0.2–1.3)
BUN BLDV-MCNC: 17 MG/DL (ref 7–17)
CALCIUM SERPL-MCNC: 9.8 MG/DL (ref 8.4–10.2)
CHLORIDE BLD-SCNC: 104 MMOL/L (ref 98–111)
CO2: 28 MMOL/L (ref 22–29)
CREAT SERPL-MCNC: 0.6 MG/DL (ref 0.5–1)
EOSINOPHILS ABSOLUTE: 0.13 K/UL (ref 0.04–0.54)
EOSINOPHILS RELATIVE PERCENT: 3.5 % (ref 0.7–7)
GFR NON-AFRICAN AMERICAN: >60
GLUCOSE BLD-MCNC: 147 MG/DL (ref 74–106)
HCT VFR BLD CALC: 41.1 % (ref 34.1–44.9)
HEMOGLOBIN: 12.7 G/DL (ref 11.2–15.7)
LACTATE DEHYDROGENASE: 389 U/L (ref 313–618)
LYMPHOCYTES ABSOLUTE: 1.52 K/UL (ref 1.18–3.74)
LYMPHOCYTES RELATIVE PERCENT: 40.5 % (ref 19.3–53.1)
MCH RBC QN AUTO: 28.9 PG (ref 25.6–32.2)
MCHC RBC AUTO-ENTMCNC: 30.9 G/DL (ref 32.3–35.5)
MCV RBC AUTO: 93.4 FL (ref 79.4–94.8)
MONOCYTES ABSOLUTE: 0.25 K/UL (ref 0.24–0.82)
MONOCYTES RELATIVE PERCENT: 6.7 % (ref 4.7–12.5)
NEUTROPHILS ABSOLUTE: 1.82 K/UL (ref 1.56–6.13)
NEUTROPHILS RELATIVE PERCENT: 48.5 % (ref 34–71.1)
PDW BLD-RTO: 13.2 % (ref 11.7–14.4)
PLATELET # BLD: 86 K/UL (ref 182–369)
PMV BLD AUTO: 11.2 FL (ref 7.4–10.4)
POTASSIUM SERPL-SCNC: 3.8 MMOL/L (ref 3.5–5.1)
RBC # BLD: 4.4 M/UL (ref 3.93–5.22)
SODIUM BLD-SCNC: 142 MMOL/L (ref 137–145)
TOTAL PROTEIN: 6.7 G/DL (ref 6.3–8.2)
WBC # BLD: 3.75 K/UL (ref 3.98–10.04)

## 2022-08-26 PROCEDURE — 83615 LACTATE (LD) (LDH) ENZYME: CPT

## 2022-08-26 PROCEDURE — 36415 COLL VENOUS BLD VENIPUNCTURE: CPT

## 2022-08-26 PROCEDURE — 99214 OFFICE O/P EST MOD 30 MIN: CPT | Performed by: PHYSICIAN ASSISTANT

## 2022-08-26 PROCEDURE — 36415 COLL VENOUS BLD VENIPUNCTURE: CPT | Performed by: PHYSICIAN ASSISTANT

## 2022-08-26 PROCEDURE — 99212 OFFICE O/P EST SF 10 MIN: CPT

## 2022-08-26 PROCEDURE — 80053 COMPREHEN METABOLIC PANEL: CPT

## 2022-08-26 PROCEDURE — 85025 COMPLETE CBC W/AUTO DIFF WBC: CPT

## 2022-08-26 ASSESSMENT — ENCOUNTER SYMPTOMS
ABDOMINAL DISTENTION: 0
WHEEZING: 0
PHOTOPHOBIA: 0
VOICE CHANGE: 0
BLOOD IN STOOL: 0
SORE THROAT: 0
DIARRHEA: 0
TROUBLE SWALLOWING: 0
CONSTIPATION: 0
COUGH: 0
SHORTNESS OF BREATH: 0
BACK PAIN: 0
EYE DISCHARGE: 0
VOMITING: 0
EYE ITCHING: 0
ABDOMINAL PAIN: 0
NAUSEA: 0

## 2022-08-29 LAB — ANA IGG, ELISA: NORMAL

## 2022-08-30 LAB
+IMM: ABNORMAL
ALBUMIN SERPL-MCNC: 3.61 G/DL (ref 3.75–5.01)
ALPHA-1-GLOBULIN: 0.24 G/DL (ref 0.19–0.46)
ALPHA-2-GLOBULIN: 0.84 G/DL (ref 0.48–1.05)
BETA GLOBULIN: 1.01 G/DL (ref 0.48–1.1)
GAMMA GLOBULIN: 1.11 G/DL (ref 0.62–1.51)
IGA: 385 MG/DL (ref 68–408)
IGG: 1048 MG/DL (ref 768–1632)
IGM: 45 MG/DL (ref 35–263)
KAPPA FREE LIGHT CHAINS QNT: 31.5 MG/L (ref 3.3–19.4)
KAPPA/LAMBDA FREE LIGHT CHAIN RATIO: 1.9 (ref 0.26–1.65)
LAMBDA FREE LIGHT CHAINS QNT: 16.61 MG/L (ref 5.71–26.3)
SPE/IFE INTERPRETATION: ABNORMAL
TOTAL PROTEIN: 6.8 G/DL (ref 6.3–8.2)

## 2022-08-31 ENCOUNTER — HOSPITAL ENCOUNTER (OUTPATIENT)
Dept: WOUND CARE | Age: 62
Discharge: HOME | End: 2022-08-31
Payer: MEDICARE

## 2022-08-31 DIAGNOSIS — T81.49XA POST-OPERATIVE WOUND ABSCESS: Chronic | ICD-10-CM

## 2022-08-31 DIAGNOSIS — E11.622 TYPE 2 DIABETES MELLITUS WITH OTHER SKIN ULCER, UNSPECIFIED WHETHER LONG TERM INSULIN USE (HCC): Chronic | ICD-10-CM

## 2022-08-31 DIAGNOSIS — L98.499 TYPE 2 DIABETES MELLITUS WITH OTHER SKIN ULCER, UNSPECIFIED WHETHER LONG TERM INSULIN USE (HCC): Chronic | ICD-10-CM

## 2022-08-31 DIAGNOSIS — L97.312 NON-PRESSURE CHRONIC ULCER OF RIGHT ANKLE WITH FAT LAYER EXPOSED (HCC): Primary | Chronic | ICD-10-CM

## 2022-08-31 PROCEDURE — 97597 DBRDMT OPN WND 1ST 20 CM/<: CPT

## 2022-08-31 PROCEDURE — 97597 DBRDMT OPN WND 1ST 20 CM/<: CPT | Performed by: SURGERY

## 2022-08-31 RX ORDER — GENTAMICIN SULFATE 1 MG/G
OINTMENT TOPICAL ONCE
Status: CANCELLED | OUTPATIENT
Start: 2022-08-31 | End: 2022-08-31

## 2022-08-31 RX ORDER — BACITRACIN ZINC AND POLYMYXIN B SULFATE 500; 1000 [USP'U]/G; [USP'U]/G
OINTMENT TOPICAL ONCE
Status: CANCELLED | OUTPATIENT
Start: 2022-08-31 | End: 2022-08-31

## 2022-08-31 RX ORDER — LIDOCAINE HYDROCHLORIDE 20 MG/ML
JELLY TOPICAL ONCE
Status: CANCELLED | OUTPATIENT
Start: 2022-08-31 | End: 2022-08-31

## 2022-08-31 RX ORDER — LIDOCAINE HYDROCHLORIDE 40 MG/ML
SOLUTION TOPICAL ONCE
Status: CANCELLED | OUTPATIENT
Start: 2022-08-31 | End: 2022-08-31

## 2022-08-31 RX ORDER — BETAMETHASONE DIPROPIONATE 0.05 %
OINTMENT (GRAM) TOPICAL ONCE
Status: CANCELLED | OUTPATIENT
Start: 2022-08-31 | End: 2022-08-31

## 2022-08-31 RX ORDER — CLOBETASOL PROPIONATE 0.5 MG/G
OINTMENT TOPICAL ONCE
Status: CANCELLED | OUTPATIENT
Start: 2022-08-31 | End: 2022-08-31

## 2022-08-31 RX ORDER — LIDOCAINE 40 MG/G
CREAM TOPICAL ONCE
Status: CANCELLED | OUTPATIENT
Start: 2022-08-31 | End: 2022-08-31

## 2022-08-31 RX ORDER — LIDOCAINE 50 MG/G
OINTMENT TOPICAL ONCE
Status: CANCELLED | OUTPATIENT
Start: 2022-08-31 | End: 2022-08-31

## 2022-08-31 RX ORDER — BACITRACIN, NEOMYCIN, POLYMYXIN B 400; 3.5; 5 [USP'U]/G; MG/G; [USP'U]/G
OINTMENT TOPICAL ONCE
Status: CANCELLED | OUTPATIENT
Start: 2022-08-31 | End: 2022-08-31

## 2022-08-31 RX ORDER — GINSENG 100 MG
CAPSULE ORAL ONCE
Status: CANCELLED | OUTPATIENT
Start: 2022-08-31 | End: 2022-08-31

## 2022-08-31 NOTE — DISCHARGE INSTRUCTIONS
[address] [facility]   Physician Orders and Discharge Instructions  [address]  Telephone: [phone]    NAME:  Kyara Toney  YOB: 1960  MEDICAL RECORD NUMBER:  [id number]  DATE:  @ED@    Discharge condition: Stable    Discharge to: Home    Left via:Private automobile    Accompanied by: Self    [facility]/HHA:  [facility]      Dressing Orders: Right Lower Ext Ulcer  Xeroform to open area, cover with mepilex border, change every 2-3 days  Wear compression stockings as instructed   Elevate feet to level or above heart 3-4 times daily and as needed to for 30 minutes to reduce swelling  Multi Vitamin, High Protein diet as tolerated  Ok to remove wrap on appointment day and right before [address] G[address] Mendoza gets there to get a shower     Follow up with Orthopedic as directed    [address] [place] [address],[address] follow up visit ___________1 week__________________  (Please note your next appointment above and if you are unable to keep, kindly give a 24 hour notice. Thank you.)          If you experience any of the following, please call the [address] during business hours:    * Increase in Pain  * Temperature over 101  * Increase in drainage from your wound  * Drainage with a foul odor  * Bleeding  * Increase in swelling  * Need for compression bandage changes due to slippage, breakthrough drainage. If you need medical attention outside of the business hours of the [address] please contact your PCP or go to the nearest emergency room.

## 2022-08-31 NOTE — PROGRESS NOTES
[address]   Progress Note and Procedure Note      [address] [facility] RECORD NUMBER:  [id number]  AGE: 62 y.o. GENDER: female  : 1960  EPISODE DATE:  2022    Subjective:     Chief Complaint   Patient presents with    Wound Check         HISTORY of PRESENT ILLNESS JOCE Arnold is a 62 y.o. female who presents today for wound/ulcer evaluation.    Wound Context: Pt with post-op wound R ankle wound here for eval/treat  Wound/Ulcer Pain Timing/Severity: none  Quality of pain: N/A  Severity:  0 / 10   Modifying Factors: None  Associated Signs/Symptoms: none    Ulcer Identification:  Ulcer Type: non-healing surgical  Contributing Factors: shear force    Wound: Surgical incision        PAST MEDICAL HISTORY        Diagnosis Date    Anxiety     Depression     Diabetes ([address]     Hypertension     Memory difficulty     Obesity 2021    Sleep apnea     c-pap       PAST SURGICAL HISTORY    Past Surgical History:   Procedure Laterality Date    ANKLE FRACTURE SURGERY Right 2021    ANKLE OPEN REDUCTION INTERNAL FIXATION WITH POSSIBLE EXTERNAL FIXATOR performed by Lucina Valencia MD at [place] [address] Right 2022    RIGHT ANKLE HARDWARE REMOVAL WITH WOUND VAC performed by Lucina Valencia MD at [facility] OR    GALLBLADDER SURGERY         FAMILY HISTORY    Family History   Problem Relation Age of Onset    Cancer Mother     Heart Attack Father        SOCIAL HISTORY    Social History     Tobacco Use    Smoking status: Never    Smokeless tobacco: Never   Vaping Use    Vaping Use: Never used   Substance Use Topics    Alcohol use: No    Drug use: No       ALLERGIES    Allergies   Allergen Reactions    Azithromycin Rash    Namenda  [Memantine Hcl] Nausea And Vomiting and Nausea Only       MEDICATIONS    Current Outpatient Medications on File Prior to Encounter   Medication Sig Dispense Refill    donepezil (ARICEPT) 10 MG tablet Take 1 tablet by mouth nightly 30 tablet 11    levocetirizine (XYZAL) 5 MG tablet Take 5 mg by mouth nightly       metFORMIN, OSM, (FORTAMET) 1000 MG extended release tablet Take 1,000 mg by mouth 2 times daily (with meals)       metoprolol succinate (TOPROL XL) 100 MG extended release tablet Take 100 mg by mouth nightly   1    escitalopram (LEXAPRO) 10 MG tablet Take 10 mg by mouth nightly       triamterene-hydrochlorothiazide (MAXZIDE-25) 37.5-25 MG per tablet Take 1 tablet by mouth nightly        No current facility-administered medications on file prior to encounter. REVIEW OF SYSTEMS    A comprehensive review of systems was negative. Objective: There were no vitals taken for this visit.     Wt Readings from Last 3 Encounters:   08/26/22 231 lb 3.2 oz (104.9 kg)   08/10/22 235 lb (106.6 kg)   07/19/22 235 lb (106.6 kg)       PHYSICAL EXAM    General Appearance: alert and oriented to person, place and time, well developed and well- nourished, in no acute distress  Skin: warm and dry, no rash or erythema  Head: normocephalic and atraumatic  Eyes: pupils equal, round, and reactive to light, extraocular eye movements intact, conjunctivae normal  ENT: tympanic membrane, external ear and ear canal normal bilaterally, nose without deformity, nasal mucosa and turbinates normal without polyps, lips teeth and gums normal  Neck: supple and non-tender without mass, no thyromegaly or thyroid nodules, no cervical lymphadenopathy  Pulmonary/Chest: clear to auscultation bilaterally- no wheezes, rales or rhonchi, normal air movement, no respiratory distress  Cardiovascular: normal rate, regular rhythm, normal S1 and S2, no murmurs, rubs, clicks, or gallops, distal pulses intact, no carotid bruits  Abdomen: soft, non-tender, non-distended, normal bowel sounds, no masses or organomegaly  Extremities: no cyanosis, clubbing or edema  Musculoskeletal: normal range of motion, no joint swelling, deformity or tenderness  Neurologic: reflexes normal and symmetric, no cranial nerve deficit, gait, coordination and speech normal, skin sensation normal      Assessment:      Problem List Items Addressed This Visit       Post-operative wound abscess (Chronic)    * (Principal) Non-pressure chronic ulcer of right ankle with fat layer exposed ([facility] [address] - Primary (Chronic)    Relevant Orders    Initiate Outpatient Wound Care Protocol    Diabetes ([facility] [address] (Chronic)        Procedure Note  Indications:  Based on my examination of this patient's wound(s)/ulcer(s) today, debridement is required to promote healing and evaluate the wound base. Performed by: Cecilia Robledo MD    Consent obtained:  Yes    Time out taken:  Yes    Pain Control:         Debridement:Non-excisional Debridement    Using curette the wound(s)/ulcer(s) was/were sharply debrided down through and including the removal of epidermis and dermis.         Devitalized Tissue Debrided:  fibrin, biofilm, slough, necrotic/eschar, and exudate      Pre Debridement Measurements:  Are located in the Wound/Ulcer Documentation Flow Sheet    Wound/Ulcer #: 1    Percent of Wound(s)/Ulcer(s) Debrided: 100%    Total Surface Area Debrided:  0.01 sq cm       Diabetic/Pressure/Non Pressure Ulcers only:  Ulcer: Non-Pressure ulcer, fat layer exposed             Post Debridement Measurements:    Wound/Ulcer Descriptions are Pre Debridement --EXCEPT MEASUREMENTS    Negative Pressure Wound Therapy Other (Comment) Right;Lateral (Active)   Wound Type Surgical 07/12/22 1037   Unit Type 3M KCI Vac Ulta 07/12/22 1037   Dressing Type Black Foam 07/12/22 1037   Number of pieces removed 2 07/12/22 1037   Dressing Status Old drainage noted 07/12/22 1037   Drainage Amount Small 07/12/22 1037   Drainage Description Sanguinous 07/12/22 1037   Wound Assessment Exposed structure bone;Granulation tissue 07/12/22 1037   Irina-wound Assessment Blanchable erythema 07/12/22 1037   Odor None 07/12/22 1037   Number of days: 63       Wound 06/28/22 Ankle Lateral;Right Wound 1, right ankle, surgical (Active)   Wound Image   08/24/22 1406   Wound Etiology Surgical 08/24/22 1406   Dressing Status Dry 08/24/22 1406   Wound Cleansed Soap and water 08/24/22 1406   Dressing/Treatment Adhesive bandage 08/24/22 1406   Offloading for Diabetic Foot Ulcers Offloading not ordered 08/24/22 1406   Wound Length (cm) 0.1 cm 08/31/22 1408   Wound Width (cm) 0.1 cm 08/31/22 1408   Wound Depth (cm) 0.1 cm 08/31/22 1408   Wound Surface Area (cm^2) 0.01 cm^2 08/31/22 1408   Change in Wound Size % (l*w) 99.94 08/31/22 1408   Wound Volume (cm^3) 0.001 cm^3 08/31/22 1408   Wound Healing % 100 08/31/22 1408   Post-Procedure Length (cm) 0.1 cm 08/31/22 1408   Post-Procedure Width (cm) 0.1 cm 08/31/22 1408   Post-Procedure Depth (cm) 0.1 cm 08/31/22 1408   Post-Procedure Surface Area (cm^2) 0.01 cm^2 08/31/22 1408   Post-Procedure Volume (cm^3) 0.001 cm^3 08/31/22 1408   Distance Tunneling (cm) 0 cm 08/10/22 1128   Tunneling Position ___ O'Clock 0 08/10/22 1128   Undermining Starts ___ O'Clock 0 08/10/22 1128   Undermining Ends___ O'Clock 0 08/10/22 1128   Undermining Maxium Distance (cm) 0 08/10/22 1128   Wound Assessment Dry 08/24/22 1406   Drainage Amount None 08/24/22 1406   Drainage Description Serosanguinous 08/17/22 1334   Odor None 08/24/22 1406   Irina-wound Assessment Blanchable erythema 08/17/22 1334   Margins Attached edges 08/24/22 1406   Wound Thickness Description not for Pressure Injury Full thickness 08/17/22 1334   Number of days: 63             Estimated Blood Loss:  Minimal    Hemostasis Achieved:  by pressure    Procedural Pain:  0  / 10     Post Procedural Pain:  0 / 10     Response to treatment:  Well tolerated by patient.          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 Stockings for edema    Treatment Note please see attached Discharge Instructions    In my professional opinion this patient would benefit from HBO Therapy: No    Written patient dismissal instructions given to patient and signed by patient or POA. Discharge [address] and Hyper[facility]ic Oxygen Therapy   Physician Orders and Discharge Instructions  [address]  Telephone: [phone]    NAME:  Francisco Javier Sea:  1960  MEDICAL RECORD NUMBER:  [id number]  DATE:  @ED@    Discharge condition: Stable    Discharge to: Home    Left via:Private automobile    Accompanied by: Self    F/HHA:  [facility]      Dressing Orders: Right Lower Ext Ulcer  Xeroform to open area, cover with mepilex border, change every 2-3 days  Wear compression stockings as instructed   Elevate feet to level or above heart 3-4 times daily and as needed to for 30 minutes to reduce swelling  Multi Vitamin, High Protein diet as tolerated  Ok to remove wrap on appointment day and right before [address] Olguin gets there to get a shower     Follow up with Orthopedic as directed    [facility] follow up visit ___________1 week__________________  (Please note your next appointment above and if you are unable to keep, kindly give a 24 hour notice. Thank you.)          If you experience any of the following, please call the [address] during business hours:    * Increase in Pain  * Temperature over 101  * Increase in drainage from your wound  * Drainage with a foul odor  * Bleeding  * Increase in swelling  * Need for compression bandage changes due to slippage, breakthrough drainage.     If you need medical attention outside of the business hours of the [address] please contact your PCP or go to the nearest emergency room.          Electronically signed by Vera Barba MD on 8/31/2022 at 2:09 PM

## 2022-09-06 ENCOUNTER — ANESTHESIA EVENT (OUTPATIENT)
Dept: OPERATING ROOM | Age: 62
End: 2022-09-06

## 2022-09-08 ENCOUNTER — HOSPITAL ENCOUNTER (OUTPATIENT)
Dept: WOUND CARE | Age: 62
Discharge: HOME | End: 2022-09-08
Payer: MEDICARE

## 2022-09-08 VITALS
DIASTOLIC BLOOD PRESSURE: 72 MMHG | RESPIRATION RATE: 18 BRPM | SYSTOLIC BLOOD PRESSURE: 110 MMHG | HEART RATE: 72 BPM | TEMPERATURE: 99 F

## 2022-09-08 DIAGNOSIS — T81.49XA POST-OPERATIVE WOUND ABSCESS: Chronic | ICD-10-CM

## 2022-09-08 DIAGNOSIS — L97.312 NON-PRESSURE CHRONIC ULCER OF RIGHT ANKLE WITH FAT LAYER EXPOSED (HCC): Primary | ICD-10-CM

## 2022-09-08 DIAGNOSIS — L98.499 TYPE 2 DIABETES MELLITUS WITH OTHER SKIN ULCER, UNSPECIFIED WHETHER LONG TERM INSULIN USE (HCC): Chronic | ICD-10-CM

## 2022-09-08 DIAGNOSIS — E11.622 TYPE 2 DIABETES MELLITUS WITH OTHER SKIN ULCER, UNSPECIFIED WHETHER LONG TERM INSULIN USE (HCC): Chronic | ICD-10-CM

## 2022-09-08 PROCEDURE — 99212 OFFICE O/P EST SF 10 MIN: CPT

## 2022-09-08 PROCEDURE — 99212 OFFICE O/P EST SF 10 MIN: CPT | Performed by: SURGERY

## 2022-09-08 RX ORDER — LIDOCAINE HYDROCHLORIDE 40 MG/ML
SOLUTION TOPICAL ONCE
OUTPATIENT
Start: 2022-09-08 | End: 2022-09-08

## 2022-09-08 RX ORDER — BACITRACIN, NEOMYCIN, POLYMYXIN B 400; 3.5; 5 [USP'U]/G; MG/G; [USP'U]/G
OINTMENT TOPICAL ONCE
OUTPATIENT
Start: 2022-09-08 | End: 2022-09-08

## 2022-09-08 RX ORDER — GINSENG 100 MG
CAPSULE ORAL ONCE
OUTPATIENT
Start: 2022-09-08 | End: 2022-09-08

## 2022-09-08 RX ORDER — BETAMETHASONE DIPROPIONATE 0.05 %
OINTMENT (GRAM) TOPICAL ONCE
OUTPATIENT
Start: 2022-09-08 | End: 2022-09-08

## 2022-09-08 RX ORDER — GENTAMICIN SULFATE 1 MG/G
OINTMENT TOPICAL ONCE
OUTPATIENT
Start: 2022-09-08 | End: 2022-09-08

## 2022-09-08 RX ORDER — LIDOCAINE HYDROCHLORIDE 20 MG/ML
JELLY TOPICAL ONCE
OUTPATIENT
Start: 2022-09-08 | End: 2022-09-08

## 2022-09-08 RX ORDER — CLOBETASOL PROPIONATE 0.5 MG/G
OINTMENT TOPICAL ONCE
OUTPATIENT
Start: 2022-09-08 | End: 2022-09-08

## 2022-09-08 RX ORDER — LIDOCAINE 40 MG/G
CREAM TOPICAL ONCE
OUTPATIENT
Start: 2022-09-08 | End: 2022-09-08

## 2022-09-08 RX ORDER — LIDOCAINE 50 MG/G
OINTMENT TOPICAL ONCE
OUTPATIENT
Start: 2022-09-08 | End: 2022-09-08

## 2022-09-08 RX ORDER — BACITRACIN ZINC AND POLYMYXIN B SULFATE 500; 1000 [USP'U]/G; [USP'U]/G
OINTMENT TOPICAL ONCE
OUTPATIENT
Start: 2022-09-08 | End: 2022-09-08

## 2022-09-08 NOTE — PROGRESS NOTES
[address]   Progress Note and Procedure Note      [address] [facility] RECORD NUMBER:  [id number]  AGE: 62 y.o. GENDER: female  : 1960  EPISODE DATE:  2022    Subjective:     Chief Complaint   Patient presents with    Wound Check         HISTORY of PRESENT ILLNESS JOCE Lewis is a 62 y.o. female who presents today for wound/ulcer evaluation.    History of Wound Context: Pt with R ankle wound here for eval/treat  Wound/Ulcer Pain Timing/Severity: none  Quality of pain: N/A  Severity:  0 / 10   Modifying Factors: None  Associated Signs/Symptoms: none    Ulcer Identification:  Ulcer Type: non-healing surgical  Contributing Factors: edema    Wound: Surgical incision        PAST MEDICAL HISTORY        Diagnosis Date    Anxiety     Depression     Diabetes ([facility] [address]     Hypertension     Memory difficulty     Obesity 2021    Sleep apnea     c-pap       PAST SURGICAL HISTORY    Past Surgical History:   Procedure Laterality Date    ANKLE FRACTURE SURGERY Right 2021    ANKLE OPEN REDUCTION INTERNAL FIXATION WITH POSSIBLE EXTERNAL FIXATOR performed by Esa Melgar MD at [place] [address] Right 2022    RIGHT ANKLE HARDWARE REMOVAL WITH WOUND VAC performed by Esa Melgar MD at [facility] OR    GALLBLADDER SURGERY         FAMILY HISTORY    Family History   Problem Relation Age of Onset    Cancer Mother     Heart Attack Father        SOCIAL HISTORY    Social History     Tobacco Use    Smoking status: Never    Smokeless tobacco: Never   Vaping Use    Vaping Use: Never used   Substance Use Topics    Alcohol use: No    Drug use: No       ALLERGIES    Allergies   Allergen Reactions    Azithromycin Rash    Namenda  [Memantine Hcl] Nausea And Vomiting and Nausea Only       MEDICATIONS    Current Outpatient Medications on File Prior to Encounter   Medication Sig Dispense Refill    donepezil (ARICEPT) 10 MG tablet Take 1 tablet by mouth nightly 30 tablet 11    levocetirizine (XYZAL) 5 MG tablet Take 5 mg by mouth nightly       metFORMIN, OSM, (FORTAMET) 1000 MG extended release tablet Take 1,000 mg by mouth 2 times daily (with meals)       metoprolol succinate (TOPROL XL) 100 MG extended release tablet Take 100 mg by mouth nightly   1    escitalopram (LEXAPRO) 10 MG tablet Take 10 mg by mouth nightly       triamterene-hydrochlorothiazide (MAXZIDE-25) 37.5-25 MG per tablet Take 1 tablet by mouth nightly        No current facility-administered medications on file prior to encounter. REVIEW OF SYSTEMS    A comprehensive review of systems was negative. Objective: There were no vitals taken for this visit.     Wt Readings from Last 3 Encounters:   08/26/22 231 lb 3.2 oz (104.9 kg)   08/10/22 235 lb (106.6 kg)   07/19/22 235 lb (106.6 kg)       PHYSICAL EXAM    General Appearance: alert and oriented to person, place and time, well developed and well- nourished, in no acute distress  Skin: warm and dry, no rash or erythema  Head: normocephalic and atraumatic  Eyes: pupils equal, round, and reactive to light, extraocular eye movements intact, conjunctivae normal  ENT: tympanic membrane, external ear and ear canal normal bilaterally, nose without deformity, nasal mucosa and turbinates normal without polyps, lips teeth and gums normal  Neck: supple and non-tender without mass, no thyromegaly or thyroid nodules, no cervical lymphadenopathy  Pulmonary/Chest: clear to auscultation bilaterally- no wheezes, rales or rhonchi, normal air movement, no respiratory distress  Cardiovascular: normal rate, regular rhythm, normal S1 and S2, no murmurs, rubs, clicks, or gallops, distal pulses intact, no carotid bruits  Abdomen: soft, non-tender, non-distended, normal bowel sounds, no masses or organomegaly  Extremities: no cyanosis, clubbing or edema  Musculoskeletal: normal range of motion, no joint swelling, deformity or tenderness  Neurologic: reflexes normal and symmetric, no cranial nerve deficit, gait, coordination and speech normal, skin sensation normal      Assessment:      Patient Active Problem List   Diagnosis Code    Hypertension I10    Sleep apnea G47.30    Spider veins I78.1    Venous reflux I87.2    Diabetes ([facility]) E11.9    Colitis K52.9    Colon polyps K63.5    Diarrhea R19.7    Memory deficit R41.3    Nausea R11.0    Obesity E66.9    Gait instability R26.81    Closed right ankle fracture, initial encounter S82.891A    Post-operative wound abscess T81.49XA    Non-pressure chronic ulcer of right ankle with fat layer exposed ([facility]) L97.312             Wound 06/28/22 Ankle Lateral;Right Wound 1, right ankle, surgical (Active)   Wound Image   08/31/22 1413   Wound Etiology Surgical 09/08/22 1325   Dressing Status Dry 08/31/22 1413   Wound Cleansed Not Cleansed 08/31/22 1413   Dressing/Treatment Adhesive bandage 08/31/22 1413   Offloading for Diabetic Foot Ulcers Offloading not ordered 08/31/22 1413   Wound Length (cm) 0 cm 09/08/22 1325   Wound Width (cm) 0 cm 09/08/22 1325   Wound Depth (cm) 0 cm 09/08/22 1325   Wound Surface Area (cm^2) 0 cm^2 09/08/22 1325   Change in Wound Size % (l*w) 100 09/08/22 1325   Wound Volume (cm^3) 0 cm^3 09/08/22 1325   Wound Healing % 100 09/08/22 1325   Post-Procedure Length (cm) 0.1 cm 08/31/22 1408   Post-Procedure Width (cm) 0.1 cm 08/31/22 1408   Post-Procedure Depth (cm) 0.1 cm 08/31/22 1408   Post-Procedure Surface Area (cm^2) 0.01 cm^2 08/31/22 1408   Post-Procedure Volume (cm^3) 0.001 cm^3 08/31/22 1408   Distance Tunneling (cm) 0 cm 08/10/22 1128   Tunneling Position ___ O'Clock 0 08/10/22 1128   Undermining Starts ___ O'Clock 0 08/10/22 1128   Undermining Ends___ O'Clock 0 08/10/22 1128   Undermining Maxium Distance (cm) 0 08/10/22 1128   Wound Assessment Dry 08/24/22 1406   Drainage Amount None 08/31/22 1413   Drainage Description Serosanguinous 08/17/22 1334   Odor None 09/08/22 1325   Irina-wound Assessment Blanchable erythema 08/17/22 1334   Margins Attached edges 08/31/22 1413   Wound Thickness Description not for Pressure Injury Full thickness 08/31/22 1413   Number of days: 71             Plan:     Problem List Items Addressed This Visit       Post-operative wound abscess (Chronic)    * (Principal) Non-pressure chronic ulcer of right ankle with fat layer exposed ([address] - Primary (Chronic)    Relevant Orders    Initiate Outpatient Wound Care Protocol    Diabetes ([address] (Chronic)     Wound healed RTO 3 weeks well check. Wear stockings      Treatment Note please see attached Discharge Instructions    In my professional opinion this patient would benefit from HBO Therapy: No    Written patient dismissal instructions given to patient and signed by patient or POA. Discharge [address] and Hyper[facility]ic Oxygen Therapy   Physician Orders and Discharge Instructions  [address]  Telephone: [phone]    NAME:  Jake Briscoe:  1960  MEDICAL RECORD NUMBER:  [id number]  DATE:  9/8/2022    Discharge condition: Stable    Discharge to: Home    Left via:Private automobile    Accompanied by: Self    Accompanied by: Self     ECF/HHA:  [facility]      Dressing Orders: Right Lower Ext Ulcer  Xeroform to open area, cover with dry 2x2, secure with medipore tape  Cahnge daily   Wear compression stockings daily   Follow up with Orthopedic as directed    [facility] follow up visit ____________3 weeks_________________  (Please note your next appointment above and if you are unable to keep, kindly give a 24 hour notice.  Thank you.)    If you experience any of the following, please call the [address] [place] [address] during business hours:    * Increase in Pain  * Temperature over 101  * Increase in drainage from your wound  * Drainage with a foul odor  * Bleeding  * Increase in swelling  * Need for compression bandage changes due to slippage, breakthrough drainage. If you need medical attention outside of the business hours of the [address] [place] [address] Road please contact your PCP or go to the nearest emergency room.          Electronically signed by Valentino Sevilla MD on 9/8/2022 at 1:45 PM

## 2022-09-08 NOTE — DISCHARGE INSTRUCTIONS
[address] and [facility]   Physician Orders and Discharge Instructions  [address]  Telephone: [phone]    NAME:  Kyara Toney  YOB: 1960  MEDICAL RECORD NUMBER:  [id number]  DATE:  9/8/2022    Discharge condition: Stable    Discharge to: Home    Left via:Private automobile    Accompanied by: Self    Accompanied by: Self     [facility]/A:  [facility]      Dressing Orders: Right Lower Ext Ulcer  Xeroform to open area, cover with dry 2x2, secure with medipore tape  Cahnge daily   Wear compression stockings daily   Follow up with Orthopedic as directed    [facility] follow up visit ____________3 weeks_________________  (Please note your next appointment above and if you are unable to keep, kindly give a 24 hour notice. Thank you.)    If you experience any of the following, please call the [facility] during business hours:    * Increase in Pain  * Temperature over 101  * Increase in drainage from your wound  * Drainage with a foul odor  * Bleeding  * Increase in swelling  * Need for compression bandage changes due to slippage, breakthrough drainage. If you need medical attention outside of the business hours of the [facility] [address] please contact your PCP or go to the nearest emergency room.

## 2022-09-13 ENCOUNTER — ANESTHESIA (OUTPATIENT)
Dept: OPERATING ROOM | Age: 62
End: 2022-09-13

## 2022-09-13 ENCOUNTER — HOSPITAL ENCOUNTER (OUTPATIENT)
Age: 62
Setting detail: OUTPATIENT SURGERY
Discharge: HOME | End: 2022-09-13
Attending: INTERNAL MEDICINE | Admitting: INTERNAL MEDICINE
Payer: MEDICARE

## 2022-09-13 VITALS
BODY MASS INDEX: 36.96 KG/M2 | WEIGHT: 230 LBS | TEMPERATURE: 98.1 F | HEART RATE: 62 BPM | HEIGHT: 66 IN | OXYGEN SATURATION: 93 % | RESPIRATION RATE: 14 BRPM | DIASTOLIC BLOOD PRESSURE: 64 MMHG | SYSTOLIC BLOOD PRESSURE: 105 MMHG

## 2022-09-13 PROBLEM — D69.6 THROMBOCYTOPENIA (HCC): Status: ACTIVE | Noted: 2022-09-13

## 2022-09-13 PROBLEM — D47.2 MGUS (MONOCLONAL GAMMOPATHY OF UNKNOWN SIGNIFICANCE): Status: ACTIVE | Noted: 2022-09-13

## 2022-09-13 PROCEDURE — 38222 DX BONE MARROW BX & ASPIR: CPT | Performed by: NURSE PRACTITIONER

## 2022-09-13 PROCEDURE — 38221 DX BONE MARROW BIOPSIES: CPT

## 2022-09-13 RX ORDER — LIDOCAINE HYDROCHLORIDE 10 MG/ML
1 INJECTION, SOLUTION EPIDURAL; INFILTRATION; INTRACAUDAL; PERINEURAL
Status: DISCONTINUED | OUTPATIENT
Start: 2022-09-13 | End: 2022-09-13 | Stop reason: HOSPADM

## 2022-09-13 RX ORDER — LIDOCAINE HYDROCHLORIDE 10 MG/ML
INJECTION, SOLUTION EPIDURAL; INFILTRATION; INTRACAUDAL; PERINEURAL PRN
Status: DISCONTINUED | OUTPATIENT
Start: 2022-09-13 | End: 2022-09-13 | Stop reason: ALTCHOICE

## 2022-09-13 RX ORDER — PROPOFOL 10 MG/ML
INJECTION, EMULSION INTRAVENOUS PRN
Status: DISCONTINUED | OUTPATIENT
Start: 2022-09-13 | End: 2022-09-13 | Stop reason: SDUPTHER

## 2022-09-13 RX ORDER — LIDOCAINE HYDROCHLORIDE 10 MG/ML
INJECTION, SOLUTION INFILTRATION; PERINEURAL PRN
Status: DISCONTINUED | OUTPATIENT
Start: 2022-09-13 | End: 2022-09-13 | Stop reason: SDUPTHER

## 2022-09-13 RX ORDER — SODIUM CHLORIDE, SODIUM LACTATE, POTASSIUM CHLORIDE, CALCIUM CHLORIDE 600; 310; 30; 20 MG/100ML; MG/100ML; MG/100ML; MG/100ML
INJECTION, SOLUTION INTRAVENOUS CONTINUOUS
Status: DISCONTINUED | OUTPATIENT
Start: 2022-09-13 | End: 2022-09-13 | Stop reason: HOSPADM

## 2022-09-13 RX ADMIN — SODIUM CHLORIDE, SODIUM LACTATE, POTASSIUM CHLORIDE, CALCIUM CHLORIDE: 600; 310; 30; 20 INJECTION, SOLUTION INTRAVENOUS at 12:42

## 2022-09-13 RX ADMIN — LIDOCAINE HYDROCHLORIDE 30 MG: 10 INJECTION, SOLUTION INFILTRATION; PERINEURAL at 13:47

## 2022-09-13 RX ADMIN — PROPOFOL 150 MG: 10 INJECTION, EMULSION INTRAVENOUS at 13:47

## 2022-09-13 NOTE — H&P
POSSIBLE EXTERNAL FIXATOR performed by Silvana Rivera MD at [facility] [address] Right 6/28/2022    RIGHT ANKLE HARDWARE REMOVAL WITH WOUND VAC performed by Silvana Rivera MD at [address] [facility] [address]         Social History:  Social History     Tobacco Use    Smoking status: Never    Smokeless tobacco: Never   Vaping Use    Vaping Use: Never used   Substance Use Topics    Alcohol use: No    Drug use: No       Vital Signs:   Vitals:    09/13/22 1226   BP: 130/70   Pulse: 65   Resp: 18   Temp: 98.1 °F (36.7 °C)   SpO2: 94%       Physical Exam:  Cardiac:  [x]WNL []Comments:  Pulmonary:  [x]WNL   []Comments:  Neuro/Mental Status:  [x]WNL  []Comments:  Abdominal:  [x]WNL    []Comments:  Other:   []WNL  []Comments:    Informed Consent:  The risks and benefits of the procedure have been discussed with either the patient or if they cannot consent, their representative. Assessment:  Patient examined and appropriate for planned sedation and procedure. Plan:  Proceed with bone marrow aspirate and biopsy today.     SWAPNA Henao  09/13/22  1:32 PM

## 2022-09-13 NOTE — ANESTHESIA POSTPROCEDURE EVALUATION
Department of Anesthesiology  Postprocedure Note    Patient: Robbin Ribera  MRN: [id number]  YOB: 1960  Date of evaluation: 9/13/2022      Procedure Summary     Date: 09/13/22 Room / Location: [facility] ENDO 01 / [address]    Anesthesia Start: 0206 Anesthesia Stop: 5220    Procedure: BONE MARROW ASPIRATION BIOPSY (Pelvis) Diagnosis:        Thrombocytopenia ([address]      (Thrombocytopenia ([address] [D69.6])    Surgeons: SWAPNA Driver Responsible Provider: SWAPNA Davis - HAYLEE    Anesthesia Type: MAC ASA Status: 3          Anesthesia Type: MAC    Case Phase I:      Case Phase II:        Anesthesia Post Evaluation    Patient location during evaluation: bedside  Patient participation: complete - patient participated  Level of consciousness: awake  Pain score: 0  Airway patency: patent  Nausea & Vomiting: no nausea and no vomiting  Complications: no  Cardiovascular status: hemodynamically stable  Respiratory status: acceptable, room air and spontaneous ventilation  Hydration status: euvolemic

## 2022-09-13 NOTE — OP NOTE
Pt Name: Emily Jensen  YOB: 1960  MRN: [id number]    Date of procedure: 9/13/2022    Procedure Note:  Bone Marrow Aspirate and Biopsy    Indication:  IgA MGUS    Site: Right iliac crest    Informed consent was signed by Emily Jensen. Time out was taken. Emily Jensen was placed in the left lateral decubitus position. Emily Jensen was prepped and draped in sterile fashion. 5 mL of 1% Lidocaine was used for local anesthetic of the skin and right periosteum. A bone marrow aspirate and biopsy was obtained and sent for surgical pathology review, flow cytometry, and chromosome analysis. The total blood loss was less than 3 mL. The skin was cleaned and a sterile bandage was placed on the entrance site. The patient tolerated the procedure without complication. Keep scheduled appointment in clinic with David Garay PA-C to review results.     SWAPNA Baires    09/13/22  2:03 PM

## 2022-09-13 NOTE — ANESTHESIA PRE PROCEDURE
Department of Anesthesiology  Preprocedure Note       Name:  Kyara Toney   Age:  62 y.o.  :  1960                                          MRN:  [id number]         Date:  2022      Surgeon: Jo Walsh):  Ruben Rivera PA-C    Procedure: Procedure(s):  BONE MARROW ASPIRATION BIOPSY    Medications prior to admission:   Prior to Admission medications    Medication Sig Start Date End Date Taking? Authorizing Provider   donepezil (ARICEPT) 10 MG tablet Take 1 tablet by mouth nightly 22   SWAPNA Edmondson   levocetirizine (XYZAL) 5 MG tablet Take 5 mg by mouth nightly  22   Historical Provider, MD   metFORMIN, OSM, (FORTAMET) 1000 MG extended release tablet Take 1,000 mg by mouth 2 times daily (with meals)  22   Historical Provider, MD   metoprolol succinate (TOPROL XL) 100 MG extended release tablet Take 100 mg by mouth nightly  18   Historical Provider, MD   escitalopram (LEXAPRO) 10 MG tablet Take 10 mg by mouth nightly     Historical Provider, MD   triamterene-hydrochlorothiazide (MAXZIDE-25) 37.5-25 MG per tablet Take 1 tablet by mouth nightly     Historical Provider, MD       Current medications:    No current facility-administered medications for this encounter. Allergies:     Allergies   Allergen Reactions    Azithromycin Rash    Namenda  [Memantine Hcl] Nausea And Vomiting and Nausea Only       Problem List:    Patient Active Problem List   Diagnosis Code    Hypertension I10    Sleep apnea G47.30    Spider veins I78.1    Venous reflux I87.2    Diabetes ([address] E11.9    Colitis K52.9    Colon polyps K63.5    Diarrhea R19.7    Memory deficit R41.3    Nausea R11.0    Obesity E66.9    Gait instability R26.81    Closed right ankle fracture, initial encounter S82.891A    Post-operative wound abscess T81.49XA    Non-pressure chronic ulcer of right ankle with fat layer exposed ([address] L97.312       Past Medical History:        Diagnosis Date    Anxiety     03/07/2013 02:20 PM    CALCIUM 9.8 08/26/2022 11:37 AM    BILITOT 0.7 08/26/2022 11:37 AM    ALKPHOS 140 08/26/2022 11:37 AM    AST 52 08/26/2022 11:37 AM    ALT 28 08/26/2022 11:37 AM       POC Tests: No results for input(s): POCGLU, POCNA, POCK, POCCL, POCBUN, POCHEMO, POCHCT in the last 72 hours. Coags: No results found for: PROTIME, INR, APTT    HCG (If Applicable): No results found for: PREGTESTUR, PREGSERUM, HCG, HCGQUANT     ABGs: No results found for: PHART, PO2ART, EKK0AUX, APT8TGO, BEART, Z8ONFPTJ     Type & Screen (If Applicable):  No results found for: LABABO, LABRH    Drug/Infectious Status (If Applicable):  No results found for: HIV, HEPCAB    COVID-19 Screening (If Applicable):   Lab Results   Component Value Date/Time    COVID19 Not Detected 12/19/2021 02:35 PM           Anesthesia Evaluation  Patient summary reviewed and Nursing notes reviewed  Airway: Mallampati: II  TM distance: >3 FB   Neck ROM: full  Mouth opening: > = 3 FB   Dental: normal exam         Pulmonary:normal exam    (+) sleep apnea:                             Cardiovascular:Negative CV ROS  Exercise tolerance: poor (<4 METS),   (+) hypertension:,          Beta Blocker:  Dose within 24 Hrs         Neuro/Psych:   (+) psychiatric history:            GI/Hepatic/Renal: Neg GI/Hepatic/Renal ROS            Endo/Other:    (+) Diabetes, . Abdominal:             Vascular: negative vascular ROS. Other Findings:           Anesthesia Plan      MAC     ASA 3       Induction: intravenous. Anesthetic plan and risks discussed with patient. Plan discussed with CRNA.                     SWAPNA Greco - HAYLEE   9/13/2022

## 2022-09-15 ENCOUNTER — OFFICE VISIT (OUTPATIENT)
Dept: SURGERY | Facility: CLINIC | Age: 62
End: 2022-09-15

## 2022-09-15 VITALS
SYSTOLIC BLOOD PRESSURE: 121 MMHG | DIASTOLIC BLOOD PRESSURE: 67 MMHG | HEIGHT: 66 IN | BODY MASS INDEX: 37.12 KG/M2 | HEART RATE: 68 BPM | WEIGHT: 231 LBS

## 2022-09-15 DIAGNOSIS — N60.91 BENIGN MAMMARY DYSPLASIA OF RIGHT BREAST: Primary | ICD-10-CM

## 2022-09-15 DIAGNOSIS — Z12.31 SCREENING MAMMOGRAM FOR HIGH-RISK PATIENT: ICD-10-CM

## 2022-09-15 PROCEDURE — 99213 OFFICE O/P EST LOW 20 MIN: CPT | Performed by: SPECIALIST

## 2022-09-15 NOTE — PROGRESS NOTES
Patient: Geneva Knight    YOB: 1960    Date: 09/15/2022    Primary Care Provider: Baljinder Alatorre MD    Chief Complaint   Patient presents with   • yearly breast exam     Mrs. Knight is here for a yearly breast exam.        Subjective .     History of present illness:   She is here for yearly follow-up    The following portions of the patient's history were reviewed and updated as appropriate: allergies, current medications, past family history, past medical history, past social history, past surgical history and problem list.    History:  Past Medical History:   Diagnosis Date   • Ankle fracture     right   • Cellulitis     Right ankle   • Diabetes mellitus (HCC)    • GERD (gastroesophageal reflux disease)    • HTN (hypertension)    • Hyperlipemia    • VIOLETA (obstructive sleep apnea)     cpap          Past Surgical History:   Procedure Laterality Date   • BREAST BIOPSY Right 8/30/2021    Procedure: EXCISIONAL RIGHT BREAST BIOPSY WITH NEEDLE LOCALIZATION - ULTRASOUND GUIDED;  Surgeon: Martine Roy MD;  Location: [facility] OR;  Service: General;  Laterality: Right;   • CHOLECYSTECTOMY     • COLONOSCOPY  07/25/2013   • COLONOSCOPY N/A 12/18/2017    Procedure: COLONOSCOPY WITH ANESTHESIA;  Surgeon: Maggi Arias MD;  Location: [facility] ENDOSCOPY;  Service:    • ORIF ANKLE FRACTURE Right        Family History   Problem Relation Age of Onset   • Breast cancer Mother    • Breast cancer Maternal Grandmother    • Colon cancer Neg Hx    • Colon polyps Neg Hx        Social History     Tobacco Use   • Smoking status: Never Smoker   • Smokeless tobacco: Never Used   Vaping Use   • Vaping Use: Never used   Substance Use Topics   • Alcohol use: No   • Drug use: Never       Allergies:  No Known Allergies    Medications:     Current Outpatient Medications:   •  diltiaZEM CD (CARDIZEM CD) 360 MG 24 hr capsule, Take 360 mg by mouth Daily., Disp: , Rfl:   •  donepezil (ARICEPT) 10 MG tablet, Take 10 mg by mouth  "Every Night., Disp: , Rfl:   •  escitalopram (LEXAPRO) 10 MG tablet, Take 10 mg by mouth Daily., Disp: , Rfl:   •  HYDROcodone-acetaminophen (NORCO) 7.5-325 MG per tablet, Take 1 tablet by mouth Every 4 (Four) Hours As Needed for Moderate Pain  (Pain)., Disp: 15 tablet, Rfl: 0  •  ketoconazole (NIZORAL) 2 % cream, Apply 1 application topically to the appropriate area as directed Daily., Disp: , Rfl:   •  metoprolol succinate XL (TOPROL-XL) 100 MG 24 hr tablet, Take 100 mg by mouth Every Night., Disp: , Rfl:   •  SITagliptin-metFORMIN HCl ER (Janumet XR) 100-1000 MG tablet, Take 1 tablet by mouth Daily., Disp: , Rfl:   •  triamcinolone (KENALOG) 0.1 % ointment, Apply 1 application topically to the appropriate area as directed 4 (Four) Times a Day., Disp: , Rfl:     Current Facility-Administered Medications:   •  lidocaine (XYLOCAINE) 1 % injection 10 mL, 10 mL, Subcutaneous, Once, Jose Cruz Shearer MD  •  lidocaine 1% - EPINEPHrine 1:[id number] (XYLOCAINE W/EPI) 1 %-1:554157 injection 10 mL, 10 mL, Injection, Once, Jose Cruz Shearer MD    Vital Signs:   Vitals:    09/15/22 0912   BP: 121/67   BP Location: Left arm   Patient Position: Sitting   Cuff Size: Adult   Pulse: 68   Weight: 105 kg (231 lb)   Height: 167.6 cm (66\")       No changes in her health.  She had another foot surgery for removal of infected hardware.  This is doing well.  On exam, she has global bilateral breast tenderness.  She has some scarring from her prior breast biopsy on the right.  There is no dominant masses skin changes.  Breast tissue is dense.  No axillary supraclavicular adenopathy.    Results Review:   I reviewed the patient's new clinical results.        Assessment / Plan:    Diagnoses and all orders for this visit:    1. Benign mammary dysplasia of right breast (Primary)        Class 2 Severe Obesity (BMI >=35 and <=39.9). Obesity-related health conditions include the following: hypertension. Obesity is unchanged. BMI is is above " average; no BMI management plan is appropriate. We discussed portion control and increasing exercise     We will continue yearly mammograms and exams.      Electronically signed by Martine Roy MD  09/15/22  09:29 CDT

## 2022-09-29 ENCOUNTER — HOSPITAL ENCOUNTER (OUTPATIENT)
Dept: WOUND CARE | Age: 62
Discharge: HOME | End: 2022-09-29
Payer: MEDICARE

## 2022-09-29 VITALS
HEART RATE: 76 BPM | HEIGHT: 66 IN | SYSTOLIC BLOOD PRESSURE: 123 MMHG | DIASTOLIC BLOOD PRESSURE: 70 MMHG | WEIGHT: 230 LBS | RESPIRATION RATE: 18 BRPM | TEMPERATURE: 97.3 F | BODY MASS INDEX: 36.96 KG/M2

## 2022-09-29 DIAGNOSIS — L97.312 NON-PRESSURE CHRONIC ULCER OF RIGHT ANKLE WITH FAT LAYER EXPOSED (HCC): Primary | ICD-10-CM

## 2022-09-29 PROCEDURE — 99212 OFFICE O/P EST SF 10 MIN: CPT | Performed by: SURGERY

## 2022-09-29 PROCEDURE — 99212 OFFICE O/P EST SF 10 MIN: CPT

## 2022-09-29 RX ORDER — LIDOCAINE HYDROCHLORIDE 20 MG/ML
JELLY TOPICAL ONCE
OUTPATIENT
Start: 2022-09-29 | End: 2022-09-29

## 2022-09-29 RX ORDER — LIDOCAINE 50 MG/G
OINTMENT TOPICAL ONCE
OUTPATIENT
Start: 2022-09-29 | End: 2022-09-29

## 2022-09-29 RX ORDER — BACITRACIN, NEOMYCIN, POLYMYXIN B 400; 3.5; 5 [USP'U]/G; MG/G; [USP'U]/G
OINTMENT TOPICAL ONCE
OUTPATIENT
Start: 2022-09-29 | End: 2022-09-29

## 2022-09-29 RX ORDER — LIDOCAINE HYDROCHLORIDE 40 MG/ML
SOLUTION TOPICAL ONCE
OUTPATIENT
Start: 2022-09-29 | End: 2022-09-29

## 2022-09-29 RX ORDER — BACITRACIN ZINC AND POLYMYXIN B SULFATE 500; 1000 [USP'U]/G; [USP'U]/G
OINTMENT TOPICAL ONCE
OUTPATIENT
Start: 2022-09-29 | End: 2022-09-29

## 2022-09-29 RX ORDER — GENTAMICIN SULFATE 1 MG/G
OINTMENT TOPICAL ONCE
OUTPATIENT
Start: 2022-09-29 | End: 2022-09-29

## 2022-09-29 RX ORDER — GINSENG 100 MG
CAPSULE ORAL ONCE
OUTPATIENT
Start: 2022-09-29 | End: 2022-09-29

## 2022-09-29 RX ORDER — BETAMETHASONE DIPROPIONATE 0.05 %
OINTMENT (GRAM) TOPICAL ONCE
OUTPATIENT
Start: 2022-09-29 | End: 2022-09-29

## 2022-09-29 RX ORDER — LIDOCAINE 40 MG/G
CREAM TOPICAL ONCE
OUTPATIENT
Start: 2022-09-29 | End: 2022-09-29

## 2022-09-29 RX ORDER — CLOBETASOL PROPIONATE 0.5 MG/G
OINTMENT TOPICAL ONCE
OUTPATIENT
Start: 2022-09-29 | End: 2022-09-29

## 2022-09-29 NOTE — DISCHARGE INSTRUCTIONS
[address] [facility]   Physician Orders and Discharge Instructions  [address]  Telephone: [phone]    NAME:  Kyara Toney  YOB: 1960  MEDICAL RECORD NUMBER:  [id number]  DATE:  9/29/2022    Discharge condition: Stable    Discharge to: Home    Left via:Private automobile    Accompanied by:  self    ECF/HHA:     Dressing Orders:     Treatment Orders:  Moisturize and protect skin with lotion as needed. [facility] follow up visit ____________as needed_________________  (Please note your next appointment above and if you are unable to keep, kindly give a 24 hour notice. Thank you.)          If you experience any of the following, please call the [facility] during business hours:    * Increase in Pain  * Temperature over 101  * Increase in drainage from your wound  * Drainage with a foul odor  * Bleeding  * Increase in swelling  * Need for compression bandage changes due to slippage, breakthrough drainage. If you need medical attention outside of the business hours of the [facility] please contact your PCP or go to the nearest emergency room.

## 2022-09-29 NOTE — PLAN OF CARE
Problem: Chronic Conditions and Co-morbidities  Goal: Patient's chronic conditions and co-morbidity symptoms are monitored and maintained or improved  Outcome: Progressing     Problem: Discharge Planning  Goal: Discharge to home or other facility with appropriate resources  Outcome: Progressing     Problem: Safety - Adult  Goal: Free from fall injury  Outcome: Progressing     Problem: Wound:  Goal: Will show signs of wound healing; wound closure and no evidence of infection  Description: Will show signs of wound healing; wound closure and no evidence of infection  Outcome: Progressing     Problem: Weight control:  Goal: Ability to maintain an optimal weight for height and age will be supported  Description: Ability to maintain an optimal weight for height and age will be supported  Outcome: Progressing     Problem: Falls - Risk of:  Goal: Will remain free from falls  Description: Will remain free from falls  Outcome: Progressing     Problem: Blood Glucose:  Goal: Ability to maintain appropriate glucose levels will improve  Description: Ability to maintain appropriate glucose levels will improve  Outcome: Progressing

## 2022-09-29 NOTE — PROGRESS NOTES
[address]   Progress Note and Procedure Note      [address] [facility] RECORD NUMBER:  [id number]  AGE: 62 y.o. GENDER: female  : 1960  EPISODE DATE:  2022    Subjective:     Chief Complaint   Patient presents with    Wound Check         HISTORY of PRESENT ILLNESS HPI     Poncho Barahona is a 62 y.o. female who presents today for wound/ulcer evaluation.    History of Wound Context: Pt with R ankle surgical wound here for eval/treat  Wound/Ulcer Pain Timing/Severity: none  Quality of pain: N/A  Severity:  0 / 10   Modifying Factors: None  Associated Signs/Symptoms: edema    Ulcer Identification:  Ulcer Type: non-healing surgical  Contributing Factors: edema    Wound: Nonhealing surgical due to infection        PAST MEDICAL HISTORY        Diagnosis Date    Anxiety     Depression     Diabetes [address] [address]     Hypertension     Memory difficulty     Obesity 2021    Sleep apnea     c-pap       PAST SURGICAL HISTORY    Past Surgical History:   Procedure Laterality Date    ANKLE FRACTURE SURGERY Right 2021    ANKLE OPEN REDUCTION INTERNAL FIXATION WITH POSSIBLE EXTERNAL FIXATOR performed by Azar Pritchett MD at [place] [address] Right 2022    RIGHT ANKLE HARDWARE REMOVAL WITH WOUND VAC performed by Azar [facility]ritchett [facility] at [facility] OR    BONE MARROW BIOPSY N/A 2022    BONE MARROW ASPIRATION BIOPSY performed by SWAPNA Earl at [facility] OR    GALLBLADDER SURGERY         FAMILY HISTORY    Family History   Problem Relation Age of Onset    Cancer Mother     Heart Attack Father        SOCIAL HISTORY    Social History     Tobacco Use    Smoking status: Never    Smokeless tobacco: Never   Vaping Use    Vaping Use: Never used   Substance Use Topics    Alcohol use: No    Drug use: No       ALLERGIES    Allergies   Allergen Reactions    Azithromycin Rash    Namenda  [Memantine Hcl] Nausea And Vomiting and Nausea Only       MEDICATIONS    Current Outpatient Medications on File Prior to Encounter   Medication Sig Dispense Refill    empagliflozin (JARDIANCE) 25 MG tablet Take 25 mg by mouth daily      donepezil (ARICEPT) 10 MG tablet Take 1 tablet by mouth nightly 30 tablet 11    levocetirizine (XYZAL) 5 MG tablet Take 5 mg by mouth nightly       metFORMIN, OSM, (FORTAMET) 1000 MG extended release tablet Take 1,000 mg by mouth 2 times daily (with meals)       metoprolol succinate (TOPROL XL) 100 MG extended release tablet Take 100 mg by mouth nightly   1    escitalopram (LEXAPRO) 10 MG tablet Take 10 mg by mouth nightly       triamterene-hydrochlorothiazide (MAXZIDE-25) 37.5-25 MG per tablet Take 1 tablet by mouth nightly        No current facility-administered medications on file prior to encounter. REVIEW OF SYSTEMS    A comprehensive review of systems was negative.     Objective:      /70   Pulse 76   Temp 97.3 °F (36.3 °C) (Temporal)   Resp 18   Ht 5' 6\" (1.676 m)   Wt 230 lb (104.3 kg)   BMI 37.12 kg/m²     Wt Readings from Last 3 Encounters:   09/29/22 230 lb (104.3 kg)   09/13/22 230 lb (104.3 kg)   08/26/22 231 lb 3.2 oz (104.9 kg)       PHYSICAL EXAM    General Appearance: alert and oriented to person, place and time, well developed and well- nourished, in no acute distress  Skin: warm and dry, no rash or erythema  Head: normocephalic and atraumatic  Eyes: pupils equal, round, and reactive to light, extraocular eye movements intact, conjunctivae normal  ENT: tympanic membrane, external ear and ear canal normal bilaterally, nose without deformity, nasal mucosa and turbinates normal without polyps, lips teeth and gums normal  Neck: supple and non-tender without mass, no thyromegaly or thyroid nodules, no cervical lymphadenopathy  Pulmonary/Chest: clear to auscultation bilaterally- no wheezes, rales or rhonchi, normal air movement, no respiratory distress  Cardiovascular: normal rate, regular rhythm, normal S1 and S2, no murmurs, rubs, clicks, or gallops, distal pulses intact, no carotid bruits  Abdomen: soft, non-tender, non-distended, normal bowel sounds, no masses or organomegaly  Extremities: no cyanosis, clubbing or edema  Musculoskeletal: normal range of motion, no joint swelling, deformity or tenderness  Neurologic: reflexes normal and symmetric, no cranial nerve deficit, gait, coordination and speech normal, skin sensation normal      Assessment:      Patient Active Problem List   Diagnosis Code    Hypertension I10    Sleep apnea G47.30    Spider veins I78.1    Venous reflux I87.2    Diabetes ([facility]) E11.9    Colitis K52.9    Colon polyps K63.5    Diarrhea R19.7    Memory deficit R41.3    Nausea R11.0    Obesity E66.9    Gait instability R26.81    Closed right ankle fracture, initial encounter S82.891A    Post-operative wound abscess T81.49XA    Non-pressure chronic ulcer of right ankle with fat layer exposed ([facility]) L97.312    MGUS (monoclonal gammopathy of unknown significance) D47.2    Thrombocytopenia ([facility]) D69.6        Incision 09/13/22 Sacrum (Active)   Number of days: 15       Wound 06/28/22 Ankle Lateral;Right Wound 1, right ankle, surgical (Active)   Wound Image   09/29/22 1314   Wound Etiology Surgical 09/29/22 1314   Dressing Status Dry 09/29/22 1314   Wound Cleansed Not Cleansed 09/29/22 1314   Dressing/Treatment Adhesive bandage 08/31/22 1413   Offloading for Diabetic Foot Ulcers Offloading not ordered 09/29/22 1314   Wound Length (cm) 0 cm 09/29/22 1314   Wound Width (cm) 0 cm 09/29/22 1314   Wound Depth (cm) 0 cm 09/29/22 1314   Wound Surface Area (cm^2) 0 cm^2 09/29/22 1314   Change in Wound Size % (l*w) 100 09/29/22 1314   Wound Volume (cm^3) 0 cm^3 09/29/22 1314   Wound Healing % 100 09/29/22 1314   Post-Procedure Length (cm) 0.1 cm 08/31/22 1408   Post-Procedure Width (cm) 0.1 cm 08/31/22 1408   Post-Procedure Depth (cm) 0.1 cm 08/31/22 1408   Post-Procedure Surface Area (cm^2) 0.01 cm^2 08/31/22 1408   Post-Procedure Volume (cm^3) 0.001 cm^3 08/31/22 1408   Distance Tunneling (cm) 0 cm 08/10/22 1128   Tunneling Position ___ O'Clock 0 08/10/22 1128   Undermining Starts ___ O'Clock 0 08/10/22 1128   Undermining Ends___ O'Clock 0 08/10/22 1128   Undermining Maxium Distance (cm) 0 08/10/22 1128   Wound Assessment Dry 08/24/22 1406   Drainage Amount None 09/29/22 1314   Drainage Description Serosanguinous 08/17/22 1334   Odor None 09/29/22 1314   Irina-wound Assessment Blanchable erythema 09/29/22 1314   Margins Attached edges 09/29/22 1314   Wound Thickness Description not for Pressure Injury Full thickness 09/29/22 1314   Number of days: 92             Plan:     Problem List Items Addressed This Visit       * (Principal) Non-pressure chronic ulcer of right ankle with fat layer exposed ([address] - Primary (Chronic)    Relevant Orders    Initiate Outpatient Wound Care Protocol     Wound healed RTO PRN      Treatment Note please see attached Discharge Instructions    In my professional opinion this patient would benefit from HBO Therapy: No    Written patient dismissal instructions given to patient and signed by patient or POA.              Electronically signed by Reza Flores MD on 9/29/2022 at 1:27 PM

## 2022-10-11 NOTE — PROGRESS NOTES
Progress Note      Pt Name: Shanna Davis  YOB: 1960  MRN: [id number]    Date of evaluation: 10/12/2022  History Obtained From:  patient, electronic medical record    CHIEF COMPLAINT:    Chief Complaint   Patient presents with    Follow-up     Gammopathy     Current active problems  Thrombocytopenia  Splenomegaly  Hepatic steatosis  Prior EBV    HISTORY OF PRESENT ILLNESS:    Shanna Davis is a 62 y.o.  female followed in the office with mild thrombocytopenia, IgA kappa monoclonal gammopathy since 7/28/2020. She does have mild splenomegaly and hepatic steatosis. She did have bone marrow aspiration biopsy performed and is here for reevaluation. She denies any transfusions, bleeding since her last visit. She does have moderate fatigue issues. She is diabetic on Jardiance 25 daily, Fortamet 1000 twice daily with a stable A1c. Blood pressure stable on her Toprol- nightly, Maxide 37.5-25 nightly. She continues taking Aricept 10 mg nightly due to memory issues and is followed by Dr. Nicky Hill. Mood is stable on her Lexapro 10 mg nightly. HEMATOLOGY HISTORY: Thrombocytopenia, splenomegaly, hepatic steatosis, gammopathy  Brooke Koyanagi was seen in initial hematology consultation on 7/28/2020 referred by SWAPNA Jarvis for evaluation of thrombocytopenia and splenomegaly. Serology 6/30/2020  B12 - 852  CMP -alk phos 135, AST 52, ALT 38     Review prior imaging studies  CT abdomen pelvis with contrast at [facility] 8/14/2018 compared to 11/21/2017 revealed hepato-steatosis but spleen read as-no splenomegaly     Current imaging study  Ultrasound liver 7/24/2020 at [facility] was compared to CT of the abdomen 8/14/2018 which revealed moderate diffuse fatty infiltration of the liver without focal lesion. Doppler sonography suggested a normal hepato-pedal portal venous circulation.   Splenomegaly measuring 14.4 x 9.5 x 14.6 cm     Her initial CBC in the office on 7/28/2020 revealed a normal WBC of 5.38 with normal percent differential.  Hgb is 14.6 with MCV 94.1. PLT is 116,000. 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 I discussed the fact that her platelet count is adequate at 116,000. She denies any significant bruising or bleeding. She denies any night sweats, weight loss, extreme fatigue, pruritus. Baseline serology will be checked however she will most likely be monitored conservatively.     Serology 7/28/2020  Fibrinogen - 261  PT/INR 12.2/1.2  PTT - 31  Folate - 18.2  Copper - 91  Zinc - 75  Antiplatelet ab - Negative  SPEP - No M spike -with immunofixation negative  QI - IgG 1232, IgA 422 (), IgM 78  Free serum light chains -   B2 M - 2.1    EBV titers  EBV Ab VCA, IgM  <36  EBV Ab VCA, IgG 136  EBV nuclear antigen Ab, IgG < 18    Consistent with prior infection    Serology 10/27/2020  SPEP - no M spike with immunofixation negative  QI - IgG 1227, IgA 390, IgM 69  Free serum light chains -kappa 31.3, K/L ratio 2.27  B2M - 2.2 - WNL  CMP = crt 0.6 with GFR > 60, AST 56, total bili 1.5    HIV 1/2 - nonreactive  Hepatitis A, B, C - negative        Past Medical History:   Diagnosis Date    Anxiety     Depression     Diabetes ([facility] [address]     Hypertension     Memory difficulty     Obesity 4/21/2021    Sleep apnea     c-pap        Past Surgical History:   Procedure Laterality Date    ANKLE FRACTURE SURGERY Right 4/21/2021    ANKLE OPEN REDUCTION INTERNAL FIXATION WITH POSSIBLE EXTERNAL FIXATOR performed by Margarita Fagan MD at [place] [address] Right 6/28/2022    RIGHT ANKLE HARDWARE REMOVAL WITH WOUND VAC performed by Margarita Fagan MD at [address] [place] [address] N/A 9/13/2022    BONE MARROW ASPIRATION BIOPSY performed by SWAPNA Villar at [address] [place] [address]             Current Outpatient Medications:     empagliflozin (JARDIANCE) 25 MG tablet, Take 25 mg by mouth daily, Disp: , Rfl: donepezil (ARICEPT) 10 MG tablet, Take 1 tablet by mouth nightly, Disp: 30 tablet, Rfl: 11    levocetirizine (XYZAL) 5 MG tablet, Take 5 mg by mouth nightly , Disp: , Rfl:     metFORMIN, OSM, (FORTAMET) 1000 MG extended release tablet, Take 1,000 mg by mouth 2 times daily (with meals) , Disp: , Rfl:     metoprolol succinate (TOPROL XL) 100 MG extended release tablet, Take 100 mg by mouth nightly , Disp: , Rfl: 1    escitalopram (LEXAPRO) 10 MG tablet, Take 10 mg by mouth nightly , Disp: , Rfl:     triamterene-hydrochlorothiazide (MAXZIDE-25) 37.5-25 MG per tablet, Take 1 tablet by mouth nightly , Disp: , Rfl:      Allergies   Allergen Reactions    Azithromycin Rash    Namenda  [Memantine Hcl] Nausea And Vomiting and Nausea Only       Social History     Tobacco Use    Smoking status: Never    Smokeless tobacco: Never   Vaping Use    Vaping Use: Never used   Substance Use Topics    Alcohol use: No    Drug use: No       Family History   Problem Relation Age of Onset    Cancer Mother     Heart Attack Father        Subjective   REVIEW OF SYSTEMS:   Review of Systems   Constitutional:  Positive for fatigue (Moderate). Negative for chills, diaphoresis, fever and unexpected weight change. HENT:  Negative for mouth sores, nosebleeds, sore throat, trouble swallowing and voice change. Eyes:  Negative for photophobia, discharge and itching. Respiratory:  Negative for cough, shortness of breath and wheezing. Cardiovascular:  Negative for chest pain, palpitations and leg swelling. Gastrointestinal:  Negative for abdominal distention, abdominal pain, blood in stool, constipation, diarrhea, nausea and vomiting. Endocrine: Negative for cold intolerance, heat intolerance, polydipsia and polyuria. Genitourinary:  Negative for difficulty urinating, dysuria, hematuria and urgency. Musculoskeletal:  Positive for arthralgias (Right ankle). Negative for back pain, joint swelling and myalgias.    Skin:  Positive for wound (Right leg basically resolved). Negative for rash. Neurological:  Negative for dizziness, tremors, seizures, syncope and light-headedness. Hematological:  Negative for adenopathy. Does not bruise/bleed easily. Psychiatric/Behavioral:  Positive for decreased concentration. Negative for behavioral problems and suicidal ideas. The patient is not nervous/anxious. All other systems reviewed and are negative. Objective   BP (!) 140/64   Pulse 77   Ht 5' 6\" (1.676 m)   Wt 230 lb 4.8 oz (104.5 kg)   SpO2 96%   BMI 37.17 kg/m²     PHYSICAL EXAM:  Physical Exam  Constitutional:       Appearance: She is well-developed. HENT:      Head: Normocephalic and atraumatic. Eyes:      General: No scleral icterus. Conjunctiva/sclera: Conjunctivae normal.   Neck:      Trachea: No tracheal deviation. Cardiovascular:      Rate and Rhythm: Normal rate and regular rhythm. Heart sounds: Normal heart sounds. No murmur heard. Pulmonary:      Effort: Pulmonary effort is normal. No respiratory distress. Breath sounds: Normal breath sounds. Abdominal:      General: Bowel sounds are normal. There is no distension. Palpations: Abdomen is soft. There is hepatomegaly (Minimal tenderness). There is no fluid wave or splenomegaly (Not obviously palpable). Tenderness: There is no abdominal tenderness. Musculoskeletal:         General: No tenderness or deformity. Cervical back: Normal range of motion and neck supple. Lymphadenopathy:      Cervical: No cervical adenopathy. Upper Body:      Right upper body: No supraclavicular or axillary adenopathy. Left upper body: No supraclavicular or axillary adenopathy. Lower Body: No right inguinal adenopathy. No left inguinal adenopathy. Skin:     General: Skin is warm and dry. Findings: No erythema or rash. Neurological:      Mental Status: She is alert and oriented to person, place, and time.       Coordination: Coordination normal. Psychiatric:         Behavior: Behavior normal.         Thought Content: Thought content normal.         Cognition and Memory: She exhibits impaired recent memory. CBC reviewed by me  Lab Results   Component Value Date    WBC 4.64 10/12/2022    HGB 12.8 10/12/2022    HCT 40.2 10/12/2022    MCV 90.1 10/12/2022     (L) 10/12/2022     Lab Results   Component Value Date    NEUTROABS 2.40 10/12/2022       VISIT DIAGNOSES  1. Gammopathy    2. MGUS (monoclonal gammopathy of unknown significance)    3. Thrombocytopenia (HCC)    4. Splenomegaly    5. Hepatic steatosis    6. Other fatigue    7. Other abnormality of red blood cells         ASSESSMENT/PLAN:    1. Thrombocytopenia/splenomegaly - status post bone marrow requiring discussion    Serology 6/22/2021  KSENIA 2 - [id number] negative  CALR mutation - negative   KSENIA 2 Exons 12-15 - negative  MPL - negative    Serology above negative for MPN causing splenomegaly. Peripheral blood flow cytometry to [facility] 6/22/2021 did not reveal any increase in blast, no B-cell monoclonality and no T-cell aberrant antigen expression      [facility] LIVER at [facility] 3/16/2022  Hepatic steatosis  Hepatosplenomegaly -hepatic right lobe measures 16.5 cm, spleen measures 14.1 x 6.7 x 14.3 cm    Ultrasound on 3/16/2022 actually showed stability to possible improvement of splenomegaly noted on prior ultrasound of 7/24/2020. Direct comparison was not given. Serology 8/26/2022  MAGALYS - negative    Bone marrow 9/13/2022  Normocellular (40% cellularity) bone marrow with normal trilineage hematopoiesis and adequate megakaryopoiesis  No significant dyspoiesis and no increased blasts seen  No increase in plasma cells (~2-3% on  stain)  Normal female karyotype  Comment: Thrombocytopenia along with adequate number of megakaryocytes is consistent with peripheral destruction/sequestration of platelets.  The differential diagnosis for these changes would include: immune/idiopathic thrombocytopenic purpura (ITP), splenomegaly, viral infection and an immune/autoimmune mediated or drug/medication related process. Clinical correlation is recommended. PLT today is 136,000. This is improved from her last visit. Bone marrow above was consistent with peripheral sequestration most likely from her hepatic steatosis-splenomegaly with hypersplenism. 2.  IgA kappa MGUS - requiring review of bone marrow and discussion    Serology 7/28/2020  SPEP - No M spike -with immunofixation negative  QI - IgG 1232, IgA 422 (), IgM 78  Free serum light chains -   B2 M - 2.1    LDH - 510 (313-618) - WNL  CMP - total bilirubin is 1.7, alk phos 130, AST 79, ALT 54      Serology 10/27/2020  SPEP - no M spike with immunofixation negative  QI - IgG 1227, IgA 390, IgM 69  Free serum light chains -kappa 31.3, K/L ratio 2.27  B2M - 2.2 - WNL  CMP = crt 0.6 with GFR > 60, AST 56, total bili 1.5    Serology 3/23/2021  SPEP - no M spike with immunofixation negative  QI - IgG 1227, IgA 342, IgM 69 - her IgA normalized  Free serum light chains -kappa 26.5, K/L ratio 2.04 -improved  B2M - 2.2 - WNL  CMP = crt 0.7 with GFR > 60, Ca 9.7, alk phos 155, AST 52, total bilirubin normal - known fatty liver    She has a slightly elevated IgA which actually showed improvement on follow-up 10/27/2020. Her SPEP itself does not show any M spike. She does have a slightly elevated kappa light chain with elevated kappa light chain ratio. I discussed this with her at her office visit of 2/23/2021. I discussed implications and potential need to proceed with bone marrow aspiration biopsy. Serology from 3/23/2021 revealed that her IgA actually normalized. K/L ratio had improved.     Serology 6/22/2021  SPEP no M spike with immunofixation negative  QI IgG 1041, IgA 315, IgM 53 all WNL  Free serum light chains kappa 28.1 with K/L ratio 2.25    Peripheral blood flow cytometry to [facility] 6/22/2021 did not reveal any increase in blast, no B-cell monoclonality and no T-cell aberrant antigen expression    Serology 10/22/2021  SPEP - no M spike negative immunofixation  QI - IgG 1106, IgA 375 (), IgM 64  Free serum light chains - Kappa 28.8, Lambda 17.1, K/L ratio 1.68  B2M - 2.3  LDH - 492  CMP - crt 0.6 with GFR >60, Ca 9.5    Bone survey 10/22/2021 at [facility] was negative    24-hour urine returned 11/2/2021 was negative for monoclonal protein, no elevated total protein. Her bone survey was negative. Her 24 urine did not reveal any monoclonal protein. Serology 2/25/2022  SPEP - no M spike negative immunofixation  QI - IgG 1123, IgA 343, IgM 54  Free serum light chains - Kappa 27.71, Lambda 16.68, K/L 1.66  B2M - 2.3  CMP - crt 0.6 with GFR >60, Ca 9.3      Serology 8/26/2022  SPEP - no  M spike negative immunofixation  QI - IgG 1048, IgA 385, IgM 45  Free serum  light chains - Kappa 31.5, Lambda 16.61, K/L 1.9  LDH - 389  CMP - crt 0.6 with GFR >60, Ca 9.8    She presented back for routine follow-up on 8/26/2022 with worsening thrombocytopenia of unknown cause and with an IgA kappa monoclonal protein I again discussed bone marrow aspiration and biopsy with her. I discussed the procedure itself, risk, side effects, expectations. I discussed procedure being performed in the office versus at [facility]. She has subsequently agreed to proceed with bone marrow, this will be performed in [facility]. Bone marrow 9/13/2022  Normocellular (40% cellularity) bone marrow with normal trilineage hematopoiesis and adequate megakaryopoiesis  No significant dyspoiesis and no increased blasts seen  No increase in plasma cells (~2-3% on  stain)  Normal female karyotype  Comment:  Briefly, no evidence of plasma cell neoplasm or lymphoma detected. As far as the thrombocytopenia, the findings are not suggestive of a myelodysplastic syndrome.  Thrombocytopenia along with adequate number of megakaryocytes is consistent with peripheral destruction/sequestration of platelets. The differential diagnosis for these changes would include: immune/idiopathic thrombocytopenic purpura (ITP), splenomegaly, viral infection and an immune/autoimmune mediated or drug/medication related process. Clinical correlation is recommended. C - calcium 9.8 on 8/26/2022  R - creatinine 0.6 with GFR > 60 on 8/26/2022  A - Hgb 12.8 today-WNL  B - bone survey negative on 10/22/2021    Bone marrow is unrevealing for any plasma cell dyscrasia. I will monitor her monoclonal protein serology every 6 months. 3.  Moderate fatigue. Her B12 level has been normal.    I again discussed the fact that she did not have any significant anemia-Hgb 12.8. She is concerned about this moderate fatigue that she has continued to have. I will check iron panel to confirm that she does not have any iron overload, and check a TSH while she is here. HEALTH MAINTENANCE    Colon cancer screening. Most recent colonoscopy was 12/18/2017 by Dr. Ly. 3 polyps removed from the rectum, (1 tubular adenoma and 2 hyperplastic polyps), congested rectal mucosa with biopsy benign. Breast cancer screening. Bilateral screening mammogram at [facility] on 7/26/2022 was BI-RADS 1. Cervical cancer screening. She reports that she has Pap smears every other year and they have been negative.           Immunization History   Administered Date(s) Administered    COVID-19, MODERNA BLUE border, Primary or Immunocompromised, (age 12y+), IM, 100 mcg/0.5mL 03/27/2021, 04/29/2021         Orders Placed This Encounter   Procedures    Iron and TIBC     Standing Status:   Future     Number of Occurrences:   1     Standing Expiration Date:   10/12/2023    Ferritin     Standing Status:   Future     Number of Occurrences:   1     Standing Expiration Date:   10/12/2023    TSH     Standing Status:   Future     Number of Occurrences:   1     Standing Expiration Date:   10/12/2023            Return in about 6 months (around 4/12/2023) for With Alysha Oquendo.      Royal Purnima PA-C  4:17 PM  10/12/2022

## 2022-10-12 ENCOUNTER — APPOINTMENT (OUTPATIENT)
Dept: INFUSION THERAPY | Age: 62
End: 2022-10-12
Payer: MEDICARE

## 2022-10-12 ENCOUNTER — OFFICE VISIT (OUTPATIENT)
Dept: HEMATOLOGY | Age: 62
End: 2022-10-12
Payer: MEDICARE

## 2022-10-12 ENCOUNTER — HOSPITAL ENCOUNTER (OUTPATIENT)
Dept: INFUSION THERAPY | Age: 62
Discharge: HOME | End: 2022-10-12
Payer: MEDICARE

## 2022-10-12 VITALS
BODY MASS INDEX: 37.01 KG/M2 | SYSTOLIC BLOOD PRESSURE: 140 MMHG | OXYGEN SATURATION: 96 % | WEIGHT: 230.3 LBS | DIASTOLIC BLOOD PRESSURE: 64 MMHG | HEART RATE: 77 BPM | HEIGHT: 66 IN

## 2022-10-12 DIAGNOSIS — D47.2 GAMMOPATHY: ICD-10-CM

## 2022-10-12 DIAGNOSIS — D47.2 MGUS (MONOCLONAL GAMMOPATHY OF UNKNOWN SIGNIFICANCE): ICD-10-CM

## 2022-10-12 DIAGNOSIS — R71.8 OTHER ABNORMALITY OF RED BLOOD CELLS: ICD-10-CM

## 2022-10-12 DIAGNOSIS — K76.0 HEPATIC STEATOSIS: ICD-10-CM

## 2022-10-12 DIAGNOSIS — R16.1 SPLENOMEGALY: ICD-10-CM

## 2022-10-12 DIAGNOSIS — R53.83 OTHER FATIGUE: ICD-10-CM

## 2022-10-12 DIAGNOSIS — D69.6 THROMBOCYTOPENIA (HCC): ICD-10-CM

## 2022-10-12 DIAGNOSIS — D47.2 GAMMOPATHY: Primary | ICD-10-CM

## 2022-10-12 LAB
BASOPHILS ABSOLUTE: 0.02 K/UL (ref 0.01–0.08)
BASOPHILS RELATIVE PERCENT: 0.4 % (ref 0.1–1.2)
EOSINOPHILS ABSOLUTE: 0.15 K/UL (ref 0.04–0.54)
EOSINOPHILS RELATIVE PERCENT: 3.2 % (ref 0.7–7)
FERRITIN: 50.3 NG/ML (ref 13–150)
HCT VFR BLD CALC: 40.2 % (ref 34.1–44.9)
HEMOGLOBIN: 12.8 G/DL (ref 11.2–15.7)
IRON SATURATION: 10 % (ref 14–50)
IRON: 39 UG/DL (ref 37–145)
LYMPHOCYTES ABSOLUTE: 1.71 K/UL (ref 1.18–3.74)
LYMPHOCYTES RELATIVE PERCENT: 36.9 % (ref 19.3–53.1)
MCH RBC QN AUTO: 28.7 PG (ref 25.6–32.2)
MCHC RBC AUTO-ENTMCNC: 31.8 G/DL (ref 32.3–35.5)
MCV RBC AUTO: 90.1 FL (ref 79.4–94.8)
MONOCYTES ABSOLUTE: 0.35 K/UL (ref 0.24–0.82)
MONOCYTES RELATIVE PERCENT: 7.5 % (ref 4.7–12.5)
NEUTROPHILS ABSOLUTE: 2.4 K/UL (ref 1.56–6.13)
NEUTROPHILS RELATIVE PERCENT: 51.8 % (ref 34–71.1)
PDW BLD-RTO: 14 % (ref 11.7–14.4)
PLATELET # BLD: 136 K/UL (ref 182–369)
PMV BLD AUTO: 10.6 FL (ref 7.4–10.4)
RBC # BLD: 4.46 M/UL (ref 3.93–5.22)
TOTAL IRON BINDING CAPACITY: 403 UG/DL (ref 250–400)
TSH SERPL DL<=0.05 MIU/L-ACNC: 1.95 UIU/ML (ref 0.27–4.2)
WBC # BLD: 4.64 K/UL (ref 3.98–10.04)

## 2022-10-12 PROCEDURE — 99212 OFFICE O/P EST SF 10 MIN: CPT

## 2022-10-12 PROCEDURE — 36415 COLL VENOUS BLD VENIPUNCTURE: CPT

## 2022-10-12 PROCEDURE — 85025 COMPLETE CBC W/AUTO DIFF WBC: CPT

## 2022-10-12 PROCEDURE — 99214 OFFICE O/P EST MOD 30 MIN: CPT | Performed by: PHYSICIAN ASSISTANT

## 2022-10-12 ASSESSMENT — ENCOUNTER SYMPTOMS
BACK PAIN: 0
CONSTIPATION: 0
COUGH: 0
VOMITING: 0
NAUSEA: 0
EYE DISCHARGE: 0
SORE THROAT: 0
DIARRHEA: 0
VOICE CHANGE: 0
ABDOMINAL PAIN: 0
TROUBLE SWALLOWING: 0
WHEEZING: 0
ABDOMINAL DISTENTION: 0
SHORTNESS OF BREATH: 0
PHOTOPHOBIA: 0
BLOOD IN STOOL: 0
EYE ITCHING: 0

## 2022-10-20 ENCOUNTER — TELEPHONE (OUTPATIENT)
Dept: INFUSION THERAPY | Age: 62
End: 2022-10-20

## 2022-10-20 NOTE — TELEPHONE ENCOUNTER
----- Message from Prince Sherman PA-C sent at 10/19/2022  4:41 PM CDT -----  Start her on iron twice a day

## 2022-11-09 ENCOUNTER — TELEPHONE (OUTPATIENT)
Dept: NEUROLOGY | Age: 62
End: 2022-11-09

## 2022-11-09 NOTE — TELEPHONE ENCOUNTER
Jossue called and cancelled today appt due to not feeling well. [facility]  Please return her call to reschedule [phone]      Thank you

## 2022-11-11 NOTE — TELEPHONE ENCOUNTER
Called patient back to get her r/s. Let her know Danilo doesn't have any follow ups until Feb. But I can get her in with MIGNON Oviedo for a follow up. Pt voiced understanding and was ok scheduling with MIGNON Oviedo on 11/21 @ 11. Cynthia Arellano ok'd appt.

## 2022-11-21 ENCOUNTER — OFFICE VISIT (OUTPATIENT)
Dept: NEUROLOGY | Age: 62
End: 2022-11-21
Payer: MEDICARE

## 2022-11-21 VITALS
DIASTOLIC BLOOD PRESSURE: 68 MMHG | BODY MASS INDEX: 37.22 KG/M2 | SYSTOLIC BLOOD PRESSURE: 119 MMHG | HEIGHT: 66 IN | WEIGHT: 231.6 LBS | OXYGEN SATURATION: 93 % | HEART RATE: 81 BPM

## 2022-11-21 DIAGNOSIS — R41.3 MEMORY DEFICIT: Primary | ICD-10-CM

## 2022-11-21 DIAGNOSIS — G47.33 OSA (OBSTRUCTIVE SLEEP APNEA): ICD-10-CM

## 2022-11-21 PROCEDURE — 99213 OFFICE O/P EST LOW 20 MIN: CPT | Performed by: NURSE PRACTITIONER

## 2022-11-21 PROCEDURE — 3074F SYST BP LT 130 MM HG: CPT | Performed by: NURSE PRACTITIONER

## 2022-11-21 PROCEDURE — 3078F DIAST BP <80 MM HG: CPT | Performed by: NURSE PRACTITIONER

## 2022-11-21 NOTE — PROGRESS NOTES
[address] [facility] Neurology Office Note      Patient:   Juana Gates  MR#:    [id number]  Account Number:                         YOB: 1960  Date of Evaluation:  11/21/2022  Time of Note:                          1:44 PM  Primary/Referring Physician:  SWAPNA Martini - CNP   Consulting Physician:  SWAPNA Steele    FOLLOW UP    Chief Complaint   Patient presents with    Follow-up     Memory loss       HISTORY OF PRESENT ILLNESS    Juana Gates is a 62y.o. year old female here for memory loss follow up. Here with . Largely unchanged, no overt progression. Still primarily short term. She does still have word findings issues and speech difficulty. Still needing reminders to complete ADLs. There is still difficulty with cooking/following recipes. Seems like it is taking her longer to get ADLs done. Denies gait changes or bladder incontinence. No hallucinations. Denies tremor.  overseeing medicines, needing reminders at times to take them. No longer driving. Denies uncontrolled depression or anxiety. On Aricept 10mg nightly. No side effects. Has tried and failed Namenda and Namzeric in the past. Had GI issues with these. She has had neuropsych testing completed in the past, no records for review. Family history of Pick's disease with aunt, onset was in her 46s. Following with hematology for thrombocytopenia still.         Past Medical History:   Diagnosis Date    Anxiety     Depression     Diabetes ([facility] [address]     Hypertension     Memory difficulty     Obesity 4/21/2021    Sleep apnea     c-pap       Past Surgical History:   Procedure Laterality Date    ANKLE FRACTURE SURGERY Right 4/21/2021    ANKLE OPEN REDUCTION INTERNAL FIXATION WITH POSSIBLE EXTERNAL FIXATOR performed by Jennifer Aguayo MD at [place] [address] Right 6/28/2022    RIGHT ANKLE HARDWARE REMOVAL WITH WOUND VAC performed by Jeninfer Aguayo MD at [address] [place] [address] N/A 9/13/2022    BONE MARROW ASPIRATION BIOPSY performed by SWAPNA Maldonado at [facility]         Family History   Problem Relation Age of Onset    Cancer Mother     Heart Attack Father        Social History     Socioeconomic History    Marital status:      Spouse name: Not on file    Number of children: Not on file    Years of education: Not on file    Highest education level: Not on file   Occupational History    Not on file   Tobacco Use    Smoking status: Never    Smokeless tobacco: Never   Vaping Use    Vaping Use: Never used   Substance and Sexual Activity    Alcohol use: No    Drug use: No    Sexual activity: Yes   Other Topics Concern    Not on file   Social History Narrative    Not on file     Social Determinants of Health     Financial Resource Strain: Not on file   Food Insecurity: Not on file   Transportation Needs: Not on file   Physical Activity: Not on file   Stress: Not on file   Social Connections: Not on file   Intimate Partner Violence: Not on file   Housing Stability: Not on file       Current Outpatient Medications   Medication Sig Dispense Refill    empagliflozin (JARDIANCE) 25 MG tablet Take 25 mg by mouth daily      donepezil (ARICEPT) 10 MG tablet Take 1 tablet by mouth nightly 30 tablet 11    levocetirizine (XYZAL) 5 MG tablet Take 5 mg by mouth nightly       metFORMIN, OSM, (FORTAMET) 1000 MG extended release tablet Take 1,000 mg by mouth 2 times daily (with meals)       metoprolol succinate (TOPROL XL) 100 MG extended release tablet Take 100 mg by mouth nightly   1    escitalopram (LEXAPRO) 10 MG tablet Take 10 mg by mouth nightly       triamterene-hydrochlorothiazide (MAXZIDE-25) 37.5-25 MG per tablet Take 1 tablet by mouth nightly        No current facility-administered medications for this visit.        Allergies   Allergen Reactions    Azithromycin Rash    Namenda  [Memantine Hcl] Nausea And Vomiting and Nausea Only         REVIEW OF SYSTEMS  Constitutional: []Fever []Sweat []Chills [] Recent Injury [x] Denies all unless marked  HEENT:[]Headache  [] Head Injury/Hearing Loss  [] Sore Throat  [] Ear Ache/Dizziness  [x] Denies all unless marked  Spine:  [] Neck pain  [] Back pain  [] Sciaticia  [x] Denies all unless marked  Cardiovascular:[]Heart Disease []Chest Pain [] Palpitations  [x] Denies all unless marked  Pulmonary: []Shortness of Breath []Cough   [x] Denies all unless marke  Gastrointestinal: []Nausea  []Vomiting  []Abdominal Pain  []Constipation  []Diarrhea  []Dark Bloody Stools  [x] Denies all unless marked  Psychiatric/Behavioral:[] Depression [] Anxiety [x] Denies all unless marked  Genitourinary:   [] Frequency  [] Urgency  [] Incontinence [] Pain with Urination  [x] Denies all unless marked  Extremities: []Pain  []Swelling  [x] Denies all unless marked  Musculoskeletal: [] Muscle Pain  [] Joint Pain  [] Arthritis [] Muscle Cramps [] Muscle Twitches  [x] Denies all unless marked  Sleep: [] Insomnia [] Snoring [] Restless Legs [x] Sleep Apnea  [] Daytime Sleepiness  [x] Denies all unless marked  Skin:[] Rash [] Skin Discoloration [x] Denies all unless marked   Neurological: [x]Visual Disturbance/Memory Loss [] Loss of Balance [] Slurred Speech/Weakness [] Seizures  [] Vertigo/Dizziness [x] Denies all unless marked    The MA has completed the ROS with the patient. I have reviewed it in its' entirety with the patient and agree with the documentation.      PHYSICAL EXAM  /68   Pulse 81   Ht 5' 6\" (1.676 m)   Wt 231 lb 9.6 oz (105.1 kg)   SpO2 93%   BMI 37.38 kg/m²       Constitutional - No acute distress    HEENT- Conjunctiva normal.  No scars, masses, or lesions over external nose or ears, no neck masses noted, no jugular vein distension, no bruit  Cardiac- Regular rate and rhythm  Pulmonary- Good expansion, normal effort without use of accessory muscles  Musculoskeletal - No significant wasting of muscles noted, no bony deformities  Extremities - No clubbing, cyanosis or edema  Skin - Warm, dry, and intact. No rash, erythema, or pallor  Psychiatric - Mood, affect, and behavior appear normal      NEUROLOGICAL EXAM     Mental status   [x] Awake, alert, oriented   [x]Affect attention and concentration appear appropriate  []Recent and remote memory appears unremarkable  []Speech normal without dysarthria or aphasia, comprehension and repetition intact. COMMENTS: speech difficulty at times   Cranial Nerves [x]No VF deficit to confrontation  [x]PERRLA, EOMI, no nystagmus, conjugate eye movements, no ptosis  [x]Face symmetric  [x]Facial sensation intact  [x]Tongue midline no atrophy or fasciculations present  [x]Palate midline, hearing to finger rub normal bilaterally  [x]Shoulder shrug and SCM testing normal bilaterally  COMMENTS:   Motor   [x]5/5 strength x 4 extremities  [x]Normal bulk and tone  [x]No tremor present  [x]No rigidity or bradykinesia noted  COMMENTS:   Sensory  [x]Sensation intact to light touch, pin prick, vibration, and proprioception BLE  [x]Sensation intact to light touch, pin prick, vibration, and proprioception BUE  COMMENTS:   Coordination [x]FTN normal bilaterally   []HTS normal bilaterally  [x]OSCAR normal bilaterally.    COMMENTS:   Reflexes  []Symmetric and non-pathological  [x]Toes down going bilaterally  [x]No clonus present  COMMENTS:hypoactive throughout   Gait                  [x]Normal steady gait    []Ataxic    []Spastic     []Magnetic     []Shuffling  COMMENTS:       LABS RECORD AND IMAGING REVIEW (As below and per HPI)    Lab Results   Component Value Date    [id number] 766 02/23/2021     Lab Results   Component Value Date    WBC 4.64 10/12/2022    HGB 12.8 10/12/2022    HCT 40.2 10/12/2022    MCV 90.1 10/12/2022     (L) 10/12/2022     Lab Results   Component Value Date     08/26/2022    K 3.8 08/26/2022     08/26/2022    CO2 28 08/26/2022    BUN 17 08/26/2022    CREATININE 0.6 08/26/2022    GLUCOSE 147 (H) 08/26/2022 CALCIUM 9.8 08/26/2022    PROT 6.7 08/26/2022    LABALBU 4.2 08/26/2022    BILITOT 0.7 08/26/2022    ALKPHOS 140 (H) 08/26/2022    AST 52 (H) 08/26/2022    ALT 28 08/26/2022    LABGLOM >60 08/26/2022    GFRAA >59 07/15/2022    AGRATIO 1.1 10/22/2021    GLOB 2.7 02/25/2022     Lab Results   Component Value Date    CHOL 145 (L) 05/12/2022    TRIG 107 05/12/2022    HDL 51 (L) 05/12/2022    LDLCALC 73 05/12/2022     Lab Results   Component Value Date    TSH 1.950 10/12/2022    T4FREE 0.86 (L) 07/01/2020     Lab Results   Component Value Date    CRP 0.73 (H) 07/04/2022    SEDRATE 10 06/23/2021      Narrative   History:   52-year-old female with memory loss. Reference    None. Technique   Routine pre and postcontrast enhanced brain MRI. There is no diffusion abnormality. There is no evidence of hemosiderin   deposition in the brain. Patchy T2 FLAIR and hyperintensities in the   cerebral white matter are nonspecific but most suggestive of mild   chronic small vessel ischemic disease. No cortical infarction is   apparent. Signal intensities of the brainstem and cerebellum are   normal. The ventricles and basal cisterns are unremarkable. There is   no significant lobar atrophy. Following contrast infusion, no   pathologic enhancement is observed. Mucosal thickening is present in both maxillary sinuses. The major   vascular flow voids are maintained. Impression   Mild microangiopathy of the cerebral white matter. No acute   infarction, hemorrhage, or mass. Signed by Dr aJd Porter on 7/10/2017 1:50 PM       Reviewed referral records     ASSESSMENT:    Noemi Lazaro is a 62y.o. year old female here for follow up of memory loss. No changes. Still short term memory affected. Here with . No changes to mood. On Aricept 10 mg nightly, denies side effects. Intolerance to Namenda in the past. Suspect underlying dementia. Also has JUAN CARLOS, has been using CPAP for about 10 years with no new sleep study.  Is not managed by anyone for this. Question if sleep apnea is well-controlled. ICD-10-CM    1. Memory deficit  R41.3       2. JUAN CARLOS (obstructive sleep apnea)  G47.33           PLAN:  Continue on Aricept 10 mg nightly. Encourage CPAP management through PCP or sleep specialist.   Consider referral to memory center. Declined today. Safety concerns discussed, increase supervision, monitor medications, no driving. Encouraged brain games and exercise. 5. Return in about 6 months (around 5/21/2023) for Schedule with Gumaro Lambert.     SWAPNA Flanagan

## 2022-11-21 NOTE — PROGRESS NOTES
REVIEW OF SYSTEMS    Constitutional: []Fever []Sweat []Chills [] Recent Injury [x] Denies all unless marked  HEENT:[]Headache  [] Head Injury/Hearing Loss  [] Sore Throat  [] Ear Ache/Dizziness  [x] Denies all unless marked  Spine:  [] Neck pain  [] Back pain  [] Sciaticia  [x] Denies all unless marked  Cardiovascular:[]Heart Disease []Chest Pain [] Palpitations  [x] Denies all unless marked  Pulmonary: []Shortness of Breath []Cough   [x] Denies all unless marke  Gastrointestinal: []Nausea  []Vomiting  []Abdominal Pain  []Constipation  []Diarrhea  []Dark Bloody Stools  [x] Denies all unless marked  Psychiatric/Behavioral:[] Depression [] Anxiety [x] Denies all unless marked  Genitourinary:   [] Frequency  [] Urgency  [] Incontinence [] Pain with Urination  [x] Denies all unless marked  Extremities: []Pain  []Swelling  [x] Denies all unless marked  Musculoskeletal: [] Muscle Pain  [] Joint Pain  [] Arthritis [] Muscle Cramps [] Muscle Twitches  [x] Denies all unless marked  Sleep: [] Insomnia [] Snoring [] Restless Legs [x] Sleep Apnea  [] Daytime Sleepiness  [x] Denies all unless marked  Skin:[] Rash [] Skin Discoloration [x] Denies all unless marked   Neurological: [x]Visual Disturbance/Memory Loss [] Loss of Balance [] Slurred Speech/Weakness [] Seizures  [] Vertigo/Dizziness [x] Denies all unless marked

## 2023-01-27 ENCOUNTER — TELEPHONE (OUTPATIENT)
Dept: GASTROENTEROLOGY | Facility: CLINIC | Age: 63
End: 2023-01-27
Payer: MEDICARE

## 2023-01-27 NOTE — TELEPHONE ENCOUNTER
I have sent 2 letters to pt re: recall colon and have had no response. I called and spoke to her about it today-she tells me her insurance has changed and she would like to check with them before scheduling. I gave her my direct line to call back so she can update me. If everything ok with insurance-I will transfer her to scheduling to arrange OV at that time. I am removing her from my recall list.

## 2023-03-13 ENCOUNTER — OFFICE VISIT (OUTPATIENT)
Dept: GASTROENTEROLOGY | Facility: CLINIC | Age: 63
End: 2023-03-13
Payer: MEDICARE

## 2023-03-13 VITALS
HEIGHT: 66 IN | TEMPERATURE: 97.1 F | HEART RATE: 65 BPM | BODY MASS INDEX: 36 KG/M2 | OXYGEN SATURATION: 98 % | SYSTOLIC BLOOD PRESSURE: 118 MMHG | WEIGHT: 224 LBS | DIASTOLIC BLOOD PRESSURE: 64 MMHG

## 2023-03-13 DIAGNOSIS — D12.6 ADENOMATOUS POLYP OF COLON, UNSPECIFIED PART OF COLON: Primary | ICD-10-CM

## 2023-03-13 PROCEDURE — 1160F RVW MEDS BY RX/DR IN RCRD: CPT | Performed by: NURSE PRACTITIONER

## 2023-03-13 PROCEDURE — 1159F MED LIST DOCD IN RCRD: CPT | Performed by: NURSE PRACTITIONER

## 2023-03-13 PROCEDURE — S0260 H&P FOR SURGERY: HCPCS | Performed by: NURSE PRACTITIONER

## 2023-03-13 RX ORDER — FAMOTIDINE 20 MG
1 TABLET ORAL DAILY
COMMUNITY

## 2023-03-13 RX ORDER — MULTIPLE VITAMINS W/ MINERALS TAB 9MG-400MCG
1 TAB ORAL DAILY
COMMUNITY

## 2023-03-13 RX ORDER — LEVOCETIRIZINE DIHYDROCHLORIDE 5 MG/1
1 TABLET, FILM COATED ORAL AS NEEDED
COMMUNITY

## 2023-03-13 RX ORDER — TRIAMTERENE AND HYDROCHLOROTHIAZIDE 37.5; 25 MG/1; MG/1
1 CAPSULE ORAL DAILY
COMMUNITY
Start: 2023-01-23

## 2023-03-13 RX ORDER — MULTIVIT-MIN/IRON/FOLIC ACID/K 18-600-40
1 CAPSULE ORAL DAILY
COMMUNITY

## 2023-03-13 RX ORDER — CHLORAL HYDRATE 500 MG
1000 CAPSULE ORAL
COMMUNITY

## 2023-03-13 NOTE — H&P (VIEW-ONLY)
Primary Physician: Baljinder Alatorre MD    Chief Complaint   Patient presents with   • Colon Cancer Screening     Pt presents today for evaluation for colonoscopy-pt's last colon was 12/18/2017; Personal history of adenomatous and hyperplastic polyps; Family history of colon cancer       Subjective     Geneva Knight is a 62 y.o. female.    HPI   Personal Hx Adenomatous Colon Polyps  Last colonoscopy 12/2017:  1 tubular adenomatous and 2 hyperplastic polyps.  Maternal grandmother had hx colon cancer in her 70's.  Maternal aunt had colon cancer in her 50's.  Pt states she is doing well. No change in bowels. No diarrhea, constipation, abdominal pain or rectal bleeding.      Past Medical History:   Diagnosis Date   • Ankle fracture     Right   • Cellulitis     Right ankle   • Diabetes mellitus (HCC)    • Family history of colon cancer    • GERD (gastroesophageal reflux disease)    • History of adenomatous polyp of colon    • History of colon polyps    • HTN (hypertension)    • Hyperlipemia    • VIOLETA (obstructive sleep apnea)     cpap       Past Surgical History:   Procedure Laterality Date   • BREAST BIOPSY Right 08/30/2021    Procedure: EXCISIONAL RIGHT BREAST BIOPSY WITH NEEDLE LOCALIZATION - ULTRASOUND GUIDED;  Surgeon: Martine Roy MD;  Location: [facility];  Service: General;  Laterality: Right;   • CHOLECYSTECTOMY  2004   • COLONOSCOPY N/A 12/18/2017    Three 4-7mm polyps in the rectum-path shows one tubular adenomatous polyp and two hyperplastic polyps; Congested mucosa in the rectum, in the recto-sigmoid colon and in the sigmoid colon-biopsied; Repeat 5 years   • COLONOSCOPY  07/25/2013    The entire examined colon is normal on direct and retroflexion views; Repeat 7-10 years   • ORIF ANKLE FRACTURE Right         Current Outpatient Medications:   •  Ascorbic Acid (Vitamin C) 500 MG capsule, Take 1 capsule by mouth Daily., Disp: , Rfl:   •  B Complex Vitamins (VITAMIN B COMPLEX PO), Take 1 capsule by mouth  Daily., Disp: , Rfl:   •  donepezil (ARICEPT) 10 MG tablet, Take 1 tablet by mouth Every Night., Disp: , Rfl:   •  empagliflozin (JARDIANCE) 25 MG tablet tablet, Take 1 tablet by mouth Daily., Disp: , Rfl:   •  escitalopram (LEXAPRO) 10 MG tablet, Take 1 tablet by mouth Daily., Disp: , Rfl:   •  levocetirizine (XYZAL) 5 MG tablet, Take 1 tablet by mouth As Needed., Disp: , Rfl:   •  metFORMIN (GLUCOPHAGE) 1000 MG tablet, Take 1 tablet by mouth 2 (Two) Times a Day., Disp: , Rfl:   •  metoprolol succinate XL (TOPROL-XL) 100 MG 24 hr tablet, Take 1 tablet by mouth Every Night., Disp: , Rfl:   •  multivitamin with minerals (Multivitamin Adults) tablet tablet, Take 1 tablet by mouth Daily., Disp: , Rfl:   •  Omega-3 Fatty Acids (fish oil) 1000 MG capsule capsule, Take 1 capsule by mouth Daily With Breakfast., Disp: , Rfl:   •  triamterene-hydrochlorothiazide (DYAZIDE) 37.5-25 MG per capsule, Take 1 capsule by mouth Daily., Disp: , Rfl:   •  Vitamin D, Cholecalciferol, 25 MCG (1000 UT) capsule, Take 1 capsule by mouth Daily., Disp: , Rfl:   •  polyethylene glycol (GoLYTELY) 236 g solution, Take as directed split dose, Disp: 4000 mL, Rfl: 0  No current facility-administered medications for this visit.    No Known Allergies    Social History     Socioeconomic History   • Marital status:    Tobacco Use   • Smoking status: Never   • Smokeless tobacco: Never   Vaping Use   • Vaping Use: Never used   Substance and Sexual Activity   • Alcohol use: Not Currently   • Drug use: Never   • Sexual activity: Defer       Family History   Problem Relation Age of Onset   • Breast cancer Mother    • Colon cancer Maternal Aunt         In her 50's   • Colon cancer Maternal Grandmother         In her 70's   • Breast cancer Maternal Grandmother    • Colon polyps Neg Hx    • Esophageal cancer Neg Hx    • Liver cancer Neg Hx    • Stomach cancer Neg Hx    • Liver disease Neg Hx    • Rectal cancer Neg Hx        Review of Systems  "  Constitutional: Negative for unexpected weight change.   Respiratory: Negative for shortness of breath.    Cardiovascular: Negative for chest pain.       Objective     /64 (BP Location: Left arm, Patient Position: Sitting, Cuff Size: Adult)   Pulse 65   Temp 97.1 °F (36.2 °C) (Infrared)   Ht 167.6 cm (66\")   Wt 102 kg (224 lb)   SpO2 98%   Breastfeeding No   BMI 36.15 kg/m²     Physical Exam  Vitals reviewed.   Constitutional:       Appearance: Normal appearance.   Cardiovascular:      Rate and Rhythm: Normal rate and regular rhythm.      Heart sounds: Normal heart sounds.   Pulmonary:      Effort: Pulmonary effort is normal.      Breath sounds: Normal breath sounds.   Neurological:      Mental Status: She is alert.         Lab Results - Last 18 Months   Lab Units 08/26/22  1137 07/15/22  0946 07/04/22  1031 07/01/22  0252 05/12/22  0914 02/25/22  1015 10/22/21  1126   GLUCOSE mg/dL 147* 121* 235*  --  159* 182* 133*   BUN mg/dL 17 19 15  --  14 12 14   CREATININE mg/dL 0.6 0.6 0.6  --  0.6 0.6 0.6   SODIUM mmol/L 142 142 139  --  140 143 144   POTASSIUM mmol/L 3.8 4.5 3.9  --  3.8 3.9 4.1   CHLORIDE mmol/L 104 105 101  --  103 106 106   TOTAL CO2 mmol/L 28 26 30*  --  27 29 30*   TOTAL PROTEIN g/dL 6.7 6.9 6.5* 5.5* 6.6 6.6 7.0   ALBUMIN g/dL 4.2 3.7 3.2* 2.9* 4.2 3.9 4.4   ALT (SGPT) U/L 28 8 12 13 30 31 29   AST (SGOT) U/L 52* 46* 46* 24 49* 58* 51*   ALK PHOS U/L 140* 105* 98 119* 125* 158* 135*   BILIRUBIN mg/dL 0.7 0.6 0.4 0.3 0.9 1.5* 0.8   GLOBULIN g/dL  --   --   --   --   --  2.7 2.6   CRP mg/dL  --   --  0.73* 3.81*  --   --   --        Lab Results - Last 18 Months   Lab Units 10/12/22  1200 08/26/22  0951 08/10/22  1246 07/04/22  1031 07/01/22  0252 06/30/22  0248 06/29/22  0231 05/12/22  0914   HEMOGLOBIN g/dL 12.8 12.7 13.0 12.6 11.7* 11.5*   < > 13.6   HEMATOCRIT % 40.2 41.1 41.6 40.3 37.1 38.3   < > 41.8   MCV fL 90.1 93.4 92.0 93.5 93.0  --   --  89.5   WBC K/uL 4.64 3.75* 4.3* 4.2* " 5.3  --   --  4.3*   RDW % 14.0 13.2 13.2 13.1 13.2  --   --  13.1   MPV fL 10.6* 11.2* 11.0 9.2* 10.1  --   --  11.4   PLATELETS K/uL 136* 86* 118* 202 201  --   --  109*    < > = values in this interval not displayed.       Lab Results - Last 18 Months   Lab Units 10/12/22  1201   IRON ug/dL 39   TIBC ug/dL 403*   IRON SATURATION % 10*   FERRITIN ng/mL 50.3   TSH uIU/mL 1.950        Lab Results - Last 18 Months   Lab Units 10/12/22  1201 08/26/22  1002   FERRITIN ng/mL 50.3  --    DON   --  None Detected           IMPRESSION/PLAN:    Assessment & Plan      Problem List Items Addressed This Visit        Gastrointestinal Abdominal     Adenomatous colon polyp - Primary    Overview     Last colonoscopy 12/2017 1 tubular adenomatous and 2 hyperplastic polyps.         Relevant Medications    polyethylene glycol (GoLYTELY) 236 g solution     Colonoscopy per Dr Hugo Gallo          .The risks, benefits, and alternatives of colonoscopy were reviewed with the patient today.  Risks including perforation of the colon possibly requiring surgery or colostomy.  Additional risks include risk of bleeding from biopsies or removal of colon tissue.  There is also the risk of a drug reaction or problems with anesthesia.  This will be discussed with the further by the anesthesia team on the day of the procedure.  Lastly there is a possibility of missing a colon polyp or cancer.  The benefits include the diagnosis and management of disease of the colon and rectum.  Alternatives to colonoscopy include barium enema, laboratory testing, radiographic evaluation, or no intervention.  The patient verbalizes understanding and agrees.    In accordance with requirements under the Affordable Care Act, [facility] has provided pricing for all hospital services and items on each of its websites. However, a patient's actual cost may differ based on the services the patient receives to meet individual healthcare needs and based on the  benefits provided under the patient’s insurance coverage.        Kavita De, APRN  03/13/23  08:48 CDT    Part of this note may be an electronic transcription/translation of spoken language to printed text.

## 2023-03-13 NOTE — PROGRESS NOTES
Primary Physician: Baljinder Alatorre MD    Chief Complaint   Patient presents with   • Colon Cancer Screening     Pt presents today for evaluation for colonoscopy-pt's last colon was 12/18/2017; Personal history of adenomatous and hyperplastic polyps; Family history of colon cancer       Subjective     Geneva Knight is a 62 y.o. female.    HPI   Personal Hx Adenomatous Colon Polyps  Last colonoscopy 12/2017:  1 tubular adenomatous and 2 hyperplastic polyps.  Maternal grandmother had hx colon cancer in her 70's.  Maternal aunt had colon cancer in her 50's.  Pt states she is doing well. No change in bowels. No diarrhea, constipation, abdominal pain or rectal bleeding.      Past Medical History:   Diagnosis Date   • Ankle fracture     Right   • Cellulitis     Right ankle   • Diabetes mellitus (HCC)    • Family history of colon cancer    • GERD (gastroesophageal reflux disease)    • History of adenomatous polyp of colon    • History of colon polyps    • HTN (hypertension)    • Hyperlipemia    • VIOLETA (obstructive sleep apnea)     cpap       Past Surgical History:   Procedure Laterality Date   • BREAST BIOPSY Right 08/30/2021    Procedure: EXCISIONAL RIGHT BREAST BIOPSY WITH NEEDLE LOCALIZATION - ULTRASOUND GUIDED;  Surgeon: Martine Roy MD;  Location: [facility];  Service: General;  Laterality: Right;   • CHOLECYSTECTOMY  2004   • COLONOSCOPY N/A 12/18/2017    Three 4-7mm polyps in the rectum-path shows one tubular adenomatous polyp and two hyperplastic polyps; Congested mucosa in the rectum, in the recto-sigmoid colon and in the sigmoid colon-biopsied; Repeat 5 years   • COLONOSCOPY  07/25/2013    The entire examined colon is normal on direct and retroflexion views; Repeat 7-10 years   • ORIF ANKLE FRACTURE Right         Current Outpatient Medications:   •  Ascorbic Acid (Vitamin C) 500 MG capsule, Take 1 capsule by mouth Daily., Disp: , Rfl:   •  B Complex Vitamins (VITAMIN B COMPLEX PO), Take 1 capsule by mouth  Daily., Disp: , Rfl:   •  donepezil (ARICEPT) 10 MG tablet, Take 1 tablet by mouth Every Night., Disp: , Rfl:   •  empagliflozin (JARDIANCE) 25 MG tablet tablet, Take 1 tablet by mouth Daily., Disp: , Rfl:   •  escitalopram (LEXAPRO) 10 MG tablet, Take 1 tablet by mouth Daily., Disp: , Rfl:   •  levocetirizine (XYZAL) 5 MG tablet, Take 1 tablet by mouth As Needed., Disp: , Rfl:   •  metFORMIN (GLUCOPHAGE) 1000 MG tablet, Take 1 tablet by mouth 2 (Two) Times a Day., Disp: , Rfl:   •  metoprolol succinate XL (TOPROL-XL) 100 MG 24 hr tablet, Take 1 tablet by mouth Every Night., Disp: , Rfl:   •  multivitamin with minerals (Multivitamin Adults) tablet tablet, Take 1 tablet by mouth Daily., Disp: , Rfl:   •  Omega-3 Fatty Acids (fish oil) 1000 MG capsule capsule, Take 1 capsule by mouth Daily With Breakfast., Disp: , Rfl:   •  triamterene-hydrochlorothiazide (DYAZIDE) 37.5-25 MG per capsule, Take 1 capsule by mouth Daily., Disp: , Rfl:   •  Vitamin D, Cholecalciferol, 25 MCG (1000 UT) capsule, Take 1 capsule by mouth Daily., Disp: , Rfl:   •  polyethylene glycol (GoLYTELY) 236 g solution, Take as directed split dose, Disp: 4000 mL, Rfl: 0  No current facility-administered medications for this visit.    No Known Allergies    Social History     Socioeconomic History   • Marital status:    Tobacco Use   • Smoking status: Never   • Smokeless tobacco: Never   Vaping Use   • Vaping Use: Never used   Substance and Sexual Activity   • Alcohol use: Not Currently   • Drug use: Never   • Sexual activity: Defer       Family History   Problem Relation Age of Onset   • Breast cancer Mother    • Colon cancer Maternal Aunt         In her 50's   • Colon cancer Maternal Grandmother         In her 70's   • Breast cancer Maternal Grandmother    • Colon polyps Neg Hx    • Esophageal cancer Neg Hx    • Liver cancer Neg Hx    • Stomach cancer Neg Hx    • Liver disease Neg Hx    • Rectal cancer Neg Hx        Review of Systems  "  Constitutional: Negative for unexpected weight change.   Respiratory: Negative for shortness of breath.    Cardiovascular: Negative for chest pain.       Objective     /64 (BP Location: Left arm, Patient Position: Sitting, Cuff Size: Adult)   Pulse 65   Temp 97.1 °F (36.2 °C) (Infrared)   Ht 167.6 cm (66\")   Wt 102 kg (224 lb)   SpO2 98%   Breastfeeding No   BMI 36.15 kg/m²     Physical Exam  Vitals reviewed.   Constitutional:       Appearance: Normal appearance.   Cardiovascular:      Rate and Rhythm: Normal rate and regular rhythm.      Heart sounds: Normal heart sounds.   Pulmonary:      Effort: Pulmonary effort is normal.      Breath sounds: Normal breath sounds.   Neurological:      Mental Status: She is alert.         Lab Results - Last 18 Months   Lab Units 08/26/22  1137 07/15/22  0946 07/04/22  1031 07/01/22  0252 05/12/22  0914 02/25/22  1015 10/22/21  1126   GLUCOSE mg/dL 147* 121* 235*  --  159* 182* 133*   BUN mg/dL 17 19 15  --  14 12 14   CREATININE mg/dL 0.6 0.6 0.6  --  0.6 0.6 0.6   SODIUM mmol/L 142 142 139  --  140 143 144   POTASSIUM mmol/L 3.8 4.5 3.9  --  3.8 3.9 4.1   CHLORIDE mmol/L 104 105 101  --  103 106 106   TOTAL CO2 mmol/L 28 26 30*  --  27 29 30*   TOTAL PROTEIN g/dL 6.7 6.9 6.5* 5.5* 6.6 6.6 7.0   ALBUMIN g/dL 4.2 3.7 3.2* 2.9* 4.2 3.9 4.4   ALT (SGPT) U/L 28 8 12 13 30 31 29   AST (SGOT) U/L 52* 46* 46* 24 49* 58* 51*   ALK PHOS U/L 140* 105* 98 119* 125* 158* 135*   BILIRUBIN mg/dL 0.7 0.6 0.4 0.3 0.9 1.5* 0.8   GLOBULIN g/dL  --   --   --   --   --  2.7 2.6   CRP mg/dL  --   --  0.73* 3.81*  --   --   --        Lab Results - Last 18 Months   Lab Units 10/12/22  1200 08/26/22  0951 08/10/22  1246 07/04/22  1031 07/01/22  0252 06/30/22  0248 06/29/22  0231 05/12/22  0914   HEMOGLOBIN g/dL 12.8 12.7 13.0 12.6 11.7* 11.5*   < > 13.6   HEMATOCRIT % 40.2 41.1 41.6 40.3 37.1 38.3   < > 41.8   MCV fL 90.1 93.4 92.0 93.5 93.0  --   --  89.5   WBC K/uL 4.64 3.75* 4.3* 4.2* " 5.3  --   --  4.3*   RDW % 14.0 13.2 13.2 13.1 13.2  --   --  13.1   MPV fL 10.6* 11.2* 11.0 9.2* 10.1  --   --  11.4   PLATELETS K/uL 136* 86* 118* 202 201  --   --  109*    < > = values in this interval not displayed.       Lab Results - Last 18 Months   Lab Units 10/12/22  1201   IRON ug/dL 39   TIBC ug/dL 403*   IRON SATURATION % 10*   FERRITIN ng/mL 50.3   TSH uIU/mL 1.950        Lab Results - Last 18 Months   Lab Units 10/12/22  1201 08/26/22  1002   FERRITIN ng/mL 50.3  --    DON   --  None Detected           IMPRESSION/PLAN:    Assessment & Plan      Problem List Items Addressed This Visit        Gastrointestinal Abdominal     Adenomatous colon polyp - Primary    Overview     Last colonoscopy 12/2017 1 tubular adenomatous and 2 hyperplastic polyps.         Relevant Medications    polyethylene glycol (GoLYTELY) 236 g solution     Colonoscopy per Dr Hugo Gallo          .The risks, benefits, and alternatives of colonoscopy were reviewed with the patient today.  Risks including perforation of the colon possibly requiring surgery or colostomy.  Additional risks include risk of bleeding from biopsies or removal of colon tissue.  There is also the risk of a drug reaction or problems with anesthesia.  This will be discussed with the further by the anesthesia team on the day of the procedure.  Lastly there is a possibility of missing a colon polyp or cancer.  The benefits include the diagnosis and management of disease of the colon and rectum.  Alternatives to colonoscopy include barium enema, laboratory testing, radiographic evaluation, or no intervention.  The patient verbalizes understanding and agrees.    In accordance with requirements under the Affordable Care Act, [facility] has provided pricing for all hospital services and items on each of its websites. However, a patient's actual cost may differ based on the services the patient receives to meet individual healthcare needs and based on the  benefits provided under the patient’s insurance coverage.        Kavita De, APRN  03/13/23  08:48 CDT    Part of this note may be an electronic transcription/translation of spoken language to printed text.

## 2023-03-30 ENCOUNTER — ANESTHESIA EVENT (OUTPATIENT)
Dept: GASTROENTEROLOGY | Facility: HOSPITAL | Age: 63
End: 2023-03-30
Payer: MEDICARE

## 2023-03-30 ENCOUNTER — ANESTHESIA (OUTPATIENT)
Dept: GASTROENTEROLOGY | Facility: HOSPITAL | Age: 63
End: 2023-03-30
Payer: MEDICARE

## 2023-03-30 ENCOUNTER — HOSPITAL ENCOUNTER (OUTPATIENT)
Facility: HOSPITAL | Age: 63
Setting detail: HOSPITAL OUTPATIENT SURGERY
Discharge: HOME | End: 2023-03-30
Attending: INTERNAL MEDICINE | Admitting: INTERNAL MEDICINE
Payer: MEDICARE

## 2023-03-30 VITALS
OXYGEN SATURATION: 96 % | BODY MASS INDEX: 35.36 KG/M2 | DIASTOLIC BLOOD PRESSURE: 71 MMHG | SYSTOLIC BLOOD PRESSURE: 130 MMHG | WEIGHT: 220 LBS | TEMPERATURE: 97.9 F | HEART RATE: 74 BPM | RESPIRATION RATE: 21 BRPM | HEIGHT: 66 IN

## 2023-03-30 DIAGNOSIS — D12.6 ADENOMATOUS POLYP OF COLON, UNSPECIFIED PART OF COLON: ICD-10-CM

## 2023-03-30 PROCEDURE — 25010000002 PROPOFOL 10 MG/ML EMULSION: Performed by: NURSE ANESTHETIST, CERTIFIED REGISTERED

## 2023-03-30 PROCEDURE — 45380 COLONOSCOPY AND BIOPSY: CPT | Performed by: INTERNAL MEDICINE

## 2023-03-30 PROCEDURE — 88305 TISSUE EXAM BY PATHOLOGIST: CPT | Performed by: INTERNAL MEDICINE

## 2023-03-30 PROCEDURE — 45385 COLONOSCOPY W/LESION REMOVAL: CPT | Performed by: INTERNAL MEDICINE

## 2023-03-30 RX ORDER — LIDOCAINE HYDROCHLORIDE 20 MG/ML
INJECTION, SOLUTION EPIDURAL; INFILTRATION; INTRACAUDAL; PERINEURAL AS NEEDED
Status: DISCONTINUED | OUTPATIENT
Start: 2023-03-30 | End: 2023-03-30 | Stop reason: SURG

## 2023-03-30 RX ORDER — PROPOFOL 10 MG/ML
VIAL (ML) INTRAVENOUS AS NEEDED
Status: DISCONTINUED | OUTPATIENT
Start: 2023-03-30 | End: 2023-03-30 | Stop reason: SURG

## 2023-03-30 RX ORDER — SODIUM CHLORIDE 0.9 % (FLUSH) 0.9 %
10 SYRINGE (ML) INJECTION AS NEEDED
Status: DISCONTINUED | OUTPATIENT
Start: 2023-03-30 | End: 2023-03-30 | Stop reason: HOSPADM

## 2023-03-30 RX ORDER — SODIUM CHLORIDE 9 MG/ML
500 INJECTION, SOLUTION INTRAVENOUS CONTINUOUS PRN
Status: DISCONTINUED | OUTPATIENT
Start: 2023-03-30 | End: 2023-03-30 | Stop reason: HOSPADM

## 2023-03-30 RX ORDER — LIDOCAINE HYDROCHLORIDE 10 MG/ML
0.5 INJECTION, SOLUTION EPIDURAL; INFILTRATION; INTRACAUDAL; PERINEURAL ONCE AS NEEDED
Status: DISCONTINUED | OUTPATIENT
Start: 2023-03-30 | End: 2023-03-30 | Stop reason: HOSPADM

## 2023-03-30 RX ADMIN — PROPOFOL INJECTABLE EMULSION 358 MG: 10 INJECTION, EMULSION INTRAVENOUS at 09:04

## 2023-03-30 RX ADMIN — LIDOCAINE HYDROCHLORIDE 50 MG: 20 INJECTION, SOLUTION EPIDURAL; INFILTRATION; INTRACAUDAL; PERINEURAL at 09:04

## 2023-03-30 RX ADMIN — SODIUM CHLORIDE 500 ML: 9 INJECTION, SOLUTION INTRAVENOUS at 08:57

## 2023-03-30 NOTE — ANESTHESIA POSTPROCEDURE EVALUATION
Patient: Geneva Knight    Procedure Summary     Date: 03/30/23 Room / Location: [facility] ENDOSCOPY 2 / [facility] ENDOSCOPY    Anesthesia Start: 0903 Anesthesia Stop: 0926    Procedure: COLONOSCOPY WITH ANESTHESIA Diagnosis:       Adenomatous polyp of colon, unspecified part of colon      (Adenomatous polyp of colon, unspecified part of colon [D12.6])    Surgeons: Maggi Arias MD Provider: Jeffrey Acharya CRNA    Anesthesia Type: MAC ASA Status: 2          Anesthesia Type: MAC    Vitals  Vitals Value Taken Time   /71 03/30/23 0942   Temp     Pulse 69 03/30/23 0945   Resp 21 03/30/23 0940   SpO2 94 % 03/30/23 0945   Vitals shown include unvalidated device data.        Post Anesthesia Care and Evaluation    Patient location during evaluation: PHASE II  Level of consciousness: awake  Pain management: adequate    Airway patency: patent  Anesthetic complications: No anesthetic complications  PONV Status: none  Cardiovascular status: acceptable  Respiratory status: acceptable  Hydration status: acceptable

## 2023-03-30 NOTE — ANESTHESIA PREPROCEDURE EVALUATION
Anesthesia Evaluation     Patient summary reviewed   no history of anesthetic complications:  NPO Solid Status: > 8 hours             Airway   Mallampati: II  Dental      Pulmonary    (+) sleep apnea on CPAP,   Cardiovascular   Exercise tolerance: excellent (>7 METS)    (+) hypertension, hyperlipidemia,       Neuro/Psych- negative ROS  GI/Hepatic/Renal/Endo    (+) obesity,  GERD,  diabetes mellitus,     Musculoskeletal     Abdominal    Substance History      OB/GYN          Other                        Anesthesia Plan    ASA 2     MAC       Anesthetic plan, risks, benefits, and alternatives have been provided, discussed and informed consent has been obtained with: patient.        CODE STATUS:

## 2023-03-31 LAB
CYTO UR: NORMAL
LAB AP CASE REPORT: NORMAL
Lab: NORMAL
PATH REPORT.FINAL DX SPEC: NORMAL
PATH REPORT.GROSS SPEC: NORMAL

## 2023-04-04 PROBLEM — K52.9 COLITIS: Status: RESOLVED | Noted: 2017-11-28 | Resolved: 2023-04-04

## 2023-04-04 PROBLEM — R19.7 DIARRHEA: Status: RESOLVED | Noted: 2017-11-28 | Resolved: 2023-04-04

## 2023-04-12 ENCOUNTER — HOSPITAL ENCOUNTER (OUTPATIENT)
Dept: INFUSION THERAPY | Age: 63
Discharge: HOME | End: 2023-04-12
Payer: MEDICARE

## 2023-04-12 DIAGNOSIS — D47.2 GAMMOPATHY: ICD-10-CM

## 2023-04-12 DIAGNOSIS — R16.1 SPLENOMEGALY: ICD-10-CM

## 2023-04-12 DIAGNOSIS — D47.2 MGUS (MONOCLONAL GAMMOPATHY OF UNKNOWN SIGNIFICANCE): ICD-10-CM

## 2023-04-12 DIAGNOSIS — D69.6 THROMBOCYTOPENIA (HCC): ICD-10-CM

## 2023-04-12 LAB
ALBUMIN SERPL-MCNC: 4 G/DL (ref 3.5–5.2)
ALP SERPL-CCNC: 119 U/L (ref 35–104)
ALT SERPL-CCNC: 36 U/L (ref 9–52)
ANION GAP SERPL CALCULATED.3IONS-SCNC: 11 MMOL/L (ref 7–19)
AST SERPL-CCNC: 50 U/L (ref 14–36)
BASOPHILS # BLD: 0.03 K/UL (ref 0.01–0.08)
BASOPHILS NFR BLD: 0.5 % (ref 0.1–1.2)
BILIRUB SERPL-MCNC: 1.3 MG/DL (ref 0.2–1.3)
BUN SERPL-MCNC: 18 MG/DL (ref 7–17)
CALCIUM SERPL-MCNC: 9.7 MG/DL (ref 8.4–10.2)
CHLORIDE SERPL-SCNC: 105 MMOL/L (ref 98–111)
CO2 SERPL-SCNC: 28 MMOL/L (ref 22–29)
CREAT SERPL-MCNC: 0.6 MG/DL (ref 0.5–1)
EOSINOPHIL # BLD: 0.23 K/UL (ref 0.04–0.54)
EOSINOPHIL NFR BLD: 4.2 % (ref 0.7–7)
ERYTHROCYTE [DISTWIDTH] IN BLOOD BY AUTOMATED COUNT: 13.8 % (ref 11.7–14.4)
GLUCOSE SERPL-MCNC: 102 MG/DL (ref 74–106)
HCT VFR BLD AUTO: 45.1 % (ref 34.1–44.9)
HGB BLD-MCNC: 14.4 G/DL (ref 11.2–15.7)
LDH SERPL-CCNC: 190 U/L (ref 120–246)
LYMPHOCYTES # BLD: 2.29 K/UL (ref 1.18–3.74)
LYMPHOCYTES NFR BLD: 41.6 % (ref 19.3–53.1)
MCH RBC QN AUTO: 28.9 PG (ref 25.6–32.2)
MCHC RBC AUTO-ENTMCNC: 31.9 G/DL (ref 32.3–35.5)
MCV RBC AUTO: 90.4 FL (ref 79.4–94.8)
MONOCYTES # BLD: 0.43 K/UL (ref 0.24–0.82)
MONOCYTES NFR BLD: 7.8 % (ref 4.7–12.5)
NEUTROPHILS # BLD: 2.51 K/UL (ref 1.56–6.13)
NEUTS SEG NFR BLD: 45.7 % (ref 34–71.1)
PLATELET # BLD AUTO: 98 K/UL (ref 182–369)
PMV BLD AUTO: 11.2 FL (ref 7.4–10.4)
POTASSIUM SERPL-SCNC: 3.9 MMOL/L (ref 3.5–5.1)
PROT SERPL-MCNC: 7.2 G/DL (ref 6.3–8.2)
RBC # BLD AUTO: 4.99 M/UL (ref 3.93–5.22)
SODIUM SERPL-SCNC: 144 MMOL/L (ref 137–145)
WBC # BLD AUTO: 5.5 K/UL (ref 3.98–10.04)

## 2023-04-12 PROCEDURE — 85025 COMPLETE CBC W/AUTO DIFF WBC: CPT

## 2023-04-12 PROCEDURE — 80053 COMPREHEN METABOLIC PANEL: CPT

## 2023-04-12 PROCEDURE — 99212 OFFICE O/P EST SF 10 MIN: CPT

## 2023-04-12 PROCEDURE — 36415 COLL VENOUS BLD VENIPUNCTURE: CPT

## 2023-04-12 PROCEDURE — 83615 LACTATE (LD) (LDH) ENZYME: CPT

## 2023-05-10 RX ORDER — FERROUS SULFATE 325(65) MG
325 TABLET ORAL 2 TIMES DAILY
Qty: 60 TABLET | Refills: 5 | Status: SHIPPED | OUTPATIENT
Start: 2023-05-10

## 2023-05-28 NOTE — DISCHARGE INSTRUCTIONS
[address] [facility]   Physician Orders and Discharge Instructions  [address]  Telephone: [phone]    NAME:  Asaf Ding  YOB: 1960  MEDICAL RECORD NUMBER:  [id number]  DATE:  @ED@    Discharge condition: Stable    Discharge to: Home    Left via:Private automobile    Accompanied by: Self      ECF/HHA:  [address] [address] to change on Monday and Friday, [facility] to change on Wednesday     Dressing Orders: Right Lower Ext Ulcer  Promogran base of wound  Absorbant Layer, Pink Coflex Unnaboot, Keep clean and Dry  Elevate feet to level or above heart 3-4 times daily and as needed to for 30 minutes to reduce swelling  Remove wraps and call office if- Wraps get wet, Cause increased pain, Slide down or you are unable to make your next scheduled appointment   Multi Vitamin, High Protein diet as tolerated  Ok to remove wrap on appointment day and right before [address] gets there to get a shower     Follow up with Orthopedic as directed    [facility] follow up visit ____________1 week_________________  (Please note your next appointment above and if you are unable to keep, kindly give a 24 hour notice. Thank you.)    If you experience any of the following, please call the [address] [place] [address] during business hours:    * Increase in Pain  * Temperature over 101  * Increase in drainage from your wound  * Drainage with a foul odor  * Bleeding  * Increase in swelling  * Need for compression bandage changes due to slippage, breakthrough drainage. If you need medical attention outside of the business hours of the [address] [place] [address] please contact your PCP or go to the nearest emergency room. [NL] : no abnormal lymph nodes palpated

## 2023-07-03 ENCOUNTER — OFFICE VISIT (OUTPATIENT)
Dept: NEUROLOGY | Age: 63
End: 2023-07-03
Payer: MEDICARE

## 2023-07-03 VITALS
DIASTOLIC BLOOD PRESSURE: 68 MMHG | BODY MASS INDEX: 36.32 KG/M2 | HEIGHT: 66 IN | WEIGHT: 226 LBS | SYSTOLIC BLOOD PRESSURE: 116 MMHG | HEART RATE: 72 BPM | OXYGEN SATURATION: 95 %

## 2023-07-03 DIAGNOSIS — R47.89 WORD FINDING DIFFICULTY: ICD-10-CM

## 2023-07-03 DIAGNOSIS — R41.3 MEMORY LOSS: Primary | ICD-10-CM

## 2023-07-03 PROCEDURE — 3017F COLORECTAL CA SCREEN DOC REV: CPT | Performed by: NURSE PRACTITIONER

## 2023-07-03 PROCEDURE — 1036F TOBACCO NON-USER: CPT | Performed by: NURSE PRACTITIONER

## 2023-07-03 PROCEDURE — 99213 OFFICE O/P EST LOW 20 MIN: CPT | Performed by: NURSE PRACTITIONER

## 2023-07-03 PROCEDURE — 3078F DIAST BP <80 MM HG: CPT | Performed by: NURSE PRACTITIONER

## 2023-07-03 PROCEDURE — G8417 CALC BMI ABV UP PARAM F/U: HCPCS | Performed by: NURSE PRACTITIONER

## 2023-07-03 PROCEDURE — G8427 DOCREV CUR MEDS BY ELIG CLIN: HCPCS | Performed by: NURSE PRACTITIONER

## 2023-07-03 PROCEDURE — 3074F SYST BP LT 130 MM HG: CPT | Performed by: NURSE PRACTITIONER

## 2023-07-03 NOTE — PROGRESS NOTES
[facility] Neurology Office Note      Patient:   Radha Peña  MR#:    [id number]  Account Number:                         YOB: 1960  Date of Evaluation:  7/3/2023  Time of Note:                          2:02 PM  Primary/Referring Physician:  SWAPNA Harris - CNP   Consulting Physician:  Dona King DNP APRISAAC    FOLLOW UP    Chief Complaint   Patient presents with    Follow-up    Memory Loss     HISTORY OF PRESENT ILLNESS    Radha Peña is a 65y.o. year old female here for follow up of memory loss. Clinically unchanged from prior visit, no clear progression. Here with . Still primarily short term memory loss noted. She does still have word findings issues and speech difficulty. Needing reminders to get ADLs completed. She is taking medications on her own but  is monitoring these as well. Difficulty with cooking/following recipes. Seems like it is taking her longer to get ADLs done. Denies gait changes or bladder incontinence. No hallucinations. Denies tremor. No longer driving. Denies depression or anxiety. On Aricept 10mg nightly. No side effects. Has tried and failed Namenda and Namzeric in the past, noted GI side effects. She has had neuropsych testing completed in the past, no records for review. Family history of Pick's disease with aunt, onset was in her 46s. Following with hematology for thrombocytopenia still.       Past Medical History:   Diagnosis Date    Anxiety     Depression     Diabetes ([address])     Hypertension     Memory difficulty     Obesity 4/21/2021    Sleep apnea     c-pap       Past Surgical History:   Procedure Laterality Date    ANKLE FRACTURE SURGERY Right 4/21/2021    ANKLE OPEN REDUCTION INTERNAL FIXATION WITH POSSIBLE EXTERNAL FIXATOR performed by Danitza Young MD at [facility] [address] Right 6/28/2022    RIGHT ANKLE HARDWARE REMOVAL WITH WOUND VAC performed by Danitza Young MD at [facility]

## 2023-07-03 NOTE — PROGRESS NOTES
REVIEW OF SYSTEMS    Constitutional: []Fever []Sweat []Chills [] Recent Injury [x] Denies all unless marked  HEENT:[]Headache  [] Head Injury/Hearing Loss  [] Sore Throat  [] Ear Ache/Dizziness  [x] Denies all unless marked  Spine:  [] Neck pain  [] Back pain  [] Sciaticia  [x] Denies all unless marked  Cardiovascular:[]Heart Disease []Chest Pain [] Palpitations  [x] Denies all unless marked  Pulmonary: []Shortness of Breath []Cough   [x] Denies all unless marke  Gastrointestinal: []Nausea  []Vomiting  []Abdominal Pain  []Constipation  []Diarrhea  []Dark Bloody Stools  [x] Denies all unless marked  Psychiatric/Behavioral:[] Depression [] Anxiety [x] Denies all unless marked  Genitourinary:   [] Frequency  [] Urgency  [] Incontinence [] Pain with Urination  [x] Denies all unless marked  Extremities: []Pain  []Swelling  [x] Denies all unless marked  Musculoskeletal: [] Muscle Pain  [] Joint Pain  [] Arthritis [] Muscle Cramps [] Muscle Twitches  [x] Denies all unless marked  Sleep: [] Insomnia [] Snoring [] Restless Legs [] Sleep Apnea  [] Daytime Sleepiness  [x] Denies all unless marked  Skin:[] Rash [] Skin Discoloration [x] Denies all unless marked   Neurological: [x]Visual Disturbance/Memory Loss [] Loss of Balance [] Slurred Speech/Weakness [] Seizures  [] Vertigo/Dizziness [] Denies all unless marked

## 2023-08-01 ENCOUNTER — HOSPITAL ENCOUNTER (OUTPATIENT)
Dept: MAMMOGRAPHY | Facility: HOSPITAL | Age: 63
Discharge: HOME | End: 2023-08-01
Admitting: SPECIALIST
Payer: MEDICARE

## 2023-08-01 DIAGNOSIS — Z12.31 SCREENING MAMMOGRAM FOR HIGH-RISK PATIENT: ICD-10-CM

## 2023-08-01 PROCEDURE — 77067 SCR MAMMO BI INCL CAD: CPT

## 2023-08-01 PROCEDURE — 77063 BREAST TOMOSYNTHESIS BI: CPT

## 2023-09-15 ENCOUNTER — TELEPHONE (OUTPATIENT)
Dept: HEMATOLOGY | Age: 63
End: 2023-09-15

## 2023-10-16 NOTE — PROGRESS NOTES
Progress Note      Pt Name: Erica Polanco  YOB: 1960  MRN: [id number]    Date of evaluation: 10/18/2023  History Obtained From:  patient, sister electronic medical record    CHIEF COMPLAINT:    Chief Complaint   Patient presents with    Follow-up     MGUS  Gammopathy     Current active problems  Thrombocytopenia  Splenomegaly  Hepatic steatosis  IgA kappa MGUS    HISTORY OF PRESENT ILLNESS:    Erica Polanco is a 65 y.o.  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 She does have mild iron deficiency and is on 1 iron tablet daily. Colonoscopy is up-to-date. She is not any transfusion since her last visit. Overall she has been doing fairly well. She is diabetic on Jardiance 25 daily, Fortamet 1000 twice and glipizide 5 mg-half a tablet twice a day added recently. Blood pressure stable on her Toprol- nightly, Maxide 37.5-25 nightly. She continues taking Aricept 10 mg nightly due to memory issues and is followed by Dr. Kaci Melgar. Mood is stable on her Lexapro 10 mg nightly. HEMATOLOGY HISTORY: Thrombocytopenia, splenomegaly, hepatic steatosis, gammopathy  Bassem Alvarado was seen in initial hematology consultation on 7/28/2020 referred by SWAPNA Mason for evaluation of thrombocytopenia and splenomegaly. Serology 6/30/2020  B12 - 852  CMP -alk phos 135, AST 52, ALT 38     Review prior imaging studies  CT abdomen pelvis with contrast at [address] 8/14/2018 compared to 11/21/2017 revealed hepato-steatosis but spleen read as-no splenomegaly     Current imaging study  Ultrasound liver 7/24/2020 at [facility] was compared to CT of the abdomen 8/14/2018 which revealed moderate diffuse fatty infiltration of the liver without focal lesion. Doppler sonography suggested a normal hepato-pedal portal venous circulation.   Splenomegaly measuring 14.4 x 9.5 x 14.6 cm     Her

## 2023-10-17 ENCOUNTER — TELEPHONE (OUTPATIENT)
Dept: HEMATOLOGY | Age: 63
End: 2023-10-17

## 2023-10-17 NOTE — TELEPHONE ENCOUNTER
I called patient to remind them of their appointment on 10/18/2023. Patient voiced their understanding of the appointment and confirmed they would be coming. Patient was given date & time of appt.

## 2023-10-18 ENCOUNTER — OFFICE VISIT (OUTPATIENT)
Dept: HEMATOLOGY | Age: 63
End: 2023-10-18
Payer: MEDICARE

## 2023-10-18 ENCOUNTER — HOSPITAL ENCOUNTER (OUTPATIENT)
Dept: INFUSION THERAPY | Age: 63
Discharge: HOME | End: 2023-10-18
Payer: MEDICARE

## 2023-10-18 VITALS
HEIGHT: 66 IN | SYSTOLIC BLOOD PRESSURE: 128 MMHG | DIASTOLIC BLOOD PRESSURE: 68 MMHG | WEIGHT: 229.9 LBS | HEART RATE: 68 BPM | OXYGEN SATURATION: 95 % | BODY MASS INDEX: 36.95 KG/M2

## 2023-10-18 DIAGNOSIS — D69.6 THROMBOCYTOPENIA (HCC): ICD-10-CM

## 2023-10-18 DIAGNOSIS — E61.1 IRON DEFICIENCY: ICD-10-CM

## 2023-10-18 DIAGNOSIS — R16.1 SPLENOMEGALY: ICD-10-CM

## 2023-10-18 DIAGNOSIS — D47.2 MGUS (MONOCLONAL GAMMOPATHY OF UNKNOWN SIGNIFICANCE): ICD-10-CM

## 2023-10-18 DIAGNOSIS — D47.2 MGUS (MONOCLONAL GAMMOPATHY OF UNKNOWN SIGNIFICANCE): Primary | ICD-10-CM

## 2023-10-18 DIAGNOSIS — R53.83 OTHER FATIGUE: ICD-10-CM

## 2023-10-18 DIAGNOSIS — D47.2 GAMMOPATHY: ICD-10-CM

## 2023-10-18 LAB
ALBUMIN SERPL-MCNC: 4 G/DL (ref 3.5–5.2)
ALP SERPL-CCNC: 169 U/L (ref 35–104)
ALT SERPL-CCNC: 43 U/L (ref 9–52)
ANION GAP SERPL CALCULATED.3IONS-SCNC: 10 MMOL/L (ref 7–19)
AST SERPL-CCNC: 52 U/L (ref 14–36)
BASOPHILS # BLD: 0.02 K/UL (ref 0.01–0.08)
BASOPHILS NFR BLD: 0.5 % (ref 0.1–1.2)
BILIRUB SERPL-MCNC: 1 MG/DL (ref 0.2–1.3)
BUN SERPL-MCNC: 14 MG/DL (ref 7–17)
CALCIUM SERPL-MCNC: 9.7 MG/DL (ref 8.4–10.2)
CHLORIDE SERPL-SCNC: 105 MMOL/L (ref 98–111)
CO2 SERPL-SCNC: 29 MMOL/L (ref 22–29)
CREAT SERPL-MCNC: 0.6 MG/DL (ref 0.5–1)
EOSINOPHIL # BLD: 0.22 K/UL (ref 0.04–0.54)
EOSINOPHIL NFR BLD: 5.1 % (ref 0.7–7)
ERYTHROCYTE [DISTWIDTH] IN BLOOD BY AUTOMATED COUNT: 12.7 % (ref 11.7–14.4)
GLOBULIN: 2.9 G/DL
GLUCOSE SERPL-MCNC: 85 MG/DL (ref 74–106)
HCT VFR BLD AUTO: 37.3 % (ref 34.1–44.9)
HGB BLD-MCNC: 13 G/DL (ref 11.2–15.7)
LYMPHOCYTES # BLD: 1.73 K/UL (ref 1.18–3.74)
LYMPHOCYTES NFR BLD: 40 % (ref 19.3–53.1)
MCH RBC QN AUTO: 31 PG (ref 25.6–32.2)
MCHC RBC AUTO-ENTMCNC: 34.9 G/DL (ref 32.3–35.5)
MCV RBC AUTO: 89 FL (ref 79.4–94.8)
MONOCYTES # BLD: 0.3 K/UL (ref 0.24–0.82)
MONOCYTES NFR BLD: 6.9 % (ref 4.7–12.5)
NEUTROPHILS # BLD: 2.01 K/UL (ref 1.56–6.13)
NEUTS SEG NFR BLD: 46.6 % (ref 34–71.1)
PLATELET # BLD AUTO: 117 K/UL (ref 182–369)
PMV BLD AUTO: 11 FL (ref 7.4–10.4)
POTASSIUM SERPL-SCNC: 4.6 MMOL/L (ref 3.5–5.1)
PROT SERPL-MCNC: 6.8 G/DL (ref 6.3–8.2)
RBC # BLD AUTO: 4.19 M/UL (ref 3.93–5.22)
SODIUM SERPL-SCNC: 144 MMOL/L (ref 137–145)
WBC # BLD AUTO: 4.32 K/UL (ref 3.98–10.04)

## 2023-10-18 PROCEDURE — G8417 CALC BMI ABV UP PARAM F/U: HCPCS | Performed by: PHYSICIAN ASSISTANT

## 2023-10-18 PROCEDURE — G8484 FLU IMMUNIZE NO ADMIN: HCPCS | Performed by: PHYSICIAN ASSISTANT

## 2023-10-18 PROCEDURE — 36415 COLL VENOUS BLD VENIPUNCTURE: CPT

## 2023-10-18 PROCEDURE — 3017F COLORECTAL CA SCREEN DOC REV: CPT | Performed by: PHYSICIAN ASSISTANT

## 2023-10-18 PROCEDURE — 80053 COMPREHEN METABOLIC PANEL: CPT

## 2023-10-18 PROCEDURE — 1036F TOBACCO NON-USER: CPT | Performed by: PHYSICIAN ASSISTANT

## 2023-10-18 PROCEDURE — 85025 COMPLETE CBC W/AUTO DIFF WBC: CPT

## 2023-10-18 PROCEDURE — G8427 DOCREV CUR MEDS BY ELIG CLIN: HCPCS | Performed by: PHYSICIAN ASSISTANT

## 2023-10-18 PROCEDURE — 99214 OFFICE O/P EST MOD 30 MIN: CPT | Performed by: PHYSICIAN ASSISTANT

## 2023-10-18 PROCEDURE — 99212 OFFICE O/P EST SF 10 MIN: CPT

## 2023-10-18 PROCEDURE — 3078F DIAST BP <80 MM HG: CPT | Performed by: PHYSICIAN ASSISTANT

## 2023-10-18 PROCEDURE — 3074F SYST BP LT 130 MM HG: CPT | Performed by: PHYSICIAN ASSISTANT

## 2023-10-18 RX ORDER — GLIPIZIDE 5 MG/1
5 TABLET ORAL 2 TIMES DAILY
COMMUNITY
Start: 2023-07-17

## 2023-10-18 ASSESSMENT — ENCOUNTER SYMPTOMS
PHOTOPHOBIA: 0
BACK PAIN: 0
SORE THROAT: 0
BLOOD IN STOOL: 0
WHEEZING: 0
SHORTNESS OF BREATH: 0
EYE ITCHING: 0
NAUSEA: 0
ABDOMINAL PAIN: 0
DIARRHEA: 0
EYE DISCHARGE: 0
CONSTIPATION: 0
VOMITING: 0
COUGH: 0
VOICE CHANGE: 0
ABDOMINAL DISTENTION: 0
TROUBLE SWALLOWING: 0

## 2024-01-03 NOTE — PROGRESS NOTES
tablet 11    metFORMIN, OSM, (FORTAMET) 1000 MG extended release tablet Take 1 tablet by mouth 2 times daily (with meals)      metoprolol succinate (TOPROL XL) 100 MG extended release tablet Take 1 tablet by mouth nightly  1    escitalopram (LEXAPRO) 10 MG tablet Take 1 tablet by mouth nightly      triamterene-hydrochlorothiazide (MAXZIDE-25) 37.5-25 MG per tablet Take 1 tablet by mouth nightly      glipiZIDE (GLUCOTROL) 5 MG tablet 1 tablet in the morning and at bedtime       No current facility-administered medications for this visit.     Allergies: Azithromycin and Namenda  [memantine hcl]  Past Medical History:   Diagnosis Date    Anxiety     Depression     Diabetes (HCC)     Diabetes (HCC)     Hypertension     Memory difficulty     Obesity 04/21/2021    Sleep apnea     c-pap     Past Surgical History:   Procedure Laterality Date    ANKLE FRACTURE SURGERY Right 4/21/2021    ANKLE OPEN REDUCTION INTERNAL FIXATION WITH POSSIBLE EXTERNAL FIXATOR performed by Ye Vásquez MD at [facility] OR    ANKLE SURGERY Right 6/28/2022    RIGHT ANKLE HARDWARE REMOVAL WITH WOUND VAC performed by Ye Vásquez MD at [facility] OR    BONE MARROW BIOPSY N/A 9/13/2022    BONE MARROW ASPIRATION BIOPSY performed by SWAPNA Caruso at [facility] OR    GALLBLADDER SURGERY       Family History   Problem Relation Age of Onset    Cancer Mother     Heart Attack Father      Social History     Tobacco Use    Smoking status: Never     Passive exposure: Never    Smokeless tobacco: Never   Substance Use Topics    Alcohol use: No         ROS  Eyes - no sudden vision change or amaurosis.  Respiratory - no significant shortness of breath,  Cardiovascular - no chest pain or syncope.  No  significant leg swelling.  no claudication.  Musculoskeletal - no gait disturbance  Skin - no new wound.  Neurologic -  No speech difficulty or lateralizing weakness.  All other review of systems are negative.    Physical Exam    /72 (Site: Right

## 2024-01-05 ENCOUNTER — HOSPITAL ENCOUNTER (OUTPATIENT)
Dept: VASCULAR LAB | Age: 64
Discharge: HOME | End: 2024-01-05
Payer: MEDICARE

## 2024-01-05 ENCOUNTER — OFFICE VISIT (OUTPATIENT)
Dept: VASCULAR SURGERY | Age: 64
End: 2024-01-05
Payer: MEDICARE

## 2024-01-05 VITALS
SYSTOLIC BLOOD PRESSURE: 125 MMHG | OXYGEN SATURATION: 98 % | DIASTOLIC BLOOD PRESSURE: 72 MMHG | HEART RATE: 75 BPM | TEMPERATURE: 97.8 F

## 2024-01-05 DIAGNOSIS — I73.9 CLAUDICATION (HCC): Primary | ICD-10-CM

## 2024-01-05 DIAGNOSIS — I73.9 CLAUDICATION (HCC): ICD-10-CM

## 2024-01-05 DIAGNOSIS — M79.605 LEG PAIN, LEFT: Primary | ICD-10-CM

## 2024-01-05 DIAGNOSIS — M79.605 LEG PAIN, LEFT: ICD-10-CM

## 2024-01-05 DIAGNOSIS — M79.89 LEG SWELLING: ICD-10-CM

## 2024-01-05 DIAGNOSIS — M79.604 LEG PAIN, RIGHT: ICD-10-CM

## 2024-01-05 PROCEDURE — G8417 CALC BMI ABV UP PARAM F/U: HCPCS | Performed by: NURSE PRACTITIONER

## 2024-01-05 PROCEDURE — 3078F DIAST BP <80 MM HG: CPT | Performed by: NURSE PRACTITIONER

## 2024-01-05 PROCEDURE — G8484 FLU IMMUNIZE NO ADMIN: HCPCS | Performed by: NURSE PRACTITIONER

## 2024-01-05 PROCEDURE — 1036F TOBACCO NON-USER: CPT | Performed by: NURSE PRACTITIONER

## 2024-01-05 PROCEDURE — 99203 OFFICE O/P NEW LOW 30 MIN: CPT | Performed by: NURSE PRACTITIONER

## 2024-01-05 PROCEDURE — 3017F COLORECTAL CA SCREEN DOC REV: CPT | Performed by: NURSE PRACTITIONER

## 2024-01-05 PROCEDURE — 3074F SYST BP LT 130 MM HG: CPT | Performed by: NURSE PRACTITIONER

## 2024-01-05 PROCEDURE — 93923 UPR/LXTR ART STDY 3+ LVLS: CPT

## 2024-01-05 PROCEDURE — G8427 DOCREV CUR MEDS BY ELIG CLIN: HCPCS | Performed by: NURSE PRACTITIONER

## 2024-01-08 ENCOUNTER — TELEPHONE (OUTPATIENT)
Dept: VASCULAR SURGERY | Age: 64
End: 2024-01-08

## 2024-01-08 NOTE — TELEPHONE ENCOUNTER
I called and spoke with the patient regarding her recent study completed on 1/5. I informed the patient that Hope reviewed the study and it showed very mild arterial blockage and that she wanted it rechecked in 1 year. The patient has an upcoming venous study and Hope wants to see her after it. I instructed the patient to give us a call back in the office if anything changes. Patient verbalized understanding.

## 2024-02-07 ENCOUNTER — OFFICE VISIT (OUTPATIENT)
Age: 64
End: 2024-02-07
Payer: MEDICARE

## 2024-02-07 ENCOUNTER — HOSPITAL ENCOUNTER (OUTPATIENT)
Dept: GENERAL RADIOLOGY | Age: 64
Discharge: HOME | End: 2024-02-07
Payer: MEDICARE

## 2024-02-07 VITALS
DIASTOLIC BLOOD PRESSURE: 68 MMHG | HEART RATE: 83 BPM | TEMPERATURE: 99.5 F | OXYGEN SATURATION: 95 % | RESPIRATION RATE: 20 BRPM | BODY MASS INDEX: 37.14 KG/M2 | WEIGHT: 230 LBS | SYSTOLIC BLOOD PRESSURE: 132 MMHG

## 2024-02-07 DIAGNOSIS — R05.1 ACUTE COUGH: ICD-10-CM

## 2024-02-07 DIAGNOSIS — H66.93 BILATERAL ACUTE OTITIS MEDIA: Primary | ICD-10-CM

## 2024-02-07 DIAGNOSIS — R09.89 ABNORMAL LUNG SOUNDS: ICD-10-CM

## 2024-02-07 DIAGNOSIS — R50.9 FEVER, UNSPECIFIED FEVER CAUSE: ICD-10-CM

## 2024-02-07 LAB
INFLUENZA A ANTIBODY: NORMAL
INFLUENZA B ANTIBODY: NORMAL
S PYO AG THROAT QL: NORMAL

## 2024-02-07 PROCEDURE — 99213 OFFICE O/P EST LOW 20 MIN: CPT

## 2024-02-07 PROCEDURE — 3075F SYST BP GE 130 - 139MM HG: CPT

## 2024-02-07 PROCEDURE — 3078F DIAST BP <80 MM HG: CPT

## 2024-02-07 PROCEDURE — 71046 X-RAY EXAM CHEST 2 VIEWS: CPT

## 2024-02-07 RX ORDER — BROMPHENIRAMINE MALEATE, PSEUDOEPHEDRINE HYDROCHLORIDE, AND DEXTROMETHORPHAN HYDROBROMIDE 2; 30; 10 MG/5ML; MG/5ML; MG/5ML
5 SYRUP ORAL 4 TIMES DAILY PRN
Qty: 100 ML | Refills: 0 | Status: SHIPPED | OUTPATIENT
Start: 2024-02-07 | End: 2024-02-12

## 2024-02-07 RX ORDER — AMOXICILLIN 500 MG/1
500 CAPSULE ORAL 2 TIMES DAILY
Qty: 20 CAPSULE | Refills: 0 | Status: SHIPPED | OUTPATIENT
Start: 2024-02-07 | End: 2024-02-17

## 2024-02-07 ASSESSMENT — ENCOUNTER SYMPTOMS
EYE REDNESS: 0
CHEST TIGHTNESS: 0
VOMITING: 0
ALLERGIC/IMMUNOLOGIC NEGATIVE: 1
NAUSEA: 0
RHINORRHEA: 0
SHORTNESS OF BREATH: 0
BACK PAIN: 0
EYE ITCHING: 0
DIARRHEA: 0
ABDOMINAL PAIN: 0
WHEEZING: 0
EYE DISCHARGE: 0
COUGH: 1
CONSTIPATION: 0
SINUS PAIN: 0
SORE THROAT: 0

## 2024-02-07 NOTE — PATIENT INSTRUCTIONS
- strep and flu test negative  - Chest XR ordered   - Begin Amoxicillin antibiotic. Take full course of antibiotic, even if symptoms improve. .  - Bromfed for cough PRN   - Increase fluid intake  - Recommended OTC flonase  - Recommended OTC claritin or zyrtec  - Tylenol / Motrin for pain/fever, unless contraindicated.   - The patient is to follow up with PCP or return to clinic if symptoms worsen/fail to improve.    Any condition can change, despite proper treatment. Therefore, if symptoms still persist or worsen after treatment plan intitated today, patient is to go to the nearest ER, call PCP, or return to urgent care for further evaluation. Urgent Care evaluation today is not a substitute for PCP visit. Follow up care is the patient's responsibility to discuss and review this UC visit.

## 2024-02-07 NOTE — PROGRESS NOTES
Pulses: Normal pulses.      Heart sounds: Normal heart sounds.   Pulmonary:      Effort: Pulmonary effort is normal. No tachypnea or respiratory distress.      Breath sounds: Examination of the right-lower field reveals rhonchi. Examination of the left-lower field reveals rhonchi. Rhonchi present. No wheezing.   Chest:      Chest wall: No tenderness.   Abdominal:      General: Bowel sounds are normal. There is no distension.      Palpations: Abdomen is soft.      Tenderness: There is no abdominal tenderness. There is no guarding.   Genitourinary:     Comments: Not indicated.   Musculoskeletal:         General: No tenderness or signs of injury. Normal range of motion.      Cervical back: Normal range of motion.   Skin:     General: Skin is warm and dry.      Capillary Refill: Capillary refill takes less than 2 seconds.      Findings: No bruising, erythema or lesion.   Neurological:      Mental Status: She is alert and oriented to person, place, and time.      Motor: No weakness.      Coordination: Coordination normal.   Psychiatric:         Mood and Affect: Mood normal.         Behavior: Behavior normal.       /68   Pulse 83   Temp 99.5 °F (37.5 °C) (Temporal)   Resp 20   Wt 104.3 kg (230 lb)   SpO2 95%   BMI 37.14 kg/m²     Assessment       Diagnosis Orders   1. Bilateral acute otitis media  amoxicillin (AMOXIL) 500 MG capsule      2. Abnormal lung sounds  XR CHEST STANDARD (2 VW)      3. Acute cough  POCT Influenza A/B    POCT rapid strep A    brompheniramine-pseudoephedrine-DM 2-30-10 MG/5ML syrup      4. Fever, unspecified fever cause  POCT Influenza A/B    POCT rapid strep A        Plan     - strep and flu test negative  - Chest XR ordered and results pending. Will call patient with results   - Begin Amoxicillin antibiotic. Take full course of antibiotic, even if symptoms improve.   - Increase fluid intake  - Bromfed for cough PRN   - Recommend Mucinex OTC for sputum expulsion   - Recommended OTC

## 2024-02-23 ENCOUNTER — TRANSCRIBE ORDERS (OUTPATIENT)
Dept: ADMINISTRATIVE | Facility: HOSPITAL | Age: 64
End: 2024-02-23
Payer: MEDICARE

## 2024-02-23 ENCOUNTER — HOSPITAL ENCOUNTER (OUTPATIENT)
Dept: GENERAL RADIOLOGY | Facility: HOSPITAL | Age: 64
Discharge: HOME | End: 2024-02-23
Payer: MEDICARE

## 2024-02-23 DIAGNOSIS — R05.9 COUGH, UNSPECIFIED TYPE: Primary | ICD-10-CM

## 2024-02-23 DIAGNOSIS — R05.9 COUGH, UNSPECIFIED TYPE: ICD-10-CM

## 2024-02-23 PROCEDURE — 71046 X-RAY EXAM CHEST 2 VIEWS: CPT

## 2024-04-12 ENCOUNTER — LAB (OUTPATIENT)
Dept: LAB | Facility: HOSPITAL | Age: 64
End: 2024-04-12
Payer: MEDICARE

## 2024-04-12 ENCOUNTER — OFFICE VISIT (OUTPATIENT)
Dept: GASTROENTEROLOGY | Facility: CLINIC | Age: 64
End: 2024-04-12
Payer: MEDICARE

## 2024-04-12 VITALS
OXYGEN SATURATION: 96 % | WEIGHT: 230 LBS | HEIGHT: 66 IN | BODY MASS INDEX: 36.96 KG/M2 | SYSTOLIC BLOOD PRESSURE: 110 MMHG | TEMPERATURE: 97.5 F | DIASTOLIC BLOOD PRESSURE: 70 MMHG | HEART RATE: 71 BPM

## 2024-04-12 DIAGNOSIS — D69.6 THROMBOCYTOPENIA: ICD-10-CM

## 2024-04-12 DIAGNOSIS — R16.2 HEPATOSPLENOMEGALY: ICD-10-CM

## 2024-04-12 DIAGNOSIS — K76.0 STEATOSIS OF LIVER: ICD-10-CM

## 2024-04-12 DIAGNOSIS — R79.89 ELEVATED LFTS: Primary | ICD-10-CM

## 2024-04-12 DIAGNOSIS — R94.5 ABNORMAL RESULTS OF LIVER FUNCTION STUDIES: ICD-10-CM

## 2024-04-12 LAB
ALBUMIN SERPL-MCNC: 4.1 G/DL (ref 3.5–5.2)
ALBUMIN/GLOB SERPL: 1.3 G/DL
ALP SERPL-CCNC: 120 U/L (ref 39–117)
ALT SERPL W P-5'-P-CCNC: 31 U/L (ref 1–33)
ANION GAP SERPL CALCULATED.3IONS-SCNC: 8 MMOL/L (ref 5–15)
AST SERPL-CCNC: 46 U/L (ref 1–32)
BILIRUB SERPL-MCNC: 1.2 MG/DL (ref 0–1.2)
BUN SERPL-MCNC: 16 MG/DL (ref 8–23)
BUN/CREAT SERPL: 29.6 (ref 7–25)
CALCIUM SPEC-SCNC: 10.2 MG/DL (ref 8.6–10.5)
CERULOPLASMIN SERPL-MCNC: 19 MG/DL (ref 19–39)
CHLORIDE SERPL-SCNC: 105 MMOL/L (ref 98–107)
CO2 SERPL-SCNC: 30 MMOL/L (ref 22–29)
CREAT SERPL-MCNC: 0.54 MG/DL (ref 0.57–1)
DEPRECATED RDW RBC AUTO: 44.2 FL (ref 37–54)
EGFRCR SERPLBLD CKD-EPI 2021: 104.2 ML/MIN/1.73
ERYTHROCYTE [DISTWIDTH] IN BLOOD BY AUTOMATED COUNT: 13.3 % (ref 12.3–15.4)
GLOBULIN UR ELPH-MCNC: 3.2 GM/DL
GLUCOSE SERPL-MCNC: 131 MG/DL (ref 65–99)
HAV IGM SERPL QL IA: NORMAL
HBV CORE IGM SERPL QL IA: NORMAL
HBV SURFACE AG SERPL QL IA: NORMAL
HCT VFR BLD AUTO: 40.2 % (ref 34–46.6)
HCV AB SER DONR QL: NORMAL
HGB BLD-MCNC: 13.3 G/DL (ref 12–15.9)
INR PPP: 1.03 (ref 0.91–1.09)
MCH RBC QN AUTO: 29.7 PG (ref 26.6–33)
MCHC RBC AUTO-ENTMCNC: 33.1 G/DL (ref 31.5–35.7)
MCV RBC AUTO: 89.7 FL (ref 79–97)
PLATELET # BLD AUTO: 115 10*3/MM3 (ref 140–450)
PMV BLD AUTO: 11 FL (ref 6–12)
POTASSIUM SERPL-SCNC: 3.8 MMOL/L (ref 3.5–5.2)
PROT SERPL-MCNC: 7.3 G/DL (ref 6–8.5)
PROTHROMBIN TIME: 13.9 SECONDS (ref 11.8–14.8)
RBC # BLD AUTO: 4.48 10*6/MM3 (ref 3.77–5.28)
SODIUM SERPL-SCNC: 143 MMOL/L (ref 136–145)
WBC NRBC COR # BLD AUTO: 3.64 10*3/MM3 (ref 3.4–10.8)

## 2024-04-12 PROCEDURE — 86015 ACTIN ANTIBODY EACH: CPT | Performed by: NURSE PRACTITIONER

## 2024-04-12 PROCEDURE — 36415 COLL VENOUS BLD VENIPUNCTURE: CPT | Performed by: NURSE PRACTITIONER

## 2024-04-12 PROCEDURE — 82390 ASSAY OF CERULOPLASMIN: CPT | Performed by: NURSE PRACTITIONER

## 2024-04-12 PROCEDURE — 85610 PROTHROMBIN TIME: CPT | Performed by: NURSE PRACTITIONER

## 2024-04-12 PROCEDURE — 1160F RVW MEDS BY RX/DR IN RCRD: CPT | Performed by: NURSE PRACTITIONER

## 2024-04-12 PROCEDURE — 86381 MITOCHONDRIAL ANTIBODY EACH: CPT | Performed by: NURSE PRACTITIONER

## 2024-04-12 PROCEDURE — 1159F MED LIST DOCD IN RCRD: CPT | Performed by: NURSE PRACTITIONER

## 2024-04-12 PROCEDURE — 80074 ACUTE HEPATITIS PANEL: CPT | Performed by: NURSE PRACTITIONER

## 2024-04-12 PROCEDURE — 86038 ANTINUCLEAR ANTIBODIES: CPT | Performed by: NURSE PRACTITIONER

## 2024-04-12 PROCEDURE — 99214 OFFICE O/P EST MOD 30 MIN: CPT | Performed by: NURSE PRACTITIONER

## 2024-04-12 PROCEDURE — 80053 COMPREHEN METABOLIC PANEL: CPT | Performed by: NURSE PRACTITIONER

## 2024-04-12 PROCEDURE — 85027 COMPLETE CBC AUTOMATED: CPT | Performed by: NURSE PRACTITIONER

## 2024-04-12 NOTE — ASSESSMENT & PLAN NOTE
..The principles of management of steatohepatitis are weight loss through a structured diet and exercise as well as meticulous control of any component of metabolic syndrome, including blood glucose levels, cholesterol and blood pressure.   The patient should strive to lose 2-4 monthly until reaching 7-10% reduction in weight.      Coffee consumption has been shown to decrease the risk of HCC in patients with chronic liver disease.  In these patients, coffee consumption should be encouraged.    I have advised to avoid fructose containing food and drink.     Daily alcohol intake should strickly be below 30gm in men and 20 gm in women.    A physical activity program should in 150-200 min/week of moderate intensity in 3-5 sessions. Resistance training to promote musculoskeletal fitness and improve metabolic factors was reviewed.

## 2024-04-12 NOTE — H&P (VIEW-ONLY)
Primary Physician: Baljnider Alatorre MD    Chief Complaint   Patient presents with    Elevated Hepatic Enzymes     Pt presents today for evaluation for elevated LFT's-had labs for PCP last month that showed this       Subjective     Geneva Knight is a 63 y.o. female.    HPI  Elevated LFT's  10/18/2023: ALKP 169, ALT 43, AST 52, Bili 10.  04/12/2023: ALKP 119, ALT 36, AST 50, Bili 1.3  07/15/2022: ALKP 105, ALT 8, AST 46, Bili  0.6  05/12/2022: ALKP  125, ALT 30, AST 49, Bili 0.9  02/25/2022: ALKP  158, ALT 31, AST 58, Bili 0.6  10/22/2021: ALKP 135, ALT 29, AST 51, Bili 0.8  07/12/2021: ALKP  140, alt 24, 39 39, Bili 0.5  02/23/2021: ALKP 155, ALT 34, AST 52, Bili 0.8  10/27/2020: ALKP 102, ALT 39, AST 56, Bili 1.5  06/21/2019: ALKP 91, ALT 41, AST 55, Bili 1.0  08/14/2018: ALKP 89, ALT 66, AST 99, Bili 1.3      10/18/2023 Ferritin  89.2, normal iron profile  Platelets 117K    3/16/2022  Liver: Hepatosplenomegaly, Hepatic Steatosis    Pt denies any alcohol use.      Personal Hx Adenomatous Colon Polyps  3/30/2023 last colonoscopy with a 4mm hyperplastic polyp in rectum removed. Large lipoma in ascending colon biopsied    Thrombocytopenia  Last platelet count in Oct 2023 was 117K  Past Medical History:   Diagnosis Date    Ankle fracture     Right    Anxiety     Cellulitis     Right ankle    Family history of colon cancer     GERD (gastroesophageal reflux disease)     History of adenomatous polyp of colon     History of colon polyps     HTN (hypertension)     Hyperlipemia     VIOLETA (obstructive sleep apnea)     cpap    Type 2 diabetes mellitus     Vitamin D deficiency        Past Surgical History:   Procedure Laterality Date    BREAST BIOPSY Right 08/30/2021    Procedure: EXCISIONAL RIGHT BREAST BIOPSY WITH NEEDLE LOCALIZATION - ULTRASOUND GUIDED;  Surgeon: Martine Roy MD;  Location: [facility] OR;  Service: General;  Laterality: Right;    CHOLECYSTECTOMY  2004    COLONOSCOPY N/A 12/18/2017    Three 4-7mm polyps  in the rectum-path shows one tubular adenomatous polyp and two hyperplastic polyps; Congested mucosa in the rectum, in the recto-sigmoid colon and in the sigmoid colon-biopsied; Repeat 5 years    COLONOSCOPY  07/25/2013    The entire examined colon is normal on direct and retroflexion views; Repeat 7-10 years    COLONOSCOPY N/A 03/30/2023    One 4mm hyperplastic polyp in the rectum; Large lipoma in the ascending colon-biopsied; Repeat 5 years    EYE SURGERY Bilateral     To remove loose skin    FOOT SURGERY Right 04/21/2021    ORIF ANKLE FRACTURE Right         Current Outpatient Medications:     Ascorbic Acid (Vitamin C) 500 MG capsule, Take 1 capsule by mouth Daily., Disp: , Rfl:     B Complex Vitamins (VITAMIN B COMPLEX PO), Take 1 capsule by mouth Daily., Disp: , Rfl:     donepezil (ARICEPT) 10 MG tablet, Take 1 tablet by mouth Every Night., Disp: , Rfl:     escitalopram (LEXAPRO) 10 MG tablet, Take 1 tablet by mouth Daily., Disp: , Rfl:     levocetirizine (XYZAL) 5 MG tablet, Take 1 tablet by mouth As Needed., Disp: , Rfl:     metFORMIN (GLUCOPHAGE) 1000 MG tablet, Take 1 tablet by mouth 2 (Two) Times a Day., Disp: , Rfl:     metoprolol succinate XL (TOPROL-XL) 100 MG 24 hr tablet, Take 1 tablet by mouth Every Night., Disp: , Rfl:     multivitamin with minerals tablet tablet, Take 1 tablet by mouth Daily., Disp: , Rfl:     Omega-3 Fatty Acids (fish oil) 1000 MG capsule capsule, Take 1 capsule by mouth Daily With Breakfast., Disp: , Rfl:     triamterene-hydrochlorothiazide (DYAZIDE) 37.5-25 MG per capsule, Take 1 capsule by mouth Daily., Disp: , Rfl:     Vitamin D, Cholecalciferol, 25 MCG (1000 UT) capsule, Take 1 capsule by mouth Daily., Disp: , Rfl:     Allergies   Allergen Reactions    Azithromycin Rash    Namenda [Memantine] Nausea Only and Headache       Social History     Socioeconomic History    Marital status:    Tobacco Use    Smoking status: Never    Smokeless tobacco: Never   Vaping Use     "Vaping status: Never Used   Substance and Sexual Activity    Alcohol use: Not Currently    Drug use: Never    Sexual activity: Not Currently     Partners: Male     Birth control/protection: Other     Comment: menopausal       Family History   Problem Relation Age of Onset    Breast cancer Mother     Colon cancer Maternal Aunt         In her 50's    Colon cancer Maternal Grandmother         In her 70's    Breast cancer Maternal Grandmother     Colon polyps Neg Hx     Esophageal cancer Neg Hx     Liver cancer Neg Hx     Stomach cancer Neg Hx     Liver disease Neg Hx     Rectal cancer Neg Hx        Review of Systems   Constitutional:  Negative for unexpected weight change.       Objective     /70 (BP Location: Left arm, Patient Position: Sitting, Cuff Size: Adult)   Pulse 71   Temp 97.5 °F (36.4 °C) (Infrared)   Ht 167.6 cm (66\")   Wt 104 kg (230 lb)   SpO2 96%   Breastfeeding No   BMI 37.12 kg/m²     Physical Exam  Vitals reviewed.   Constitutional:       Appearance: Normal appearance.   Cardiovascular:      Rate and Rhythm: Normal rate and regular rhythm.   Abdominal:      General: Abdomen is flat.      Palpations: Abdomen is soft.      Comments: Liver palpated 2 fingerbreaths below ribs   Neurological:      Mental Status: She is alert.             Lab Results - Last 18 Months   Lab Units 04/12/24  0915 10/18/23  1128 04/12/23  1029   HEMOGLOBIN g/dL 13.3 13.0 14.4   HEMATOCRIT % 40.2 37.3 45.1*   MCV fL 89.7 89.0 90.4   WBC 10*3/mm3 3.64 4.32 5.50   RDW % 13.3 12.7 13.8   MPV fL 11.0 11.0* 11.2*   PLATELETS 10*3/mm3 115* 117* 98*   INR  1.03  --   --        Lab Results - Last 18 Months   Lab Units 10/18/23  1132   IRON ug/dL 128   TIBC ug/dL 378   IRON SATURATION % 34   FERRITIN ng/mL 89.2        Lab Results - Last 18 Months   Lab Units 04/12/24  0915 10/18/23  1132   HEP B C IGM  Non-Reactive  --    HEP B S AG  Non-Reactive  --    FERRITIN ng/mL  --  89.2     US LIVER  3/16/2022  Order: " [id number]  Impression    Hepatic steatosis.  2. Hepatosplenomegaly.  Signed by Dr Dawson Muro  Narrative    EXAMINATION: US LIVER 3/16/2022 11:49 AM  HISTORY: US LIVER 3/16/2022 9:08 AM  HISTORY: R74.8  COMPARISON: NONE  TECHNIQUE: Multiple longitudinal and transverse realtime sonographic  images of the upper abdomen are obtained.  FINDINGS:    Pancreas: Limited visibility.  Liver: Echogenic and very dense. This is consistent with hepatic  steatosis. The right lobe approximately 16.5 cm in sagittal length.  Hepatopedal flow is present in the portal vein..  Gallbladder: Gallbladder is surgically absent..  Bile ducts: The CBD measures 0.47 cm in diameter. There is no  intrahepatic ductal dilation.  Spleen: Spleen is mildly enlarged. Bleeding is 14.1 x 6.7 x 14.3 cm.    Other: Right renal contour is 4.6 x 4.6 x 10.9 cm..  Exam End: 03/16/22 11:57 Last Resulted: 03/16/22 11:59   Received From: [facility]  Result Received: 02/23/24         IMPRESSION/PLAN:    Assessment & Plan      Problem List Items Addressed This Visit       Elevated LFTs - Primary    Overview     Elevation in LFT's since at least 2018    3/2022 Liver US with hepatic steatosis and hepatosplenomegaly         Current Assessment & Plan     Liver serology work up to begin today         Relevant Orders    DON    Mitochondrial Antibodies, M2    Anti-Smooth Muscle Antibody Titer    Hepatitis Panel, Acute (Completed)    Ceruloplasmin    CBC (No Diff) (Completed)    Comprehensive Metabolic Panel (Completed)    Protime-INR (Completed)    CT Abdomen Liver With Contrast    Thrombocytopenia    Overview     Last platelet count in Oct 2023 was 117K         Steatosis of liver    Overview     3/16/2022 US Liver: Hepatosplenomegaly, Hepatic Steatosis         Current Assessment & Plan     ..The principles of management of steatohepatitis are weight loss through a structured diet and exercise as well as meticulous control of any component of  metabolic syndrome, including blood glucose levels, cholesterol and blood pressure.   The patient should strive to lose 2-4 monthly until reaching 7-10% reduction in weight.      Coffee consumption has been shown to decrease the risk of HCC in patients with chronic liver disease.  In these patients, coffee consumption should be encouraged.    I have advised to avoid fructose containing food and drink.     Daily alcohol intake should strickly be below 30gm in men and 20 gm in women.    A physical activity program should in 150-200 min/week of moderate intensity in 3-5 sessions. Resistance training to promote musculoskeletal fitness and improve metabolic factors was reviewed.          Hepatosplenomegaly    Overview     3/16/2022  Liver: Hepatosplenomegaly, Hepatic Steatosis         Current Assessment & Plan     Obtain CT Scan to review         Relevant Orders    CT Abdomen Liver With Contrast     Other Visit Diagnoses       Abnormal results of liver function studies        Relevant Orders    Hepatitis Panel, Acute (Completed)    Protime-INR (Completed)            Begin work up to find source of elevated LFT's check serology today.  CT Scan of the LIver  Follow up 2 months            Kavita De, APRN  04/12/24  12:37 CDT    Part of this note may be an electronic transcription/translation of spoken language to printed text.

## 2024-04-12 NOTE — PROGRESS NOTES
Primary Physician: Baljinder Alatorre MD    Chief Complaint   Patient presents with    Elevated Hepatic Enzymes     Pt presents today for evaluation for elevated LFT's-had labs for PCP last month that showed this       Subjective     Geneav Knight is a 63 y.o. female.    HPI  Elevated LFT's  10/18/2023: ALKP 169, ALT 43, AST 52, Bili 10.  04/12/2023: ALKP 119, ALT 36, AST 50, Bili 1.3  07/15/2022: ALKP 105, ALT 8, AST 46, Bili  0.6  05/12/2022: ALKP  125, ALT 30, AST 49, Bili 0.9  02/25/2022: ALKP  158, ALT 31, AST 58, Bili 0.6  10/22/2021: ALKP 135, ALT 29, AST 51, Bili 0.8  07/12/2021: ALKP  140, alt 24, 39 39, Bili 0.5  02/23/2021: ALKP 155, ALT 34, AST 52, Bili 0.8  10/27/2020: ALKP 102, ALT 39, AST 56, Bili 1.5  06/21/2019: ALKP 91, ALT 41, AST 55, Bili 1.0  08/14/2018: ALKP 89, ALT 66, AST 99, Bili 1.3      10/18/2023 Ferritin  89.2, normal iron profile  Platelets 117K    3/16/2022  Liver: Hepatosplenomegaly, Hepatic Steatosis    Pt denies any alcohol use.      Personal Hx Adenomatous Colon Polyps  3/30/2023 last colonoscopy with a 4mm hyperplastic polyp in rectum removed. Large lipoma in ascending colon biopsied    Thrombocytopenia  Last platelet count in Oct 2023 was 117K  Past Medical History:   Diagnosis Date    Ankle fracture     Right    Anxiety     Cellulitis     Right ankle    Family history of colon cancer     GERD (gastroesophageal reflux disease)     History of adenomatous polyp of colon     History of colon polyps     HTN (hypertension)     Hyperlipemia     VIOLETA (obstructive sleep apnea)     cpap    Type 2 diabetes mellitus     Vitamin D deficiency        Past Surgical History:   Procedure Laterality Date    BREAST BIOPSY Right 08/30/2021    Procedure: EXCISIONAL RIGHT BREAST BIOPSY WITH NEEDLE LOCALIZATION - ULTRASOUND GUIDED;  Surgeon: Martine Roy MD;  Location: [facility] OR;  Service: General;  Laterality: Right;    CHOLECYSTECTOMY  2004    COLONOSCOPY N/A 12/18/2017    Three 4-7mm polyps  in the rectum-path shows one tubular adenomatous polyp and two hyperplastic polyps; Congested mucosa in the rectum, in the recto-sigmoid colon and in the sigmoid colon-biopsied; Repeat 5 years    COLONOSCOPY  07/25/2013    The entire examined colon is normal on direct and retroflexion views; Repeat 7-10 years    COLONOSCOPY N/A 03/30/2023    One 4mm hyperplastic polyp in the rectum; Large lipoma in the ascending colon-biopsied; Repeat 5 years    EYE SURGERY Bilateral     To remove loose skin    FOOT SURGERY Right 04/21/2021    ORIF ANKLE FRACTURE Right         Current Outpatient Medications:     Ascorbic Acid (Vitamin C) 500 MG capsule, Take 1 capsule by mouth Daily., Disp: , Rfl:     B Complex Vitamins (VITAMIN B COMPLEX PO), Take 1 capsule by mouth Daily., Disp: , Rfl:     donepezil (ARICEPT) 10 MG tablet, Take 1 tablet by mouth Every Night., Disp: , Rfl:     escitalopram (LEXAPRO) 10 MG tablet, Take 1 tablet by mouth Daily., Disp: , Rfl:     levocetirizine (XYZAL) 5 MG tablet, Take 1 tablet by mouth As Needed., Disp: , Rfl:     metFORMIN (GLUCOPHAGE) 1000 MG tablet, Take 1 tablet by mouth 2 (Two) Times a Day., Disp: , Rfl:     metoprolol succinate XL (TOPROL-XL) 100 MG 24 hr tablet, Take 1 tablet by mouth Every Night., Disp: , Rfl:     multivitamin with minerals tablet tablet, Take 1 tablet by mouth Daily., Disp: , Rfl:     Omega-3 Fatty Acids (fish oil) 1000 MG capsule capsule, Take 1 capsule by mouth Daily With Breakfast., Disp: , Rfl:     triamterene-hydrochlorothiazide (DYAZIDE) 37.5-25 MG per capsule, Take 1 capsule by mouth Daily., Disp: , Rfl:     Vitamin D, Cholecalciferol, 25 MCG (1000 UT) capsule, Take 1 capsule by mouth Daily., Disp: , Rfl:     Allergies   Allergen Reactions    Azithromycin Rash    Namenda [Memantine] Nausea Only and Headache       Social History     Socioeconomic History    Marital status:    Tobacco Use    Smoking status: Never    Smokeless tobacco: Never   Vaping Use     "Vaping status: Never Used   Substance and Sexual Activity    Alcohol use: Not Currently    Drug use: Never    Sexual activity: Not Currently     Partners: Male     Birth control/protection: Other     Comment: menopausal       Family History   Problem Relation Age of Onset    Breast cancer Mother     Colon cancer Maternal Aunt         In her 50's    Colon cancer Maternal Grandmother         In her 70's    Breast cancer Maternal Grandmother     Colon polyps Neg Hx     Esophageal cancer Neg Hx     Liver cancer Neg Hx     Stomach cancer Neg Hx     Liver disease Neg Hx     Rectal cancer Neg Hx        Review of Systems   Constitutional:  Negative for unexpected weight change.       Objective     /70 (BP Location: Left arm, Patient Position: Sitting, Cuff Size: Adult)   Pulse 71   Temp 97.5 °F (36.4 °C) (Infrared)   Ht 167.6 cm (66\")   Wt 104 kg (230 lb)   SpO2 96%   Breastfeeding No   BMI 37.12 kg/m²     Physical Exam  Vitals reviewed.   Constitutional:       Appearance: Normal appearance.   Cardiovascular:      Rate and Rhythm: Normal rate and regular rhythm.   Abdominal:      General: Abdomen is flat.      Palpations: Abdomen is soft.      Comments: Liver palpated 2 fingerbreaths below ribs   Neurological:      Mental Status: She is alert.             Lab Results - Last 18 Months   Lab Units 04/12/24  0915 10/18/23  1128 04/12/23  1029   HEMOGLOBIN g/dL 13.3 13.0 14.4   HEMATOCRIT % 40.2 37.3 45.1*   MCV fL 89.7 89.0 90.4   WBC 10*3/mm3 3.64 4.32 5.50   RDW % 13.3 12.7 13.8   MPV fL 11.0 11.0* 11.2*   PLATELETS 10*3/mm3 115* 117* 98*   INR  1.03  --   --        Lab Results - Last 18 Months   Lab Units 10/18/23  1132   IRON ug/dL 128   TIBC ug/dL 378   IRON SATURATION % 34   FERRITIN ng/mL 89.2        Lab Results - Last 18 Months   Lab Units 04/12/24  0915 10/18/23  1132   HEP B C IGM  Non-Reactive  --    HEP B S AG  Non-Reactive  --    FERRITIN ng/mL  --  89.2     US LIVER  3/16/2022  Order: " [id number]  Impression    Hepatic steatosis.  2. Hepatosplenomegaly.  Signed by Dr Dawson Muro  Narrative    EXAMINATION: US LIVER 3/16/2022 11:49 AM  HISTORY: US LIVER 3/16/2022 9:08 AM  HISTORY: R74.8  COMPARISON: NONE  TECHNIQUE: Multiple longitudinal and transverse realtime sonographic  images of the upper abdomen are obtained.  FINDINGS:    Pancreas: Limited visibility.  Liver: Echogenic and very dense. This is consistent with hepatic  steatosis. The right lobe approximately 16.5 cm in sagittal length.  Hepatopedal flow is present in the portal vein..  Gallbladder: Gallbladder is surgically absent..  Bile ducts: The CBD measures 0.47 cm in diameter. There is no  intrahepatic ductal dilation.  Spleen: Spleen is mildly enlarged. Bleeding is 14.1 x 6.7 x 14.3 cm.    Other: Right renal contour is 4.6 x 4.6 x 10.9 cm..  Exam End: 03/16/22 11:57 Last Resulted: 03/16/22 11:59   Received From: [facility]  Result Received: 02/23/24         IMPRESSION/PLAN:    Assessment & Plan      Problem List Items Addressed This Visit       Elevated LFTs - Primary    Overview     Elevation in LFT's since at least 2018    3/2022 Liver US with hepatic steatosis and hepatosplenomegaly         Current Assessment & Plan     Liver serology work up to begin today         Relevant Orders    DON    Mitochondrial Antibodies, M2    Anti-Smooth Muscle Antibody Titer    Hepatitis Panel, Acute (Completed)    Ceruloplasmin    CBC (No Diff) (Completed)    Comprehensive Metabolic Panel (Completed)    Protime-INR (Completed)    CT Abdomen Liver With Contrast    Thrombocytopenia    Overview     Last platelet count in Oct 2023 was 117K         Steatosis of liver    Overview     3/16/2022 US Liver: Hepatosplenomegaly, Hepatic Steatosis         Current Assessment & Plan     ..The principles of management of steatohepatitis are weight loss through a structured diet and exercise as well as meticulous control of any component of  metabolic syndrome, including blood glucose levels, cholesterol and blood pressure.   The patient should strive to lose 2-4 monthly until reaching 7-10% reduction in weight.      Coffee consumption has been shown to decrease the risk of HCC in patients with chronic liver disease.  In these patients, coffee consumption should be encouraged.    I have advised to avoid fructose containing food and drink.     Daily alcohol intake should strickly be below 30gm in men and 20 gm in women.    A physical activity program should in 150-200 min/week of moderate intensity in 3-5 sessions. Resistance training to promote musculoskeletal fitness and improve metabolic factors was reviewed.          Hepatosplenomegaly    Overview     3/16/2022  Liver: Hepatosplenomegaly, Hepatic Steatosis         Current Assessment & Plan     Obtain CT Scan to review         Relevant Orders    CT Abdomen Liver With Contrast     Other Visit Diagnoses       Abnormal results of liver function studies        Relevant Orders    Hepatitis Panel, Acute (Completed)    Protime-INR (Completed)            Begin work up to find source of elevated LFT's check serology today.  CT Scan of the LIver  Follow up 2 months            Kavita De, APRN  04/12/24  12:37 CDT    Part of this note may be an electronic transcription/translation of spoken language to printed text.

## 2024-04-13 LAB
MITOCHONDRIA M2 IGG SER-ACNC: <20 UNITS (ref 0–20)
SMA IGG SER-ACNC: 9 UNITS (ref 0–19)

## 2024-04-15 LAB — ANA SER QL: NEGATIVE

## 2024-04-17 ENCOUNTER — OFFICE VISIT (OUTPATIENT)
Dept: NEUROLOGY | Age: 64
End: 2024-04-17
Payer: MEDICARE

## 2024-04-17 VITALS
BODY MASS INDEX: 36.96 KG/M2 | DIASTOLIC BLOOD PRESSURE: 74 MMHG | OXYGEN SATURATION: 97 % | SYSTOLIC BLOOD PRESSURE: 126 MMHG | WEIGHT: 230 LBS | HEIGHT: 66 IN | HEART RATE: 88 BPM

## 2024-04-17 DIAGNOSIS — R47.89 WORD FINDING DIFFICULTY: ICD-10-CM

## 2024-04-17 DIAGNOSIS — R41.3 MEMORY LOSS: Primary | ICD-10-CM

## 2024-04-17 PROCEDURE — 99213 OFFICE O/P EST LOW 20 MIN: CPT | Performed by: NURSE PRACTITIONER

## 2024-04-17 PROCEDURE — 1036F TOBACCO NON-USER: CPT | Performed by: NURSE PRACTITIONER

## 2024-04-17 PROCEDURE — 3074F SYST BP LT 130 MM HG: CPT | Performed by: NURSE PRACTITIONER

## 2024-04-17 PROCEDURE — 3017F COLORECTAL CA SCREEN DOC REV: CPT | Performed by: NURSE PRACTITIONER

## 2024-04-17 PROCEDURE — 3078F DIAST BP <80 MM HG: CPT | Performed by: NURSE PRACTITIONER

## 2024-04-17 PROCEDURE — G8417 CALC BMI ABV UP PARAM F/U: HCPCS | Performed by: NURSE PRACTITIONER

## 2024-04-17 PROCEDURE — G8427 DOCREV CUR MEDS BY ELIG CLIN: HCPCS | Performed by: NURSE PRACTITIONER

## 2024-04-17 RX ORDER — DONEPEZIL HYDROCHLORIDE 10 MG/1
10 TABLET, FILM COATED ORAL NIGHTLY
Qty: 90 TABLET | Refills: 3 | Status: SHIPPED | OUTPATIENT
Start: 2024-04-17

## 2024-04-17 NOTE — PROGRESS NOTES
REVIEW OF SYSTEMS    Constitutional: []Fever []Sweat []Chills [] Recent Injury [x] Denies all unless marked  HEENT:[]Headache  [] Head Injury/Hearing Loss  [] Sore Throat  [] Ear Ache/Dizziness  [x] Denies all unless marked  Spine:  [] Neck pain  [] Back pain  [] Sciaticia  [x] Denies all unless marked  Cardiovascular:[]Heart Disease []Chest Pain [] Palpitations  [x] Denies all unless marked  Pulmonary: []Shortness of Breath []Cough   [x] Denies all unless marke  Gastrointestinal: []Nausea  []Vomiting  []Abdominal Pain  []Constipation  []Diarrhea  []Dark Bloody Stools  [x] Denies all unless marked  Psychiatric/Behavioral:[] Depression [] Anxiety [x] Denies all unless marked  Genitourinary:   [] Frequency  [] Urgency  [] Incontinence [] Pain with Urination  [x] Denies all unless marked  Extremities: []Pain  []Swelling  [x] Denies all unless marked  Musculoskeletal: [] Muscle Pain  [] Joint Pain  [] Arthritis [] Muscle Cramps [] Muscle Twitches  [x] Denies all unless marked  Sleep: [] Insomnia [] Snoring [] Restless Legs [] Sleep Apnea  [] Daytime Sleepiness  [x] Denies all unless marked  Skin:[] Rash [] Skin Discoloration [x] Denies all unless marked   Neurological: []Visual Disturbance/Memory Loss [] Loss of Balance [] Slurred Speech/Weakness [] Seizures  [] Vertigo/Dizziness [x] Denies all unless marked       
    ASSESSMENT:    Judi Yi is a 63 y.o. year old female here for follow up of memory loss, word finding difficulty.  Clinically unchanged from prior no clear progression.  Continues to note word finding difficulty, short-term memory loss.  Denies hallucinations, mood changes. She is on Aricept, unable to tolerate Namenda in the past.  Suspect underlying dementia, possible early Alzheimer's, FTD variant felt less likely. Family history of pick's disease with aunt.  Discussed additional opinion from tertiary memory center, patient defers this today.      ICD-10-CM    1. Memory loss  R41.3       2. Word finding difficulty  R47.89         PLAN:  Continue Aricept 10mg nightly.   Continue CPAP nightly  Discussed referral to memory center, defers today   Safety concerns discussed, increase supervision, monitor medications, no driving. Encouraged brain games and exercise.   5.  Return in about 1 year (around 4/17/2025) for follow up, sooner if worsening.    Nadege Kenyon DNP, APRN    This dictation was generated by voice recognition computer software.  Although all attempts are made to edit the dictation for accuracy, there may be errors in the transcription that are not intended.

## 2024-04-19 ENCOUNTER — TELEPHONE (OUTPATIENT)
Dept: GASTROENTEROLOGY | Facility: CLINIC | Age: 64
End: 2024-04-19
Payer: MEDICARE

## 2024-04-19 ENCOUNTER — HOSPITAL ENCOUNTER (OUTPATIENT)
Dept: CT IMAGING | Facility: HOSPITAL | Age: 64
Discharge: HOME | End: 2024-04-19
Payer: MEDICARE

## 2024-04-19 DIAGNOSIS — I77.1 CELIAC ARTERY STENOSIS: Primary | ICD-10-CM

## 2024-04-19 DIAGNOSIS — R16.2 HEPATOSPLENOMEGALY: ICD-10-CM

## 2024-04-19 DIAGNOSIS — K74.69 OTHER CIRRHOSIS OF LIVER: ICD-10-CM

## 2024-04-19 DIAGNOSIS — R79.89 ELEVATED LFTS: ICD-10-CM

## 2024-04-19 DIAGNOSIS — R93.3 ABNORMAL CT SCAN, ESOPHAGUS: Primary | ICD-10-CM

## 2024-04-19 PROCEDURE — 25510000001 IOPAMIDOL PER 1 ML: Performed by: NURSE PRACTITIONER

## 2024-04-19 PROCEDURE — 74170 CT ABD WO CNTRST FLWD CNTRST: CPT

## 2024-04-19 RX ADMIN — IOPAMIDOL 100 ML: 755 INJECTION, SOLUTION INTRAVENOUS at 08:18

## 2024-04-19 NOTE — TELEPHONE ENCOUNTER
Pt called and discussed the need to have an endoscopy to evaluate for varices given abnormal CT Scan. She has never had endoscopy.  Pt agrees to have done.   Kavita NORRIS

## 2024-04-19 NOTE — TELEPHONE ENCOUNTER
----- Message from Kavita De, JR sent at 4/19/2024 11:37 AM CDT -----  Maddison, I need you to send this CT to Elizabeth Hancock with [facility].  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 I am placing referral now and pt has been notified. Please work on that referral for me too.  Also, I want her to have an endoscopy to evaluate possible varices. I have spoke and told the patient and placing those orders.  Thanks  Kavita De

## 2024-04-19 NOTE — TELEPHONE ENCOUNTER
Called [place] and reviewed CT scan of the abdomen collected earlier this morning.  She is notified liver does have the cirrhosis appearance so far serology is negative for any autoimmune hepatitis, PBC.  We will continue to follow her closely.  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  She is already followed by [facility] vascular [facility] therefore I am going to forward the CT over to them and give them a call to see if they can see her for this.  She will call them Monday if she has not heard from them to set up an appointment.  He is instructed to keep her follow-up appointments with me for further and close evaluation.  When I see her again in office for follow up, I am going to set her up for endoscopy to further evaluate for varices.  Kavita NORRIS

## 2024-04-22 ENCOUNTER — OFFICE VISIT (OUTPATIENT)
Dept: VASCULAR SURGERY | Age: 64
End: 2024-04-22
Payer: MEDICARE

## 2024-04-22 VITALS
DIASTOLIC BLOOD PRESSURE: 68 MMHG | HEART RATE: 73 BPM | SYSTOLIC BLOOD PRESSURE: 110 MMHG | OXYGEN SATURATION: 98 % | TEMPERATURE: 97.7 F

## 2024-04-22 DIAGNOSIS — I77.1 CELIAC ARTERY STENOSIS (HCC): Primary | ICD-10-CM

## 2024-04-22 PROCEDURE — G8417 CALC BMI ABV UP PARAM F/U: HCPCS | Performed by: NURSE PRACTITIONER

## 2024-04-22 PROCEDURE — 3078F DIAST BP <80 MM HG: CPT | Performed by: NURSE PRACTITIONER

## 2024-04-22 PROCEDURE — 3074F SYST BP LT 130 MM HG: CPT | Performed by: NURSE PRACTITIONER

## 2024-04-22 PROCEDURE — 1036F TOBACCO NON-USER: CPT | Performed by: NURSE PRACTITIONER

## 2024-04-22 PROCEDURE — 99213 OFFICE O/P EST LOW 20 MIN: CPT | Performed by: NURSE PRACTITIONER

## 2024-04-22 PROCEDURE — G8427 DOCREV CUR MEDS BY ELIG CLIN: HCPCS | Performed by: NURSE PRACTITIONER

## 2024-04-22 PROCEDURE — 3017F COLORECTAL CA SCREEN DOC REV: CPT | Performed by: NURSE PRACTITIONER

## 2024-04-23 NOTE — PROGRESS NOTES
Judi Yi (:  1960) is a 63 y.o. female,Established patient, here for evaluation of the following chief complaint(s):  Follow-up            SUBJECTIVE/OBJECTIVE:  She presents for follow-up of possible celiac disease. She has had no previous intervention.  Current treatments include ASA 81 mg po qd. She has had no abdominal pain.  She can eat whatever she wants.  She has had no weight loss.    Differential diagnosis considered for abdominal pain include but are not limited to mesenteric disease, diverticulitis, hiatal hernia, gallbladder disease, colitis, Crohn's and other's.        I have personally reviewed the following: problem list, current meds, allergies, PMH, PSH, family hx, and social hx  Judi Yi is a 63 y.o. female with the following history as recorded in [facility]:  Patient Active Problem List    Diagnosis Date Noted    MGUS (monoclonal gammopathy of unknown significance) 2022    Thrombocytopenia (HCC) 2022    Non-pressure chronic ulcer of right ankle with fat layer exposed (HCC) 2022    Post-operative wound abscess 2022    Obesity 2021    Gait instability 2021    Closed right ankle fracture, initial encounter 2021    Colon polyps 2017    Colitis 2017    Diarrhea 2017    Memory deficit 2017    Nausea 2017    Spider veins 10/28/2015    Venous reflux 10/28/2015    Hypertension     Sleep apnea     Diabetes (HCC)      Current Outpatient Medications   Medication Sig Dispense Refill    donepezil (ARICEPT) 10 MG tablet Take 1 tablet by mouth nightly 90 tablet 3    B COMPLEX VITAMINS ER PO Take 1 capsule by mouth daily      Ascorbic Acid  MG CPCR Take 1 capsule by mouth daily      vitamin D 25 MCG (1000 UT) CAPS Take 25 mcg by mouth daily      ferrous sulfate (IRON 325) 325 (65 Fe) MG tablet Take 1 tablet by mouth 2 times daily 60 tablet 5    metFORMIN, OSM, (FORTAMET) 1000 MG extended release tablet Take 1

## 2024-04-25 ENCOUNTER — HOSPITAL ENCOUNTER (OUTPATIENT)
Facility: HOSPITAL | Age: 64
Setting detail: HOSPITAL OUTPATIENT SURGERY
Discharge: HOME | End: 2024-04-25
Attending: INTERNAL MEDICINE | Admitting: INTERNAL MEDICINE
Payer: MEDICARE

## 2024-04-25 ENCOUNTER — ANESTHESIA (OUTPATIENT)
Dept: GASTROENTEROLOGY | Facility: HOSPITAL | Age: 64
End: 2024-04-25
Payer: MEDICARE

## 2024-04-25 ENCOUNTER — TELEPHONE (OUTPATIENT)
Dept: GASTROENTEROLOGY | Facility: CLINIC | Age: 64
End: 2024-04-25
Payer: MEDICARE

## 2024-04-25 ENCOUNTER — ANESTHESIA EVENT (OUTPATIENT)
Dept: GASTROENTEROLOGY | Facility: HOSPITAL | Age: 64
End: 2024-04-25
Payer: MEDICARE

## 2024-04-25 VITALS
DIASTOLIC BLOOD PRESSURE: 72 MMHG | BODY MASS INDEX: 37.28 KG/M2 | RESPIRATION RATE: 17 BRPM | OXYGEN SATURATION: 94 % | SYSTOLIC BLOOD PRESSURE: 129 MMHG | WEIGHT: 232 LBS | TEMPERATURE: 97 F | HEIGHT: 66 IN | HEART RATE: 64 BPM

## 2024-04-25 DIAGNOSIS — R93.3 ABNORMAL CT SCAN, ESOPHAGUS: ICD-10-CM

## 2024-04-25 DIAGNOSIS — R16.2 HEPATOSPLENOMEGALY: ICD-10-CM

## 2024-04-25 DIAGNOSIS — K74.69 OTHER CIRRHOSIS OF LIVER: ICD-10-CM

## 2024-04-25 PROBLEM — D49.0 IPMN (INTRADUCTAL PAPILLARY MUCINOUS NEOPLASM): Status: ACTIVE | Noted: 2024-04-25

## 2024-04-25 LAB — GLUCOSE BLDC GLUCOMTR-MCNC: 127 MG/DL (ref 70–130)

## 2024-04-25 PROCEDURE — 82948 REAGENT STRIP/BLOOD GLUCOSE: CPT

## 2024-04-25 PROCEDURE — 25010000002 PROPOFOL 10 MG/ML EMULSION: Performed by: NURSE ANESTHETIST, CERTIFIED REGISTERED

## 2024-04-25 PROCEDURE — 25810000003 SODIUM CHLORIDE 0.9 % SOLUTION: Performed by: ANESTHESIOLOGY

## 2024-04-25 PROCEDURE — 43235 EGD DIAGNOSTIC BRUSH WASH: CPT | Performed by: INTERNAL MEDICINE

## 2024-04-25 RX ORDER — SODIUM CHLORIDE 9 MG/ML
500 INJECTION, SOLUTION INTRAVENOUS CONTINUOUS PRN
Status: DISCONTINUED | OUTPATIENT
Start: 2024-04-25 | End: 2024-04-25 | Stop reason: HOSPADM

## 2024-04-25 RX ORDER — SODIUM CHLORIDE 0.9 % (FLUSH) 0.9 %
10 SYRINGE (ML) INJECTION AS NEEDED
Status: DISCONTINUED | OUTPATIENT
Start: 2024-04-25 | End: 2024-04-25 | Stop reason: HOSPADM

## 2024-04-25 RX ORDER — PROPOFOL 10 MG/ML
VIAL (ML) INTRAVENOUS AS NEEDED
Status: DISCONTINUED | OUTPATIENT
Start: 2024-04-25 | End: 2024-04-25 | Stop reason: SURG

## 2024-04-25 RX ORDER — LIDOCAINE HYDROCHLORIDE 20 MG/ML
INJECTION, SOLUTION EPIDURAL; INFILTRATION; INTRACAUDAL; PERINEURAL AS NEEDED
Status: DISCONTINUED | OUTPATIENT
Start: 2024-04-25 | End: 2024-04-25 | Stop reason: SURG

## 2024-04-25 RX ADMIN — PROPOFOL INJECTABLE EMULSION 100 MG: 10 INJECTION, EMULSION INTRAVENOUS at 10:43

## 2024-04-25 RX ADMIN — LIDOCAINE HYDROCHLORIDE 50 MG: 20 INJECTION, SOLUTION EPIDURAL; INFILTRATION; INTRACAUDAL; PERINEURAL at 10:38

## 2024-04-25 RX ADMIN — SODIUM CHLORIDE 500 ML: 9 INJECTION, SOLUTION INTRAVENOUS at 08:48

## 2024-04-25 RX ADMIN — PROPOFOL INJECTABLE EMULSION 100 MG: 10 INJECTION, EMULSION INTRAVENOUS at 10:38

## 2024-04-25 NOTE — TELEPHONE ENCOUNTER
Called and spoke to pt-she ANUEL. I advised if she had further questions about their recommendations to reach out to vascular. Pt also advised to call me back with any further questions/problems, otherwise she will keep f/u with Radha as scheduled in June.

## 2024-04-25 NOTE — TELEPHONE ENCOUNTER
Please let her know that I reviewed the office note from vascular surgery with regards to her abnormal CT scan.  They clearly did have the CT and reviewed it.  Their recommendations were sound.  To summarize, they recommended:    Use aspirin daily to reduce the risk of clot in the artery and to decrease the rate of plaque buildup  Consider using a statin for management of cholesterol--discuss this with your primary care provider  Follow-up as scheduled  Lastly please call vascular office if there is any weight loss of 10 pounds in 1 month or if you have pain after eating or pain in the abdomen associated with eating.    Maggi Arias MD

## 2024-04-25 NOTE — ANESTHESIA POSTPROCEDURE EVALUATION
Patient: Geneva Knight    Procedure Summary       Date: 04/25/24 Room / Location: [facility] ENDOSCOPY 5 / [facility] ENDOSCOPY    Anesthesia Start: 1036 Anesthesia Stop: 1047    Procedure: ESOPHAGOGASTRODUODENOSCOPY WITH ANESTHESIA Diagnosis:       Hepatosplenomegaly      Other cirrhosis of liver      Abnormal CT scan, esophagus      (Hepatosplenomegaly [R16.2])      (Other cirrhosis of liver [K74.69])      (Abnormal CT scan, esophagus [R93.3])    Surgeons: Maggi Arias MD Provider: Sina Zepeda CRNA    Anesthesia Type: MAC ASA Status: 3            Anesthesia Type: MAC    Vitals  Vitals Value Taken Time   BP     Temp     Pulse 68 04/25/24 1047   Resp     SpO2 88 % 04/25/24 1047   Vitals shown include unfiled device data.        Post Anesthesia Care and Evaluation    Patient location during evaluation: PACU  Patient participation: complete - patient participated  Level of consciousness: awake and awake and alert  Pain score: 0  Pain management: adequate    Airway patency: patent  Anesthetic complications: No anesthetic complications    Cardiovascular status: acceptable and stable  Respiratory status: acceptable and unassisted  Hydration status: acceptable

## 2024-04-25 NOTE — ANESTHESIA PREPROCEDURE EVALUATION
Anesthesia Evaluation     no history of anesthetic complications:   NPO Solid Status: > 8 hours  NPO Liquid Status: > 8 hours           Airway   Mallampati: II  No difficulty expected  Dental      Pulmonary    (+) ,sleep apnea on CPAP  (-) not a smoker  Cardiovascular   Exercise tolerance: good (4-7 METS)    (+) hypertension, hyperlipidemia  (-) valvular problems/murmurs, CAD      Neuro/Psych  (-) seizures, TIA, CVA  GI/Hepatic/Renal/Endo    (+) obesity, GERD, liver disease fatty liver disease cirrhosis, diabetes mellitus type 2    Musculoskeletal     Abdominal    Substance History      OB/GYN          Other                      Anesthesia Plan    ASA 3     MAC     intravenous induction     Anesthetic plan, risks, benefits, and alternatives have been provided, discussed and informed consent has been obtained with: patient.    CODE STATUS:

## 2024-06-12 ENCOUNTER — LAB (OUTPATIENT)
Dept: LAB | Facility: HOSPITAL | Age: 64
End: 2024-06-12
Payer: MEDICARE

## 2024-06-12 ENCOUNTER — OFFICE VISIT (OUTPATIENT)
Dept: GASTROENTEROLOGY | Facility: CLINIC | Age: 64
End: 2024-06-12
Payer: MEDICARE

## 2024-06-12 VITALS
HEIGHT: 66 IN | BODY MASS INDEX: 37.12 KG/M2 | TEMPERATURE: 97.5 F | DIASTOLIC BLOOD PRESSURE: 72 MMHG | SYSTOLIC BLOOD PRESSURE: 118 MMHG | WEIGHT: 231 LBS | HEART RATE: 69 BPM | OXYGEN SATURATION: 96 %

## 2024-06-12 DIAGNOSIS — R41.0 CONFUSION: Primary | ICD-10-CM

## 2024-06-12 DIAGNOSIS — I85.10 SECONDARY ESOPHAGEAL VARICES WITHOUT BLEEDING: ICD-10-CM

## 2024-06-12 DIAGNOSIS — R41.3 MEMORY DEFICIT: ICD-10-CM

## 2024-06-12 DIAGNOSIS — K74.69 OTHER CIRRHOSIS OF LIVER: ICD-10-CM

## 2024-06-12 DIAGNOSIS — D69.6 THROMBOCYTOPENIA: ICD-10-CM

## 2024-06-12 DIAGNOSIS — D12.6 ADENOMATOUS POLYP OF COLON, UNSPECIFIED PART OF COLON: ICD-10-CM

## 2024-06-12 DIAGNOSIS — D49.0 IPMN (INTRADUCTAL PAPILLARY MUCINOUS NEOPLASM): ICD-10-CM

## 2024-06-12 PROBLEM — K86.2 PANCREATIC CYST: Status: ACTIVE | Noted: 2024-06-12

## 2024-06-12 PROBLEM — K86.2 PANCREATIC CYST: Status: RESOLVED | Noted: 2024-06-12 | Resolved: 2024-06-12

## 2024-06-12 LAB — AMMONIA BLD-SCNC: 57 UMOL/L (ref 11–51)

## 2024-06-12 PROCEDURE — 99214 OFFICE O/P EST MOD 30 MIN: CPT | Performed by: NURSE PRACTITIONER

## 2024-06-12 PROCEDURE — 1159F MED LIST DOCD IN RCRD: CPT | Performed by: NURSE PRACTITIONER

## 2024-06-12 PROCEDURE — 82140 ASSAY OF AMMONIA: CPT | Performed by: NURSE PRACTITIONER

## 2024-06-12 PROCEDURE — 1160F RVW MEDS BY RX/DR IN RCRD: CPT | Performed by: NURSE PRACTITIONER

## 2024-06-12 PROCEDURE — 36415 COLL VENOUS BLD VENIPUNCTURE: CPT | Performed by: NURSE PRACTITIONER

## 2024-06-12 RX ORDER — ROSUVASTATIN CALCIUM 5 MG/1
1 TABLET, COATED ORAL DAILY
COMMUNITY

## 2024-06-12 RX ORDER — ASPIRIN 81 MG/1
81 TABLET ORAL DAILY
COMMUNITY

## 2024-06-12 NOTE — ASSESSMENT & PLAN NOTE
Patient to see PCP today.  We are asking if we can switch over to Coreg 3.125 mg twice daily.  Patient is to call me back and let me know what they think.  Educated on importance of 2000 mg sodium diet.  Will check ammonia level today as patient's family member has noted her to have some occasional confusion.  Educated on importance of HCC surveillance every 6 months.    ..The principles of management of steatohepatitis are weight loss through a structured diet and exercise as well as meticulous control of any component of metabolic syndrome, including blood glucose levels, cholesterol and blood pressure.   The patient should strive to lose 2-4 monthly until reaching 7-10% reduction in weight.      Coffee consumption has been shown to decrease the risk of HCC in patients with chronic liver disease.  In these patients, coffee consumption should be encouraged.    I have advised to avoid fructose containing food and drink.     Daily alcohol intake should strickly be below 30gm in men and 20 gm in women.    A physical activity program should in 150-200 min/week of moderate intensity in 3-5 sessions. Resistance training to promote musculoskeletal fitness and improve metabolic factors was reviewed.   Office follow-up every 6 months as well.

## 2024-06-12 NOTE — PROGRESS NOTES
"Primary Physician: Baljinder Alatorre MD    Chief Complaint   Patient presents with    Follow-up     Pt presents today for cirrhosis follow up-had endo 4/25/2024; Pt states she is feeling good for the most part but does have a pain in her chest-states it is \"just there\"        Subjective     Geneva Knight is a 63 y.o. female.    HPI  Cirrhosis  Pt with cirrhosis suspected to be secondary to NAFLD.  Other serology showing no other obvious source of liver disease.    4/12/2024:DON negative, AMA negative, ASMA negative, hepatitis panel unremarkable, ceruloplasmin normal.  10/18/2023 Normal iron profile and ferritin   3/16/2022:hepatic steatosis, hepatosplenomegaly.  No alcohol use.    4/19/2024 CT scan of the abdomen revealing cirrhotic appearing liver, portal hypertension, splenomegaly, gastric esophageal varices.  No ascites.  No liver lesions.  3 cystic lesions in the pancreatic head largest measuring 14 mm favoring sidebranch IPMN's.  Severe stenosis of celiac artery.    4/12/2024 MELD=8    04/12/2024: ALKP 120, ALT 31, AST 46, Bili 1.2  10/18/2023: ALKP 169, ALT 43, AST 52, Bili 10.  04/12/2023: ALKP 119, ALT 36, AST 50, Bili 1.3  07/15/2022: ALKP 105, ALT 8, AST 46, Bili  0.6  05/12/2022: ALKP  125, ALT 30, AST 49, Bili 0.9  02/25/2022: ALKP  158, ALT 31, AST 58, Bili 0.6  10/22/2021: ALKP 135, ALT 29, AST 51, Bili 0.8  07/12/2021: ALKP       140, alt 24, 39 39, Bili 0.5  02/23/2021: ALKP 155, ALT 34, AST 52, Bili 0.8  10/27/2020: ALKP 102, ALT 39, AST 56, Bili 1.5  06/21/2019: ALKP 91, ALT 41, AST 55, Bili 1.0  08/14/2018: ALKP 89, ALT 66, AST 99, Bili 1.3    4/25/2024 endoscopy revealing grade 1 esophageal varices.    Some confusion reported from her sister.  Wanting NH3 checked.  Not sure if it is related to her liver disease or not.  Patient has been educated that if her ammonia level is found to be elevated we would begin maintenance therapy for hepatic encephalopathy.    Thrombocytopenia  4/12/2024:  " platelets 115K  10/18/2023: platelets 117K  4/12/2023: platelets 98K  10/12/2022: platelets 136K    Personal Hx Adenomatous Colon Polyps  3/30/2023 last colonoscopy with a 4mm hyperplastic polyp in rectum removed. Large lipoma in ascending colon biopsied.    Pancreatic Sidebranch IPMN  4/19/2024 CT Scan of Abdomen: 3 cystic lesions in the pancreatic head largest measuring 14 mm favoring sidebranch IPMN's.      Past Medical History:   Diagnosis Date    Ankle fracture     Right    Anxiety     Cellulitis     Right ankle    Family history of colon cancer     GERD (gastroesophageal reflux disease)     History of adenomatous polyp of colon     History of colon polyps     HTN (hypertension)     Hyperlipemia     VIOLETA (obstructive sleep apnea)     cpap    Type 2 diabetes mellitus     Vitamin D deficiency        Past Surgical History:   Procedure Laterality Date    BREAST BIOPSY Right 08/30/2021    Procedure: EXCISIONAL RIGHT BREAST BIOPSY WITH NEEDLE LOCALIZATION - ULTRASOUND GUIDED;  Surgeon: Martine Roy MD;  Location: [facility] OR;  Service: General;  Laterality: Right;    CHOLECYSTECTOMY  2004    COLONOSCOPY N/A 12/18/2017    Three 4-7mm polyps in the rectum-path shows one tubular adenomatous polyp and two hyperplastic polyps; Congested mucosa in the rectum, in the recto-sigmoid colon and in the sigmoid colon-biopsied; Repeat 5 years    COLONOSCOPY  07/25/2013    The entire examined colon is normal on direct and retroflexion views; Repeat 7-10 years    COLONOSCOPY N/A 03/30/2023    One 4mm hyperplastic polyp in the rectum; Large lipoma in the ascending colon-biopsied; Repeat 5 years    ENDOSCOPY N/A 04/25/2024    Grade I esophageal varices; Normal stomach; Normal examined duodenum; No specimens collected; No need to repeat endoscopy as she is on metoprolol    EYE SURGERY Bilateral     To remove loose skin    FOOT SURGERY Right 04/21/2021    ORIF ANKLE FRACTURE Right         Current Outpatient Medications:     Ascorbic  Acid (Vitamin C) 500 MG capsule, Take 1 capsule by mouth Daily., Disp: , Rfl:     aspirin 81 MG EC tablet, Take 1 tablet by mouth Daily., Disp: , Rfl:     B Complex Vitamins (VITAMIN B COMPLEX PO), Take 1 capsule by mouth Daily., Disp: , Rfl:     donepezil (ARICEPT) 10 MG tablet, Take 1 tablet by mouth Every Night., Disp: , Rfl:     escitalopram (LEXAPRO) 10 MG tablet, Take 1 tablet by mouth Daily., Disp: , Rfl:     levocetirizine (XYZAL) 5 MG tablet, Take 1 tablet by mouth As Needed., Disp: , Rfl:     metFORMIN (GLUCOPHAGE) 1000 MG tablet, Take 1 tablet by mouth 2 (Two) Times a Day., Disp: , Rfl:     metoprolol succinate XL (TOPROL-XL) 100 MG 24 hr tablet, Take 1 tablet by mouth Every Night., Disp: , Rfl:     multivitamin with minerals tablet tablet, Take 1 tablet by mouth Daily., Disp: , Rfl:     Omega-3 Fatty Acids (fish oil) 1000 MG capsule capsule, Take 1 capsule by mouth Daily With Breakfast., Disp: , Rfl:     rosuvastatin (CRESTOR) 5 MG tablet, Take 1 tablet by mouth Daily., Disp: , Rfl:     triamterene-hydrochlorothiazide (DYAZIDE) 37.5-25 MG per capsule, Take 1 capsule by mouth Daily., Disp: , Rfl:     Vitamin D, Cholecalciferol, 25 MCG (1000 UT) capsule, Take 1 capsule by mouth Daily., Disp: , Rfl:     Allergies   Allergen Reactions    Azithromycin Rash    Namenda [Memantine] Nausea Only and Headache       Social History     Socioeconomic History    Marital status:    Tobacco Use    Smoking status: Never    Smokeless tobacco: Never   Vaping Use    Vaping status: Never Used   Substance and Sexual Activity    Alcohol use: Not Currently    Drug use: Never    Sexual activity: Not Currently     Partners: Male     Birth control/protection: Other     Comment: menopausal       Family History   Problem Relation Age of Onset    Breast cancer Mother     Colon cancer Maternal Aunt         In her 50's    Colon cancer Maternal Grandmother         In her 70's    Breast cancer Maternal Grandmother     Colon polyps  "Neg Hx     Esophageal cancer Neg Hx     Liver cancer Neg Hx     Stomach cancer Neg Hx     Liver disease Neg Hx     Rectal cancer Neg Hx        Review of Systems   Constitutional:  Negative for unexpected weight change.       Objective     /72 (BP Location: Left arm, Patient Position: Sitting, Cuff Size: Adult)   Pulse 69   Temp 97.5 °F (36.4 °C) (Infrared)   Ht 167.6 cm (66\")   Wt 105 kg (231 lb)   SpO2 96%   Breastfeeding No   BMI 37.28 kg/m²     Physical Exam  Vitals reviewed.   Constitutional:       Appearance: Normal appearance.   Musculoskeletal:      Right lower leg: No edema.      Left lower leg: No edema.   Neurological:      Mental Status: She is alert.         Lab Results - Last 18 Months   Lab Units 04/12/24  0915   GLUCOSE mg/dL 131*   BUN mg/dL 16   CREATININE mg/dL 0.54*   SODIUM mmol/L 143   POTASSIUM mmol/L 3.8   CHLORIDE mmol/L 105   CO2 mmol/L 30.0*   TOTAL PROTEIN g/dL 7.3   ALBUMIN g/dL 4.1   ALT (SGPT) U/L 31   AST (SGOT) U/L 46*   ALK PHOS U/L 120*   BILIRUBIN mg/dL 1.2   GLOBULIN gm/dL 3.2       Lab Results - Last 18 Months   Lab Units 04/12/24  0915 10/18/23  1128 04/12/23  1029   HEMOGLOBIN g/dL 13.3 13.0 14.4   HEMATOCRIT % 40.2 37.3 45.1*   MCV fL 89.7 89.0 90.4   WBC 10*3/mm3 3.64 4.32 5.50   RDW % 13.3 12.7 13.8   MPV fL 11.0 11.0* 11.2*   PLATELETS 10*3/mm3 115* 117* 98*   INR  1.03  --   --        Lab Results - Last 18 Months   Lab Units 10/18/23  1132   IRON ug/dL 128   TIBC ug/dL 378   IRON SATURATION % 34   FERRITIN ng/mL 89.2        Lab Results - Last 18 Months   Lab Units 04/12/24  0915 10/18/23  1132   HEP B C IGM  Non-Reactive  --    HEP B S AG  Non-Reactive  --    FERRITIN ng/mL  --  89.2   CERULOPLASMIN mg/dL 19  --    MITOCHONDRIAL AB Units <20.0  --        EXAM/TECHNIQUE: CT abdomen without and with IV contrast 4/19/2024     INDICATION: Elevated LFTs, hepatosplenomegaly     COMPARISON: None available.     DLP: 1505.96 mGy.cm. Automated exposure control was also " utilized to  decrease patient radiation dose.     FINDINGS:     The lung bases are clear.     The liver is cirrhotic. No suspicious arterial enhancing liver lesion.  There is evidence of portal hypertension including recanalized  periumbilical vein, splenomegaly (15.7 cm), and gastroesophageal varices  development. No ascites.     Prior cholecystectomy. No biliary ductal dilatation.     3 cystic lesions are present in the pancreatic head, the largest of  which is 14 mm. Pancreas is otherwise unremarkable. The adrenal glands  appear normal.     No solid renal mass. No urolithiasis or hydronephrosis.     Visualized portion of the colon is unremarkable without evidence of  focal wall thickening or adjacent fat stranding. Normal appendix. No  abnormal small bowel distention or evidence of active small bowel  inflammation in the abdomen.     The abdominal aorta is nonaneurysmal. There is severe stenosis of the  celiac artery related to diaphragm compression. The SMA is widely  patent. The TRAVIS is widely patent. Both renal arteries are widely patent.  No enlarged abdominal lymph nodes.     No acute abdominal wall soft tissue abnormality. No acute osseous  finding.     IMPRESSION:     1.  Liver is cirrhotic. There is sequela of portal hypertension  including splenomegaly and gastroesophageal varices. No ascites. No  suspicious arterial enhancing liver lesion.     2.  3 cystic lesions in the pancreatic head are present, the largest of  which measures 14 mm. 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  Recommend a follow-up CT or MRI in 1 year based on ACR white paper  criteria.     3.  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     This report was signed and finalized on 4/19/2024 8:50 AM by Dr. Jose Cruz Shearer MD.    IMPRESSION/PLAN:    Assessment & Plan      Problem List Items Addressed This Visit       Memory deficit    Overview     Checking NH3 level  today         Adenomatous colon polyp    Overview     One adenomatous polyp removed 12/2017.  Colonoscopy 3/2023 unremarkable.  REpeat 3/2028.         Thrombocytopenia    Overview     4/12/2024:  platelets 115K  10/18/2023: platelets 117K  4/12/2023: platelets 98K  10/12/2022: platelets 136K         Other cirrhosis of liver    Overview     Cirrhosis to be suspected secondary to nonalcoholic fatty liver disease.  No previous ascites or HE.  Had esophageal varices seen 4/2024.  Patient already on metoprolol.  Suggest PCP change to Coreg if at all possible.  4/12/2024 MELD score= 8  No other obvious source of liver disease with unremarkable serology.         Current Assessment & Plan     Patient to see PCP today.  We are asking if we can switch over to Coreg 3.125 mg twice daily.  Patient is to call me back and let me know what they think.  Educated on importance of 2000 mg sodium diet.  Will check ammonia level today as patient's family member has noted her to have some occasional confusion.  Educated on importance of HCC surveillance every 6 months.    ..The principles of management of steatohepatitis are weight loss through a structured diet and exercise as well as meticulous control of any component of metabolic syndrome, including blood glucose levels, cholesterol and blood pressure.   The patient should strive to lose 2-4 monthly until reaching 7-10% reduction in weight.      Coffee consumption has been shown to decrease the risk of HCC in patients with chronic liver disease.  In these patients, coffee consumption should be encouraged.    I have advised to avoid fructose containing food and drink.     Daily alcohol intake should strickly be below 30gm in men and 20 gm in women.    A physical activity program should in 150-200 min/week of moderate intensity in 3-5 sessions. Resistance training to promote musculoskeletal fitness and improve metabolic factors was reviewed.   Office follow-up every 6 months as  well.         Relevant Orders    Ammonia (Completed)    IPMN (intraductal papillary mucinous neoplasm)    Overview     CT:  3 cystic lesions in the pancreatic head are present, the largest of  which measures 14 mm. 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  Recommend a follow-up CT or MRI in 1 year based on ACR white paper  criteria.         Current Assessment & Plan     Recheck CT April 2025         Secondary esophageal varices without bleeding    Overview     4/25/2024 endoscopy revealing grade 1 esophageal varices.         Current Assessment & Plan     Finding out from PCP today if we can switch metoprolol over to Coreg.          Other Visit Diagnoses       Confusion    -  Primary    Relevant Orders    Ammonia (Completed)          6 month follow up                    Kavita De, APRN  06/12/24  14:31 CDT    Part of this note may be an electronic transcription/translation of spoken language to printed text.

## 2024-06-13 ENCOUNTER — TELEPHONE (OUTPATIENT)
Dept: GASTROENTEROLOGY | Facility: CLINIC | Age: 64
End: 2024-06-13
Payer: MEDICARE

## 2024-06-13 NOTE — TELEPHONE ENCOUNTER
Noted. Placed pt in recall for CT for 10/2025-she will get a reminder letter about 2 months ahead of time asking her to call the office to discuss how to get the CT ordered and scheduled.

## 2024-06-13 NOTE — TELEPHONE ENCOUNTER
From: Kavita De, JR   Sent: 6/12/2024   2:27 PM CDT   To: Ivette Sutherland MA   Subject: CT [id number]                                 Ivette please put her in for recall CT Scan for HCC surveillance in Oct 2024   Thanks   Kavita

## 2024-06-24 ENCOUNTER — OFFICE VISIT (OUTPATIENT)
Dept: SURGERY | Facility: CLINIC | Age: 64
End: 2024-06-24
Payer: MEDICARE

## 2024-06-24 VITALS
SYSTOLIC BLOOD PRESSURE: 112 MMHG | HEIGHT: 66 IN | OXYGEN SATURATION: 97 % | HEART RATE: 78 BPM | WEIGHT: 230.4 LBS | BODY MASS INDEX: 37.03 KG/M2 | DIASTOLIC BLOOD PRESSURE: 70 MMHG

## 2024-06-24 DIAGNOSIS — N63.12 MASS OF UPPER INNER QUADRANT OF RIGHT BREAST: Primary | ICD-10-CM

## 2024-06-24 PROCEDURE — 99214 OFFICE O/P EST MOD 30 MIN: CPT

## 2024-06-24 PROCEDURE — 1159F MED LIST DOCD IN RCRD: CPT

## 2024-06-24 PROCEDURE — 1160F RVW MEDS BY RX/DR IN RCRD: CPT

## 2024-06-24 RX ORDER — CARVEDILOL 3.12 MG/1
TABLET, FILM COATED ORAL
COMMUNITY
Start: 2024-06-12

## 2024-06-24 NOTE — PROGRESS NOTES
Office Established Patient Note:     Referring Provider: Rosalba Nelson APRN    Chief Complaint   Patient presents with    Follow-up     Patient here for evaluation of lump in right breast        Subjective .     History of present illness:  Geneva Knight is a 63 y.o. female presents to the clinic with a spot in the R breast. She states that it has been there for at least a year. She states that it starts out as a hurt/pain and now there is a spot that is present. She states that when she presses on it that it hurts worse. She denies any skin changes or drainage from the area. She denies any personal history of breast cancer, but does endorse a family history of breast cancer in her mother and maternal grandmother. She was previously followed by Dr. Roy.     BMI is 37.21. She does not take any blood thinners other than 81mg of ASA daily. She is a nonsmoker.     Review of Systems    Review of Systems - General ROS: negative  ENT ROS: negative  Respiratory ROS: no cough, shortness of breath, or wheezing  Cardiovascular ROS: no chest pain or dyspnea on exertion  Gastrointestinal ROS: no abdominal pain, change in bowel habits, or black or bloody stools  Genito-Urinary ROS: no dysuria, trouble voiding, or hematuria  Dermatological ROS: negative   Breast ROS: positive for - new or changing breast lumps  Hematological and Lymphatic ROS: negative  Musculoskeletal ROS: negative   Neurological ROS: no TIA or stroke symptoms    Psychological ROS: negative  Endocrine ROS: negative    History  Past Medical History:   Diagnosis Date    Ankle fracture     Right    Anxiety     Cellulitis     Right ankle    Family history of colon cancer     GERD (gastroesophageal reflux disease)     History of adenomatous polyp of colon     History of colon polyps     HTN (hypertension)     Hyperlipemia     VIOLETA (obstructive sleep apnea)     cpap    Type 2 diabetes mellitus     Vitamin D deficiency    ,   Past Surgical History:   Procedure  Laterality Date    BREAST BIOPSY Right 08/30/2021    Procedure: EXCISIONAL RIGHT BREAST BIOPSY WITH NEEDLE LOCALIZATION - ULTRASOUND GUIDED;  Surgeon: Martine Roy MD;  Location: [facility] OR;  Service: General;  Laterality: Right;    CHOLECYSTECTOMY  2004    COLONOSCOPY N/A 12/18/2017    Three 4-7mm polyps in the rectum-path shows one tubular adenomatous polyp and two hyperplastic polyps; Congested mucosa in the rectum, in the recto-sigmoid colon and in the sigmoid colon-biopsied; Repeat 5 years    COLONOSCOPY  07/25/2013    The entire examined colon is normal on direct and retroflexion views; Repeat 7-10 years    COLONOSCOPY N/A 03/30/2023    One 4mm hyperplastic polyp in the rectum; Large lipoma in the ascending colon-biopsied; Repeat 5 years    ENDOSCOPY N/A 04/25/2024    Grade I esophageal varices; Normal stomach; Normal examined duodenum; No specimens collected; No need to repeat endoscopy as she is on metoprolol    EYE SURGERY Bilateral     To remove loose skin    FOOT SURGERY Right 04/21/2021    ORIF ANKLE FRACTURE Right    ,   Family History   Problem Relation Age of Onset    Breast cancer Mother     Colon cancer Maternal Aunt         In her 50's    Colon cancer Maternal Grandmother         In her 70's    Breast cancer Maternal Grandmother     Colon polyps Neg Hx     Esophageal cancer Neg Hx     Liver cancer Neg Hx     Stomach cancer Neg Hx     Liver disease Neg Hx     Rectal cancer Neg Hx    ,   Social History     Tobacco Use    Smoking status: Never    Smokeless tobacco: Never   Vaping Use    Vaping status: Never Used   Substance Use Topics    Alcohol use: Not Currently    Drug use: Never   , (Not in a hospital admission)   and Allergies:  Azithromycin and Namenda [memantine]    Current Outpatient Medications:     Ascorbic Acid (Vitamin C) 500 MG capsule, Take 1 capsule by mouth Daily., Disp: , Rfl:     aspirin 81 MG EC tablet, Take 1 tablet by mouth Daily., Disp: , Rfl:     B Complex Vitamins (VITAMIN B  "COMPLEX PO), Take 1 capsule by mouth Daily., Disp: , Rfl:     Coreg 3.125 MG tablet, Take 1 tablet twice a day by oral route for 90 days., Disp: , Rfl:     donepezil (ARICEPT) 10 MG tablet, Take 1 tablet by mouth Every Night., Disp: , Rfl:     escitalopram (LEXAPRO) 10 MG tablet, Take 1 tablet by mouth Daily., Disp: , Rfl:     levocetirizine (XYZAL) 5 MG tablet, Take 1 tablet by mouth As Needed., Disp: , Rfl:     metFORMIN (GLUCOPHAGE) 1000 MG tablet, Take 1 tablet by mouth 2 (Two) Times a Day., Disp: , Rfl:     multivitamin with minerals tablet tablet, Take 1 tablet by mouth Daily., Disp: , Rfl:     Omega-3 Fatty Acids (fish oil) 1000 MG capsule capsule, Take 1 capsule by mouth Daily With Breakfast., Disp: , Rfl:     rosuvastatin (CRESTOR) 5 MG tablet, Take 1 tablet by mouth Daily., Disp: , Rfl:     triamterene-hydrochlorothiazide (DYAZIDE) 37.5-25 MG per capsule, Take 1 capsule by mouth Daily., Disp: , Rfl:     Vitamin D, Cholecalciferol, 25 MCG (1000 UT) capsule, Take 1 capsule by mouth Daily., Disp: , Rfl:     Objective     Vital Signs   /70 (BP Location: Left arm, Patient Position: Sitting, Cuff Size: Large Adult)   Pulse 78   Ht 167.6 cm (65.98\")   Wt 105 kg (230 lb 6.4 oz)   SpO2 97%   BMI 37.21 kg/m²      Physical Exam:  General appearance - alert, well appearing, and in no distress  Mental status - alert, oriented to person, place, and time, normal mood, behavior, speech, dress, motor activity, and thought processes  Eyes - sclera anicteric  Neck - supple, no significant adenopathy  Chest - no tachypnea, retractions or cyanosis  Heart - normal rate and regular rhythm  Breasts - right breast normal without skin or nipple changes or axillary node enlargement, less than 1 cm round tender palpable mass at 3 o'clock position 5 cm from the nipple, no overlying skin changes  Left breast normal without mass, skin or nipple changes or axillary nodes  Neurological - alert, oriented, normal speech, no focal " findings or movement disorder noted  Extremities - no pedal edema noted  Skin - normal coloration and turgor, no rashes, no suspicious skin lesions noted    Results Review:  Result Review :            Mammo Screening Digital Tomosynthesis Bilateral With CAD (08/01/2023 10:37)   Summary:  1. Benign mammograms.  2. BI-RADS category 1.  3. Screening mammography is recommended in one year.    Assessment & Plan       Diagnoses and all orders for this visit:    1. Mass of upper inner quadrant of right breast (Primary)  -     Mammo Diagnostic Digital Tomosynthesis Right With CAD; Future  -     US Breast Right Limited; Future     Ms. Knight is a 63-year-old female who presents to the clinic with a new lump of the right breast at the 3 o'clock position.  There is a palpable abnormality upon physical exam.  I have ordered a diagnostic mammogram and ultrasound to further evaluate this.  The patient will return to clinic in 2-week for further discussion and next steps.  I will call her with the results prior to the appointment.  She is to call for an appointment if she has any new problems or concerns.  She voices understanding and is agreeable to the plan.    This is a new problem with uncertain diagnosis and uncertain prognosis.  I have reviewed previous imaging and ordered further imaging.    Follow up:     Class 2 Severe Obesity (BMI >=35 and <=39.9).     Return in about 2 weeks (around 7/8/2024) for Recheck.        Lyric Lerma PA-C  06/24/24  16:19 CDT

## 2024-06-25 ENCOUNTER — TELEPHONE (OUTPATIENT)
Dept: VASCULAR SURGERY | Age: 64
End: 2024-06-25

## 2024-06-25 NOTE — TELEPHONE ENCOUNTER
Judi called this morning and requested that the test and office visit that was scheduled for 7/1 be cancelled, patient did not wish to r/s at this time.     Thank you.

## 2024-06-27 DIAGNOSIS — K76.82 HEPATIC ENCEPHALOPATHY: Primary | ICD-10-CM

## 2024-06-27 RX ORDER — LACTULOSE 10 G/15ML
20 SOLUTION ORAL 2 TIMES DAILY
Qty: 946 ML | Refills: 6 | Status: SHIPPED | OUTPATIENT
Start: 2024-06-27

## 2024-07-01 ENCOUNTER — TELEPHONE (OUTPATIENT)
Dept: GASTROENTEROLOGY | Facility: CLINIC | Age: 64
End: 2024-07-01
Payer: MEDICARE

## 2024-07-01 NOTE — TELEPHONE ENCOUNTER
Pt left me a VM on Friday stating the Xifaxan you prescribed is too expensive-even with insurance it is over $1,000. I called pharmacy-there is no PA option-that is what she owes after insurance pays their part.     Radha-she was asking if this was something she HAD to be on? She was able to get the Lactulose picked up and got started on that. She doesn't think she will qualify for pt assistance-I told her the only way we'd know for sure is if we went through the application process. She wanted me to ask you first if she had to be on this medication or if there were any other alternatives you would recommend? I told her I would discuss with you tomorrow when you were back in office and would call her tomorrow to discuss further. Thanks!

## 2024-07-02 NOTE — TELEPHONE ENCOUNTER
Called and spoke to pt re: Radha's recommendations-she VU. I advised her I was going to look in to the best way to enroll her in pt assistance. I will call her back this afternoon to discuss further.

## 2024-07-03 NOTE — TELEPHONE ENCOUNTER
After looking in to pt assistance for this pt-she is going to have to apply for a low-income subsidy. That is a requirement for any Medicare patients through the [facility] Patient assistance program. I have a form and a phone number for pt to do that. I also have the pt assistance application on my desk. I tried to call her to discuss/explain-was unable to reach her and no VM set up. I will be out of office after today until Monday. If pt were to call back-I need to speak to her about all of this before proceeding with anything. I will call her again on Monday if she hasn't called back by then.

## 2024-07-05 ENCOUNTER — HOSPITAL ENCOUNTER (OUTPATIENT)
Dept: ULTRASOUND IMAGING | Facility: HOSPITAL | Age: 64
Discharge: HOME | End: 2024-07-05
Payer: MEDICARE

## 2024-07-05 ENCOUNTER — HOSPITAL ENCOUNTER (OUTPATIENT)
Dept: MAMMOGRAPHY | Facility: HOSPITAL | Age: 64
Discharge: HOME | End: 2024-07-05
Payer: MEDICARE

## 2024-07-05 DIAGNOSIS — N63.12 MASS OF UPPER INNER QUADRANT OF RIGHT BREAST: ICD-10-CM

## 2024-07-05 LAB
NCCN CRITERIA FLAG: ABNORMAL
TYRER CUZICK SCORE: 23

## 2024-07-05 PROCEDURE — G0279 TOMOSYNTHESIS, MAMMO: HCPCS

## 2024-07-05 PROCEDURE — 76642 ULTRASOUND BREAST LIMITED: CPT

## 2024-07-05 PROCEDURE — 77066 DX MAMMO INCL CAD BI: CPT

## 2024-07-08 ENCOUNTER — OFFICE VISIT (OUTPATIENT)
Dept: SURGERY | Facility: CLINIC | Age: 64
End: 2024-07-08
Payer: MEDICARE

## 2024-07-08 VITALS
BODY MASS INDEX: 36.96 KG/M2 | HEIGHT: 66 IN | OXYGEN SATURATION: 95 % | WEIGHT: 230 LBS | DIASTOLIC BLOOD PRESSURE: 80 MMHG | HEART RATE: 75 BPM | SYSTOLIC BLOOD PRESSURE: 126 MMHG

## 2024-07-08 DIAGNOSIS — N63.12 MASS OF UPPER INNER QUADRANT OF RIGHT BREAST: Primary | ICD-10-CM

## 2024-07-08 PROCEDURE — 99212 OFFICE O/P EST SF 10 MIN: CPT

## 2024-07-08 PROCEDURE — 1159F MED LIST DOCD IN RCRD: CPT

## 2024-07-08 PROCEDURE — 1160F RVW MEDS BY RX/DR IN RCRD: CPT

## 2024-07-08 NOTE — PROGRESS NOTES
Office Established Patient Note:     Referring Provider: No ref. provider found    Chief Complaint   Patient presents with    Mass       Subjective .     History of present illness:  Geneva Knight is a 63 y.o. female who presents to the clinic for discussion of mammogram and ultrasound results. She is overall doing well. No changes in her health since her previous visit.     BMI is 37.15. She is a nonsmoker. She does not take any blood thinners other than 81mg ASA.     Review of Systems    Review of Systems - General ROS: negative  ENT ROS: negative  Respiratory ROS: no cough, shortness of breath, or wheezing  Cardiovascular ROS: no chest pain or dyspnea on exertion  Gastrointestinal ROS: no abdominal pain, change in bowel habits, or black or bloody stools  Genito-Urinary ROS: no dysuria, trouble voiding, or hematuria  Dermatological ROS: negative   Breast ROS: negative for breast lumps  Hematological and Lymphatic ROS: negative  Musculoskeletal ROS: negative   Neurological ROS: no TIA or stroke symptoms    Psychological ROS: negative  Endocrine ROS: negative    History  Past Medical History:   Diagnosis Date    Ankle fracture     Right    Anxiety     Cellulitis     Right ankle    Family history of colon cancer     GERD (gastroesophageal reflux disease)     History of adenomatous polyp of colon     History of colon polyps     HTN (hypertension)     Hyperlipemia     VIOLETA (obstructive sleep apnea)     cpap    Type 2 diabetes mellitus     Vitamin D deficiency    ,   Past Surgical History:   Procedure Laterality Date    BREAST BIOPSY Right 08/30/2021    Procedure: EXCISIONAL RIGHT BREAST BIOPSY WITH NEEDLE LOCALIZATION - ULTRASOUND GUIDED;  Surgeon: Martine Roy MD;  Location: [facility];  Service: General;  Laterality: Right;    CHOLECYSTECTOMY  2004    COLONOSCOPY N/A 12/18/2017    Three 4-7mm polyps in the rectum-path shows one tubular adenomatous polyp and two hyperplastic polyps; Congested mucosa in the  rectum, in the recto-sigmoid colon and in the sigmoid colon-biopsied; Repeat 5 years    COLONOSCOPY  07/25/2013    The entire examined colon is normal on direct and retroflexion views; Repeat 7-10 years    COLONOSCOPY N/A 03/30/2023    One 4mm hyperplastic polyp in the rectum; Large lipoma in the ascending colon-biopsied; Repeat 5 years    ENDOSCOPY N/A 04/25/2024    Grade I esophageal varices; Normal stomach; Normal examined duodenum; No specimens collected; No need to repeat endoscopy as she is on metoprolol    EYE SURGERY Bilateral     To remove loose skin    FOOT SURGERY Right 04/21/2021    ORIF ANKLE FRACTURE Right    ,   Family History   Problem Relation Age of Onset    Breast cancer Mother     Colon cancer Maternal Grandmother         In her 70's    Breast cancer Maternal Grandmother     Colon cancer Maternal Aunt         In her 50's    Colon polyps Neg Hx     Esophageal cancer Neg Hx     Liver cancer Neg Hx     Stomach cancer Neg Hx     Liver disease Neg Hx     Rectal cancer Neg Hx    ,   Social History     Tobacco Use    Smoking status: Never    Smokeless tobacco: Never   Vaping Use    Vaping status: Never Used   Substance Use Topics    Alcohol use: Not Currently    Drug use: Never   , (Not in a hospital admission)   and Allergies:  Azithromycin and Namenda [memantine]    Current Outpatient Medications:     Ascorbic Acid (Vitamin C) 500 MG capsule, Take 1 capsule by mouth Daily., Disp: , Rfl:     aspirin 81 MG EC tablet, Take 1 tablet by mouth Daily., Disp: , Rfl:     B Complex Vitamins (VITAMIN B COMPLEX PO), Take 1 capsule by mouth Daily., Disp: , Rfl:     Coreg 3.125 MG tablet, Take 1 tablet twice a day by oral route for 90 days., Disp: , Rfl:     donepezil (ARICEPT) 10 MG tablet, Take 1 tablet by mouth Every Night., Disp: , Rfl:     escitalopram (LEXAPRO) 10 MG tablet, Take 1 tablet by mouth Daily., Disp: , Rfl:     lactulose (CHRONULAC) 10 GM/15ML solution, Take 30 mL by mouth 2 (Two) Times a Day.,  "Disp: 946 mL, Rfl: 6    levocetirizine (XYZAL) 5 MG tablet, Take 1 tablet by mouth As Needed., Disp: , Rfl:     metFORMIN (GLUCOPHAGE) 1000 MG tablet, Take 1 tablet by mouth 2 (Two) Times a Day., Disp: , Rfl:     multivitamin with minerals tablet tablet, Take 1 tablet by mouth Daily., Disp: , Rfl:     Omega-3 Fatty Acids (fish oil) 1000 MG capsule capsule, Take 1 capsule by mouth Daily With Breakfast., Disp: , Rfl:     riFAXIMin (Xifaxan) 550 MG tablet, Take 1 tablet by mouth 2 (Two) Times a Day., Disp: 60 tablet, Rfl: 6    rosuvastatin (CRESTOR) 5 MG tablet, Take 1 tablet by mouth Daily., Disp: , Rfl:     triamterene-hydrochlorothiazide (DYAZIDE) 37.5-25 MG per capsule, Take 1 capsule by mouth Daily., Disp: , Rfl:     Vitamin D, Cholecalciferol, 25 MCG (1000 UT) capsule, Take 1 capsule by mouth Daily., Disp: , Rfl:     Objective     Vital Signs   /80   Pulse 75   Ht 167.6 cm (65.98\")   Wt 104 kg (230 lb)   SpO2 95%   BMI 37.15 kg/m²      Physical Exam:  General appearance - alert, well appearing, and in no distress  Mental status - alert, oriented to person, place, and time, normal mood, behavior, speech, dress, motor activity, and thought processes  Eyes - sclera anicteric  Neck - supple, no significant adenopathy  Chest - no tachypnea, retractions or cyanosis  Heart - normal rate and regular rhythm  Neurological - alert, oriented, normal speech, no focal findings or movement disorder noted  Extremities - no pedal edema noted  Skin - normal coloration and turgor, no rashes, no suspicious skin lesions noted    Results Review:  Result Review :            US Breast Right Limited (07/05/2024 09:01)   IMPRESSION:  1. Stable mammogram with no radiographic findings suspicious for  malignancy. No mammographic or sonographic correlate for the focus of  palpable abnormality and tenderness in the upper inner quadrant of the  right breast. Clinical follow-up suggested.  2. ACR BI-RADS 2 benign findings negative. " Routine screening mammography  in 1 year's time is recommended unless otherwise earlier clinically  indicated.    Mammo Diagnostic Digital Tomosynthesis Bilateral With CAD (07/05/2024 08:39)   No new dominant mass or suspicious microcalcifications are present. No  skin changes are present. No mammographic or sonographic correlate for  the area of palpable abnormality and tenderness within the upper inner  quadrant of the right breast.    Assessment & Plan       Diagnoses and all orders for this visit:    1. Mass of upper inner quadrant of right breast (Primary)     Ms. Knight is a 62 y/o female who presents to the clinic for discussion of mammogram and ultrasound results. The results are benign. I discussed this with the patient and it was decided that the patient will follow up in 6 months to recheck. If there still is a palpable abnormality, I will order an MRI to evaluate. If there is no longer a palpable abnormality, we will proceed with a mammogram 1 year from now. I instructed her to call for an appointment if she has any changes in the interim. She voiced understanding and is agreeable to the plan.     Follow up:     Class 2 Severe Obesity (BMI >=35 and <=39.9).    Return in about 6 months (around 1/8/2025) for 6 month breast f/u .        Lyric Lerma PA-C  07/08/24  08:56 CDT

## 2024-07-09 NOTE — TELEPHONE ENCOUNTER
Called pt again today re: Xifaxan pt assistance and spoke to her. Basically she has to apply for a low-income subsidy through Medicaid first. If she is approved, then that will pay for the medication. If she is denied, then we can go forward with applying for assistance through the company ([employer]). I gave her the number to call to apply for the low-income subsidy-she will call me to update me once she has heard back from them on the decision so that we know how to proceed. I did advise just continuing Lactulose for now until we get this straightened out. I still have pt assistance forms on my desk in case those are needed in the future.

## 2024-07-15 ENCOUNTER — TELEPHONE (OUTPATIENT)
Dept: HEMATOLOGY | Age: 64
End: 2024-07-15

## 2024-07-15 NOTE — TELEPHONE ENCOUNTER
Called Patient and reminded patient of their appointment on 07/18/2024 and patient confirmed they would be here. Reminded patient to just come at appointment time, and to not come at the lab appointment time. Reminded patient that we will not check them in any more than 30 minutes before appointment time.

## 2024-07-17 NOTE — PROGRESS NOTES
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       EGD 4/25/2024 by Dr. Elaine  Grade 1 esophageal varices  Normal stomach  Normal examined duodenum          WBC 3.51 with ANC 1.9, PLT 96,000.  This is stable for her.  I will continue to monitor her CBC.        2.  IgA kappa MGUS -stable but not at goal    Serology 7/28/2020  SPEP - No M spike -with immunofixation negative  QI - IgG 1232, IgA 422 (), IgM 78  Free serum light chains -   B2 M - 2.1    LDH - 510 (313-618) - WNL  CMP - total bilirubin is 1.7, alk phos 130, AST 79, ALT 54      Serology 10/27/2020  SPEP - no M spike with immunofixation negative  QI - IgG 1227, IgA 390, IgM 69  Free serum light chains -kappa 31.3, K/L ratio 2.27  B2M - 2.2 - WNL  CMP = crt 0.6 with GFR > 60, AST 56, total bili 1.5    Serology 3/23/2021  SPEP - no M spike with immunofixation negative  QI - IgG 1227, IgA 342, IgM 69 - her IgA normalized  Free serum light chains -kappa 26.5, K/L ratio 2.04 -improved  B2M - 2.2 - WNL  CMP = crt 0.7 with GFR > 60, Ca 9.7, alk phos 155, AST 52, total bilirubin normal - known fatty liver      Serology 6/22/2021  SPEP no M spike with immunofixation negative  QI IgG 1041, IgA 315, IgM 53 all WNL  Free serum light chains kappa 28.1 with K/L ratio 2.25    Peripheral blood flow cytometry to [facility] 6/22/2021 did not reveal any increase in blast, no B-cell monoclonality and no T-cell aberrant antigen expression    Serology 10/22/2021  SPEP - no M spike negative immunofixation  QI - IgG 1106, IgA 375 (), IgM 64  Free serum light chains - Kappa 28.8, Lambda 17.1, K/L ratio 1.68  B2M - 2.3  LDH - 492  CMP - crt 0.6 with GFR >60, Ca 9.5    Bone survey 10/22/2021 at [facility] was negative    24-hour urine returned 11/2/2021 was negative for monoclonal protein, no elevated total protein.    Her bone survey was negative.  Her 24 urine did not reveal any monoclonal protein.     Bone marrow

## 2024-07-18 ENCOUNTER — HOSPITAL ENCOUNTER (OUTPATIENT)
Dept: INFUSION THERAPY | Age: 64
Discharge: HOME | End: 2024-07-18
Payer: MEDICARE

## 2024-07-18 ENCOUNTER — OFFICE VISIT (OUTPATIENT)
Dept: HEMATOLOGY | Age: 64
End: 2024-07-18

## 2024-07-18 VITALS
WEIGHT: 228.1 LBS | BODY MASS INDEX: 36.66 KG/M2 | HEART RATE: 71 BPM | SYSTOLIC BLOOD PRESSURE: 120 MMHG | DIASTOLIC BLOOD PRESSURE: 76 MMHG | HEIGHT: 66 IN | OXYGEN SATURATION: 96 % | TEMPERATURE: 98.2 F

## 2024-07-18 DIAGNOSIS — D47.2 MGUS (MONOCLONAL GAMMOPATHY OF UNKNOWN SIGNIFICANCE): ICD-10-CM

## 2024-07-18 DIAGNOSIS — R16.1 SPLENOMEGALY: ICD-10-CM

## 2024-07-18 DIAGNOSIS — D47.2 GAMMOPATHY: ICD-10-CM

## 2024-07-18 DIAGNOSIS — D69.6 THROMBOCYTOPENIA (HCC): ICD-10-CM

## 2024-07-18 DIAGNOSIS — I77.1 CELIAC ARTERY STENOSIS (HCC): ICD-10-CM

## 2024-07-18 DIAGNOSIS — D49.0 IPMN (INTRADUCTAL PAPILLARY MUCINOUS NEOPLASM): ICD-10-CM

## 2024-07-18 DIAGNOSIS — R53.83 OTHER FATIGUE: ICD-10-CM

## 2024-07-18 DIAGNOSIS — E61.1 IRON DEFICIENCY: ICD-10-CM

## 2024-07-18 DIAGNOSIS — D47.2 MGUS (MONOCLONAL GAMMOPATHY OF UNKNOWN SIGNIFICANCE): Primary | ICD-10-CM

## 2024-07-18 LAB
ALBUMIN SERPL-MCNC: 4 G/DL (ref 3.5–5.2)
ALP SERPL-CCNC: 179 U/L (ref 35–104)
ALT SERPL-CCNC: 38 U/L (ref 9–52)
ANION GAP SERPL CALCULATED.3IONS-SCNC: 12 MMOL/L (ref 7–19)
AST SERPL-CCNC: 56 U/L (ref 14–36)
BASOPHILS # BLD: 0.02 K/UL (ref 0.01–0.08)
BASOPHILS NFR BLD: 0.6 % (ref 0.1–1.2)
BILIRUB SERPL-MCNC: 1.1 MG/DL (ref 0.2–1.3)
BUN SERPL-MCNC: 14 MG/DL (ref 7–17)
CALCIUM SERPL-MCNC: 9.4 MG/DL (ref 8.4–10.2)
CHLORIDE SERPL-SCNC: 105 MMOL/L (ref 98–111)
CO2 SERPL-SCNC: 26 MMOL/L (ref 22–29)
CREAT SERPL-MCNC: 0.6 MG/DL (ref 0.5–1)
EOSINOPHIL # BLD: 0.2 K/UL (ref 0.04–0.54)
EOSINOPHIL NFR BLD: 5.7 % (ref 0.7–7)
ERYTHROCYTE [DISTWIDTH] IN BLOOD BY AUTOMATED COUNT: 12.6 % (ref 11.7–14.4)
GLOBULIN: 2.8 G/DL
GLUCOSE SERPL-MCNC: 97 MG/DL (ref 74–106)
HCT VFR BLD AUTO: 38.5 % (ref 34.1–44.9)
HGB BLD-MCNC: 13.1 G/DL (ref 11.2–15.7)
LYMPHOCYTES # BLD: 1.14 K/UL (ref 1.18–3.74)
LYMPHOCYTES NFR BLD: 32.5 % (ref 19.3–53.1)
MCH RBC QN AUTO: 30 PG (ref 25.6–32.2)
MCHC RBC AUTO-ENTMCNC: 34 G/DL (ref 32.3–35.5)
MCV RBC AUTO: 88.3 FL (ref 79.4–94.8)
MONOCYTES # BLD: 0.25 K/UL (ref 0.24–0.82)
MONOCYTES NFR BLD: 7.1 % (ref 4.7–12.5)
NEUTROPHILS # BLD: 1.9 K/UL (ref 1.56–6.13)
NEUTS SEG NFR BLD: 54.1 % (ref 34–71.1)
PLATELET # BLD AUTO: 96 K/UL (ref 182–369)
PMV BLD AUTO: 10 FL (ref 7.4–10.4)
POTASSIUM SERPL-SCNC: 4.2 MMOL/L (ref 3.5–5.1)
PROT SERPL-MCNC: 6.8 G/DL (ref 6.3–8.2)
RBC # BLD AUTO: 4.36 M/UL (ref 3.93–5.22)
SODIUM SERPL-SCNC: 143 MMOL/L (ref 137–145)
WBC # BLD AUTO: 3.51 K/UL (ref 3.98–10.04)

## 2024-07-18 PROCEDURE — 99212 OFFICE O/P EST SF 10 MIN: CPT

## 2024-07-18 PROCEDURE — 85025 COMPLETE CBC W/AUTO DIFF WBC: CPT

## 2024-07-18 PROCEDURE — 36415 COLL VENOUS BLD VENIPUNCTURE: CPT

## 2024-07-18 PROCEDURE — 80053 COMPREHEN METABOLIC PANEL: CPT

## 2024-07-18 RX ORDER — CARVEDILOL 3.12 MG/1
3.12 TABLET, FILM COATED ORAL DAILY
COMMUNITY
Start: 2024-06-12

## 2024-07-18 ASSESSMENT — ENCOUNTER SYMPTOMS
CONSTIPATION: 0
NAUSEA: 0
DIARRHEA: 0
EYE ITCHING: 0
ABDOMINAL DISTENTION: 0
WHEEZING: 0
EYE DISCHARGE: 0
VOICE CHANGE: 0
BACK PAIN: 0
BLOOD IN STOOL: 0
SHORTNESS OF BREATH: 0
COUGH: 0
VOMITING: 0
PHOTOPHOBIA: 0
SORE THROAT: 0
ABDOMINAL PAIN: 0
TROUBLE SWALLOWING: 0

## 2024-07-22 ENCOUNTER — TELEPHONE (OUTPATIENT)
Dept: VASCULAR SURGERY | Facility: CLINIC | Age: 64
End: 2024-07-22
Payer: MEDICARE

## 2024-07-22 LAB
ALBUMIN SERPL-MCNC: 3.89 G/DL (ref 3.75–5.01)
ALPHA1 GLOB SERPL ELPH-MCNC: 0.19 G/DL (ref 0.19–0.46)
ALPHA2 GLOB SERPL ELPH-MCNC: 0.72 G/DL (ref 0.48–1.05)
B-GLOBULIN SERPL ELPH-MCNC: 0.85 G/DL (ref 0.48–1.1)
DEPRECATED KAPPA LC FREE/LAMBDA SER: 1.82 {RATIO} (ref 0.26–1.65)
EER MONOCLONAL PROTEIN AND FLC, SERUM: ABNORMAL
GAMMA GLOB SERPL ELPH-MCNC: 1.04 G/DL (ref 0.62–1.51)
IGA SERPL-MCNC: 305 MG/DL (ref 68–408)
IGG SERPL-MCNC: 1004 MG/DL (ref 768–1632)
IGM SERPL-MCNC: 43 MG/DL (ref 35–263)
INTERPRETATION SERPL IFE-IMP: ABNORMAL
INTERPRETATION SERPL IFE-IMP: ABNORMAL
KAPPA LC FREE SER-MCNC: 31.05 MG/L (ref 3.3–19.4)
LAMBDA LC FREE SERPL-MCNC: 17.07 MG/L (ref 5.71–26.3)
MONOCLONAL PROTEIN, SERUM: ABNORMAL G/DL
PROT SERPL-MCNC: 6.7 G/DL (ref 6.3–8.2)

## 2024-07-23 ENCOUNTER — OFFICE VISIT (OUTPATIENT)
Dept: VASCULAR SURGERY | Facility: CLINIC | Age: 64
End: 2024-07-23
Payer: MEDICARE

## 2024-07-23 VITALS
DIASTOLIC BLOOD PRESSURE: 72 MMHG | SYSTOLIC BLOOD PRESSURE: 124 MMHG | HEART RATE: 74 BPM | BODY MASS INDEX: 36.16 KG/M2 | HEIGHT: 66 IN | WEIGHT: 225 LBS | OXYGEN SATURATION: 97 %

## 2024-07-23 DIAGNOSIS — I77.4 MEDIAN ARCUATE LIGAMENT SYNDROME: Primary | ICD-10-CM

## 2024-07-23 DIAGNOSIS — I65.23 BILATERAL CAROTID ARTERY STENOSIS: ICD-10-CM

## 2024-07-23 PROCEDURE — 1159F MED LIST DOCD IN RCRD: CPT | Performed by: SURGERY

## 2024-07-23 PROCEDURE — 1160F RVW MEDS BY RX/DR IN RCRD: CPT | Performed by: SURGERY

## 2024-07-23 PROCEDURE — 99204 OFFICE O/P NEW MOD 45 MIN: CPT | Performed by: SURGERY

## 2024-07-23 NOTE — PROGRESS NOTES
07/23/2024      Rosalba Nelson APRN  [address] [place] [address]    Geneva Knight  1960    Chief Complaint   Patient presents with    NEW PATIENT     Referred from Rosalba Nelson for Celiac Artery Stenosis. Testing done 4/19/24. Patient states that she has been having trouble stomach and digestive issues for past year. This was found after testing w/ PCP. Patient has never been a smoker.        Dear JR Simpson    HPI  I had the pleasure of seeing your patient Geneva Knight in the office today.  Thank you kindly for this consultation.  As you recall, Geneva Knight is a 63 y.o.  female who you are currently following for routine health maintenance.  She has been having come complaints of her stomach being slow to digest. She denies any significant abdominal pain with eating.  She denies any nausea or vomiting.  She denies weight loss or food fear. She is maintained on aspirin and Crestor. She did have noninvasive testing performed today, which I did personally review.        Past Medical History:   Diagnosis Date    Ankle fracture     Right    Anxiety     Cellulitis     Right ankle    Family history of colon cancer     GERD (gastroesophageal reflux disease)     History of adenomatous polyp of colon     History of colon polyps     HTN (hypertension)     Hyperlipemia     VIOLETA (obstructive sleep apnea)     cpap    Type 2 diabetes mellitus     Vitamin D deficiency        Past Surgical History:   Procedure Laterality Date    BREAST BIOPSY Right 08/30/2021    Procedure: EXCISIONAL RIGHT BREAST BIOPSY WITH NEEDLE LOCALIZATION - ULTRASOUND GUIDED;  Surgeon: Martine Roy MD;  Location: [facility];  Service: General;  Laterality: Right;    CHOLECYSTECTOMY  2004    COLONOSCOPY N/A 12/18/2017    Three 4-7mm polyps in the rectum-path shows one tubular adenomatous polyp and two hyperplastic polyps; Congested mucosa in the rectum, in the recto-sigmoid colon and in the sigmoid colon-biopsied; Repeat 5  years    COLONOSCOPY  07/25/2013    The entire examined colon is normal on direct and retroflexion views; Repeat 7-10 years    COLONOSCOPY N/A 03/30/2023    One 4mm hyperplastic polyp in the rectum; Large lipoma in the ascending colon-biopsied; Repeat 5 years    ENDOSCOPY N/A 04/25/2024    Grade I esophageal varices; Normal stomach; Normal examined duodenum; No specimens collected; No need to repeat endoscopy as she is on metoprolol    EYE SURGERY Bilateral     To remove loose skin    FOOT SURGERY Right 04/21/2021    ORIF ANKLE FRACTURE Right        Family History   Problem Relation Age of Onset    Breast cancer Mother     Colon cancer Maternal Grandmother         In her 70's    Breast cancer Maternal Grandmother     Colon cancer Maternal Aunt         In her 50's    Colon polyps Neg Hx     Esophageal cancer Neg Hx     Liver cancer Neg Hx     Stomach cancer Neg Hx     Liver disease Neg Hx     Rectal cancer Neg Hx        Social History     Socioeconomic History    Marital status:    Tobacco Use    Smoking status: Never    Smokeless tobacco: Never   Vaping Use    Vaping status: Never Used   Substance and Sexual Activity    Alcohol use: Not Currently    Drug use: Never    Sexual activity: Not Currently     Partners: Male     Birth control/protection: Other     Comment: menopausal       Allergies   Allergen Reactions    Azithromycin Rash    Namenda [Memantine] Nausea Only and Headache         Current Outpatient Medications:     Ascorbic Acid (Vitamin C) 500 MG capsule, Take 1 capsule by mouth Daily., Disp: , Rfl:     aspirin 81 MG EC tablet, Take 1 tablet by mouth Daily., Disp: , Rfl:     B Complex Vitamins (VITAMIN B COMPLEX PO), Take 1 capsule by mouth Daily., Disp: , Rfl:     Coreg 3.125 MG tablet, Take 1 tablet twice a day by oral route for 90 days., Disp: , Rfl:     donepezil (ARICEPT) 10 MG tablet, Take 1 tablet by mouth Every Night., Disp: , Rfl:     escitalopram (LEXAPRO) 10 MG tablet, Take 1 tablet by  "mouth Daily., Disp: , Rfl:     lactulose (CHRONULAC) 10 GM/15ML solution, Take 30 mL by mouth 2 (Two) Times a Day., Disp: 946 mL, Rfl: 6    levocetirizine (XYZAL) 5 MG tablet, Take 1 tablet by mouth As Needed., Disp: , Rfl:     metFORMIN (GLUCOPHAGE) 1000 MG tablet, Take 1 tablet by mouth 2 (Two) Times a Day., Disp: , Rfl:     multivitamin with minerals tablet tablet, Take 1 tablet by mouth Daily., Disp: , Rfl:     rosuvastatin (CRESTOR) 5 MG tablet, Take 1 tablet by mouth Daily., Disp: , Rfl:     triamterene-hydrochlorothiazide (DYAZIDE) 37.5-25 MG per capsule, Take 1 capsule by mouth Daily., Disp: , Rfl:     Vitamin D, Cholecalciferol, 25 MCG (1000 UT) capsule, Take 1 capsule by mouth Daily., Disp: , Rfl:     Omega-3 Fatty Acids (fish oil) 1000 MG capsule capsule, Take 1 capsule by mouth Daily With Breakfast. (Patient not taking: Reported on 7/23/2024), Disp: , Rfl:     riFAXIMin (Xifaxan) 550 MG tablet, Take 1 tablet by mouth 2 (Two) Times a Day. (Patient not taking: Reported on 7/23/2024), Disp: 60 tablet, Rfl: 6    Review of Systems   Constitutional: Negative.    HENT: Negative.     Eyes: Negative.    Respiratory: Negative.     Cardiovascular: Negative.    Gastrointestinal: Negative.    Endocrine: Negative.    Genitourinary: Negative.    Musculoskeletal: Negative.    Skin: Negative.    Allergic/Immunologic: Negative.    Neurological: Negative.    Hematological: Negative.    Psychiatric/Behavioral: Negative.     All other systems reviewed and are negative.    /72   Pulse 74   Ht 167.6 cm (66\")   Wt 102 kg (225 lb)   SpO2 97%   BMI 36.32 kg/m²     Physical Exam  Vitals and nursing note reviewed.   Constitutional:       Appearance: She is well-developed.   HENT:      Head: Normocephalic and atraumatic.   Eyes:      General: No scleral icterus.     Pupils: Pupils are equal, round, and reactive to light.   Neck:      Thyroid: No thyromegaly.      Vascular: No carotid bruit or JVD.   Cardiovascular:      " Rate and Rhythm: Normal rate and regular rhythm.      Pulses:           Carotid pulses are 2+ on the right side and 2+ on the left side.       Femoral pulses are 2+ on the right side and 2+ on the left side.       Popliteal pulses are 2+ on the right side and 2+ on the left side.        Dorsalis pedis pulses are 2+ on the right side and 2+ on the left side.        Posterior tibial pulses are 2+ on the right side and 2+ on the left side.      Heart sounds: Normal heart sounds.   Pulmonary:      Effort: Pulmonary effort is normal.      Breath sounds: Normal breath sounds.   Abdominal:      General: Bowel sounds are normal. There is no distension or abdominal bruit.      Palpations: Abdomen is soft. There is no mass.      Tenderness: There is no abdominal tenderness.   Musculoskeletal:         General: Normal range of motion.      Cervical back: Neck supple.   Lymphadenopathy:      Cervical: No cervical adenopathy.   Skin:     General: Skin is warm and dry.   Neurological:      Mental Status: She is alert and oriented to person, place, and time.      Cranial Nerves: No cranial nerve deficit.      Sensory: No sensory deficit.   Psychiatric:         Mood and Affect: Mood normal.         Behavior: Behavior normal.         Thought Content: Thought content normal.         Judgment: Judgment normal.       Diagnostic Data:  EXAM/TECHNIQUE: CT abdomen without and with IV contrast     INDICATION: Elevated LFTs, hepatosplenomegaly     COMPARISON: None available.     DLP: 1505.96 mGy.cm. Automated exposure control was also utilized to  decrease patient radiation dose.     FINDINGS:     The lung bases are clear.     The liver is cirrhotic. No suspicious arterial enhancing liver lesion.  There is evidence of portal hypertension including recanalized  periumbilical vein, splenomegaly (15.7 cm), and gastroesophageal varices  development. No ascites.     Prior cholecystectomy. No biliary ductal dilatation.     3 cystic lesions are  present in the pancreatic head, the largest of  which is 14 mm. Pancreas is otherwise unremarkable. The adrenal glands  appear normal.     No solid renal mass. No urolithiasis or hydronephrosis.     Visualized portion of the colon is unremarkable without evidence of  focal wall thickening or adjacent fat stranding. Normal appendix. No  abnormal small bowel distention or evidence of active small bowel  inflammation in the abdomen.     The abdominal aorta is nonaneurysmal. There is severe stenosis of the  celiac artery related to diaphragm compression. The SMA is widely  patent. The TRAVIS is widely patent. Both renal arteries are widely patent.  No enlarged abdominal lymph nodes.     No acute abdominal wall soft tissue abnormality. No acute osseous  finding.     IMPRESSION:     1.  Liver is cirrhotic. There is sequela of portal hypertension  including splenomegaly and gastroesophageal varices. No ascites. No  suspicious arterial enhancing liver lesion.     2.  3 cystic lesions in the pancreatic head are present, the largest of  which measures 14 mm. 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  Recommend a follow-up CT or MRI in 1 year based on ACR white paper  criteria.     3.  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     This report was signed and finalized on 4/19/2024 8:50 AM by Dr. Jose Cruz Shearer MD.       Patient Active Problem List   Diagnosis    Nausea    Memory deficit    Adenomatous colon polyp    Elevated LFTs    Thrombocytopenia    Steatosis of liver    Hepatosplenomegaly    Other cirrhosis of liver    IPMN (intraductal papillary mucinous neoplasm)    Secondary esophageal varices without bleeding        Diagnosis Plan   1. Median arcuate ligament syndrome        2. Bilateral carotid artery stenosis  US Carotid Bilateral          Plan: After thoroughly evaluating Geneva Knight, I believe the best course of action is to remain  conservative from vascular surgery standpoint.  Overall, it does not sound like she is symptomatic with regards to median arcuate ligament syndrome.  I do see compression noted with poststenotic dilatation.  I did review her testing and this was found incidentally when working up elevated LFTs.  She is following with gastroenterology as well.  She has had no weight loss, nausea, vomiting or significant postprandial pain.  We will see her back in 1 year and at that time order a screening carotid duplex.  The patient is to continue taking their medications as previously discussed.   This was all discussed in full with complete understanding.  Thank you for allowing me to participate in the care of your patient.  Please do not hesitate to call with any questions or concerns.  We will keep you aware of any further encounters with Geneva Knight.        Sincerely yours,         DO Celi Soto Jeffrey L, MD

## 2024-09-19 ENCOUNTER — TELEPHONE (OUTPATIENT)
Dept: GASTROENTEROLOGY | Facility: CLINIC | Age: 64
End: 2024-09-19
Payer: MEDICARE

## 2024-10-10 ENCOUNTER — TELEPHONE (OUTPATIENT)
Dept: GENETICS | Facility: HOSPITAL | Age: 64
End: 2024-10-10
Payer: MEDICARE

## 2024-10-10 NOTE — TELEPHONE ENCOUNTER
I spoke to the patient regarding risk assessment results that were completed by her earlier this year that would be showing in MyChart due to technical/interface issues.  The patient verbalized understanding and is currently under the care of Lyric Lerma for her breast cancer risk.   The patient asked about having an MRI or other procedure of her breast due to breast pain.  I told her I would relay this information to Lyric.  I sent a chat message to Lyric to inform her of this conversation, the TC score, and the patient's request.

## 2024-10-15 ENCOUNTER — TELEPHONE (OUTPATIENT)
Dept: HEMATOLOGY | Age: 64
End: 2024-10-15

## 2024-10-15 NOTE — TELEPHONE ENCOUNTER
Returned a call to Judi stating that she has a sore spot above breast bone that is hurting. She stated she has had this spot for over a year. She stated that they want her to see Dusty. I changed her apt to a sooner apt per her request.

## 2024-10-21 ENCOUNTER — OFFICE VISIT (OUTPATIENT)
Dept: SURGERY | Facility: CLINIC | Age: 64
End: 2024-10-21
Payer: MEDICARE

## 2024-10-21 VITALS
SYSTOLIC BLOOD PRESSURE: 128 MMHG | HEIGHT: 66 IN | HEART RATE: 81 BPM | BODY MASS INDEX: 35.36 KG/M2 | WEIGHT: 220 LBS | OXYGEN SATURATION: 97 % | DIASTOLIC BLOOD PRESSURE: 72 MMHG

## 2024-10-21 DIAGNOSIS — N64.4 BREAST PAIN, RIGHT: Primary | ICD-10-CM

## 2024-10-21 PROCEDURE — 1159F MED LIST DOCD IN RCRD: CPT

## 2024-10-21 PROCEDURE — 99213 OFFICE O/P EST LOW 20 MIN: CPT

## 2024-10-21 PROCEDURE — 1160F RVW MEDS BY RX/DR IN RCRD: CPT

## 2024-10-21 NOTE — PROGRESS NOTES
Office New Patient History and Physical:     Referring Provider: No ref. provider found    Chief Complaint   Patient presents with    Breast Pain       Subjective .     History of present illness:  Geneva Knight is a 63 y.o. female who presents to the clinic with right upper inner breast pain.  This pain has continued to occur for the past year.  She has not tried anything to alleviate the pain.  She had a mammogram in July which showed abnormalities or concerning lesions.  Otherwise she has no concerns with her breasts.    BMI is 35.51.  She is a non-smoker.  She does not take any blood thinners other than 81 mg of aspirin daily.    Review of Systems    Review of Systems - General ROS: negative  ENT ROS: negative  Respiratory ROS: no cough, shortness of breath, or wheezing  Cardiovascular ROS: no chest pain or dyspnea on exertion  Gastrointestinal ROS: no abdominal pain, change in bowel habits, or black or bloody stools  Genito-Urinary ROS: no dysuria, trouble voiding, or hematuria  Dermatological ROS: negative   Breast ROS: positive for -right breast pain  Hematological and Lymphatic ROS: negative  Musculoskeletal ROS: negative   Neurological ROS: no TIA or stroke symptoms    Psychological ROS: negative  Endocrine ROS: negative    History  Past Medical History:   Diagnosis Date    Ankle fracture     Right    Anxiety     Cellulitis     Right ankle    Family history of colon cancer     GERD (gastroesophageal reflux disease)     History of adenomatous polyp of colon     History of colon polyps     HTN (hypertension)     Hyperlipemia     VIOLETA (obstructive sleep apnea)     cpap    Type 2 diabetes mellitus     Vitamin D deficiency    ,   Past Surgical History:   Procedure Laterality Date    BREAST BIOPSY Right 08/30/2021    Procedure: EXCISIONAL RIGHT BREAST BIOPSY WITH NEEDLE LOCALIZATION - ULTRASOUND GUIDED;  Surgeon: Martine Roy MD;  Location: [facility];  Service: General;  Laterality: Right;     CHOLECYSTECTOMY  2004    COLONOSCOPY N/A 12/18/2017    Three 4-7mm polyps in the rectum-path shows one tubular adenomatous polyp and two hyperplastic polyps; Congested mucosa in the rectum, in the recto-sigmoid colon and in the sigmoid colon-biopsied; Repeat 5 years    COLONOSCOPY  07/25/2013    The entire examined colon is normal on direct and retroflexion views; Repeat 7-10 years    COLONOSCOPY N/A 03/30/2023    One 4mm hyperplastic polyp in the rectum; Large lipoma in the ascending colon-biopsied; Repeat 5 years    ENDOSCOPY N/A 04/25/2024    Grade I esophageal varices; Normal stomach; Normal examined duodenum; No specimens collected; No need to repeat endoscopy as she is on metoprolol    EYE SURGERY Bilateral     To remove loose skin    FOOT SURGERY Right 04/21/2021    ORIF ANKLE FRACTURE Right    ,   Family History   Problem Relation Age of Onset    Breast cancer Mother     Colon cancer Maternal Grandmother         In her 70's    Breast cancer Maternal Grandmother     Colon cancer Maternal Aunt         In her 50's    Colon polyps Neg Hx     Esophageal cancer Neg Hx     Liver cancer Neg Hx     Stomach cancer Neg Hx     Liver disease Neg Hx     Rectal cancer Neg Hx    ,   Social History     Tobacco Use    Smoking status: Never    Smokeless tobacco: Never   Vaping Use    Vaping status: Never Used   Substance Use Topics    Alcohol use: Not Currently    Drug use: Never   , (Not in a hospital admission)   and Allergies:  Azithromycin and Namenda [memantine]    Current Outpatient Medications:     Ascorbic Acid (Vitamin C) 500 MG capsule, Take 1 capsule by mouth Daily., Disp: , Rfl:     aspirin 81 MG EC tablet, Take 1 tablet by mouth Daily., Disp: , Rfl:     B Complex Vitamins (VITAMIN B COMPLEX PO), Take 1 capsule by mouth Daily., Disp: , Rfl:     Coreg 3.125 MG tablet, Take 1 tablet twice a day by oral route for 90 days., Disp: , Rfl:     donepezil (ARICEPT) 10 MG tablet, Take 1 tablet by mouth Every Night., Disp: ,  "Rfl:     escitalopram (LEXAPRO) 10 MG tablet, Take 1 tablet by mouth Daily., Disp: , Rfl:     lactulose (CHRONULAC) 10 GM/15ML solution, Take 30 mL by mouth 2 (Two) Times a Day., Disp: 946 mL, Rfl: 6    levocetirizine (XYZAL) 5 MG tablet, Take 1 tablet by mouth As Needed., Disp: , Rfl:     metFORMIN (GLUCOPHAGE) 1000 MG tablet, Take 1 tablet by mouth 2 (Two) Times a Day., Disp: , Rfl:     multivitamin with minerals tablet tablet, Take 1 tablet by mouth Daily., Disp: , Rfl:     Omega-3 Fatty Acids (fish oil) 1000 MG capsule capsule, Take 1 capsule by mouth Daily With Breakfast., Disp: , Rfl:     riFAXIMin (Xifaxan) 550 MG tablet, Take 1 tablet by mouth 2 (Two) Times a Day., Disp: 60 tablet, Rfl: 6    rosuvastatin (CRESTOR) 5 MG tablet, Take 1 tablet by mouth Daily., Disp: , Rfl:     Tirzepatide (MOUNJARO) 2.5 MG/0.5ML solution pen-injector pen, Inject 0.5 mL under the skin into the appropriate area as directed 1 (One) Time Per Week., Disp: , Rfl:     triamterene-hydrochlorothiazide (DYAZIDE) 37.5-25 MG per capsule, Take 1 capsule by mouth Daily., Disp: , Rfl:     Vitamin D, Cholecalciferol, 25 MCG (1000 UT) capsule, Take 1 capsule by mouth Daily., Disp: , Rfl:     Objective     Vital Signs   /72   Pulse 81   Ht 167.6 cm (66\")   Wt 99.8 kg (220 lb)   SpO2 97%   BMI 35.51 kg/m²      Physical Exam:  General appearance - alert, well appearing, and in no distress  Mental status - normal mood, behavior, speech, dress, motor activity, and thought processes  Eyes - sclera anicteric  Neck - supple, no significant adenopathy  Chest - no tachypnea, retractions or cyanosis  Heart - normal rate and regular rhythm  Breasts - breasts appear normal, no suspicious masses, no skin or nipple changes or axillary nodes, tenderness noted of right breast at 1 to 2 o'clock position but without palpable abnormalities or overlying skin changes  Neurological - alert, oriented, normal speech, no focal findings or movement disorder " noted  Extremities - no pedal edema noted  Skin - normal coloration and turgor, no rashes, no suspicious skin lesions noted    Results Review:  Result Review :            Mammo Diagnostic Digital Tomosynthesis Bilateral With CAD (07/05/2024 08:39)     Assessment & Plan       Diagnoses and all orders for this visit:    1. Breast pain, right (Primary)    Ms. Knight is a 63-year-old female who presents to the clinic with continued right breast pain.  Diagnostic mammogram was performed in July which showed no concerning abnormalities.  She has not tried any over-the-counter remedies for said breast pain.  I discussed with her what can exacerbate breast pain as well as over-the-counter options.  I discussed the use of ibuprofen, Tylenol, ice, heat, vitamin E, and primrose oil.  I discussed the need to consistently use these remedies until her next scheduled follow-up in January 2025.  The patient continues to have right breast pain will consider repeat imaging at that time.  I instructed the patient to call for a sooner appointment if she has worsening of symptoms or new worrisome symptoms.  She voiced understanding and is agreeable to the plan.    Follow up:     Class 2 Severe Obesity (BMI >=35 and <=39.9).     Return for Next scheduled follow up.        Lyric Lerma PA-C  10/21/24  11:47 CDT

## 2024-10-31 ENCOUNTER — TELEPHONE (OUTPATIENT)
Dept: HEMATOLOGY | Age: 64
End: 2024-10-31

## 2024-10-31 DIAGNOSIS — D69.6 THROMBOCYTOPENIA (HCC): Primary | ICD-10-CM

## 2024-10-31 NOTE — TELEPHONE ENCOUNTER
I called patient and reminded patient of their appt on 11/04/24 and patient confirmed they would be here. I also let patient know that we have moved into our new cancer facility and asked patient if they were aware of where we were now located, and patient voiced understanding of our new location. Patient knows not to arrive early and that we will get labs at the time of the follow up appointment and not the lab appointment time.

## 2024-10-31 NOTE — PROGRESS NOTES
survey 10/22/2021 at [facility] was negative    24-hour urine returned 11/2/2021 was negative for monoclonal protein, no elevated total protein.    Her bone survey was negative.  Her 24 urine did not reveal any monoclonal protein.     Bone marrow 9/13/2022  Normocellular (40% cellularity) bone marrow with normal trilineage hematopoiesis and adequate megakaryopoiesis  No significant dyspoiesis and no increased blasts seen  No increase in plasma cells (~2-3% on  stain)  Normal female karyotype      Serology 2/25/2022  SPEP - no M spike negative immunofixation  QI - IgG 1123, IgA 343, IgM 54  Free serum light chains - Kappa 27.71, Lambda 16.68, K/L 1.66  B2M - 2.3  CMP - crt 0.6 with GFR >60, Ca 9.3    Serology 8/26/2022  SPEP - no  M spike negative immunofixation  QI - IgG 1048, IgA 385, IgM 45  Free serum  light chains - Kappa 31.5, Lambda 16.61, K/L 1.9  LDH - 389  CMP - crt 0.6 with GFR >60, Ca 9.8    Serology 4/12/2023  SPEP- no  M spike negative immunofixation  QI-IgG 918, HfV554, IgM 43  Free serum light chains- Kappa 26.30, Lambda 16.86, K/L 1.90  CMP-crt 0.6 with GFR >60, Ca 9.7  LDH-190  B2M-2.4    Serology 10/18/2023  SPEP-No M spike, Negative Immunofixation  QI-IgG 832, IgA 313, IgM 47  Free serum light chains- Kappa 33.14, Lambda 16.08, K/L 2.06  CMP-crt 0.6 with GFR >60, Ca 9.7    Serology 7/18/2024  SPEP-No M spike, Negative Immunofixation  QI-IgG 1004, IgA 305, IgM 43  Free serum light chains- Kappa 31.05, Lambda 17.07, K/L 1.82  CMP-crt 0.6 with GFR >90, Ca 9.4      C - calcium 10.2 on 7/18/2024  R - creatinine 0.6 with GFR > 90 on 7/18/2024  A - Hgb   B - bone survey negative on 10/22/2021    Serology from 7/18/2024 revealed improved K/L ratio still just mildly elevated.  Normal renal function, calcium.    PLAN  Will recheck at 6-month interval      4.  Mild iron deficiency without anemia -resolved    Serology 10/18/2023  Serum FE-128  TIBC-378  FE sat-34%  Ferritin-89.2        5.  Pancreatic cyst-most

## 2024-11-04 ENCOUNTER — OFFICE VISIT (OUTPATIENT)
Dept: HEMATOLOGY | Age: 64
End: 2024-11-04
Payer: MEDICARE

## 2024-11-04 ENCOUNTER — HOSPITAL ENCOUNTER (OUTPATIENT)
Dept: INFUSION THERAPY | Age: 64
Discharge: HOME | End: 2024-11-04
Payer: MEDICARE

## 2024-11-04 VITALS
SYSTOLIC BLOOD PRESSURE: 132 MMHG | HEART RATE: 77 BPM | BODY MASS INDEX: 35.02 KG/M2 | WEIGHT: 217.9 LBS | OXYGEN SATURATION: 96 % | HEIGHT: 66 IN | DIASTOLIC BLOOD PRESSURE: 68 MMHG | TEMPERATURE: 98 F

## 2024-11-04 DIAGNOSIS — R07.89 STERNAL PAIN: Primary | ICD-10-CM

## 2024-11-04 DIAGNOSIS — R07.89 STERNAL PAIN: ICD-10-CM

## 2024-11-04 DIAGNOSIS — M94.0 COSTOCHONDRITIS: ICD-10-CM

## 2024-11-04 DIAGNOSIS — R74.8 ELEVATED ALKALINE PHOSPHATASE LEVEL: ICD-10-CM

## 2024-11-04 DIAGNOSIS — D49.0 IPMN (INTRADUCTAL PAPILLARY MUCINOUS NEOPLASM): ICD-10-CM

## 2024-11-04 DIAGNOSIS — D47.2 MGUS (MONOCLONAL GAMMOPATHY OF UNKNOWN SIGNIFICANCE): ICD-10-CM

## 2024-11-04 DIAGNOSIS — I77.4 CELIAC ARTERY STENOSIS (HCC): ICD-10-CM

## 2024-11-04 DIAGNOSIS — D69.6 THROMBOCYTOPENIA (HCC): ICD-10-CM

## 2024-11-04 DIAGNOSIS — D47.2 GAMMOPATHY: ICD-10-CM

## 2024-11-04 DIAGNOSIS — R53.83 OTHER FATIGUE: ICD-10-CM

## 2024-11-04 DIAGNOSIS — E61.1 IRON DEFICIENCY: ICD-10-CM

## 2024-11-04 LAB
BASOPHILS # BLD: 0.03 K/UL (ref 0.01–0.08)
BASOPHILS NFR BLD: 1 % (ref 0.1–1.2)
CRP SERPL HS-MCNC: <0.3 MG/DL (ref 0–0.5)
EOSINOPHIL # BLD: 0.23 K/UL (ref 0.04–0.54)
EOSINOPHIL NFR BLD: 7.4 % (ref 0.7–7)
ERYTHROCYTE [DISTWIDTH] IN BLOOD BY AUTOMATED COUNT: 13.1 % (ref 11.7–14.4)
HCT VFR BLD AUTO: 39 % (ref 34.1–44.9)
HGB BLD-MCNC: 13.4 G/DL (ref 11.2–15.7)
LYMPHOCYTES # BLD: 1.28 K/UL (ref 1.18–3.74)
LYMPHOCYTES NFR BLD: 41.4 % (ref 19.3–53.1)
MCH RBC QN AUTO: 30 PG (ref 25.6–32.2)
MCHC RBC AUTO-ENTMCNC: 34.4 G/DL (ref 32.3–35.5)
MCV RBC AUTO: 87.4 FL (ref 79.4–94.8)
MONOCYTES # BLD: 0.23 K/UL (ref 0.24–0.82)
MONOCYTES NFR BLD: 7.4 % (ref 4.7–12.5)
NEUTROPHILS # BLD: 1.32 K/UL (ref 1.56–6.13)
NEUTS SEG NFR BLD: 42.8 % (ref 34–71.1)
PLATELET # BLD AUTO: 102 K/UL (ref 182–369)
PMV BLD AUTO: 10.7 FL (ref 7.4–10.4)
RBC # BLD AUTO: 4.46 M/UL (ref 3.93–5.22)
WBC # BLD AUTO: 3.09 K/UL (ref 3.98–10.04)

## 2024-11-04 PROCEDURE — 85652 RBC SED RATE AUTOMATED: CPT

## 2024-11-04 PROCEDURE — 84075 ASSAY ALKALINE PHOSPHATASE: CPT

## 2024-11-04 PROCEDURE — 99212 OFFICE O/P EST SF 10 MIN: CPT

## 2024-11-04 PROCEDURE — 85025 COMPLETE CBC W/AUTO DIFF WBC: CPT

## 2024-11-04 PROCEDURE — 36415 COLL VENOUS BLD VENIPUNCTURE: CPT

## 2024-11-04 PROCEDURE — 1036F TOBACCO NON-USER: CPT | Performed by: PHYSICIAN ASSISTANT

## 2024-11-04 PROCEDURE — 84080 ASSAY ALKALINE PHOSPHATASES: CPT

## 2024-11-04 PROCEDURE — 3017F COLORECTAL CA SCREEN DOC REV: CPT | Performed by: PHYSICIAN ASSISTANT

## 2024-11-04 PROCEDURE — 86140 C-REACTIVE PROTEIN: CPT

## 2024-11-04 PROCEDURE — 3075F SYST BP GE 130 - 139MM HG: CPT | Performed by: PHYSICIAN ASSISTANT

## 2024-11-04 PROCEDURE — G8484 FLU IMMUNIZE NO ADMIN: HCPCS | Performed by: PHYSICIAN ASSISTANT

## 2024-11-04 PROCEDURE — G8427 DOCREV CUR MEDS BY ELIG CLIN: HCPCS | Performed by: PHYSICIAN ASSISTANT

## 2024-11-04 PROCEDURE — G8417 CALC BMI ABV UP PARAM F/U: HCPCS | Performed by: PHYSICIAN ASSISTANT

## 2024-11-04 PROCEDURE — 3078F DIAST BP <80 MM HG: CPT | Performed by: PHYSICIAN ASSISTANT

## 2024-11-04 PROCEDURE — 99214 OFFICE O/P EST MOD 30 MIN: CPT | Performed by: PHYSICIAN ASSISTANT

## 2024-11-04 RX ORDER — BLOOD SUGAR DIAGNOSTIC
1 STRIP MISCELLANEOUS DAILY
COMMUNITY
Start: 2024-10-10

## 2024-11-04 RX ORDER — LANCETS
EACH MISCELLANEOUS
COMMUNITY
Start: 2024-10-10

## 2024-11-04 RX ORDER — TIRZEPATIDE 2.5 MG/.5ML
2.5 INJECTION, SOLUTION SUBCUTANEOUS DAILY
COMMUNITY

## 2024-11-04 RX ORDER — ROSUVASTATIN CALCIUM 5 MG/1
1 TABLET, COATED ORAL DAILY
COMMUNITY

## 2024-11-04 RX ORDER — METHYLPREDNISOLONE 4 MG/1
TABLET ORAL
Qty: 1 KIT | Refills: 0 | Status: SHIPPED | OUTPATIENT
Start: 2024-11-04 | End: 2024-11-10

## 2024-11-04 ASSESSMENT — ENCOUNTER SYMPTOMS
NAUSEA: 0
WHEEZING: 0
DIARRHEA: 0
VOMITING: 0
VOICE CHANGE: 0
BACK PAIN: 0
ABDOMINAL DISTENTION: 0
COUGH: 0
PHOTOPHOBIA: 0
CONSTIPATION: 0
ABDOMINAL PAIN: 0
EYE ITCHING: 0
BLOOD IN STOOL: 0
TROUBLE SWALLOWING: 0
SHORTNESS OF BREATH: 0
EYE DISCHARGE: 0
SORE THROAT: 0

## 2024-11-04 NOTE — PROGRESS NOTES
right inguinal adenopathy. No left inguinal adenopathy.   Skin:     General: Skin is warm and dry.      Findings: No erythema or rash.   Neurological:      Mental Status: She is alert and oriented to person, place, and time.      Coordination: Coordination normal.   Psychiatric:         Behavior: Behavior normal.         Thought Content: Thought content normal.         Cognition and Memory: She exhibits impaired recent memory.        CBC reviewed by me  Lab Results   Component Value Date    WBC 3.09 (L) 11/04/2024    HGB 13.4 11/04/2024    HCT 39.0 11/04/2024    MCV 87.4 11/04/2024     (L) 11/04/2024     Lab Results   Component Value Date    NEUTROABS 1.32 (L) 11/04/2024       VISIT DIAGNOSES  1. Sternal pain    2. Costochondritis    3. Elevated alkaline phosphatase level    4. MGUS (monoclonal gammopathy of unknown significance)    5. Gammopathy    6. Other fatigue    7. Iron deficiency    8. IPMN (intraductal papillary mucinous neoplasm)    9. Celiac artery stenosis (HCC)          ASSESSMENT/PLAN:    1.  Soreness in sternum -suspect costochondritis-not at goal    Recheck CMP-chronically elevated alkaline phosphatase felt to be secondary to her cirrhosis, will check isoenzymes.  Calcium has remained normal.    CT chest without and with contrast 9/25/2024 at [facility]  Normal-appearing sternum  Splenomegaly measuring 15.4 cm  No adenopathy      She has tenderness of the right costochondral joint.  I believe that she has costochondritis.  I am putting her on a Medrol Dosepak to see if this will help.    PLAN  Medrol Dosepak        2.  Thrombocytopenia/splenomegaly -hepatic steatosis, cirrhosis with hypersplenism -not at goal    Bone marrow 9/13/2022 revealed adequate megakaryocytes consistent with peripheral destruction, sequestration of platelets.    CT abdomen without and with contrast [facility] 4/19/2024  1.  Liver is cirrhotic. There is sequela of portal hypertension including splenomegaly (15.7 cm) and

## 2024-12-12 ENCOUNTER — OFFICE VISIT (OUTPATIENT)
Dept: GASTROENTEROLOGY | Facility: CLINIC | Age: 64
End: 2024-12-12
Payer: MEDICARE

## 2024-12-12 ENCOUNTER — LAB (OUTPATIENT)
Dept: LAB | Facility: HOSPITAL | Age: 64
End: 2024-12-12
Payer: MEDICARE

## 2024-12-12 VITALS
HEART RATE: 85 BPM | OXYGEN SATURATION: 97 % | WEIGHT: 217 LBS | BODY MASS INDEX: 34.87 KG/M2 | SYSTOLIC BLOOD PRESSURE: 130 MMHG | TEMPERATURE: 98 F | HEIGHT: 66 IN | DIASTOLIC BLOOD PRESSURE: 80 MMHG

## 2024-12-12 DIAGNOSIS — D12.6 ADENOMATOUS POLYP OF COLON, UNSPECIFIED PART OF COLON: ICD-10-CM

## 2024-12-12 DIAGNOSIS — D49.0 IPMN (INTRADUCTAL PAPILLARY MUCINOUS NEOPLASM): ICD-10-CM

## 2024-12-12 DIAGNOSIS — R16.2 HEPATOSPLENOMEGALY: ICD-10-CM

## 2024-12-12 DIAGNOSIS — K76.82 HEPATIC ENCEPHALOPATHY: ICD-10-CM

## 2024-12-12 DIAGNOSIS — D69.6 THROMBOCYTOPENIA: ICD-10-CM

## 2024-12-12 DIAGNOSIS — K74.69 OTHER CIRRHOSIS OF LIVER: Primary | ICD-10-CM

## 2024-12-12 DIAGNOSIS — K74.69 OTHER CIRRHOSIS OF LIVER: ICD-10-CM

## 2024-12-12 DIAGNOSIS — I85.10 SECONDARY ESOPHAGEAL VARICES WITHOUT BLEEDING: ICD-10-CM

## 2024-12-12 LAB
25(OH)D3 SERPL-MCNC: 40.7 NG/ML (ref 30–100)
ALBUMIN SERPL-MCNC: 3.9 G/DL (ref 3.5–5.2)
ALBUMIN/GLOB SERPL: 1.6 G/DL
ALP SERPL-CCNC: 125 U/L (ref 39–117)
ALPHA-FETOPROTEIN: 2 NG/ML (ref 0–8.3)
ALT SERPL W P-5'-P-CCNC: 34 U/L (ref 1–33)
AMMONIA BLD-SCNC: 177 UMOL/L (ref 11–51)
ANION GAP SERPL CALCULATED.3IONS-SCNC: 10 MMOL/L (ref 5–15)
AST SERPL-CCNC: 43 U/L (ref 1–32)
BILIRUB SERPL-MCNC: 0.9 MG/DL (ref 0–1.2)
BUN SERPL-MCNC: 20 MG/DL (ref 8–23)
BUN/CREAT SERPL: 34.5 (ref 7–25)
CALCIUM SPEC-SCNC: 9.5 MG/DL (ref 8.6–10.5)
CHLORIDE SERPL-SCNC: 106 MMOL/L (ref 98–107)
CO2 SERPL-SCNC: 25 MMOL/L (ref 22–29)
CREAT SERPL-MCNC: 0.58 MG/DL (ref 0.57–1)
DEPRECATED RDW RBC AUTO: 41.4 FL (ref 37–54)
EGFRCR SERPLBLD CKD-EPI 2021: 102.5 ML/MIN/1.73
ERYTHROCYTE [DISTWIDTH] IN BLOOD BY AUTOMATED COUNT: 13 % (ref 12.3–15.4)
GLOBULIN UR ELPH-MCNC: 2.4 GM/DL
GLUCOSE SERPL-MCNC: 101 MG/DL (ref 65–99)
HCT VFR BLD AUTO: 37.9 % (ref 34–46.6)
HGB BLD-MCNC: 13.1 G/DL (ref 12–15.9)
INR PPP: 1.04 (ref 0.91–1.09)
MCH RBC QN AUTO: 30 PG (ref 26.6–33)
MCHC RBC AUTO-ENTMCNC: 34.6 G/DL (ref 31.5–35.7)
MCV RBC AUTO: 86.9 FL (ref 79–97)
PLATELET # BLD AUTO: 101 10*3/MM3 (ref 140–450)
PMV BLD AUTO: 11.1 FL (ref 6–12)
POTASSIUM SERPL-SCNC: 4 MMOL/L (ref 3.5–5.2)
PROT SERPL-MCNC: 6.3 G/DL (ref 6–8.5)
PROTHROMBIN TIME: 14.1 SECONDS (ref 11.8–14.8)
RBC # BLD AUTO: 4.36 10*6/MM3 (ref 3.77–5.28)
SODIUM SERPL-SCNC: 141 MMOL/L (ref 136–145)
WBC NRBC COR # BLD AUTO: 3.92 10*3/MM3 (ref 3.4–10.8)

## 2024-12-12 PROCEDURE — 85610 PROTHROMBIN TIME: CPT | Performed by: NURSE PRACTITIONER

## 2024-12-12 PROCEDURE — 1159F MED LIST DOCD IN RCRD: CPT | Performed by: NURSE PRACTITIONER

## 2024-12-12 PROCEDURE — 36415 COLL VENOUS BLD VENIPUNCTURE: CPT | Performed by: NURSE PRACTITIONER

## 2024-12-12 PROCEDURE — 82306 VITAMIN D 25 HYDROXY: CPT | Performed by: NURSE PRACTITIONER

## 2024-12-12 PROCEDURE — 82140 ASSAY OF AMMONIA: CPT | Performed by: NURSE PRACTITIONER

## 2024-12-12 PROCEDURE — 82105 ALPHA-FETOPROTEIN SERUM: CPT

## 2024-12-12 PROCEDURE — 99214 OFFICE O/P EST MOD 30 MIN: CPT | Performed by: NURSE PRACTITIONER

## 2024-12-12 PROCEDURE — 1160F RVW MEDS BY RX/DR IN RCRD: CPT | Performed by: NURSE PRACTITIONER

## 2024-12-12 PROCEDURE — 80053 COMPREHEN METABOLIC PANEL: CPT | Performed by: NURSE PRACTITIONER

## 2024-12-12 PROCEDURE — 85027 COMPLETE CBC AUTOMATED: CPT | Performed by: NURSE PRACTITIONER

## 2024-12-12 NOTE — ASSESSMENT & PLAN NOTE
Pt will some intermittent foggy brain. Has not taken Lactulose daily. Having 2 BM's per day.  Will recheck NH3 today

## 2024-12-12 NOTE — PROGRESS NOTES
Primary Physician: Baljinder Alatorre MD    Chief Complaint   Patient presents with    GI Problem     6 mo follow up visit       Subjective     Geneva Knight is a 63 y.o. female.    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  Other serology showing no other obvious source of liver disease.     4/12/2024:DON negative, AMA negative, ASMA negative, hepatitis panel unremarkable, ceruloplasmin normal.  10/18/2023 Normal iron profile and ferritin   3/16/2022 Liver US with Elastography: hepatic steatosis, hepatosplenomegaly. F4 Metavir Stage   No alcohol use.     Most recent imaging  4/19/2024 CT scan of the abdomen revealing cirrhotic appearing liver, portal hypertension, splenomegaly, gastric esophageal varices.  No ascites.  No liver lesions.  3 cystic lesions in the pancreatic head largest measuring 14 mm favoring sidebranch IPMN's.  Severe stenosis of celiac artery.     4/12/2024 MELD=8  4/12/2024 Fib-4 Score= 4.34     04/12/2024: ALKP 120, ALT 31, AST 46, Bili 1.2, platelets 115K, albumin 4.1  10/18/2023: ALKP 169, ALT 43, AST 52, Bili 10.  04/12/2023: ALKP 119, ALT 36, AST 50, Bili 1.3  07/15/2022: ALKP 105, ALT 8, AST 46, Bili  0.6  05/12/2022: ALKP  125, ALT 30, AST 49, Bili 0.9  02/25/2022: ALKP  158, ALT 31, AST 58, Bili 0.6  10/22/2021: ALKP 135, ALT 29, AST 51, Bili 0.8  07/12/2021: ALKP 140, alt 24, AST 39, Bili 0.5  02/23/2021: ALKP 155, ALT 34, AST 52, Bili 0.8  10/27/2020: ALKP 102, ALT 39, AST 56, Bili 1.5  06/21/2019: ALKP 91, ALT 41, AST 55, Bili 1.0  08/14/2018: ALKP 89, ALT 66, AST 99, Bili 1.3     4/25/2024 endoscopy revealing grade 1 esophageal varices.  Patient on Coreg twice daily.    4/29/2024 CT Abdomen: cirrhotic appearing, refer to vascular for celiac artery stenosis    6/2/2024: NH3 slightly up and lactulose and xifaxan started    12/12/2024 pt reports she is having at least 2 BM's per day. She does not take Lactulose regularly but will use it for a few days and then miss a  day.    No lower extremity edema and watching her Na intake    She has had her yearly flu vaccine and Covid vaccine    Thrombocytopenia  11/4/2024 platelets 104K  4/12/2024:  platelets 115K  10/18/2023: platelets 117K  4/12/2023: platelets 98K  10/12/2022: platelets 136K     Personal Hx Adenomatous Colon Polyps  3/30/2023 last colonoscopy with a 4mm hyperplastic polyp in rectum removed. Large lipoma in ascending colon biopsied.     Pancreatic Sidebranch IPMN  4/19/2024 CT Scan of Abdomen: 3 cystic lesions in the pancreatic head largest measuring 14 mm favoring sidebranch IPMN's.    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  7/23/2024 seen in consultation by vascular here at [facility].  It is determined to begin a conservative approach with her and no surgery was planned.    Past Medical History:   Diagnosis Date    Ankle fracture     Right    Anxiety     Cellulitis     Right ankle    Cirrhosis     Family history of colon cancer     GERD (gastroesophageal reflux disease)     History of adenomatous polyp of colon     History of colon polyps     HTN (hypertension)     Hyperlipemia     VIOLETA (obstructive sleep apnea)     cpap    Type 2 diabetes mellitus     Vitamin D deficiency        Past Surgical History:   Procedure Laterality Date    BREAST BIOPSY Right 08/30/2021    Procedure: EXCISIONAL RIGHT BREAST BIOPSY WITH NEEDLE LOCALIZATION - ULTRASOUND GUIDED;  Surgeon: Martine Roy MD;  Location: [facility] OR;  Service: General;  Laterality: Right;    CHOLECYSTECTOMY  2004    COLONOSCOPY N/A 12/18/2017    Three 4-7mm polyps in the rectum-path shows one tubular adenomatous polyp and two hyperplastic polyps; Congested mucosa in the rectum, in the recto-sigmoid colon and in the sigmoid colon-biopsied; Repeat 5 years    COLONOSCOPY  07/25/2013    The entire examined colon is normal on direct and retroflexion views; Repeat 7-10 years    COLONOSCOPY N/A 03/30/2023     One 4mm hyperplastic polyp in the rectum; Large lipoma in the ascending colon-biopsied; Repeat 5 years    ENDOSCOPY N/A 04/25/2024    Grade I esophageal varices; Normal stomach; Normal examined duodenum; No specimens collected; No need to repeat endoscopy as she is on metoprolol    EYE SURGERY Bilateral     To remove loose skin    FOOT SURGERY Right 04/21/2021    ORIF ANKLE FRACTURE Right         Current Outpatient Medications:     Ascorbic Acid (Vitamin C) 500 MG capsule, Take 1 capsule by mouth Daily., Disp: , Rfl:     aspirin 81 MG EC tablet, Take 1 tablet by mouth Daily., Disp: , Rfl:     B Complex Vitamins (VITAMIN B COMPLEX PO), Take 1 capsule by mouth Daily., Disp: , Rfl:     Coreg 3.125 MG tablet, Take 1 tablet twice a day by oral route for 90 days., Disp: , Rfl:     donepezil (ARICEPT) 10 MG tablet, Take 1 tablet by mouth Every Night., Disp: , Rfl:     escitalopram (LEXAPRO) 10 MG tablet, Take 1 tablet by mouth Daily., Disp: , Rfl:     lactulose (CHRONULAC) 10 GM/15ML solution, Take 30 mL by mouth 2 (Two) Times a Day., Disp: 946 mL, Rfl: 6    levocetirizine (XYZAL) 5 MG tablet, Take 1 tablet by mouth As Needed., Disp: , Rfl:     metFORMIN (GLUCOPHAGE) 1000 MG tablet, Take 1 tablet by mouth 2 (Two) Times a Day., Disp: , Rfl:     multivitamin with minerals tablet tablet, Take 1 tablet by mouth Daily., Disp: , Rfl:     Omega-3 Fatty Acids (fish oil) 1000 MG capsule capsule, Take 1 capsule by mouth Daily With Breakfast., Disp: , Rfl:     riFAXIMin (Xifaxan) 550 MG tablet, Take 1 tablet by mouth 2 (Two) Times a Day., Disp: 60 tablet, Rfl: 6    rosuvastatin (CRESTOR) 5 MG tablet, Take 1 tablet by mouth Daily., Disp: , Rfl:     Tirzepatide (MOUNJARO) 2.5 MG/0.5ML solution pen-injector pen, Inject 0.5 mL under the skin into the appropriate area as directed 1 (One) Time Per Week., Disp: , Rfl:     triamterene-hydrochlorothiazide (DYAZIDE) 37.5-25 MG per capsule, Take 1 capsule by mouth Daily., Disp: , Rfl:      "Vitamin D, Cholecalciferol, 25 MCG (1000 UT) capsule, Take 1 capsule by mouth Daily., Disp: , Rfl:     Allergies   Allergen Reactions    Azithromycin Rash    Namenda [Memantine] Nausea Only and Headache       Social History     Socioeconomic History    Marital status:    Tobacco Use    Smoking status: Never    Smokeless tobacco: Never   Vaping Use    Vaping status: Never Used   Substance and Sexual Activity    Alcohol use: Not Currently    Drug use: Never    Sexual activity: Not Currently     Partners: Male     Birth control/protection: Other     Comment: menopausal       Family History   Problem Relation Age of Onset    Breast cancer Mother     Colon cancer Maternal Grandmother         In her 70's    Breast cancer Maternal Grandmother     Colon cancer Maternal Aunt         In her 50's    Colon polyps Neg Hx     Esophageal cancer Neg Hx     Liver cancer Neg Hx     Stomach cancer Neg Hx     Liver disease Neg Hx     Rectal cancer Neg Hx        Review of Systems   Constitutional:  Negative for unexpected weight change.   Cardiovascular:  Negative for chest pain and leg swelling.   Gastrointestinal:  Negative for constipation and diarrhea.   Psychiatric/Behavioral:  Positive for confusion.        Objective     /80   Pulse 85   Temp 98 °F (36.7 °C)   Ht 167.6 cm (66\")   Wt 98.4 kg (217 lb)   SpO2 97%   Breastfeeding No   BMI 35.02 kg/m²     Physical Exam  Vitals reviewed.   Constitutional:       Appearance: Normal appearance.   Musculoskeletal:      Right lower leg: No edema.      Left lower leg: No edema.   Neurological:      Mental Status: She is alert.         Lab Results - Last 18 Months   Lab Units 12/12/24  1001 11/04/24  0845 07/18/24  1022 04/12/24  0915   GLUCOSE mg/dL 101*  --  97 131*   BUN mg/dL 20  --  14 16   CREATININE mg/dL 0.58  --  0.6 0.54*   SODIUM mmol/L 141  --  143 143   POTASSIUM mmol/L 4.0  --  4.2 3.8   CHLORIDE mmol/L 106  --  105 105   TOTAL CO2 mmol/L  --   --  26  --  "   CO2 mmol/L 25.0  --   --  30.0*   TOTAL PROTEIN g/dL 6.3  --  6.8 7.3   ALBUMIN g/dL 3.9  --  4.0 4.1   ALT (SGPT) U/L 34*  --  38 31   AST (SGOT) U/L 43*  --  56* 46*   ALK PHOS U/L 125*  --  179* 120*   BILIRUBIN mg/dL 0.9  --  1.1 1.2   GLOBULIN gm/dL 2.4  --  2.8 3.2   CRP mg/dL  --  <0.30  --   --        Lab Results - Last 18 Months   Lab Units 12/12/24  1001 11/04/24  0855 07/18/24  1018 04/12/24  0915 04/12/24  0915 10/18/23  1128   HEMOGLOBIN g/dL 13.1 13.4 13.1  --  13.3 13.0   HEMATOCRIT % 37.9 39.0 38.5  --  40.2 37.3   MCV fL 86.9 87.4 88.3  --  89.7 89.0   WBC 10*3/mm3 3.92 3.09* 3.51*  --  3.64 4.32   RDW % 13.0 13.1 12.6  --  13.3 12.7   MPV fL 11.1 10.7* 10.0  --  11.0 11.0*   PLATELETS 10*3/mm3 101* 102* 96*  --  115* 117*   INR  1.04  --   --    < > 1.03  --     < > = values in this interval not displayed.       Lab Results - Last 18 Months   Lab Units 10/18/23  1132   IRON ug/dL 128   TIBC ug/dL 378   IRON SATURATION % 34   FERRITIN ng/mL 89.2        Lab Results - Last 18 Months   Lab Units 04/12/24  0915 10/18/23  1132   HEP B C IGM  Non-Reactive  --    HEP B S AG  Non-Reactive  --    FERRITIN ng/mL  --  89.2   CERULOPLASMIN mg/dL 19  --    MITOCHONDRIAL AB Units <20.0  --            IMPRESSION/PLAN:    Assessment & Plan      Problem List Items Addressed This Visit       Adenomatous colon polyp    Overview     One adenomatous polyp removed 12/2017.  Colonoscopy 3/2023 unremarkable.  Repeat 3/2028.         Thrombocytopenia    Overview     11/4/2024 platelets 104K  4/12/2024:  platelets 115K  10/18/2023: platelets 117K  4/12/2023: platelets 98K  10/12/2022: platelets 136K         Hepatosplenomegaly    Overview     3/16/2022 US Liver: Hepatosplenomegaly, Hepatic Steatosis         Other cirrhosis of liver - Primary    Overview     Cirrhosis to be suspected secondary to nonalcoholic fatty liver disease.  No previous ascites.  Had esophageal varices seen 4/2024.  Patient already on metoprolol.   Suggest PCP change to Coreg if at all possible.  4/12/2024 MELD score= 8  No other obvious source of liver disease with unremarkable serology.  6/2/2024 NH3 slightly elevated- xifaxan and lactulose started  4/12/2024 MELD Score- 8, Fib-4 Score 4.34  4/29/2024 CT abdomen: cirrhotic appearing, portal htn, splenomegaly, gastric/esophageal varices           Relevant Orders    CT Abdomen Pelvis With Contrast    CBC (No Diff) (Completed)    Comprehensive Metabolic Panel (Completed)    Protime-INR (Completed)    AFP Tumor Marker    Vitamin D 25 Hydroxy    Ammonia (Completed)    IPMN (intraductal papillary mucinous neoplasm)    Overview     4/19/2024 CT: 3 cystic lesions in the pancreatic head are present, the largest of which measures 14 mm. 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  Recommend a follow-up CT or MRI in 1 year based on ACR white paper  criteria.         Relevant Orders    CT Abdomen Pelvis With Contrast    Secondary esophageal varices without bleeding    Overview     4/25/2024 endoscopy revealing grade 1 esophageal varices.         Current Assessment & Plan     Coreg 3.125mg BID         Hepatic encephalopathy    Overview     6/2/2024: NH3 slightly up and lactulose and xifaxan started         Current Assessment & Plan     Pt will some intermittent foggy brain. Has not taken Lactulose daily. Having 2 BM's per day.  Will recheck NH3 today          Patient needs repeat CT for HCC surveillance  Routine labs today including AFP, vit D, cbc cmp pt/inr, NH3  6-month follow-up                Kavita De, APRN  12/12/24  13:13 CST    Part of this note may be an electronic transcription/translation of spoken language to printed text.

## 2024-12-13 ENCOUNTER — TELEPHONE (OUTPATIENT)
Dept: GASTROENTEROLOGY | Facility: CLINIC | Age: 64
End: 2024-12-13
Payer: MEDICARE

## 2024-12-13 NOTE — TELEPHONE ENCOUNTER
Pt called. She is told her LFt's look stable  but her NH3 is elevated. I have encouraged her to start increasing her Lactulose as she has Not been consistent about using it.  I need to her to take at least 3 doses a day. I would like a minimum of 3 BM's per day up to 5 BM's per day. She is instructed to let her family members know of increasing NH3 so they can keep a close eye on her level of confusion.  Kavita NORRIS

## 2024-12-18 ENCOUNTER — TELEPHONE (OUTPATIENT)
Dept: GASTROENTEROLOGY | Facility: CLINIC | Age: 64
End: 2024-12-18
Payer: MEDICARE

## 2024-12-18 NOTE — TELEPHONE ENCOUNTER
Called pt again re: CT and spoke to her-she tells me she was able to get the CT scheduled for 12/24/2024. Pt aware that Radha is out of office next week so it will be the following week before we touch base with results-she VU. Pt advised to call me back with any further questions/problems.

## 2024-12-18 NOTE — TELEPHONE ENCOUNTER
Caller: Geneva Knight    Relationship to patient: Self    Best call back number: [phone]      Patient is needing: PT WAS NOT AWARE OF CT ORDER PUT IN TO BE DONE. WANTS TO KNOW IF ANOTHER CT SCAN ORDER CAN BE PUT IN AND SENT THE ADDRESS TO WHERE TO GO. PLEASE CALL PT TO DISCUSS.

## 2024-12-18 NOTE — TELEPHONE ENCOUNTER
Pt saw Radha in the office last week and she brought me her financial documents to re-do her Xifaxan patient assistance. I had her sign all of the forms and then I filled our part out. I also had Radha sign them and I faxed in to [facility] yesterday. I will update pt once I hear back from [facility].

## 2024-12-18 NOTE — TELEPHONE ENCOUNTER
"Tried to call pt to discuss-was unable to reach her-rec'd message stating \"the wireless customer you are calling is unavailable-try your call again later.\"   " Biopsy Method: Dermablade

## 2024-12-24 ENCOUNTER — HOSPITAL ENCOUNTER (OUTPATIENT)
Dept: CT IMAGING | Facility: HOSPITAL | Age: 64
Discharge: HOME | End: 2024-12-24
Admitting: NURSE PRACTITIONER
Payer: MEDICARE

## 2024-12-24 DIAGNOSIS — K74.69 OTHER CIRRHOSIS OF LIVER: ICD-10-CM

## 2024-12-24 DIAGNOSIS — D49.0 IPMN (INTRADUCTAL PAPILLARY MUCINOUS NEOPLASM): ICD-10-CM

## 2024-12-24 LAB — CREAT BLDA-MCNC: 0.8 MG/DL (ref 0.6–1.3)

## 2024-12-24 PROCEDURE — 82565 ASSAY OF CREATININE: CPT

## 2024-12-24 PROCEDURE — 74177 CT ABD & PELVIS W/CONTRAST: CPT

## 2024-12-24 PROCEDURE — 25510000001 IOPAMIDOL PER 1 ML: Performed by: NURSE PRACTITIONER

## 2024-12-24 RX ORDER — IOPAMIDOL 755 MG/ML
100 INJECTION, SOLUTION INTRAVASCULAR
Status: COMPLETED | OUTPATIENT
Start: 2024-12-24 | End: 2024-12-24

## 2024-12-24 RX ADMIN — IOPAMIDOL 100 ML: 755 INJECTION, SOLUTION INTRAVENOUS at 08:30

## 2025-01-03 DIAGNOSIS — K76.82 HEPATIC ENCEPHALOPATHY: Primary | ICD-10-CM

## 2025-01-03 RX ORDER — LACTULOSE 10 G/15ML
20 SOLUTION ORAL 3 TIMES DAILY
Qty: 946 ML | Refills: 6 | Status: SHIPPED | OUTPATIENT
Start: 2025-01-03

## 2025-01-09 ENCOUNTER — OFFICE VISIT (OUTPATIENT)
Dept: SURGERY | Facility: CLINIC | Age: 65
End: 2025-01-09
Payer: MEDICARE

## 2025-01-09 VITALS
WEIGHT: 217 LBS | SYSTOLIC BLOOD PRESSURE: 128 MMHG | BODY MASS INDEX: 34.87 KG/M2 | HEIGHT: 66 IN | DIASTOLIC BLOOD PRESSURE: 88 MMHG

## 2025-01-09 DIAGNOSIS — Z12.31 SCREENING MAMMOGRAM FOR BREAST CANCER: Primary | ICD-10-CM

## 2025-01-09 NOTE — PROGRESS NOTES
Office Established Patient Note:     Referring Provider: No ref. provider found    Chief Complaint   Patient presents with    Follow-up       Subjective .     History of present illness:  Geneva Knight is a 64 y.o. female who presents to the clinic for 6 month breast follow up. She has a history of an excisional right breast biopsy by Dr. Roy in 2021. Her TC score is 23. Today she has no concerns with her breasts. She denies palpable masses, nipple discharge, or skin changes of either breast. The previous breast pain she had has since resolved.    BMI is 35.02. She is a non smoker. She does not take any blood thinners.     Review of Systems    Review of Systems - General ROS: negative  ENT ROS: negative  Respiratory ROS: no cough, shortness of breath, or wheezing  Cardiovascular ROS: no chest pain or dyspnea on exertion  Gastrointestinal ROS: no abdominal pain, change in bowel habits, or black or bloody stools  Genito-Urinary ROS: no dysuria, trouble voiding, or hematuria  Dermatological ROS: negative   Breast ROS: negative for breast lumps  Hematological and Lymphatic ROS: negative  Musculoskeletal ROS: negative   Neurological ROS: no TIA or stroke symptoms    Psychological ROS: negative  Endocrine ROS: negative    History  Past Medical History:   Diagnosis Date    Ankle fracture     Right    Anxiety     Cellulitis     Right ankle    Cirrhosis     Family history of colon cancer     GERD (gastroesophageal reflux disease)     History of adenomatous polyp of colon     History of colon polyps     HTN (hypertension)     Hyperlipemia     VIOLETA (obstructive sleep apnea)     cpap    Type 2 diabetes mellitus     Vitamin D deficiency    ,   Past Surgical History:   Procedure Laterality Date    BREAST BIOPSY Right 08/30/2021    Procedure: EXCISIONAL RIGHT BREAST BIOPSY WITH NEEDLE LOCALIZATION - ULTRASOUND GUIDED;  Surgeon: Martine Roy MD;  Location: [facility];  Service: General;  Laterality: Right;     CHOLECYSTECTOMY  2004    COLONOSCOPY N/A 12/18/2017    Three 4-7mm polyps in the rectum-path shows one tubular adenomatous polyp and two hyperplastic polyps; Congested mucosa in the rectum, in the recto-sigmoid colon and in the sigmoid colon-biopsied; Repeat 5 years    COLONOSCOPY  07/25/2013    The entire examined colon is normal on direct and retroflexion views; Repeat 7-10 years    COLONOSCOPY N/A 03/30/2023    One 4mm hyperplastic polyp in the rectum; Large lipoma in the ascending colon-biopsied; Repeat 5 years    ENDOSCOPY N/A 04/25/2024    Grade I esophageal varices; Normal stomach; Normal examined duodenum; No specimens collected; No need to repeat endoscopy as she is on metoprolol    EYE SURGERY Bilateral     To remove loose skin    FOOT SURGERY Right 04/21/2021    ORIF ANKLE FRACTURE Right    ,   Family History   Problem Relation Age of Onset    Breast cancer Mother     Colon cancer Maternal Grandmother         In her 70's    Breast cancer Maternal Grandmother     Colon cancer Maternal Aunt         In her 50's    Colon polyps Neg Hx     Esophageal cancer Neg Hx     Liver cancer Neg Hx     Stomach cancer Neg Hx     Liver disease Neg Hx     Rectal cancer Neg Hx    ,   Social History     Tobacco Use    Smoking status: Never    Smokeless tobacco: Never   Vaping Use    Vaping status: Never Used   Substance Use Topics    Alcohol use: Not Currently    Drug use: Never   , (Not in a hospital admission)   and Allergies:  Azithromycin and Namenda [memantine]    Current Outpatient Medications:     Ascorbic Acid (Vitamin C) 500 MG capsule, Take 1 capsule by mouth Daily., Disp: , Rfl:     aspirin 81 MG EC tablet, Take 1 tablet by mouth Daily., Disp: , Rfl:     B Complex Vitamins (VITAMIN B COMPLEX PO), Take 1 capsule by mouth Daily., Disp: , Rfl:     Coreg 3.125 MG tablet, Take 1 tablet twice a day by oral route for 90 days., Disp: , Rfl:     donepezil (ARICEPT) 10 MG tablet, Take 1 tablet by mouth Every Night., Disp: ,  "Rfl:     escitalopram (LEXAPRO) 10 MG tablet, Take 1 tablet by mouth Daily., Disp: , Rfl:     lactulose (CHRONULAC) 10 GM/15ML solution, Take 30 mL by mouth 3 (Three) Times a Day., Disp: 946 mL, Rfl: 6    levocetirizine (XYZAL) 5 MG tablet, Take 1 tablet by mouth As Needed., Disp: , Rfl:     metFORMIN (GLUCOPHAGE) 1000 MG tablet, Take 1 tablet by mouth 2 (Two) Times a Day., Disp: , Rfl:     multivitamin with minerals tablet tablet, Take 1 tablet by mouth Daily., Disp: , Rfl:     Omega-3 Fatty Acids (fish oil) 1000 MG capsule capsule, Take 1 capsule by mouth Daily With Breakfast., Disp: , Rfl:     riFAXIMin (Xifaxan) 550 MG tablet, Take 1 tablet by mouth 2 (Two) Times a Day., Disp: 60 tablet, Rfl: 6    rosuvastatin (CRESTOR) 5 MG tablet, Take 1 tablet by mouth Daily., Disp: , Rfl:     Tirzepatide (MOUNJARO) 2.5 MG/0.5ML solution pen-injector pen, Inject 0.5 mL under the skin into the appropriate area as directed 1 (One) Time Per Week., Disp: , Rfl:     triamterene-hydrochlorothiazide (DYAZIDE) 37.5-25 MG per capsule, Take 1 capsule by mouth Daily., Disp: , Rfl:     Vitamin D, Cholecalciferol, 25 MCG (1000 UT) capsule, Take 1 capsule by mouth Daily., Disp: , Rfl:     Objective     Vital Signs   /88   Ht 167.6 cm (66\")   Wt 98.4 kg (217 lb)   BMI 35.02 kg/m²      Physical Exam:  General appearance - alert, well appearing, and in no distress  Mental status - normal mood, behavior, speech, dress, motor activity, and thought processes  Eyes - sclera anicteric  Neck - supple, no significant adenopathy  Chest - no tachypnea, retractions or cyanosis  Heart - normal rate and regular rhythm  Breasts - breasts appear normal, no suspicious masses, no skin or nipple changes or axillary nodes  Neurological - alert, oriented, normal speech, no focal findings or movement disorder noted  Extremities - no pedal edema noted  Skin - normal coloration and turgor, no rashes, no suspicious skin lesions noted    Results " Review:  Result Review :            Assessment & Plan       Diagnoses and all orders for this visit:    1. Screening mammogram for breast cancer (Primary)  -     Mammo Screening Digital Tomosynthesis Bilateral With CAD; Future         Geneva Knight is a 64 y.o. female who presents to the clinic for breast follow up. Her breast pain has resolved. At this time, the patient is due for screening mammogram in 6 months. I have placed the order for this. She will follow up in office in July 2025 for discussion of results and clinical breast exam. She is to call for an appointment sooner if she has any new problems or concerns. She voices understanding and is agreeable to the plan.     Follow up:     Class 2 Severe Obesity (BMI >=35 and <=39.9).     Return in about 6 months (around 7/9/2025) for 6 month breast f/u .        Lyric Lerma PA-C  01/09/25  11:55 CST

## 2025-01-30 ENCOUNTER — TELEPHONE (OUTPATIENT)
Dept: HEMATOLOGY | Age: 65
End: 2025-01-30

## 2025-01-30 NOTE — TELEPHONE ENCOUNTER

## 2025-02-03 DIAGNOSIS — D47.2 MGUS (MONOCLONAL GAMMOPATHY OF UNKNOWN SIGNIFICANCE): Primary | ICD-10-CM

## 2025-02-03 DIAGNOSIS — D47.2 GAMMOPATHY: ICD-10-CM

## 2025-02-03 NOTE — PROGRESS NOTES
Progress Note      Pt Name: Judi Yi  YOB: 1960  MRN: [id number]    Date of evaluation: 2/4/2025  History Obtained From:  patient, electronic medical record    CHIEF COMPLAINT:    Chief Complaint   Patient presents with    Follow-up     MGUS (monoclonal gammopathy of unknown significance)     Current active problems  Thrombocytopenia  Splenomegaly  Hepatic steatosis  IgA kappa MGUS    HISTORY OF PRESENT ILLNESS:    Judi Yi is a 64 y.o.  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     She is followed by Dr. Elaine.  She has hepatic encephalopathy on lactulose.  She says she is to have a follow-up endoscopy later this year.  She has followed by SWAPNA Marquez as well.  She denies any known hepatic issues.    She is diabetic, does not know her A1c, o recently had Mounjaro added to her regimen.  She is actually been able to lose 30 pounds with this addition.  Blood pressure stable on Maxide 37.5-25 nightly. She continues taking Aricept 10 mg nightly due to memory issues, followed by [facility] neurology.  Mood is stable on her escitalopram 10 mg nightly.      HEMATOLOGY HISTORY: Thrombocytopenia, splenomegaly, hepatic steatosis, gammopathy  Judi was seen in initial hematology consultation on 7/28/2020 referred by SWAPNA Blount for evaluation of thrombocytopenia and splenomegaly.             Serology 6/30/2020  B12 - 852  CMP -alk phos 135, AST 52, ALT 38     Review prior imaging studies  CT abdomen pelvis with contrast at [facility] 8/14/2018 compared to 11/21/2017 revealed hepato-steatosis but spleen read as-no splenomegaly     Current imaging study  Ultrasound liver 7/24/2020 at [facility] was compared to CT of the abdomen 8/14/2018 which revealed moderate diffuse fatty infiltration of the liver without focal lesion.  Doppler sonography suggested a normal

## 2025-02-04 ENCOUNTER — OFFICE VISIT (OUTPATIENT)
Dept: HEMATOLOGY | Age: 65
End: 2025-02-04
Payer: MEDICARE

## 2025-02-04 ENCOUNTER — TRANSCRIBE ORDERS (OUTPATIENT)
Dept: ADMINISTRATIVE | Facility: HOSPITAL | Age: 65
End: 2025-02-04
Payer: MEDICARE

## 2025-02-04 ENCOUNTER — HOSPITAL ENCOUNTER (OUTPATIENT)
Dept: INFUSION THERAPY | Age: 65
Discharge: HOME | End: 2025-02-04
Payer: MEDICARE

## 2025-02-04 VITALS
HEIGHT: 66 IN | BODY MASS INDEX: 34.01 KG/M2 | SYSTOLIC BLOOD PRESSURE: 120 MMHG | OXYGEN SATURATION: 95 % | HEART RATE: 87 BPM | TEMPERATURE: 98.1 F | WEIGHT: 211.6 LBS | DIASTOLIC BLOOD PRESSURE: 68 MMHG

## 2025-02-04 DIAGNOSIS — D49.0 INTRADUCTAL PAPILLARY MUCINOUS NEOPLASM: Primary | ICD-10-CM

## 2025-02-04 DIAGNOSIS — R74.8 ELEVATED ALKALINE PHOSPHATASE LEVEL: ICD-10-CM

## 2025-02-04 DIAGNOSIS — M94.0 COSTOCHONDRITIS: ICD-10-CM

## 2025-02-04 DIAGNOSIS — R53.83 OTHER FATIGUE: ICD-10-CM

## 2025-02-04 DIAGNOSIS — R07.89 STERNAL PAIN: Primary | ICD-10-CM

## 2025-02-04 DIAGNOSIS — I77.4 CELIAC ARTERY STENOSIS (HCC): ICD-10-CM

## 2025-02-04 DIAGNOSIS — D49.0 IPMN (INTRADUCTAL PAPILLARY MUCINOUS NEOPLASM): ICD-10-CM

## 2025-02-04 DIAGNOSIS — D47.2 GAMMOPATHY: ICD-10-CM

## 2025-02-04 DIAGNOSIS — E61.1 IRON DEFICIENCY: ICD-10-CM

## 2025-02-04 DIAGNOSIS — D47.2 MGUS (MONOCLONAL GAMMOPATHY OF UNKNOWN SIGNIFICANCE): ICD-10-CM

## 2025-02-04 LAB
ALBUMIN SERPL-MCNC: 4 G/DL (ref 3.5–5.2)
ALP SERPL-CCNC: 124 U/L (ref 35–104)
ALT SERPL-CCNC: 42 U/L (ref 5–33)
ANION GAP SERPL CALCULATED.3IONS-SCNC: 14 MMOL/L (ref 7–19)
AST SERPL-CCNC: 58 U/L (ref 5–32)
BASOPHILS # BLD: 0.02 K/UL (ref 0–0.2)
BASOPHILS NFR BLD: 0.5 % (ref 0–1)
BILIRUB SERPL-MCNC: 1 MG/DL (ref 0–1.2)
BUN SERPL-MCNC: 19 MG/DL (ref 8–23)
CALCIUM SERPL-MCNC: 10.2 MG/DL (ref 8.8–10.2)
CHLORIDE SERPL-SCNC: 104 MMOL/L (ref 98–107)
CO2 SERPL-SCNC: 23 MMOL/L (ref 22–29)
CREAT SERPL-MCNC: 0.7 MG/DL (ref 0.5–0.9)
EOSINOPHIL # BLD: 0.19 K/UL (ref 0–0.6)
EOSINOPHIL NFR BLD: 4.9 % (ref 0–5)
ERYTHROCYTE [DISTWIDTH] IN BLOOD BY AUTOMATED COUNT: 13.2 % (ref 11.5–14.5)
GLUCOSE SERPL-MCNC: 120 MG/DL (ref 70–99)
HCT VFR BLD AUTO: 38.3 % (ref 37–47)
HGB BLD-MCNC: 13.3 G/DL (ref 12–16)
LYMPHOCYTES # BLD: 1.43 K/UL (ref 1.1–4.5)
LYMPHOCYTES NFR BLD: 36.7 % (ref 20–40)
MCH RBC QN AUTO: 30.6 PG (ref 27–31)
MCHC RBC AUTO-ENTMCNC: 34.7 G/DL (ref 33–37)
MCV RBC AUTO: 88 FL (ref 81–99)
MONOCYTES # BLD: 0.33 K/UL (ref 0–0.9)
MONOCYTES NFR BLD: 8.5 % (ref 1–10)
NEUTROPHILS # BLD: 1.93 K/UL (ref 1.5–7.5)
NEUTS SEG NFR BLD: 49.4 % (ref 50–65)
PLATELET # BLD AUTO: 108 K/UL (ref 130–400)
PMV BLD AUTO: 10.8 FL (ref 9.4–12.3)
POTASSIUM SERPL-SCNC: 3.8 MMOL/L (ref 3.5–5.1)
PROT SERPL-MCNC: 6.6 G/DL (ref 6.4–8.3)
RBC # BLD AUTO: 4.35 M/UL (ref 4.2–5.4)
SODIUM SERPL-SCNC: 141 MMOL/L (ref 136–145)
WBC # BLD AUTO: 3.9 K/UL (ref 4.8–10.8)

## 2025-02-04 PROCEDURE — 82784 ASSAY IGA/IGD/IGG/IGM EACH: CPT

## 2025-02-04 PROCEDURE — 84155 ASSAY OF PROTEIN SERUM: CPT

## 2025-02-04 PROCEDURE — 83521 IG LIGHT CHAINS FREE EACH: CPT

## 2025-02-04 PROCEDURE — 83883 ASSAY NEPHELOMETRY NOT SPEC: CPT

## 2025-02-04 PROCEDURE — 80053 COMPREHEN METABOLIC PANEL: CPT

## 2025-02-04 PROCEDURE — 99213 OFFICE O/P EST LOW 20 MIN: CPT

## 2025-02-04 PROCEDURE — 36415 COLL VENOUS BLD VENIPUNCTURE: CPT

## 2025-02-04 PROCEDURE — 86334 IMMUNOFIX E-PHORESIS SERUM: CPT

## 2025-02-04 PROCEDURE — 85025 COMPLETE CBC W/AUTO DIFF WBC: CPT

## 2025-02-04 PROCEDURE — 84165 PROTEIN E-PHORESIS SERUM: CPT

## 2025-02-04 ASSESSMENT — ENCOUNTER SYMPTOMS
SORE THROAT: 0
EYE ITCHING: 0
SHORTNESS OF BREATH: 0
PHOTOPHOBIA: 0
DIARRHEA: 0
BLOOD IN STOOL: 0
WHEEZING: 0
ABDOMINAL DISTENTION: 0
CONSTIPATION: 1
ABDOMINAL PAIN: 1
BACK PAIN: 0
TROUBLE SWALLOWING: 0
EYE DISCHARGE: 0
VOMITING: 0
NAUSEA: 0
VOICE CHANGE: 0
COUGH: 0

## 2025-02-07 LAB
ALBUMIN SERPL-MCNC: 3.82 G/DL (ref 3.75–5.01)
ALPHA1 GLOB SERPL ELPH-MCNC: 0.19 G/DL (ref 0.19–0.46)
ALPHA2 GLOB SERPL ELPH-MCNC: 0.7 G/DL (ref 0.48–1.05)
B-GLOBULIN SERPL ELPH-MCNC: 0.86 G/DL (ref 0.48–1.1)
DEPRECATED KAPPA LC FREE/LAMBDA SER: 1.77 {RATIO} (ref 0.26–1.65)
EER MONOCLONAL PROTEIN AND FLC, SERUM: ABNORMAL
GAMMA GLOB SERPL ELPH-MCNC: 0.83 G/DL (ref 0.62–1.51)
IGA SERPL-MCNC: 274 MG/DL (ref 68–408)
IGG SERPL-MCNC: 846 MG/DL (ref 768–1632)
IGM SERPL-MCNC: 46 MG/DL (ref 35–263)
INTERPRETATION SERPL IFE-IMP: ABNORMAL
INTERPRETATION SERPL IFE-IMP: ABNORMAL
KAPPA LC FREE SER-MCNC: 27.99 MG/L (ref 3.3–19.4)
LAMBDA LC FREE SERPL-MCNC: 15.8 MG/L (ref 5.71–26.3)
MONOCLONAL PROTEIN, SERUM: ABNORMAL G/DL
PROT SERPL-MCNC: 6.4 G/DL (ref 6.3–8.2)

## 2025-03-05 DIAGNOSIS — R41.3 MEMORY LOSS: Primary | ICD-10-CM

## 2025-03-05 RX ORDER — DONEPEZIL HYDROCHLORIDE 10 MG/1
10 TABLET, FILM COATED ORAL NIGHTLY
Qty: 90 TABLET | Refills: 3 | Status: SHIPPED | OUTPATIENT
Start: 2025-03-05

## 2025-03-05 NOTE — TELEPHONE ENCOUNTER
Requested Prescriptions     Pending Prescriptions Disp Refills    donepezil (ARICEPT) 10 MG tablet [Pharmacy Med Name: Donepezil HCl Oral Tablet 10 MG] 90 tablet 3     Sig: TAKE 1 TABLET EVERY NIGHT       Last Office Visit: 4/17/2024  Next Office Visit: 4/17/2025  Last Medication Refill: 4/17/24 RF 3

## 2025-03-28 ENCOUNTER — OFFICE VISIT (OUTPATIENT)
Age: 65
End: 2025-03-28
Payer: MEDICARE

## 2025-03-28 VITALS
RESPIRATION RATE: 20 BRPM | DIASTOLIC BLOOD PRESSURE: 64 MMHG | TEMPERATURE: 97.2 F | SYSTOLIC BLOOD PRESSURE: 122 MMHG | OXYGEN SATURATION: 97 % | BODY MASS INDEX: 33.59 KG/M2 | WEIGHT: 209 LBS | HEART RATE: 86 BPM | HEIGHT: 66 IN

## 2025-03-28 DIAGNOSIS — H60.502 ACUTE OTITIS EXTERNA OF LEFT EAR, UNSPECIFIED TYPE: ICD-10-CM

## 2025-03-28 DIAGNOSIS — H66.002 NON-RECURRENT ACUTE SUPPURATIVE OTITIS MEDIA OF LEFT EAR WITHOUT SPONTANEOUS RUPTURE OF TYMPANIC MEMBRANE: Primary | ICD-10-CM

## 2025-03-28 PROCEDURE — 3074F SYST BP LT 130 MM HG: CPT

## 2025-03-28 PROCEDURE — 99213 OFFICE O/P EST LOW 20 MIN: CPT

## 2025-03-28 PROCEDURE — 3078F DIAST BP <80 MM HG: CPT

## 2025-03-28 PROCEDURE — G8427 DOCREV CUR MEDS BY ELIG CLIN: HCPCS

## 2025-03-28 PROCEDURE — 3017F COLORECTAL CA SCREEN DOC REV: CPT

## 2025-03-28 PROCEDURE — G8417 CALC BMI ABV UP PARAM F/U: HCPCS

## 2025-03-28 PROCEDURE — 4130F TOPICAL PREP RX AOE: CPT

## 2025-03-28 PROCEDURE — 1036F TOBACCO NON-USER: CPT

## 2025-03-28 RX ORDER — OFLOXACIN 3 MG/ML
10 SOLUTION AURICULAR (OTIC) 2 TIMES DAILY
Qty: 7 ML | Refills: 0 | Status: SHIPPED | OUTPATIENT
Start: 2025-03-28 | End: 2025-04-04

## 2025-03-28 ASSESSMENT — ENCOUNTER SYMPTOMS
EYE DISCHARGE: 0
SHORTNESS OF BREATH: 0
ABDOMINAL DISTENTION: 0
WHEEZING: 0
SINUS PAIN: 0
CONSTIPATION: 0
EYE PAIN: 0
EYE ITCHING: 0
TROUBLE SWALLOWING: 0
COLOR CHANGE: 0
CHEST TIGHTNESS: 0
ABDOMINAL PAIN: 0
VOMITING: 0
NAUSEA: 0
RHINORRHEA: 0
SINUS PRESSURE: 0
APNEA: 0
DIARRHEA: 0
SORE THROAT: 0
COUGH: 0

## 2025-03-28 NOTE — PROGRESS NOTES
[facility]  [facility]  [address] [place] [address] DRIVE  [facility] [address]  Dept: [phone]  Dept Fax: [phone]  Loc: [phone]    Judi Yi is a 64 y.o. female who presents today for her medical conditions/complaints as noted below.      HPI:     Patient presents for evaluation of left otalgia for 5 days. Denies drainage or hearing loss. Denies other associated symptoms including cough or sore throat. Denies fevers. No known sick contacts.     Past Medical History:   Diagnosis Date    Anxiety     Cirrhosis of liver (HCC) 04/25/2024    Depression     Diabetes (HCC)     Diabetes (HCC)     Hypertension     Memory difficulty     Obesity 04/21/2021    Psoriasis     Sleep apnea     c-pap    Stenosis of artery 04/25/2024       Past Surgical History:   Procedure Laterality Date    ANKLE FRACTURE SURGERY Right 4/21/2021    ANKLE OPEN REDUCTION INTERNAL FIXATION WITH POSSIBLE EXTERNAL FIXATOR performed by Ye Vásquez MD at [facility] OR    ANKLE SURGERY Right 6/28/2022    RIGHT ANKLE HARDWARE REMOVAL WITH WOUND VAC performed by Ye Vásquez MD at [facility] OR    BONE MARROW BIOPSY N/A 9/13/2022    BONE MARROW ASPIRATION BIOPSY performed by SWAPNA Caruso at [facility] OR    GALLBLADDER SURGERY         Family History   Problem Relation Age of Onset    Cancer Mother     Heart Attack Father        Social History     Tobacco Use    Smoking status: Never     Passive exposure: Never    Smokeless tobacco: Never   Substance Use Topics    Alcohol use: No        Current Outpatient Medications   Medication Sig Dispense Refill    amoxicillin-clavulanate (AUGMENTIN) 875-125 MG per tablet Take 1 tablet by mouth 2 times daily for 10 days 20 tablet 0    ofloxacin (FLOXIN) 0.3 % otic solution Place 10 drops into the left ear 2 times daily for 7 days 7 mL 0    donepezil (ARICEPT) 10 MG tablet TAKE 1 TABLET EVERY NIGHT 90 tablet 3    rosuvastatin (CRESTOR) 5 MG

## 2025-03-28 NOTE — PATIENT INSTRUCTIONS
Oral antibiotic and antibiotic drops sent to pharmacy, take full course.   Rest and increase fluid intake.   Can use cotton ball to help keep drops in ear.   May take Tylenol/Ibuprofen as needed for pain and/or fever.   Avoid swimming or submerging ears into water for at least 7 days.   Follow-up with primary care provider or return to clinic if symptoms worsen/fail to improve.

## 2025-04-17 ENCOUNTER — OFFICE VISIT (OUTPATIENT)
Age: 65
End: 2025-04-17

## 2025-04-17 ENCOUNTER — OFFICE VISIT (OUTPATIENT)
Dept: NEUROLOGY | Age: 65
End: 2025-04-17
Payer: MEDICARE

## 2025-04-17 VITALS
HEIGHT: 66 IN | WEIGHT: 209 LBS | DIASTOLIC BLOOD PRESSURE: 63 MMHG | BODY MASS INDEX: 33.59 KG/M2 | SYSTOLIC BLOOD PRESSURE: 104 MMHG | HEART RATE: 76 BPM | OXYGEN SATURATION: 97 %

## 2025-04-17 VITALS
DIASTOLIC BLOOD PRESSURE: 72 MMHG | RESPIRATION RATE: 20 BRPM | SYSTOLIC BLOOD PRESSURE: 118 MMHG | WEIGHT: 207.2 LBS | OXYGEN SATURATION: 98 % | HEART RATE: 75 BPM | TEMPERATURE: 98.1 F | BODY MASS INDEX: 33.44 KG/M2

## 2025-04-17 DIAGNOSIS — R47.89 WORD FINDING DIFFICULTY: ICD-10-CM

## 2025-04-17 DIAGNOSIS — E55.9 VITAMIN D DEFICIENCY: ICD-10-CM

## 2025-04-17 DIAGNOSIS — R41.3 MEMORY LOSS: ICD-10-CM

## 2025-04-17 DIAGNOSIS — R41.3 MEMORY LOSS: Primary | ICD-10-CM

## 2025-04-17 DIAGNOSIS — H69.92 EUSTACHIAN TUBE DYSFUNCTION, LEFT: Primary | ICD-10-CM

## 2025-04-17 LAB
25(OH)D3 SERPL-MCNC: 49 NG/ML
VIT B12 SERPL-MCNC: 939 PG/ML (ref 232–1245)

## 2025-04-17 PROCEDURE — 99214 OFFICE O/P EST MOD 30 MIN: CPT | Performed by: NURSE PRACTITIONER

## 2025-04-17 PROCEDURE — 3017F COLORECTAL CA SCREEN DOC REV: CPT | Performed by: NURSE PRACTITIONER

## 2025-04-17 PROCEDURE — G8427 DOCREV CUR MEDS BY ELIG CLIN: HCPCS | Performed by: NURSE PRACTITIONER

## 2025-04-17 PROCEDURE — 3078F DIAST BP <80 MM HG: CPT | Performed by: NURSE PRACTITIONER

## 2025-04-17 PROCEDURE — 1036F TOBACCO NON-USER: CPT | Performed by: NURSE PRACTITIONER

## 2025-04-17 PROCEDURE — G8417 CALC BMI ABV UP PARAM F/U: HCPCS | Performed by: NURSE PRACTITIONER

## 2025-04-17 PROCEDURE — 3074F SYST BP LT 130 MM HG: CPT | Performed by: NURSE PRACTITIONER

## 2025-04-17 RX ORDER — FEXOFENADINE HCL AND PSEUDOEPHEDRINE HCL 180; 240 MG/1; MG/1
1 TABLET, EXTENDED RELEASE ORAL DAILY
Qty: 30 TABLET | Refills: 0 | Status: SHIPPED | OUTPATIENT
Start: 2025-04-17

## 2025-04-17 ASSESSMENT — ENCOUNTER SYMPTOMS
ABDOMINAL PAIN: 0
WHEEZING: 0
SHORTNESS OF BREATH: 0
BLOOD IN STOOL: 0
CHEST TIGHTNESS: 0

## 2025-04-17 NOTE — PROGRESS NOTES
[facility] Office Note      Patient:   Judi Yi  MR#:    [id number]  Account Number:                         YOB: 1960  Date of Evaluation:  4/17/2025  Time of Note:                          9:13 AM  Primary/Referring Physician:  Rosemary Dove APRN - CNP   Consulting Physician:  Nadege Kenyon DNP, APRN    FOLLOW UP    Chief Complaint   Patient presents with    Follow-up    Memory Loss     Pt states things are about the same     HISTORY OF PRESENT ILLNESS    Judi Yi is a 64 y.o. year old female here for follow up of memory loss, word finding difficulty. She is alone at today's appointment. She lives at home with her . Doing about the same from prior. She does feel that her ability to complete sentences has declined some, will lose her train of thought/words. Still primarily short term memory loss noted. She does still have word findings issues and speech difficulty. Misplacing items around the house. Needing reminders to get ADLs completed. Seems like it is taking her longer to get ADLs done. She is taking medications on her own but  is monitoring these as well. Difficulty with cooking/following recipes. Denies gait changes or bladder incontinence. No hallucinations. Denies tremor. No longer driving. Denies depression or anxiety. On Aricept 10mg nightly. No side effects. Has tried and failed Namenda and Namzeric in the past, noted GI side effects. She has had neuropsych testing completed in the past, no records for review. Family history of Pick's disease with aunt, onset was in her 50s. Following with hematology for thrombocytopenia still.      Past Medical History:   Diagnosis Date    Anxiety     Cirrhosis of liver (HCC) 04/25/2024    Depression     Diabetes (HCC)     Diabetes (HCC)     Hypertension     Memory difficulty     Obesity 04/21/2021    Psoriasis     Sleep apnea     c-pap    Stenosis of artery 04/25/2024       Past Surgical History:   Procedure

## 2025-04-17 NOTE — PROGRESS NOTES
Past Surgical History:   Procedure Laterality Date    ANKLE FRACTURE SURGERY Right 4/21/2021    ANKLE OPEN REDUCTION INTERNAL FIXATION WITH POSSIBLE EXTERNAL FIXATOR performed by Ye Vásquez MD at [facility] OR    ANKLE SURGERY Right 6/28/2022    RIGHT ANKLE HARDWARE REMOVAL WITH WOUND VAC performed by Ye Vásquez MD at [facility] OR    BONE MARROW BIOPSY N/A 9/13/2022    BONE MARROW ASPIRATION BIOPSY performed by SWAPNA Caruso at [facility] OR    GALLBLADDER SURGERY        Allergies   Allergen Reactions    Azithromycin Rash    Namenda  [Memantine Hcl] Nausea And Vomiting and Nausea Only        Results      Lab Results   Component Value Date     02/04/2025    K 3.8 02/04/2025     02/04/2025    CO2 23 02/04/2025    BUN 19 02/04/2025    CREATININE 0.7 02/04/2025    GLUCOSE 120 (H) 02/04/2025    CALCIUM 10.2 02/04/2025    BILITOT 1.0 02/04/2025    ALKPHOS 124 (H) 02/04/2025    AST 58 (H) 02/04/2025    ALT 42 (H) 02/04/2025    LABGLOM >90 02/04/2025    GFRAA >59 07/15/2022    AGRATIO 1.1 10/22/2021    GLOB 2.8 07/18/2024        Lab Results   Component Value Date    WBC 3.90 (L) 02/04/2025    HGB 13.3 02/04/2025    HCT 38.3 02/04/2025    MCV 88.0 02/04/2025     (L) 02/04/2025                EMR Dragon/transcription disclaimer:  Much of this encounter note is electronic transcription/translation of spoken language toprinted texts.  The electronic translation of spoken language may be erroneous, or at times, nonsensical words or phrases may be inadvertently transcribed.  Although I have reviewed the note for such errors, some may stillexist.      An electronic signature was used to authenticate this note.    --Dustin Ridley MD

## 2025-04-21 LAB — VIT B1 BLD-MCNC: 156 NMOL/L (ref 70–180)

## 2025-04-22 ENCOUNTER — RESULTS FOLLOW-UP (OUTPATIENT)
Dept: NEUROLOGY | Age: 65
End: 2025-04-22

## 2025-04-22 DIAGNOSIS — R41.3 MEMORY LOSS: Primary | ICD-10-CM

## 2025-04-22 LAB
APOE ALLELE E2+E3+E4 BLD/T: ABNORMAL
SPECIMEN SOURCE: ABNORMAL

## 2025-04-23 LAB
AL BETA40 PLAS-MCNC: 187.87 PG/ML
AL BETA42 PLAS-MCNC: 19.65 PG/ML
BETA-AMYLOID 42/40 RATIO: 0.1

## 2025-05-01 ENCOUNTER — TRANSCRIBE ORDERS (OUTPATIENT)
Dept: ADMINISTRATIVE | Facility: HOSPITAL | Age: 65
End: 2025-05-01
Payer: MEDICARE

## 2025-05-01 DIAGNOSIS — Z78.0 MENOPAUSE: Primary | ICD-10-CM

## 2025-05-01 NOTE — TELEPHONE ENCOUNTER
Patient called and would like someone to call and explain what this new blood test is that was ordered for for her. Patient stated she called the lab and they couldn't tell her.

## 2025-05-02 DIAGNOSIS — R41.3 MEMORY LOSS: ICD-10-CM

## 2025-05-02 LAB
Lab: NORMAL
REPORT: NORMAL
THIS TEST SENT TO: NORMAL

## 2025-05-07 ENCOUNTER — HOSPITAL ENCOUNTER (OUTPATIENT)
Dept: BONE DENSITY | Facility: HOSPITAL | Age: 65
Discharge: HOME | End: 2025-05-07
Admitting: FAMILY MEDICINE
Payer: MEDICARE

## 2025-05-07 DIAGNOSIS — Z78.0 MENOPAUSE: ICD-10-CM

## 2025-05-07 PROCEDURE — 77080 DXA BONE DENSITY AXIAL: CPT

## 2025-05-08 LAB — MISCELLANEOUS LAB TEST RESULT: NORMAL

## 2025-05-21 ENCOUNTER — RESULTS FOLLOW-UP (OUTPATIENT)
Dept: NEUROLOGY | Age: 65
End: 2025-05-21

## 2025-06-12 ENCOUNTER — OFFICE VISIT (OUTPATIENT)
Dept: GASTROENTEROLOGY | Facility: CLINIC | Age: 65
End: 2025-06-12
Payer: MEDICARE

## 2025-06-12 ENCOUNTER — RESULTS FOLLOW-UP (OUTPATIENT)
Dept: GASTROENTEROLOGY | Facility: CLINIC | Age: 65
End: 2025-06-12

## 2025-06-12 ENCOUNTER — LAB (OUTPATIENT)
Dept: LAB | Facility: HOSPITAL | Age: 65
End: 2025-06-12
Payer: MEDICARE

## 2025-06-12 VITALS
OXYGEN SATURATION: 98 % | HEART RATE: 89 BPM | SYSTOLIC BLOOD PRESSURE: 130 MMHG | BODY MASS INDEX: 31.18 KG/M2 | DIASTOLIC BLOOD PRESSURE: 74 MMHG | HEIGHT: 66 IN | WEIGHT: 194 LBS | TEMPERATURE: 97.5 F

## 2025-06-12 DIAGNOSIS — K76.82 HEPATIC ENCEPHALOPATHY: ICD-10-CM

## 2025-06-12 DIAGNOSIS — K74.69 OTHER CIRRHOSIS OF LIVER: ICD-10-CM

## 2025-06-12 DIAGNOSIS — D69.6 THROMBOCYTOPENIA: ICD-10-CM

## 2025-06-12 DIAGNOSIS — D12.6 ADENOMATOUS POLYP OF COLON, UNSPECIFIED PART OF COLON: ICD-10-CM

## 2025-06-12 DIAGNOSIS — R16.2 HEPATOSPLENOMEGALY: ICD-10-CM

## 2025-06-12 DIAGNOSIS — K74.69 OTHER CIRRHOSIS OF LIVER: Primary | ICD-10-CM

## 2025-06-12 DIAGNOSIS — I77.4 CELIAC ARTERY STENOSIS: ICD-10-CM

## 2025-06-12 DIAGNOSIS — D49.0 IPMN (INTRADUCTAL PAPILLARY MUCINOUS NEOPLASM): ICD-10-CM

## 2025-06-12 DIAGNOSIS — I85.10 SECONDARY ESOPHAGEAL VARICES WITHOUT BLEEDING: ICD-10-CM

## 2025-06-12 LAB
ALBUMIN SERPL-MCNC: 4 G/DL (ref 3.5–5.2)
ALBUMIN/GLOB SERPL: 1.8 G/DL
ALP SERPL-CCNC: 113 U/L (ref 39–117)
ALPHA-FETOPROTEIN: <2 NG/ML (ref 0–8.3)
ALT SERPL W P-5'-P-CCNC: 32 U/L (ref 1–33)
AMMONIA BLD-SCNC: 88 UMOL/L (ref 11–51)
ANION GAP SERPL CALCULATED.3IONS-SCNC: 15 MMOL/L (ref 5–15)
AST SERPL-CCNC: 53 U/L (ref 1–32)
BILIRUB SERPL-MCNC: 0.9 MG/DL (ref 0–1.2)
BUN SERPL-MCNC: 19 MG/DL (ref 8–23)
BUN/CREAT SERPL: 26.8 (ref 7–25)
CALCIUM SPEC-SCNC: 9.9 MG/DL (ref 8.6–10.5)
CHLORIDE SERPL-SCNC: 104 MMOL/L (ref 98–107)
CO2 SERPL-SCNC: 23 MMOL/L (ref 22–29)
CREAT SERPL-MCNC: 0.71 MG/DL (ref 0.57–1)
DEPRECATED RDW RBC AUTO: 43 FL (ref 37–54)
EGFRCR SERPLBLD CKD-EPI 2021: 95.7 ML/MIN/1.73
ERYTHROCYTE [DISTWIDTH] IN BLOOD BY AUTOMATED COUNT: 13.1 % (ref 12.3–15.4)
GLOBULIN UR ELPH-MCNC: 2.2 GM/DL
GLUCOSE SERPL-MCNC: 98 MG/DL (ref 65–99)
HCT VFR BLD AUTO: 39.3 % (ref 34–46.6)
HGB BLD-MCNC: 13.1 G/DL (ref 12–15.9)
INR PPP: 1.15 (ref 0.91–1.09)
MCH RBC QN AUTO: 29.6 PG (ref 26.6–33)
MCHC RBC AUTO-ENTMCNC: 33.3 G/DL (ref 31.5–35.7)
MCV RBC AUTO: 88.7 FL (ref 79–97)
PLATELET # BLD AUTO: 89 10*3/MM3 (ref 140–450)
PMV BLD AUTO: 10.9 FL (ref 6–12)
POTASSIUM SERPL-SCNC: 3.8 MMOL/L (ref 3.5–5.2)
PROT SERPL-MCNC: 6.2 G/DL (ref 6–8.5)
PROTHROMBIN TIME: 15.3 SECONDS (ref 11.8–14.8)
RBC # BLD AUTO: 4.43 10*6/MM3 (ref 3.77–5.28)
SODIUM SERPL-SCNC: 142 MMOL/L (ref 136–145)
WBC NRBC COR # BLD AUTO: 3.67 10*3/MM3 (ref 3.4–10.8)

## 2025-06-12 PROCEDURE — 1160F RVW MEDS BY RX/DR IN RCRD: CPT | Performed by: NURSE PRACTITIONER

## 2025-06-12 PROCEDURE — 1159F MED LIST DOCD IN RCRD: CPT | Performed by: NURSE PRACTITIONER

## 2025-06-12 PROCEDURE — 80053 COMPREHEN METABOLIC PANEL: CPT | Performed by: NURSE PRACTITIONER

## 2025-06-12 PROCEDURE — 82140 ASSAY OF AMMONIA: CPT | Performed by: NURSE PRACTITIONER

## 2025-06-12 PROCEDURE — 85610 PROTHROMBIN TIME: CPT | Performed by: NURSE PRACTITIONER

## 2025-06-12 PROCEDURE — 85027 COMPLETE CBC AUTOMATED: CPT | Performed by: NURSE PRACTITIONER

## 2025-06-12 PROCEDURE — 99214 OFFICE O/P EST MOD 30 MIN: CPT | Performed by: NURSE PRACTITIONER

## 2025-06-12 PROCEDURE — 36415 COLL VENOUS BLD VENIPUNCTURE: CPT | Performed by: NURSE PRACTITIONER

## 2025-06-12 PROCEDURE — 82105 ALPHA-FETOPROTEIN SERUM: CPT

## 2025-06-12 NOTE — TELEPHONE ENCOUNTER
Patient called, lab work reviewed.  Ammonia level is improved as compared to 6 months ago however still elevated at 88.  We will go ahead and begin the Xifaxan twice a day.  Lactulose will continue once daily but we will titrate this up as needed be,to assure that she has at least 3 bowel movements per day.  Patient asked to call me if she has any questions or concerns but she does agree with the plan.  JR Barton

## 2025-06-12 NOTE — PROGRESS NOTES
Primary Physician: Rosalba Nelson APRN    Chief Complaint   Patient presents with    Follow-up     Pt presents today for cirrhosis follow up-states she is feeling good       Subjective     Geneva Knight is a 64 y.o. female.    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  Other serology showing no other obvious source of liver disease.     4/12/2024:DON negative, AMA negative, ASMA negative, hepatitis panel unremarkable, ceruloplasmin normal.  10/18/2023 Normal iron profile and ferritin   3/16/2022 Liver US with Elastography: hepatic steatosis, hepatosplenomegaly. F4 Metavir Stage   No alcohol use.     Most recent imaging  12/24/2024 CT of the abdomen and pelvis showing liver cirrhosis with no focal liver lesions.    4/24/2024 CT Abdomen with findings consistent with cirrhosis, NO liver lesions or enhancement. Spleen enlarge 15.6cm. Sequelae of portal hypertension including mild splenomegaly, small caliber lower paraesophageal varices as well as portosystemic shunt, NO ascites.     4/12/2024 MELD=8  4/12/2024 Fib-4 Score= 4.34     2/4/2025:   ALKP 124, ALT 42, AST 58, bilirubin 1.0.  04/12/2024: ALKP 120, ALT 31, AST 46, Bili 1.2, platelets 115K, albumin 4.1  10/18/2023: ALKP 169, ALT 43, AST 52, Bili 10.  04/12/2023: ALKP 119, ALT 36, AST 50, Bili 1.3  07/15/2022: ALKP 105, ALT 8, AST 46, Bili  0.6  05/12/2022: ALKP  125, ALT 30, AST 49, Bili 0.9  02/25/2022: ALKP  158, ALT 31, AST 58, Bili 0.6  10/22/2021: ALKP 135, ALT 29, AST 51, Bili 0.8  07/12/2021: ALKP 140, alt 24, AST 39, Bili 0.5  02/23/2021: ALKP 155, ALT 34, AST 52, Bili 0.8  10/27/2020: ALKP 102, ALT 39, AST 56, Bili 1.5  06/21/2019: ALKP 91, ALT 41, AST 55, Bili 1.0  08/14/2018: ALKP 89, ALT 66, AST 99, Bili 1.3     4/25/2024 endoscopy revealing grade 1 esophageal varices.  Patient on Coreg twice daily.     4/29/2024 CT Abdomen: cirrhotic appearing, refer to vascular for celiac artery stenosis     6/2/2024: NH3 slightly up and lactulose and  xifaxan started  12/12/2024 ammonia 177     12/12/2024 pt reports she is having at least 2 BM's per day. She does not take Lactulose regularly but will use it for a few days and then miss a day.    12/12/2024 AFP normal 2.0  Vitamin D level normal on April 17, 2025     No lower extremity edema and watching her Na intake     She has had her yearly flu vaccine and Covid vaccine  5/7/2025 Bone Density Study revealing osteopenia    6/12/2025 Pt having 3 BM's day.  No issues with extremity swelling. No ascites.  Pt feels a little tired.  Some confusion but also has dementia. She is on Lactulose once daily. She has not been taking her Xifaxan.  Previously it was not covered.  However we have called the pharmacy today during her office visit and pharmacy has ran this through and believes it to be completely covered.  Therefore they are going to get this prepared for her to  today and I have asked the patient to begin taking this twice daily.  Patient has no abdominal pain.  She has lost weight with recent use of Mounjaro.  This has made her feel better.       Thrombocytopenia  2/4/2025 Platelets 108K  11/4/2024 platelets 104K  4/12/2024:  platelets 115K  10/18/2023: platelets 117K  4/12/2023: platelets 98K  10/12/2022: platelets 136K     Personal Hx Adenomatous Colon Polyps  3/30/2023 last colonoscopy with a 4mm hyperplastic polyp in rectum removed. Large lipoma in ascending colon biopsied.     Pancreatic Sidebranch IPMN  4/19/2024 CT Scan of Abdomen: 3 cystic lesions in the pancreatic head largest measuring 14 mm favoring sidebranch IPMN's.    12/24/2024 CT: Reidentified nonenhancing cystic lesions in the pancreatic head measuring 8 mm, 14 mm, 9 mm, and 11 mm.  These are stable.  Main pancreatic duct is nondilated.  No solid enhancing masses are seen.     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  7/23/2024 seen in consultation by vascular here at [facility]  [place].  It is determined to begin a conservative approach with her and no surgery was planned.      Past Medical History:   Diagnosis Date    Ankle fracture     Right    Anxiety     Cellulitis     Right ankle    Cirrhosis     Family history of colon cancer     GERD (gastroesophageal reflux disease)     History of adenomatous polyp of colon     History of colon polyps     HTN (hypertension)     Hyperlipemia     VIOLETA (obstructive sleep apnea)     cpap    Type 2 diabetes mellitus     Vitamin D deficiency        Past Surgical History:   Procedure Laterality Date    BREAST BIOPSY Right 08/30/2021    Procedure: EXCISIONAL RIGHT BREAST BIOPSY WITH NEEDLE LOCALIZATION - ULTRASOUND GUIDED;  Surgeon: Martine Roy MD;  Location: [facility] OR;  Service: General;  Laterality: Right;    CHOLECYSTECTOMY  2004    COLONOSCOPY N/A 12/18/2017    Three 4-7mm polyps in the rectum-path shows one tubular adenomatous polyp and two hyperplastic polyps; Congested mucosa in the rectum, in the recto-sigmoid colon and in the sigmoid colon-biopsied; Repeat 5 years    COLONOSCOPY  07/25/2013    The entire examined colon is normal on direct and retroflexion views; Repeat 7-10 years    COLONOSCOPY N/A 03/30/2023    One 4mm hyperplastic polyp in the rectum; Large lipoma in the ascending colon-biopsied; Repeat 5 years    ENDOSCOPY N/A 04/25/2024    Grade I esophageal varices; Normal stomach; Normal examined duodenum; No specimens collected; No need to repeat endoscopy as she is on metoprolol    EYE SURGERY Bilateral     To remove loose skin    FOOT SURGERY Right 04/21/2021    ORIF ANKLE FRACTURE Right         Current Outpatient Medications:     Ascorbic Acid (Vitamin C) 500 MG capsule, Take 1 capsule by mouth Daily., Disp: , Rfl:     aspirin 81 MG EC tablet, Take 1 tablet by mouth Daily., Disp: , Rfl:     B Complex Vitamins (VITAMIN B COMPLEX PO), Take 1 capsule by mouth Daily., Disp: , Rfl:     Coreg 3.125 MG tablet, Take 1 tablet twice a day by  oral route for 90 days., Disp: , Rfl:     donepezil (ARICEPT) 10 MG tablet, Take 1 tablet by mouth Every Night., Disp: , Rfl:     escitalopram (LEXAPRO) 10 MG tablet, Take 1 tablet by mouth Daily., Disp: , Rfl:     lactulose (CHRONULAC) 10 GM/15ML solution, Take 30 mL by mouth 3 (Three) Times a Day., Disp: 946 mL, Rfl: 6    levocetirizine (XYZAL) 5 MG tablet, Take 1 tablet by mouth As Needed., Disp: , Rfl:     metFORMIN (GLUCOPHAGE) 1000 MG tablet, Take 1 tablet by mouth 2 (Two) Times a Day., Disp: , Rfl:     multivitamin with minerals tablet tablet, Take 1 tablet by mouth Daily., Disp: , Rfl:     Omega-3 Fatty Acids (fish oil) 1000 MG capsule capsule, Take 1 capsule by mouth Daily With Breakfast., Disp: , Rfl:     riFAXIMin (Xifaxan) 550 MG tablet, Take 1 tablet by mouth 2 (Two) Times a Day., Disp: 60 tablet, Rfl: 6    rosuvastatin (CRESTOR) 5 MG tablet, Take 1 tablet by mouth Daily., Disp: , Rfl:     Tirzepatide (MOUNJARO) 2.5 MG/0.5ML solution pen-injector pen, Inject 0.5 mL under the skin into the appropriate area as directed 1 (One) Time Per Week., Disp: , Rfl:     triamterene-hydrochlorothiazide (DYAZIDE) 37.5-25 MG per capsule, Take 1 capsule by mouth Daily., Disp: , Rfl:     Vitamin D, Cholecalciferol, 25 MCG (1000 UT) capsule, Take 1 capsule by mouth Daily., Disp: , Rfl:     Allergies   Allergen Reactions    Azithromycin Rash    Namenda [Memantine] Nausea Only and Headache       Social History     Socioeconomic History    Marital status:    Tobacco Use    Smoking status: Never    Smokeless tobacco: Never   Vaping Use    Vaping status: Never Used   Substance and Sexual Activity    Alcohol use: Not Currently    Drug use: Never    Sexual activity: Not Currently     Partners: Male     Birth control/protection: Other     Comment: menopausal       Family History   Problem Relation Age of Onset    Breast cancer Mother     Colon cancer Maternal Grandmother         In her 70's    Breast cancer Maternal  "Grandmother     Colon cancer Maternal Aunt         In her 50's    Colon polyps Neg Hx     Esophageal cancer Neg Hx     Liver cancer Neg Hx     Stomach cancer Neg Hx     Liver disease Neg Hx     Rectal cancer Neg Hx        Review of Systems   Constitutional:  Negative for unexpected weight change.   HENT:  Negative for trouble swallowing.    Gastrointestinal:  Negative for abdominal pain, blood in stool, constipation and diarrhea.       Objective     /74 (BP Location: Left arm, Patient Position: Sitting, Cuff Size: Adult)   Pulse 89   Temp 97.5 °F (36.4 °C) (Infrared)   Ht 167.6 cm (66\")   Wt 88 kg (194 lb)   SpO2 98%   Breastfeeding No   BMI 31.31 kg/m²     Physical Exam  Vitals reviewed.   Constitutional:       Appearance: Normal appearance.   Cardiovascular:      Rate and Rhythm: Normal rate and regular rhythm.      Heart sounds: Normal heart sounds.   Pulmonary:      Effort: Pulmonary effort is normal.      Breath sounds: Normal breath sounds.   Abdominal:      General: Abdomen is flat. There is no distension.      Palpations: Abdomen is soft.      Tenderness: There is no abdominal tenderness.   Musculoskeletal:      Right lower leg: No edema.      Left lower leg: No edema.   Neurological:      Mental Status: She is alert.         Lab Results - Last 18 Months   Lab Units 06/12/25  1006 02/04/25  0832 12/24/24  0812 12/12/24  1001 11/04/24  0845 07/18/24  1022 04/12/24  0915   GLUCOSE mg/dL 98 120*  --  101*  --  97 131*   BUN mg/dL 19.0 19  --  20  --  14 16   CREATININE mg/dL 0.71 0.7 0.80 0.58  --  0.6 0.54*   SODIUM mmol/L 142 141  --  141  --  143 143   POTASSIUM mmol/L 3.8 3.8  --  4.0  --  4.2 3.8   CHLORIDE mmol/L 104 104  --  106  --  105 105   TOTAL CO2 mmol/L  --  23  --   --   --  26  --    CO2 mmol/L 23.0  --   --  25.0  --   --  30.0*   TOTAL PROTEIN g/dL 6.2 6.6  --  6.3  --  6.8 7.3   ALBUMIN g/dL 4.0 4  --  3.9  --  4.0 4.1   ALT (SGPT) U/L 32 42*  --  34*  --  38 31   AST (SGOT) U/L " 53* 58*  --  43*  --  56* 46*   ALK PHOS U/L 113 124*  --  125*  --  179* 120*   BILIRUBIN mg/dL 0.9 1  --  0.9  --  1.1 1.2   GLOBULIN gm/dL 2.2  --   --  2.4  --  2.8 3.2   CRP mg/dL  --   --   --   --  <0.30  --   --    ALPHA-FETOPROTEIN ng/mL  --   --   --  2.00  --   --   --        Lab Results - Last 18 Months   Lab Units 06/12/25  1006 02/04/25  0832 12/12/24  1001 11/04/24  0855 07/18/24  1018 04/12/24  0915   HEMOGLOBIN g/dL 13.1 13.3 13.1 13.4 13.1 13.3   HEMATOCRIT % 39.3 38.3 37.9 39 38.5 40.2   MCV fL 88.7 88 86.9 87.4 88.3 89.7   WBC 10*3/mm3 3.67 3.9* 3.92 3.09* 3.51* 3.64   RDW % 13.1 13.2 13.0 13.1 12.6 13.3   MPV fL 10.9 10.8 11.1 10.7* 10.0 11.0   PLATELETS 10*3/mm3 89* 108* 101* 102* 96* 115*   INR  1.15*  --  1.04  --   --  1.03       Lab Results - Last 18 Months   Lab Units 04/17/25  0942   VIT D 25 HYDROXY ng/mL 49.0        Lab Results - Last 18 Months   Lab Units 12/12/24  1001 04/12/24  0915   HEP B C IGM   --  Non-Reactive   HEP B S AG   --  Non-Reactive   ALPHA-FETOPROTEIN ng/mL 2.00  --    CERULOPLASMIN mg/dL  --  19   MITOCHONDRIAL AB Units  --  <20.0       CT ABDOMEN PELVIS W CONTRAST- 12/24/2024 7:28 AM     HISTORY: HCC surveillance, pancreatic cyst; K74.69-Other cirrhosis of  liver; D49.0-Neoplasm of unspecified behavior of digestive system       COMPARISON: CT scan dated 4/19/2024.     DOSE LENGTH PRODUCT: 1343.57 mGy.cm. Automated exposure control was also  utilized to decrease patient radiation dose.     TECHNIQUE: Following the intravenous administration of contrast, helical  CT tomographic images of the abdomen and pelvis were acquired.  Multiplanar reformatted images were provided for review.     FINDINGS:  LOWER CHEST: The lung bases are clear. Included mediastinal structures  are unremarkable.     LIVER: Liver cirrhosis. No arterial enhancing liver lesions are  identified. Portal and hepatic veins are patent.     BILIARY SYSTEM: The gallbladder has been removed. There is no  evidence  of biliary ductal dilation.     PANCREAS: Reidentified nonenhancing cystic lesions in the pancreatic  head measuring 8 mm, 14 mm, 9 mm and 11 mm (axial images 57, 53 and 52,  respectively). These are stable. No solid enhancing masses are  identified. The main pancreatic duct is nondilated.     SPLEEN: Enlarged, measuring 15.6 cm.     KIDNEYS AND ADRENALS: Adrenal glands are unremarkable. Kidneys are  unremarkable. The ureters are decompressed and normal in appearance.     RETROPERITONEUM: No mass, lymphadenopathy or hemorrhage.     GI TRACT: The stomach is nondistended. Small caliber lower  paraesophageal varices are seen. Small bowel loops are nondistended. No  inflammatory changes along the colon. Normal appendix.     OTHER: There is no mesenteric mass, lymphadenopathy, free fluid or free  air. Abdominal vascular structures are patent. There is a prominent  tortuous vessel in the mid abdomen and right of midline in the abdomen  which appears to terminate in the IVC, possibly indicating a  portosystemic shunt which has developed. There is no ascites. No acute  bony abnormality.     PELVIS: No mass lesion, fluid collection or significant lymphadenopathy  is seen in the pelvis. The urinary bladder is normal in appearance.     IMPRESSION:     1.  Liver cirrhosis. No focal liver lesion.  2.  Sequelae of portal hypertension including mild splenomegaly, small  caliber lower paraesophageal varices as well as a portosystemic shunt  which has developed. No ascites.  3.  Stable small nonenhancing cystic lesions of the pancreatic head,  appearance favoring branch IPMN. Main duct is nondilated.           This report was signed and finalized on 12/24/2024 8:51 AM by Dr Lazaro Ferrara.    IMPRESSION/PLAN:    Assessment & Plan      Problem List Items Addressed This Visit       Adenomatous colon polyp    Overview   One adenomatous polyp removed 12/2017.  Colonoscopy 3/2023 unremarkable.  Repeat 3/2028.          Thrombocytopenia    Overview   11/4/2024 platelets 104K  4/12/2024:  platelets 115K  10/18/2023: platelets 117K  4/12/2023: platelets 98K  10/12/2022: platelets 136K         Relevant Orders    AFP Tumor Marker    Ammonia (Completed)    CBC (No Diff) (Completed)    Comprehensive Metabolic Panel (Completed)    Protime-INR (Completed)    Hepatosplenomegaly    Overview   3/16/2022 US Liver: Hepatosplenomegaly, Hepatic Steatosis         Relevant Orders    MRI Abdomen With & Without Contrast    Other cirrhosis of liver - Primary    Overview   Cirrhosis to be suspected secondary to nonalcoholic fatty liver disease.  No previous ascites.  Had esophageal varices seen 4/2024.  Patient already on metoprolol.  Suggest PCP change to Coreg if at all possible.  4/12/2024 MELD score= 8  No other obvious source of liver disease with unremarkable serology.  6/2/2024 NH3 slightly elevated- xifaxan and lactulose started  4/12/2024 MELD Score- 8, Fib-4 Score 4.34  4/29/2024 CT abdomen: cirrhotic appearing, portal htn, splenomegaly, gastric/esophageal varices           Current Assessment & Plan   Will get MRI to evaluate for HCC surveillance in addition to the pancreatic cyst evaluation.  Begin Xifaxan twice daily.  Continue Coreg twice daily.  Continue low-sodium diet.  Continue avoidance of alcohol.  Checking ammonia level today for adjustment of lactulose as needed.  Labs today to recalculate MELD score.  6-month follow-up.         Relevant Orders    AFP Tumor Marker    Ammonia (Completed)    CBC (No Diff) (Completed)    Comprehensive Metabolic Panel (Completed)    Protime-INR (Completed)    MRI Abdomen With & Without Contrast    IPMN (intraductal papillary mucinous neoplasm)    Overview   4/19/2024 CT: 3 cystic lesions in the pancreatic head are present, the largest of which measures 14 mm. 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  Recommend a follow-up CT or MRI in 1 year based on ACR white paper  criteria.         Current  Assessment & Plan   Will get MRI ordered today to reevaluate these pancreatic cyst.         Relevant Orders    MRI Abdomen With & Without Contrast    Secondary esophageal varices without bleeding    Overview   4/25/2024 endoscopy revealing grade 1 esophageal varices. Coreg BID          Hepatic encephalopathy    Overview   6/2/2024: NH3 slightly up and lactulose and xifaxan ordered however insurance would not cover.  12/12/2024 ammonia 177         Current Assessment & Plan   I have called her [facility] today and they have ran the Xifaxan and show that it is covered.  I have asked the patient to begin taking this twice daily immediately.  I am going to recheck her ammonia level today and adjust lactulose as necessary.         Relevant Medications    riFAXIMin (Xifaxan) 550 MG tablet    Other Relevant Orders    AFP Tumor Marker    Ammonia (Completed)    CBC (No Diff) (Completed)    Comprehensive Metabolic Panel (Completed)    Protime-INR (Completed)    Celiac artery stenosis    Overview   Followed by Dr Yee, has follow up next month          MRI to evaluate pancreatic cysts and liver cirrhosis (HCC Surveillance)  Labs today                Kavita De, JR  06/12/25  11:45 CDT    Part of this note may be an electronic transcription/translation of spoken language to printed text.

## 2025-06-12 NOTE — ASSESSMENT & PLAN NOTE
I have called her [facility] today and they have ran the Xifaxan and show that it is covered.  I have asked the patient to begin taking this twice daily immediately.  I am going to recheck her ammonia level today and adjust lactulose as necessary.

## 2025-06-12 NOTE — ASSESSMENT & PLAN NOTE
Will get MRI to evaluate for HCC surveillance in addition to the pancreatic cyst evaluation.  Begin Xifaxan twice daily.  Continue Coreg twice daily.  Continue low-sodium diet.  Continue avoidance of alcohol.  Checking ammonia level today for adjustment of lactulose as needed.  Labs today to recalculate MELD score.  6-month follow-up.

## 2025-06-25 ENCOUNTER — HOSPITAL ENCOUNTER (OUTPATIENT)
Dept: MRI IMAGING | Facility: HOSPITAL | Age: 65
Discharge: HOME | End: 2025-06-25
Admitting: NURSE PRACTITIONER
Payer: MEDICARE

## 2025-06-25 DIAGNOSIS — D49.0 IPMN (INTRADUCTAL PAPILLARY MUCINOUS NEOPLASM): ICD-10-CM

## 2025-06-25 DIAGNOSIS — R16.2 HEPATOSPLENOMEGALY: ICD-10-CM

## 2025-06-25 DIAGNOSIS — K74.69 OTHER CIRRHOSIS OF LIVER: ICD-10-CM

## 2025-06-25 LAB — CREAT BLDA-MCNC: 0.9 MG/DL (ref 0.6–1.3)

## 2025-06-25 PROCEDURE — A9573 GADOPICLENOL 0.5 MMOL/ML SOLUTION: HCPCS | Performed by: NURSE PRACTITIONER

## 2025-06-25 PROCEDURE — 82565 ASSAY OF CREATININE: CPT

## 2025-06-25 PROCEDURE — 25510000001 GADOPICLENOL 0.5 MMOL/ML SOLUTION: Performed by: NURSE PRACTITIONER

## 2025-06-25 PROCEDURE — 74183 MRI ABD W/O CNTR FLWD CNTR: CPT

## 2025-06-25 RX ADMIN — GADOPICLENOL 9 ML: 485.1 INJECTION INTRAVENOUS at 15:57

## 2025-08-01 ENCOUNTER — TELEPHONE (OUTPATIENT)
Dept: HEMATOLOGY | Age: 65
End: 2025-08-01

## 2025-08-01 NOTE — TELEPHONE ENCOUNTER
I called patient and reminded patient of their appt on 08/06/2025 and patient confirmed they would be here. I also let patient know that we have moved into our new cancer facility and asked patient if they were aware of where we were now located, and patient voiced understanding of our new location. Patient knows not to arrive any earlier their appointment because we are unable to check patients in early and to come in well hydrated incase labs are needed at any time throughout their visit.

## 2025-08-07 DIAGNOSIS — I65.23 BILATERAL CAROTID ARTERY STENOSIS: Primary | ICD-10-CM

## 2025-08-12 ENCOUNTER — OFFICE VISIT (OUTPATIENT)
Dept: OBSTETRICS AND GYNECOLOGY | Age: 65
End: 2025-08-12
Payer: MEDICARE

## 2025-08-12 VITALS
WEIGHT: 193 LBS | BODY MASS INDEX: 31.02 KG/M2 | SYSTOLIC BLOOD PRESSURE: 116 MMHG | HEIGHT: 66 IN | DIASTOLIC BLOOD PRESSURE: 72 MMHG

## 2025-08-12 DIAGNOSIS — Z12.4 SCREENING FOR CERVICAL CANCER: ICD-10-CM

## 2025-08-12 DIAGNOSIS — Z76.89 ESTABLISHING CARE WITH NEW DOCTOR, ENCOUNTER FOR: ICD-10-CM

## 2025-08-12 DIAGNOSIS — N64.59 INVERTED NIPPLE: ICD-10-CM

## 2025-08-12 DIAGNOSIS — N95.0 POSTMENOPAUSAL BLEEDING: ICD-10-CM

## 2025-08-12 DIAGNOSIS — Z01.419 WOMEN'S ANNUAL ROUTINE GYNECOLOGICAL EXAMINATION: Primary | ICD-10-CM

## 2025-08-12 PROCEDURE — G0123 SCREEN CERV/VAG THIN LAYER: HCPCS | Performed by: NURSE PRACTITIONER

## 2025-08-12 PROCEDURE — 87624 HPV HI-RISK TYP POOLED RSLT: CPT | Performed by: NURSE PRACTITIONER

## 2025-08-12 RX ORDER — LANCETS
EACH MISCELLANEOUS
COMMUNITY
Start: 2025-08-01

## 2025-08-12 RX ORDER — TIRZEPATIDE 7.5 MG/.5ML
INJECTION, SOLUTION SUBCUTANEOUS
COMMUNITY
Start: 2025-08-01

## 2025-08-12 RX ORDER — BLOOD SUGAR DIAGNOSTIC
STRIP MISCELLANEOUS
COMMUNITY
Start: 2025-08-01

## 2025-08-14 LAB
GEN CATEG CVX/VAG CYTO-IMP: NORMAL
HPV I/H RISK 4 DNA CVX QL PROBE+SIG AMP: NOT DETECTED
LAB AP CASE REPORT: NORMAL
LAB AP GYN ADDITIONAL INFORMATION: NORMAL
Lab: NORMAL
PATH INTERP SPEC-IMP: NORMAL
STAT OF ADQ CVX/VAG CYTO-IMP: NORMAL

## 2025-08-19 DIAGNOSIS — R92.8 ABNORMAL SCREENING MAMMOGRAM: Primary | ICD-10-CM

## 2025-08-21 ENCOUNTER — RESULTS FOLLOW-UP (OUTPATIENT)
Facility: HOSPITAL | Age: 65
End: 2025-08-21
Payer: MEDICARE

## 2025-08-21 DIAGNOSIS — Z91.89 AT HIGH RISK FOR BREAST CANCER: Primary | ICD-10-CM

## 2025-08-21 LAB
NCCN CRITERIA FLAG: ABNORMAL
NCCN CRITERIA FLAG: ABNORMAL
TYRER CUZICK SCORE: 22.1
TYRER CUZICK SCORE: 25.4

## 2025-08-22 ENCOUNTER — PROCEDURE VISIT (OUTPATIENT)
Dept: OBSTETRICS AND GYNECOLOGY | Age: 65
End: 2025-08-22
Payer: MEDICARE

## 2025-08-22 VITALS
SYSTOLIC BLOOD PRESSURE: 126 MMHG | HEIGHT: 66 IN | DIASTOLIC BLOOD PRESSURE: 80 MMHG | WEIGHT: 192 LBS | BODY MASS INDEX: 30.86 KG/M2

## 2025-08-22 DIAGNOSIS — N95.0 POST-MENOPAUSAL BLEEDING: ICD-10-CM

## 2025-08-22 DIAGNOSIS — R93.89 THICKENED ENDOMETRIUM: Primary | ICD-10-CM

## 2025-08-22 DIAGNOSIS — N88.2 CERVICAL STENOSIS (UTERINE CERVIX): ICD-10-CM

## 2025-08-26 ENCOUNTER — HOSPITAL ENCOUNTER (OUTPATIENT)
Dept: ULTRASOUND IMAGING | Facility: HOSPITAL | Age: 65
Discharge: HOME | End: 2025-08-26
Payer: MEDICARE

## 2025-08-26 ENCOUNTER — HOSPITAL ENCOUNTER (OUTPATIENT)
Dept: MAMMOGRAPHY | Facility: HOSPITAL | Age: 65
Discharge: HOME | End: 2025-08-26
Payer: MEDICARE

## 2025-08-26 DIAGNOSIS — N64.59 INVERTED NIPPLE: ICD-10-CM

## 2025-08-26 DIAGNOSIS — R92.8 ABNORMAL SCREENING MAMMOGRAM: ICD-10-CM

## 2025-08-26 PROCEDURE — 77066 DX MAMMO INCL CAD BI: CPT

## 2025-08-26 PROCEDURE — G0279 TOMOSYNTHESIS, MAMMO: HCPCS

## 2025-08-28 ENCOUNTER — TELEPHONE (OUTPATIENT)
Dept: SURGERY | Facility: CLINIC | Age: 65
End: 2025-08-28
Payer: MEDICARE

## 2025-09-04 ENCOUNTER — TELEPHONE (OUTPATIENT)
Dept: HEMATOLOGY | Age: 65
End: 2025-09-04

## (undated) DEVICE — SENSR O2 OXIMAX FNGR A/ 18IN NONSTR

## (undated) DEVICE — C-ARM: Brand: UNBRANDED

## (undated) DEVICE — SURGICAL PROCEDURE PACK LOWER EXTREMITY LOURDES HOSP

## (undated) DEVICE — FRCP BIOP ENDO CAPTURA/PRO SERR SPK 2.8MM 230CM

## (undated) DEVICE — ZIMMER® STERILE DISPOSABLE TOURNIQUET CUFF WITH PLC, DUAL PORT, SINGLE BLADDER, 34 IN. (86 CM)

## (undated) DEVICE — THE SINGLE USE ETRAP – POLYP TRAP IS USED FOR SUCTION RETRIEVAL OF ENDOSCOPICALLY REMOVED POLYPS.: Brand: ETRAP

## (undated) DEVICE — ADHS LIQ MASTISOL 2/3ML

## (undated) DEVICE — DISPOSABLE SPECIMEN RADIOGRAPHY SYSTEM WITH LOCALIZING GRID, 4.65" RADIOLUCENT GRID: Brand: ACCUGRID

## (undated) DEVICE — DRESSING WND VAC SM GRANUFOAM SENSATRAC

## (undated) DEVICE — GLOVE SURG SZ 85 L12IN FNGR THK94MIL TRNSLUC YEL LTX

## (undated) DEVICE — BIT DRL L125MM DIA2MM QUIK CPL W/O STP REUSE

## (undated) DEVICE — ANTIBACTERIAL UNDYED BRAIDED (POLYGLACTIN 910), SYNTHETIC ABSORBABLE SUTURE: Brand: COATED VICRYL

## (undated) DEVICE — Device

## (undated) DEVICE — C-ARMOR C-ARM EQUIPMENT COVERS CLEAR STERILE UNIVERSAL FIT 12 PER CASE: Brand: C-ARMOR

## (undated) DEVICE — MSK O2 MD CONCENTR A/ LF 7FT 1P/U

## (undated) DEVICE — THE CHANNEL CLEANING BRUSH IS A NYLON FLEXI BRUSH ATTACHED TO A FLEXIBLE PLASTIC SHEATH DESIGNED TO SAFELY REMOVE DEBRIS FROM FLEXIBLE ENDOSCOPES.

## (undated) DEVICE — Device: Brand: DEFENDO AIR/WATER/SUCTION AND BIOPSY VALVE

## (undated) DEVICE — SUTURE ETHLN SZ 2-0 L30IN NONABSORBABLE BLK L36MM FSLX 3/8 1674H

## (undated) DEVICE — SUTURE VCRL SZ 2-0 L36IN ABSRB UD L36MM CT-1 1/2 CIR J945H

## (undated) DEVICE — CANNULA NSL AD L7FT DIV O2 CO2 W/ M LUERLOCK TRMPT CONN

## (undated) DEVICE — SHEET,DRAPE,53X77,STERILE: Brand: MEDLINE

## (undated) DEVICE — TBG SMPL FLTR LINE NASL 02/C02 A/ BX/100

## (undated) DEVICE — CUFF,BP,DISP,1 TUBE,ADULT,HP: Brand: MEDLINE

## (undated) DEVICE — SPNG GZ STRL 2S 4X4 12PLY

## (undated) DEVICE — SOLUTION IV 1000ML 0.9% SOD CHL FOR IRRIG PLAS CONT

## (undated) DEVICE — CHLORAPREP 26ML ORANGE

## (undated) DEVICE — DRSNG SURESITE WNDW 4X4.5

## (undated) DEVICE — CONMED SCOPE SAVER BITE BLOCK, 20X27 MM: Brand: SCOPE SAVER

## (undated) DEVICE — GLOVE SURG SZ 85 CRM LTX FREE POLYISOPRENE POLYMER BEAD ANTI

## (undated) DEVICE — PAD,ABDOMINAL,8"X10",ST,LF: Brand: MEDLINE

## (undated) DEVICE — SUTURE ETHLN SZ 3-0 L18IN NONABSORBABLE BLK FS-1 L24MM 3/8 663H

## (undated) DEVICE — GUIDEWIRE ORTH L150MM DIA1.25MM S STL THRD FOR 4MM CANN SCR

## (undated) DEVICE — GLOVE SURG SZ 85 L12IN FNGR THK79MIL GRN LTX FREE

## (undated) DEVICE — GLOVE SURG SZ 8 L12IN FNGR THK79MIL GRN LTX FREE

## (undated) DEVICE — APPL CHLORAPREP HI/LITE 26ML ORNG

## (undated) DEVICE — YANKAUER,BULB TIP WITH VENT: Brand: ARGYLE

## (undated) DEVICE — PAD MINOR UNIVERSAL: Brand: MEDLINE INDUSTRIES, INC.

## (undated) DEVICE — FRCP BX RADJAW4 NDL 2.8 240 STD OG

## (undated) DEVICE — AMBU AURA-I U SIZE 4, DISPOSABLE LARYNGEAL MASK: Brand: AURA-I

## (undated) DEVICE — GLV SURG BIOGEL M LTX PF 7 1/2

## (undated) DEVICE — WRAP AROUND LENS-STERLIE

## (undated) DEVICE — SNAR POLYP SENSATION MICRO OVL 13 240X40

## (undated) DEVICE — KIT DSG 26X15X1.6CM PD 1 PERF POLYUR FOAM THN DISP

## (undated) DEVICE — GLOVE SURG SZ 85 L12IN FNGR ORTHO 126MIL CRM LTX FREE

## (undated) DEVICE — PK TURNOVER RM ADV

## (undated) DEVICE — GOWN,PRECEPT,XLNG/XXLARGE,STRL: Brand: MEDLINE

## (undated) DEVICE — BIT DRL L110MM DIA2.5MM G QUIK CPL W/O STP REUSE

## (undated) DEVICE — SNAR POLYP CAPTIVATOR RND STFF 2.4 240CM 10MM 1P/U

## (undated) DEVICE — DRESSING WND W4XL5IN SIL SULF CNTCT LAYR NONADHESIVE

## (undated) DEVICE — BANDAGE COMPR W3INXL15FT BGE E SGL LAYERED CLP CLSR

## (undated) DEVICE — 3M™ STERI-STRIP™ REINFORCED ADHESIVE SKIN CLOSURES, R1547, 1/2 IN X 4 IN (12 MM X 100 MM), 6 STRIPS/ENVELOPE: Brand: 3M™ STERI-STRIP™

## (undated) DEVICE — BIT DRL L160MM DIA2.7MM CANN QUIK CPL ADJ STP REUSE FOR

## (undated) DEVICE — MASK,OXYGEN,MED CONC,ADLT,7' TUB, UC: Brand: PENDING

## (undated) DEVICE — BIT DRL L125MM DIA2.7MM QUIK CPL 3 FLUT

## (undated) DEVICE — Z DISCONTINUED USE 2272117 DRAPE SURG 3 QTR N INVASIVE 2 LAYR DISP

## (undated) DEVICE — SOLUTION IV IRRIG POUR BRL 0.9% SODIUM CHL 2F7124

## (undated) DEVICE — KIT NEG PRSS FOR NONLIN OR UPTO 90CM LIN INCIS PREVENA +

## (undated) DEVICE — ENDOGATOR AUXILIARY WATER JET CONNECTOR: Brand: ENDOGATOR